# Patient Record
Sex: FEMALE | Race: WHITE | NOT HISPANIC OR LATINO | Employment: OTHER | URBAN - METROPOLITAN AREA
[De-identification: names, ages, dates, MRNs, and addresses within clinical notes are randomized per-mention and may not be internally consistent; named-entity substitution may affect disease eponyms.]

---

## 2017-01-03 RX ORDER — LEVOTHYROXINE SODIUM 75 UG/1
TABLET ORAL
Qty: 30 TABLET | Refills: 11 | Status: SHIPPED | OUTPATIENT
Start: 2017-01-03 | End: 2017-06-16

## 2017-01-12 ENCOUNTER — OFFICE VISIT (OUTPATIENT)
Dept: SURGERY | Facility: CLINIC | Age: 37
End: 2017-01-12
Payer: MEDICARE

## 2017-01-12 VITALS
SYSTOLIC BLOOD PRESSURE: 120 MMHG | HEIGHT: 62 IN | BODY MASS INDEX: 32.94 KG/M2 | WEIGHT: 179 LBS | HEART RATE: 112 BPM | OXYGEN SATURATION: 97 % | DIASTOLIC BLOOD PRESSURE: 78 MMHG | RESPIRATION RATE: 19 BRPM | TEMPERATURE: 98 F

## 2017-01-12 DIAGNOSIS — N61.0 CELLULITIS OF LEFT BREAST: Primary | ICD-10-CM

## 2017-01-12 RX ORDER — SODIUM CHLORIDE 0.9 % (FLUSH) 0.9 %
3 SYRINGE (ML) INJECTION EVERY 8 HOURS
Status: CANCELLED | OUTPATIENT
Start: 2017-01-12

## 2017-01-12 RX ORDER — SODIUM CHLORIDE AND POTASSIUM CHLORIDE 150; 900 MG/100ML; MG/100ML
INJECTION, SOLUTION INTRAVENOUS CONTINUOUS
Status: CANCELLED | OUTPATIENT
Start: 2017-01-12

## 2017-01-12 RX ORDER — ONDANSETRON 4 MG/1
4 TABLET, FILM COATED ORAL 4 TIMES DAILY PRN
COMMUNITY
End: 2017-01-26

## 2017-01-12 RX ORDER — ONDANSETRON 2 MG/ML
4 INJECTION INTRAMUSCULAR; INTRAVENOUS EVERY 12 HOURS PRN
Status: CANCELLED | OUTPATIENT
Start: 2017-01-12

## 2017-01-12 RX ORDER — HYDROCODONE BITARTRATE AND ACETAMINOPHEN 5; 325 MG/1; MG/1
1 TABLET ORAL EVERY 4 HOURS PRN
Status: CANCELLED | OUTPATIENT
Start: 2017-01-12

## 2017-01-12 RX ORDER — HYDROCODONE BITARTRATE AND ACETAMINOPHEN 10; 325 MG/1; MG/1
1 TABLET ORAL 3 TIMES DAILY PRN
COMMUNITY
End: 2017-01-26 | Stop reason: SDUPTHER

## 2017-01-12 RX ORDER — OXYCODONE HYDROCHLORIDE 5 MG/1
10 TABLET ORAL EVERY 4 HOURS PRN
Status: CANCELLED | OUTPATIENT
Start: 2017-01-12
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
## 2017-01-12 NOTE — MR AVS SNAPSHOT
[facility]  [address]  [address] [address]  Phone: [phone]  Fax: [phone]                  Jaycee Gray   2017 3:30 PM   Office Visit    Description:  Female : 1980   Provider:  TONY Steward MD   Department:  [facility]           Reason for Visit     Post-op Evaluation     Other           Diagnoses this Visit        Comments    Cellulitis of left breast    -  Primary            To Do List           Future Appointments        Provider Department Dept Phone    2017 3:30 PM TONY Steward MD [facility] [phone]      Goals (5 Years of Data)     None      [facility]     [facility] -  Assistance  Registered nurses in the [facility] provide clinical advisement, health education, appointment booking, and other advisory services.  Call for this free service at [phone].             Medications           Message regarding Medications     Verify the changes and/or additions to your medication regime listed below are the same as discussed with your clinician today.  If any of these changes or additions are incorrect, please notify your healthcare provider.        STOP taking these medications     oxycodone-acetaminophen (PERCOCET)  mg per tablet Take 1 tablet by mouth 2 (two) times daily as needed for Pain.           Verify that the below list of medications is an accurate representation of the medications you are currently taking.  If none reported, the list may be blank. If incorrect, please contact your healthcare provider. Carry this list with you in case of emergency.           Current Medications     hydrocodone-acetaminophen 10-325mg (NORCO)  mg Tab Take 1 tablet by mouth 3 (three) times daily as needed for Pain.    levothyroxine (SYNTHROID) 75 MCG tablet TAKE ONE TABLET BY MOUTH EVERY DAY BEFORE BREAKFAST    ondansetron (ZOFRAN) 4 MG  "tablet Take 4 mg by mouth 4 (four) times daily as needed for Nausea.    tizanidine (ZANAFLEX) 4 MG tablet Take 1 tablet (4 mg total) by mouth every 8 (eight) hours.    zolpidem (AMBIEN) 5 MG Tab Take 1 tablet (5 mg total) by mouth nightly as needed.           Clinical Reference Information           Vital Signs - Last Recorded  Most recent update: 1/12/2017 12:50 PM by Mary Ann Rhoades MA    BP Pulse Temp Resp Ht Wt    120/78 (BP Location: Left arm, Patient Position: Sitting, BP Method: Manual) (!) 112 98.4 °F (36.9 °C) 19 5' 2" (1.575 m) 81.2 kg (179 lb)    LMP SpO2 BMI          04/26/2009 97% 32.74 kg/m2        Blood Pressure          Most Recent Value    BP  120/78      Allergies as of 1/12/2017     Sumatriptan Succinate    Tramadol      Immunizations Administered on Date of Encounter - 1/12/2017     None      "

## 2017-01-26 ENCOUNTER — OFFICE VISIT (OUTPATIENT)
Dept: SURGERY | Facility: CLINIC | Age: 37
End: 2017-01-26
Payer: MEDICARE

## 2017-01-26 VITALS
SYSTOLIC BLOOD PRESSURE: 102 MMHG | DIASTOLIC BLOOD PRESSURE: 60 MMHG | HEART RATE: 94 BPM | BODY MASS INDEX: 32.57 KG/M2 | OXYGEN SATURATION: 99 % | HEIGHT: 62 IN | WEIGHT: 177 LBS | RESPIRATION RATE: 19 BRPM

## 2017-01-26 DIAGNOSIS — N64.89 HEMATOMA OF BREAST: ICD-10-CM

## 2017-01-26 DIAGNOSIS — N61.0 CELLULITIS OF BREAST: Primary | ICD-10-CM

## 2017-01-26 PROCEDURE — 99024 POSTOP FOLLOW-UP VISIT: CPT | Mod: S$GLB,,, | Performed by: PHYSICIAN ASSISTANT

## 2017-01-26 NOTE — MR AVS SNAPSHOT
[facility]  [address] [place] [address]  [address] [place] [address]  Phone: [phone]  Fax: [phone]                  Jaycee Gray   2017 1:15 PM   Office Visit    Description:  Female : 1980   Provider:  Denise Mims PA-C   Department:  [facility]           Reason for Visit     Post-op Evaluation                To Do List           Goals (5 Years of Data)     None      Follow-Up and Disposition     Return if symptoms worsen or fail to improve.      [facility]     [facility] -  Assistance  Registered nurses in the [facility] provide clinical advisement, health education, appointment booking, and other advisory services.  Call for this free service at [phone].             Medications           Message regarding Medications     Verify the changes and/or additions to your medication regime listed below are the same as discussed with your clinician today.  If any of these changes or additions are incorrect, please notify your healthcare provider.        STOP taking these medications     ondansetron (ZOFRAN) 4 MG tablet Take 4 mg by mouth 4 (four) times daily as needed for Nausea.           Verify that the below list of medications is an accurate representation of the medications you are currently taking.  If none reported, the list may be blank. If incorrect, please contact your healthcare provider. Carry this list with you in case of emergency.           Current Medications     levothyroxine (SYNTHROID) 75 MCG tablet TAKE ONE TABLET BY MOUTH EVERY DAY BEFORE BREAKFAST    mupirocin (BACTROBAN) 2 % ointment Apply topically 3 (three) times daily. Apply to nipple/areolar area    tizanidine (ZANAFLEX) 4 MG tablet Take 1 tablet (4 mg total) by mouth every 8 (eight) hours.    zolpidem (AMBIEN) 5 MG Tab Take 1 tablet (5 mg total) by mouth nightly as needed.    hydrocodone-acetaminophen 10-325mg (NORCO)  " mg Tab Take 1 tablet by mouth 4 (four) times daily as needed.    levoFLOXacin (LEVAQUIN) 750 MG tablet Take 1 tablet (750 mg total) by mouth once daily.           Clinical Reference Information           Vital Signs - Last Recorded  Most recent update: 1/26/2017  1:04 PM by Mary Ann Rhoades MA    BP Pulse Resp Ht Wt LMP    102/60 (BP Location: Right arm, Patient Position: Sitting, BP Method: Manual) 94 19 5' 2" (1.575 m) 80.3 kg (177 lb) 04/26/2009    SpO2 BMI             99% 32.37 kg/m2         Blood Pressure          Most Recent Value    BP  102/60      Allergies as of 1/26/2017     Carrot    Sumatriptan Succinate    Tramadol      Immunizations Administered on Date of Encounter - 1/26/2017     None      Instructions    1.  Apply skin lotion to the areola/nipple complex 3 x daily x 3 weeks.  2.  Call our office with any questions/concerns  3.  Return to clinic as needed       "

## 2017-01-26 NOTE — PROGRESS NOTES
History & Physical    SUBJECTIVE:     History of Present Illness:  Patient is a 36 y.o. female presents for hospital follow-up.  Patient was admitted to the hospital for cellulitis of the left lateral breast on 1/12/2017.  The patient was placed on antibiotics and asked to apply warm moist compresses to the breast for several days.  The area became dark showing a resolving hematoma and then the patient   Spiked a temp and the lateral aspect of the breast became erythematous and painful.  An ultrasound was ordered and no fluid/abscess was noted.  The patient was continued on antibiotics.  The following day, the patient spiked a temp and the area in question became redder.  The patient was taken to the OR on 1/18/2017 and under went multiple aspirations of the left breast.  No purulent exudate was obtained.  The antibiotics were discontinued and the patient was discharged the next day.  She returns today and advises she is much better.  She has no complaints today.    Chief Complaint   Patient presents with    Post-op Evaluation       Review of patient's allergies indicates:   Allergen Reactions    Carrot Hives and Shortness Of Breath    Sumatriptan succinate Other (See Comments)     paralysis    Tramadol Other (See Comments)     Faces swells       Current Outpatient Prescriptions   Medication Sig Dispense Refill    levothyroxine (SYNTHROID) 75 MCG tablet TAKE ONE TABLET BY MOUTH EVERY DAY BEFORE BREAKFAST 30 tablet 11    mupirocin (BACTROBAN) 2 % ointment Apply topically 3 (three) times daily. Apply to nipple/areolar area 1 Tube 1    tizanidine (ZANAFLEX) 4 MG tablet Take 1 tablet (4 mg total) by mouth every 8 (eight) hours. 30 tablet 11    zolpidem (AMBIEN) 5 MG Tab Take 1 tablet (5 mg total) by mouth nightly as needed. 30 tablet 2    hydrocodone-acetaminophen 10-325mg (NORCO)  mg Tab Take 1 tablet by mouth 4 (four) times daily as needed. 32 tablet 0    levoFLOXacin (LEVAQUIN) 750 MG tablet Take 1  "tablet (750 mg total) by mouth once daily. 7 tablet 0    [DISCONTINUED] escitalopram (LEXAPRO) 10 MG tablet Take 1 tablet (10 mg total) by mouth once daily. 30 tablet 11    [DISCONTINUED] ranitidine (ZANTAC) 150 MG tablet Take 1 tablet (150 mg total) by mouth 2 (two) times daily. 60 tablet 11    [DISCONTINUED] topiramate (TOPAMAX) 50 MG tablet Take 1 tablet (50 mg total) by mouth 2 (two) times daily. 60 tablet 0     No current facility-administered medications for this visit.        Past Medical History   Diagnosis Date    Anxiety     Breast abscess     Depression     Endometriosis of uterus     Headache(784.0)     Herpes     History of psychiatric care     History of psychiatric hospitalization     Muscular dystrophy     Psychiatric problem     PTSD (post-traumatic stress disorder)     Seizures     Self-injurious behavior      Past Surgical History   Procedure Laterality Date    Knee surgery Bilateral     Hysterectomy       TLH vs LAVH with BSO    Appendectomy      Breast surgery       reduction     Family History   Problem Relation Age of Onset    Cancer Maternal Grandfather      colon    Heart disease Neg Hx      Social History   Substance Use Topics    Smoking status: Former Smoker     Packs/day: 0.50     Years: 3.00     Types: Cigarettes    Smokeless tobacco: Never Used      Comment: quit 4/2015    Alcohol use Yes      Comment: at parties or holidays        Review of Systems:  Review of Systems   Skin: Negative for color change, pallor, rash and wound.        S/P Cellulitis and resolving hematoma to left breast       OBJECTIVE:     Vital Signs (Most Recent)  Pulse: 94 (01/26/17 1301)  Resp: 19 (01/26/17 1301)  BP: 102/60 (01/26/17 1301)  SpO2: 99 % (01/26/17 1301)  5' 2" (1.575 m)  80.3 kg (177 lb)     Physical Exam:  Physical Exam   Constitutional: She is oriented to person, place, and time. She appears well-developed and well-nourished. No distress.   HENT:   Head: Normocephalic and " atraumatic.   Right Ear: External ear normal.   Left Ear: External ear normal.   Nose: Nose normal.   Eyes: Conjunctivae and EOM are normal. Pupils are equal, round, and reactive to light. Right eye exhibits no discharge. Left eye exhibits no discharge. No scleral icterus.   Neck: Normal range of motion. Neck supple. No tracheal deviation present.   Cardiovascular: Normal rate, regular rhythm and normal heart sounds.  Exam reveals no gallop and no friction rub.    No murmur heard.  Pulmonary/Chest: Effort normal and breath sounds normal. No stridor. No respiratory distress. She has no wheezes. She has no rales.   Abdominal: Soft. Bowel sounds are normal. She exhibits no distension. There is no tenderness.   Musculoskeletal:   Decreased ROM secondary to MD.   Neurological: She is alert and oriented to person, place, and time. She displays abnormal reflex. Coordination (Patient has MD.) abnormal.   Skin: Skin is warm and dry. No rash noted. She is not diaphoretic. No erythema. No pallor.   Left Breast: no erythema, no bruising noted.  Hematoma completely resolved.  Areola/Nipple complex is completely healed.  NTTP.   Psychiatric: She has a normal mood and affect. Her behavior is normal. Judgment and thought content normal.   Nursing note and vitals reviewed.      Laboratory  Microbiology: Reviewed    Diagnostic Results:      ASSESSMENT/PLAN:     1.  Cellulitis of the Left Lateral Breast -- resolved  2.  Hematoma of the Left Inferior Breast  -- resolved  3.  H/o MD  4.  H/o Anxiety  5.  H/o H/A  6.  H/o Psyc issues including PTSD    PLAN:Plan     1.  Patient reassured of resolution of cellulitis and hematoma  2.  Patient advised to apply skin lotion to the areola/nipple complex 3 x daily x 3 weeks.  3.  Call our office with any questions/concerns  4.  RTC prn  5.  Dr Steward advised of above.

## 2017-01-26 NOTE — PATIENT INSTRUCTIONS
1.  Apply skin lotion to the areola/nipple complex 3 x daily x 3 weeks.  2.  Call our office with any questions/concerns  3.  Return to clinic as needed

## 2017-02-02 DIAGNOSIS — J02.9 PHARYNGITIS, UNSPECIFIED ETIOLOGY: ICD-10-CM

## 2017-02-02 RX ORDER — ZOLPIDEM TARTRATE 5 MG/1
TABLET ORAL
Qty: 30 TABLET | Refills: 2 | Status: SHIPPED | OUTPATIENT
Start: 2017-02-02 | End: 2017-04-24 | Stop reason: SDUPTHER

## 2017-02-06 ENCOUNTER — OFFICE VISIT (OUTPATIENT)
Dept: SURGERY | Facility: CLINIC | Age: 37
End: 2017-02-06
Payer: MEDICARE

## 2017-02-06 VITALS
SYSTOLIC BLOOD PRESSURE: 102 MMHG | BODY MASS INDEX: 33.31 KG/M2 | WEIGHT: 181 LBS | HEIGHT: 62 IN | OXYGEN SATURATION: 98 % | RESPIRATION RATE: 18 BRPM | HEART RATE: 84 BPM | DIASTOLIC BLOOD PRESSURE: 60 MMHG

## 2017-02-06 DIAGNOSIS — L65.9 HAIR LOSS: ICD-10-CM

## 2017-02-06 DIAGNOSIS — N64.4 MASTODYNIA OF LEFT BREAST: Primary | ICD-10-CM

## 2017-02-06 DIAGNOSIS — R11.2 NAUSEA AND VOMITING, INTRACTABILITY OF VOMITING NOT SPECIFIED, UNSPECIFIED VOMITING TYPE: ICD-10-CM

## 2017-02-06 PROCEDURE — 99214 OFFICE O/P EST MOD 30 MIN: CPT | Mod: 24,S$GLB,, | Performed by: PHYSICIAN ASSISTANT

## 2017-02-06 RX ORDER — HYDROCODONE BITARTRATE AND ACETAMINOPHEN 7.5; 325 MG/1; MG/1
1 TABLET ORAL EVERY 6 HOURS PRN
Qty: 32 TABLET | Refills: 0 | Status: SHIPPED | OUTPATIENT
Start: 2017-02-06 | End: 2017-02-06 | Stop reason: ALTCHOICE

## 2017-02-06 RX ORDER — OXYCODONE AND ACETAMINOPHEN 10; 325 MG/1; MG/1
1 TABLET ORAL EVERY 6 HOURS PRN
Qty: 30 TABLET | Refills: 0 | Status: SHIPPED | OUTPATIENT
Start: 2017-02-06 | End: 2017-02-13

## 2017-02-06 RX ORDER — MUPIROCIN 20 MG/G
OINTMENT TOPICAL 2 TIMES DAILY
Qty: 1 TUBE | Refills: 1 | Status: SHIPPED | OUTPATIENT
Start: 2017-02-06 | End: 2017-02-13

## 2017-02-06 RX ORDER — PROMETHAZINE HYDROCHLORIDE 25 MG/1
25 TABLET ORAL EVERY 4 HOURS
Qty: 20 TABLET | Refills: 0 | Status: SHIPPED | OUTPATIENT
Start: 2017-02-06 | End: 2017-02-13

## 2017-02-06 NOTE — PATIENT INSTRUCTIONS
1.  Rx Percocet prn pain  2.  Rx Phenergan prn Nausea/Vomiting  3.  Rx Bactroban to apply to left areola twice a day after cleaning site.  4.  F/u one week  5.  Call our office with any questions/concerns  6.  Labs to check Thyroid Functions

## 2017-02-06 NOTE — MR AVS SNAPSHOT
[facility]  [address]  [address] [address]  Phone: [phone]  Fax: [phone]                  Jaycee Gray   2017 1:45 PM   Office Visit    Description:  Female : 1980   Provider:  Denise Mims PA-C   Department:  [facility]           Reason for Visit     Breast Problem           Diagnoses this Visit        Comments    Mastodynia of left breast    -  Primary     Nausea and vomiting, intractability of vomiting not specified, unspecified vomiting type         Hair loss                To Do List           Future Appointments        Provider Department Dept Phone    2017 1:45 PM Denise Mims PA-C [facility] [phone]    2017 1:45 PM TONY Steward MD [facility] [phone]      Goals (5 Years of Data)     None      Follow-Up and Disposition     Return in about 7 days (around 2017) for recheck.       These Medications        Disp Refills Start End    mupirocin (BACTROBAN) 2 % ointment 1 Tube 1 2017    Apply topically 2 (two) times daily. - Topical (Top)    Pharmacy: [facility] [address] [place], [place] [id number] [facility] [address] Ph #: [phone]       oxycodone-acetaminophen (PERCOCET)  mg per tablet 30 tablet 0 2017    Take 1 tablet by mouth every 6 (six) hours as needed for Pain. - Oral    [facility]: [facility] [address] [place], [place] [id number] [facility] [address] Ph #: [phone]       promethazine (PHENERGAN) 25 MG tablet 20 tablet 0 2017     Take 1 tablet (25 mg total) by mouth every 4 (four) hours. - Oral    [facility] [address] [place], [place] [id number] [address] AT [facility] [address] Ph #: [phone]         [facility]     [facility] Care [facility] -  Assistance  Registered nurses in the [facility]  [facility] provide clinical advisement, health education, appointment booking, and other advisory services.  Call for this free service at [phone].             Medications           Message regarding Medications     Verify the changes and/or additions to your medication regime listed below are the same as discussed with your clinician today.  If any of these changes or additions are incorrect, please notify your healthcare provider.        START taking these NEW medications        Refills    mupirocin (BACTROBAN) 2 % ointment 1    Sig: Apply topically 2 (two) times daily.    Class: Print    Route: Topical (Top)    oxycodone-acetaminophen (PERCOCET)  mg per tablet 0    Sig: Take 1 tablet by mouth every 6 (six) hours as needed for Pain.    Class: Print    Route: Oral    promethazine (PHENERGAN) 25 MG tablet 0    Sig: Take 1 tablet (25 mg total) by mouth every 4 (four) hours.    Class: Print    Route: Oral           Verify that the below list of medications is an accurate representation of the medications you are currently taking.  If none reported, the list may be blank. If incorrect, please contact your healthcare provider. Carry this list with you in case of emergency.           Current Medications     levothyroxine (SYNTHROID) 75 MCG tablet TAKE ONE TABLET BY MOUTH EVERY DAY BEFORE BREAKFAST    tizanidine (ZANAFLEX) 4 MG tablet Take 1 tablet (4 mg total) by mouth every 8 (eight) hours.    zolpidem (AMBIEN) 5 MG Tab TAKE ONE TABLET BY MOUTH NIGHTLY AS NEEDED    mupirocin (BACTROBAN) 2 % ointment Apply topically 2 (two) times daily.    oxycodone-acetaminophen (PERCOCET)  mg per tablet Take 1 tablet by mouth every 6 (six) hours as needed for Pain.    promethazine (PHENERGAN) 25 MG tablet Take 1 tablet (25 mg total) by mouth every 4 (four) hours.           Clinical Reference Information           Your Vitals Were     BP Pulse Resp Height Weight Last Period    102/60 (BP Location: Right arm, Patient  "Position: Sitting, BP Method: Manual) 84 18 5' 2" (1.575 m) 82.1 kg (181 lb) 04/26/2009    SpO2 BMI             98% 33.11 kg/m2         Blood Pressure          Most Recent Value    BP  102/60      Allergies as of 2/6/2017     Carrot    Sumatriptan Succinate    Tramadol      Immunizations Administered on Date of Encounter - 2/6/2017     None      Orders Placed During Today's Visit     Future Labs/Procedures Expected by Expires    T3  2/6/2017 4/7/2018    T4  2/6/2017 4/7/2018    TSH  2/6/2017 4/7/2018      Instructions    1.  Rx Percocet prn pain  2.  Rx Phenergan prn Nausea/Vomiting  3.  Rx Bactroban to apply to left areola twice a day after cleaning site.  4.  F/u one week  5.  Call our office with any questions/concerns       Language Assistance Services     ATTENTION: Language assistance services are available, free of charge. Please call [phone].      ATENCIÓN: Si habla marnie, tiene a razo disposición servicios gratuitos de asistencia lingüística. Llame al [phone].     PARIS Ý: N?u b?n nói Ti?ng Vi?t, có các d?ch v? h? tr? ngôn ng? mi?n phí dành cho b?n. G?i s? [phone].         [facility] complies with applicable Federal civil rights laws and does not discriminate on the basis of race, color, national origin, age, disability, or sex.        "

## 2017-02-06 NOTE — PROGRESS NOTES
History & Physical    SUBJECTIVE:     History of Present Illness:  Patient is a 36 y.o. female presents with complaints of a new problem with the left breast.  Patient advises of a mass that came up to the mid & lateral inferior aspect of the breast on Friday and has progressively increased in size.  Patient complains of a lot of pain with palpation of the site.  Patient also complains of nausea and vomiting due to the pain.  Patient denies nipple discharge.  Patient denies erythema or calor to the breast.  Patient also complains of hair loss that started with her breast reduction surgery last year.  I have reviewed patient's medical history, past surgeries, social history, medications and allergies.    Chief Complaint   Patient presents with    Breast Problem     Left Breast pain       Review of patient's allergies indicates:   Allergen Reactions    Carrot Hives and Shortness Of Breath    Sumatriptan succinate Other (See Comments)     paralysis    Tramadol Other (See Comments)     Faces swells       Current Outpatient Prescriptions   Medication Sig Dispense Refill    levothyroxine (SYNTHROID) 75 MCG tablet TAKE ONE TABLET BY MOUTH EVERY DAY BEFORE BREAKFAST 30 tablet 11    tizanidine (ZANAFLEX) 4 MG tablet Take 1 tablet (4 mg total) by mouth every 8 (eight) hours. 30 tablet 11    zolpidem (AMBIEN) 5 MG Tab TAKE ONE TABLET BY MOUTH NIGHTLY AS NEEDED 30 tablet 2    hydrocodone-acetaminophen 7.5-325mg (NORCO) 7.5-325 mg per tablet Take 1 tablet by mouth every 6 (six) hours as needed for Pain. 32 tablet 0    mupirocin (BACTROBAN) 2 % ointment Apply topically 2 (two) times daily. 1 Tube 1    [DISCONTINUED] escitalopram (LEXAPRO) 10 MG tablet Take 1 tablet (10 mg total) by mouth once daily. 30 tablet 11    [DISCONTINUED] ranitidine (ZANTAC) 150 MG tablet Take 1 tablet (150 mg total) by mouth 2 (two) times daily. 60 tablet 11    [DISCONTINUED] topiramate (TOPAMAX) 50 MG tablet Take 1 tablet (50 mg total) by  mouth 2 (two) times daily. 60 tablet 0     No current facility-administered medications for this visit.        Past Medical History   Diagnosis Date    Anxiety     Breast abscess     Depression     Endometriosis of uterus     Headache(784.0)     Herpes     History of psychiatric care     History of psychiatric hospitalization     Muscular dystrophy     Psychiatric problem     PTSD (post-traumatic stress disorder)     Seizures     Self-injurious behavior      Past Surgical History   Procedure Laterality Date    Knee surgery Bilateral     Hysterectomy       TLH vs LAVH with BSO    Appendectomy      Breast surgery       reduction     Family History   Problem Relation Age of Onset    Cancer Maternal Grandfather      colon    Heart disease Neg Hx      Social History   Substance Use Topics    Smoking status: Former Smoker     Packs/day: 0.50     Years: 3.00     Types: Cigarettes    Smokeless tobacco: Never Used      Comment: quit 4/2015    Alcohol use Yes      Comment: at parties or holidays        Review of Systems:  Review of Systems   Constitutional: Positive for activity change (secondary to pain) and appetite change (secondary to nausea and vomiting). Negative for chills, diaphoresis, fatigue and fever.   HENT: Negative for congestion, postnasal drip, rhinorrhea, sinus pressure, sneezing and sore throat.    Respiratory: Negative for cough, choking, shortness of breath, wheezing and stridor.    Cardiovascular: Negative for chest pain and palpitations.   Gastrointestinal: Positive for nausea (secondary to pain) and vomiting (secondary to pain). Negative for abdominal distention, abdominal pain, constipation and diarrhea.   Musculoskeletal: Positive for arthralgias, back pain and myalgias. Negative for neck pain and neck stiffness.   Skin: Positive for wound (on the areaola). Negative for color change, pallor and rash.   Neurological: Positive for weakness. Negative for dizziness, seizures,  "light-headedness and headaches.   Psychiatric/Behavioral: Negative for agitation and confusion. The patient is not nervous/anxious and is not hyperactive.        OBJECTIVE:     Vital Signs (Most Recent)  Pulse: 84 (02/06/17 1255)  Resp: 18 (02/06/17 1255)  BP: 102/60 (02/06/17 1255)  SpO2: 98 % (02/06/17 1255)  5' 2" (1.575 m)  82.1 kg (181 lb)     Physical Exam:  Physical Exam   Constitutional: She is oriented to person, place, and time. She appears well-developed and well-nourished. No distress.   HENT:   Head: Normocephalic and atraumatic.   Right Ear: External ear normal.   Left Ear: External ear normal.   Nose: Nose normal.   Eyes: Conjunctivae and EOM are normal. Pupils are equal, round, and reactive to light. No scleral icterus.   Neck: Normal range of motion. Neck supple. No tracheal deviation present.   Cardiovascular: Normal rate, regular rhythm and normal heart sounds.  Exam reveals no gallop and no friction rub.    No murmur heard.  Pulmonary/Chest: Effort normal and breath sounds normal. No stridor. No respiratory distress. She has no wheezes. She has no rales.   Abdominal: Soft. Bowel sounds are normal. She exhibits no distension. There is no tenderness.   Musculoskeletal:   Decreased ROM secondary to MDA     Neurological: She is alert and oriented to person, place, and time. Coordination (secondary to MDA) abnormal.   Skin: Skin is warm and dry. No rash noted. She is not diaphoretic. No erythema. No pallor.   "Golf-ball" size mass to the mid-lateral inferior aspect of the left breast, no erythema, no calor, mobile, exquisitely TTP.   Psychiatric: She has a normal mood and affect. Her behavior is normal. Judgment and thought content normal.   Nursing note and vitals reviewed.      Laboratory  No Recent TFT's    Diagnostic Results:      ASSESSMENT/PLAN:     1.  Mastodynia of Left Breast  2.  Nausea with Vomiting  3.  Hair loss  4.  H/o MDA  5.  H/o Anxiety  6.  H/o Depression  7.  H/o PTSD    PLAN:Plan "     1.  Rx Bactroban to apply to left areola BID x 7 days  2.  Rx Phenergan 25 mg one po Q 4 HRS prn N/V  3.  Rx Percocet 10/325 mg one po Q 6 HRS prn pain  4.  Re-evaluate left breast in one week  5.  Patient advised to call our office if sxs become worse.  6.  Dr Steward is aware of above.  7.  TFT's to check thyroid function

## 2017-02-13 ENCOUNTER — OFFICE VISIT (OUTPATIENT)
Dept: SURGERY | Facility: CLINIC | Age: 37
End: 2017-02-13
Payer: MEDICARE

## 2017-02-13 VITALS
OXYGEN SATURATION: 98 % | HEIGHT: 62 IN | TEMPERATURE: 98 F | BODY MASS INDEX: 32.82 KG/M2 | RESPIRATION RATE: 18 BRPM | HEART RATE: 96 BPM | WEIGHT: 178.38 LBS | DIASTOLIC BLOOD PRESSURE: 60 MMHG | SYSTOLIC BLOOD PRESSURE: 102 MMHG

## 2017-02-13 DIAGNOSIS — N64.4 BREAST PAIN: Primary | ICD-10-CM

## 2017-02-13 DIAGNOSIS — R11.2 NAUSEA AND VOMITING, INTRACTABILITY OF VOMITING NOT SPECIFIED, UNSPECIFIED VOMITING TYPE: ICD-10-CM

## 2017-02-13 DIAGNOSIS — F41.9 ANXIETY: ICD-10-CM

## 2017-02-13 PROCEDURE — 99214 OFFICE O/P EST MOD 30 MIN: CPT | Mod: S$GLB,,, | Performed by: PHYSICIAN ASSISTANT

## 2017-02-13 RX ORDER — PROMETHAZINE HYDROCHLORIDE 25 MG/1
25 TABLET ORAL EVERY 4 HOURS PRN
Qty: 12 TABLET | Refills: 0 | Status: SHIPPED | OUTPATIENT
Start: 2017-02-13 | End: 2017-03-08

## 2017-02-13 RX ORDER — OXYCODONE AND ACETAMINOPHEN 5; 325 MG/1; MG/1
1 TABLET ORAL 3 TIMES DAILY PRN
Qty: 24 TABLET | Refills: 0 | Status: SHIPPED | OUTPATIENT
Start: 2017-02-13 | End: 2017-03-08

## 2017-02-13 RX ORDER — LORAZEPAM 0.5 MG/1
0.5 TABLET ORAL 2 TIMES DAILY PRN
Qty: 30 TABLET | Refills: 0 | Status: SHIPPED | OUTPATIENT
Start: 2017-02-13 | End: 2017-03-08

## 2017-02-13 NOTE — MR AVS SNAPSHOT
[facility]  [address]  [address]  [place] [address]  Phone: [phone]  Fax: [phone]                  Jaycee Gray   2017 1:45 PM   Office Visit    Description:  Female : 1980   Provider:  Denise Mims PA-C   Department:  [facility]           Reason for Visit     Follow-up           Diagnoses this Visit        Comments    Breast pain    -  Primary     Nausea and vomiting, intractability of vomiting not specified, unspecified vomiting type         Anxiety                To Do List           Goals (5 Years of Data)     None      Follow-Up and Disposition     Return if symptoms worsen or fail to improve.       These Medications        Disp Refills Start End    oxycodone-acetaminophen (PERCOCET) 5-325 mg per tablet 24 tablet 0 2017     Take 1 tablet by mouth 3 (three) times daily as needed for Pain. - Oral    Pharmacy: [facility] [place] [address] AT [facility] [address] Ph #: [phone]       promethazine (PHENERGAN) 25 MG tablet 12 tablet 0 2017     Take 1 tablet (25 mg total) by mouth every 4 (four) hours as needed for Nausea. - Oral    Pharmacy: [facility] [place] [address] AT [facility] [address] Ph #: [phone]       lorazepam (ATIVAN) 0.5 MG tablet 30 tablet 0 2017 3/15/2017    Take 1 tablet (0.5 mg total) by mouth 2 (two) times daily as needed for Anxiety. - Oral    Ph[facility] [place] [address] [facility] [address] Ph #: [phone]         [facility]     [facility] On Call Nurse Care Line -  Assistance  Registered nurses in the [facility] provide clinical advisement, health education, appointment booking, and other advisory services.  Call for this free service at [phone].             Medications           Message regarding  Medications     Verify the changes and/or additions to your medication regime listed below are the same as discussed with your clinician today.  If any of these changes or additions are incorrect, please notify your healthcare provider.        START taking these NEW medications        Refills    oxycodone-acetaminophen (PERCOCET) 5-325 mg per tablet 0    Sig: Take 1 tablet by mouth 3 (three) times daily as needed for Pain.    Class: Print    Route: Oral    promethazine (PHENERGAN) 25 MG tablet 0    Sig: Take 1 tablet (25 mg total) by mouth every 4 (four) hours as needed for Nausea.    Class: Print    Route: Oral    lorazepam (ATIVAN) 0.5 MG tablet 0    Sig: Take 1 tablet (0.5 mg total) by mouth 2 (two) times daily as needed for Anxiety.    Class: Print    Route: Oral      STOP taking these medications     oxycodone-acetaminophen (PERCOCET)  mg per tablet Take 1 tablet by mouth every 6 (six) hours as needed for Pain.           Verify that the below list of medications is an accurate representation of the medications you are currently taking.  If none reported, the list may be blank. If incorrect, please contact your healthcare provider. Carry this list with you in case of emergency.           Current Medications     levothyroxine (SYNTHROID) 75 MCG tablet TAKE ONE TABLET BY MOUTH EVERY DAY BEFORE BREAKFAST    mupirocin (BACTROBAN) 2 % ointment Apply topically 2 (two) times daily.    tizanidine (ZANAFLEX) 4 MG tablet Take 1 tablet (4 mg total) by mouth every 8 (eight) hours.    zolpidem (AMBIEN) 5 MG Tab TAKE ONE TABLET BY MOUTH NIGHTLY AS NEEDED    lorazepam (ATIVAN) 0.5 MG tablet Take 1 tablet (0.5 mg total) by mouth 2 (two) times daily as needed for Anxiety.    oxycodone-acetaminophen (PERCOCET) 5-325 mg per tablet Take 1 tablet by mouth 3 (three) times daily as needed for Pain.    promethazine (PHENERGAN) 25 MG tablet Take 1 tablet (25 mg total) by mouth every 4 (four) hours as needed for Nausea.          "  Clinical Reference Information           Your Vitals Were     BP Pulse Temp Resp Height Weight    102/60 (BP Location: Right arm, Patient Position: Sitting, BP Method: Manual) 96 98.3 °F (36.8 °C) 18 5' 2" (1.575 m) 80.9 kg (178 lb 6.4 oz)    Last Period SpO2 BMI          04/26/2009 (Exact Date) 98% 32.63 kg/m2        Blood Pressure          Most Recent Value    BP  102/60      Allergies as of 2/13/2017     Carrot    Sumatriptan Succinate    Tramadol      Immunizations Administered on Date of Encounter - 2/13/2017     None      Instructions    1.  Rx Phenergan 25 mg one po every 4 hours as needed for Nausea/vomiting  2.  Rx Percocet 5/325 mg #24 one po TID prn pain  3.  Rx Ativan 0.5 mg one po BID prn anxiety  4.  The left breast mass should continue to decrease in size  5.  Call our office with any questions/concerns       Language Assistance Services     ATTENTION: Language assistance services are available, free of charge. Please call [phone].      ATENCIÓN: Si habla español, tiene a razo disposición servicios gratuitos de asistencia lingüística. Llame al [phone].     PARIS Ý: N?u b?n nói Ti?ng Vi?t, có các d?ch v? h? tr? ngôn ng? mi?n phí dành cho b?n. G?i s? [phone].         [facility] complies with applicable Federal civil rights laws and does not discriminate on the basis of race, color, national origin, age, disability, or sex.        "

## 2017-02-13 NOTE — PATIENT INSTRUCTIONS
1.  Rx Phenergan 25 mg one po every 4 hours as needed for Nausea/vomiting  2.  Rx Percocet 5/325 mg #24 one po TID prn pain  3.  Rx Ativan 0.5 mg one po BID prn anxiety  4.  The left breast mass should continue to decrease in size  5.  Call our office with any questions/concerns

## 2017-03-08 ENCOUNTER — OFFICE VISIT (OUTPATIENT)
Dept: FAMILY MEDICINE | Facility: CLINIC | Age: 37
End: 2017-03-08
Payer: MEDICARE

## 2017-03-08 ENCOUNTER — TELEPHONE (OUTPATIENT)
Dept: FAMILY MEDICINE | Facility: CLINIC | Age: 37
End: 2017-03-08

## 2017-03-08 VITALS
RESPIRATION RATE: 16 BRPM | WEIGHT: 180.75 LBS | DIASTOLIC BLOOD PRESSURE: 80 MMHG | SYSTOLIC BLOOD PRESSURE: 126 MMHG | OXYGEN SATURATION: 99 % | BODY MASS INDEX: 33.06 KG/M2 | HEART RATE: 91 BPM

## 2017-03-08 DIAGNOSIS — R10.32 LEFT LOWER QUADRANT PAIN: Primary | ICD-10-CM

## 2017-03-08 PROCEDURE — 99214 OFFICE O/P EST MOD 30 MIN: CPT | Mod: S$PBB,,,

## 2017-03-08 PROCEDURE — 99213 OFFICE O/P EST LOW 20 MIN: CPT | Mod: PBBFAC,PO

## 2017-03-08 PROCEDURE — 99999 PR PBB SHADOW E&M-EST. PATIENT-LVL III: CPT | Mod: PBBFAC,,,

## 2017-03-08 RX ORDER — METRONIDAZOLE 500 MG/1
500 TABLET ORAL 3 TIMES DAILY
Qty: 30 TABLET | Refills: 0 | Status: SHIPPED | OUTPATIENT
Start: 2017-03-08 | End: 2017-03-13

## 2017-03-08 RX ORDER — CIPROFLOXACIN 500 MG/1
500 TABLET ORAL 2 TIMES DAILY
Qty: 20 TABLET | Refills: 0 | Status: SHIPPED | OUTPATIENT
Start: 2017-03-08 | End: 2017-03-13

## 2017-03-08 RX ORDER — METRONIDAZOLE 500 MG/1
500 TABLET ORAL 3 TIMES DAILY
Qty: 30 TABLET | Refills: 0 | Status: SHIPPED | OUTPATIENT
Start: 2017-03-08 | End: 2017-03-08 | Stop reason: SDUPTHER

## 2017-03-08 RX ORDER — CIPROFLOXACIN 500 MG/1
500 TABLET ORAL 2 TIMES DAILY
Qty: 20 TABLET | Refills: 0 | Status: SHIPPED | OUTPATIENT
Start: 2017-03-08 | End: 2017-03-08 | Stop reason: SDUPTHER

## 2017-03-08 NOTE — PROGRESS NOTES
Subjective:       Patient ID: Jaycee Gray is a 36 y.o. female.    Chief Complaint: LLQ pain (10/10)    HPI Complaining of pain in her LLQ of severe intensity. She thinks that taking pain medications has been masking her pain. She was prescribed over 250 opioid pain medications since November. This was for post-op pain from a breast reduction and subsequent complications including an abscess and infection that required 3 surgical procedures. She is concerned it is related to endometriosis and is scheduled to consult with gyn next week. She had serious pain last week while in [facility]. She vomits after meals. No weight loss. She is discouraged. LLQ stabbing. Daily constant but gets worse at times. Standing and walking make the pain worse. She consulted with Dr. Cavazos for back pain. He advised her not to do epidural injections. BM or passing gas doesn't help. She had blood in her stool. She is not currently sexually active.     Review of Systems   Constitutional: Positive for appetite change and diaphoresis. Negative for activity change, chills, fatigue and fever.   HENT: Negative.    Gastrointestinal: Positive for abdominal distention, abdominal pain, blood in stool, nausea and vomiting. Negative for anal bleeding, diarrhea and rectal pain.   Endocrine: Negative.    Genitourinary: Positive for hematuria and pelvic pain. Negative for difficulty urinating, dysuria, flank pain, urgency, vaginal bleeding and vaginal discharge.   Musculoskeletal: Positive for back pain.   Skin: Negative for rash.   Neurological: Positive for light-headedness.   Hematological: Negative.    Psychiatric/Behavioral: Negative for dysphoric mood.       Objective:      Vitals:    03/08/17 1300   BP: 126/80   Pulse: 91   Resp: 16     Physical Exam   Constitutional: She is oriented to person, place, and time. She appears well-developed and well-nourished. She is active.  Non-toxic appearance. She does not have a sickly appearance. She does  not appear ill. No distress.   HENT:   Head: Normocephalic and atraumatic.   Right Ear: External ear normal.   Left Ear: External ear normal.   Nose: Nose normal.   Hearing normal.    Eyes: Conjunctivae are normal. Pupils are equal, round, and reactive to light.   Neck: Normal range of motion. Neck supple. No thyroid mass and no thyromegaly present.   Cardiovascular: Normal rate, regular rhythm, normal heart sounds and intact distal pulses.  Exam reveals no gallop and no friction rub.    No murmur heard.  Pulses:       Dorsalis pedis pulses are 2+ on the right side, and 2+ on the left side.        Posterior tibial pulses are 2+ on the right side, and 2+ on the left side.   Pulmonary/Chest: Effort normal and breath sounds normal. No respiratory distress. She exhibits no tenderness.   Abdominal: Soft. Bowel sounds are normal. There is tenderness.   Musculoskeletal: Normal range of motion. She exhibits no edema.   Lymphadenopathy:     She has no cervical adenopathy.   Neurological: She is alert and oriented to person, place, and time. She has normal strength. Coordination and gait normal.   Skin: Skin is warm and dry. She is not diaphoretic. No pallor.   Psychiatric: She has a normal mood and affect. Her speech is normal and behavior is normal. Judgment and thought content normal. Cognition and memory are normal.   Vitals reviewed.      Assessment:       1. Left lower quadrant pain        Plan:       Left lower quadrant pain  -     CT Abdomen Without Contrast; Future; Expected date: 3/8/17  -     Discontinue: ciprofloxacin HCl (CIPRO) 500 MG tablet; Take 1 tablet (500 mg total) by mouth 2 (two) times daily.  Dispense: 20 tablet; Refill: 0  -     Discontinue: metronidazole (FLAGYL) 500 MG tablet; Take 1 tablet (500 mg total) by mouth 3 (three) times daily.  Dispense: 30 tablet; Refill: 0  -     Ambulatory referral to General Surgery  -     ciprofloxacin HCl (CIPRO) 500 MG tablet; Take 1 tablet (500 mg total) by mouth 2  (two) times daily.  Dispense: 20 tablet; Refill: 0  -     metronidazole (FLAGYL) 500 MG tablet; Take 1 tablet (500 mg total) by mouth 3 (three) times daily.  Dispense: 30 tablet; Refill: 0      Return in about 4 weeks (around 4/5/2017).

## 2017-03-08 NOTE — TELEPHONE ENCOUNTER
----- Message from Ashwini Amanda sent at 3/8/2017  2:46 PM CST -----  taye langley in [place]   [phone]  Called to say script given to patient today has a major drug interaction with something else she is taking       cipro and zanaflex              Call pharmacy

## 2017-03-08 NOTE — MR AVS SNAPSHOT
[facility]  [address] [place] [address]  Phone: [phone]  Fax: [phone]                  Jaycee Gray   3/8/2017 1:00 PM   Office Visit    Description:  Female : 1980   Provider:  Matthew Hartman Jr., MD   Department:  [facility]           Reason for Visit     LLQ pain           Diagnoses this Visit        Comments    Left lower quadrant pain    -  Primary            To Do List           Future Appointments        Provider Department Dept Phone    3/13/2017 2:30 PM Jena Jackson MD [place] - [phone]      Goals (5 Years of Data)     None      Follow-Up and Disposition     Return in about 4 weeks (around 2017).       These Medications        Disp Refills Start End    ciprofloxacin HCl (CIPRO) 500 MG tablet 20 tablet 0 3/8/2017 3/18/2017    Take 1 tablet (500 mg total) by mouth 2 (two) times daily. - Oral    Pharmacy: [id number] - [place] - [address] Ph #: [phone]       metronidazole (FLAGYL) 500 MG tablet 30 tablet 0 3/8/2017 3/18/2017    Take 1 tablet (500 mg total) by mouth 3 (three) times daily. - Oral    Pharmacy: [id number] - [place] - [address]  #: [phone]         [facility]     [facility] On Call Nurse Care Line -  Assistance  Registered nurses in the [facility] provide clinical advisement, health education, appointment booking, and other advisory services.  Call for this free service at [phone].             Medications           Message regarding Medications     Verify the changes and/or additions to your medication regime listed below are the same as discussed with your clinician today.  If any of these changes or additions are incorrect, please notify your healthcare provider.        START taking these NEW medications        Refills    ciprofloxacin HCl (CIPRO) 500 MG tablet 0    Sig: Take 1 tablet (500 mg total) by mouth 2 (two) times daily.    Class:  Normal    Route: Oral    metronidazole (FLAGYL) 500 MG tablet 0    Sig: Take 1 tablet (500 mg total) by mouth 3 (three) times daily.    Class: Normal    Route: Oral      STOP taking these medications     lorazepam (ATIVAN) 0.5 MG tablet Take 1 tablet (0.5 mg total) by mouth 2 (two) times daily as needed for Anxiety.    oxycodone-acetaminophen (PERCOCET) 5-325 mg per tablet Take 1 tablet by mouth 3 (three) times daily as needed for Pain.    promethazine (PHENERGAN) 25 MG tablet Take 1 tablet (25 mg total) by mouth every 4 (four) hours as needed for Nausea.    tizanidine (ZANAFLEX) 4 MG tablet Take 1 tablet (4 mg total) by mouth every 8 (eight) hours.           Verify that the below list of medications is an accurate representation of the medications you are currently taking.  If none reported, the list may be blank. If incorrect, please contact your healthcare provider. Carry this list with you in case of emergency.           Current Medications     levothyroxine (SYNTHROID) 75 MCG tablet TAKE ONE TABLET BY MOUTH EVERY DAY BEFORE BREAKFAST    zolpidem (AMBIEN) 5 MG Tab TAKE ONE TABLET BY MOUTH NIGHTLY AS NEEDED    ciprofloxacin HCl (CIPRO) 500 MG tablet Take 1 tablet (500 mg total) by mouth 2 (two) times daily.    metronidazole (FLAGYL) 500 MG tablet Take 1 tablet (500 mg total) by mouth 3 (three) times daily.           Clinical Reference Information           Your Vitals Were     BP Pulse Resp Weight Last Period SpO2    126/80 (BP Location: Right arm, Patient Position: Sitting, BP Method: Manual) 91 16 82 kg (180 lb 12.4 oz) 04/26/2009 (Exact Date) 99%    BMI                33.06 kg/m2          Blood Pressure          Most Recent Value    BP  126/80      Allergies as of 3/8/2017     Carrot    Sumatriptan Succinate    Tramadol      Immunizations Administered on Date of Encounter - 3/8/2017     None      Orders Placed During Today's Visit      Normal Orders This Visit    Ambulatory referral to General Surgery      Future Labs/Procedures Expected by Expires    CT Abdomen Without Contrast  3/8/2017 3/8/2018      [facility] Sign-Up     Activating your [facility] account is as easy as 1-2-3!     1) Visit [email or web address], select Sign Up Now, enter this activation code and your date of birth, then select Next.  Activation code not generated  Current Patient Portal Status: Account disabled      2) Create a username and password to use when you visit [facility] in the future and select a security question in case you lose your password and select Next.    3) Enter your e-mail address and click Sign Up!    Additional Information  If you have questions, please e-mail [email or web address] or call [phone] to talk to our [facility] staff. Remember, [facility] is NOT to be used for urgent needs. For medical emergencies, dial 911.         Language Assistance Services     ATTENTION: Language assistance services are available, free of charge. Please call [phone].      ATENCIÓN: Si habla marnie, tiene a razo disposición servicios gratuitos de asistencia lingüística. Llame al [phone].     PARIS Ý: N?u b?n nói Ti?ng Vi?t, có các d?ch v? h? tr? ngôn ng? mi?n phí dành cho b?n. G?i s? [phone].         [facility] complies with applicable Federal civil rights laws and does not discriminate on the basis of race, color, national origin, age, disability, or sex.

## 2017-03-13 ENCOUNTER — OFFICE VISIT (OUTPATIENT)
Dept: OBSTETRICS AND GYNECOLOGY | Facility: CLINIC | Age: 37
End: 2017-03-13
Payer: MEDICARE

## 2017-03-13 VITALS
SYSTOLIC BLOOD PRESSURE: 100 MMHG | BODY MASS INDEX: 32.7 KG/M2 | HEIGHT: 62 IN | DIASTOLIC BLOOD PRESSURE: 70 MMHG | WEIGHT: 177.69 LBS

## 2017-03-13 DIAGNOSIS — G89.29 CHRONIC PELVIC PAIN IN FEMALE: Primary | ICD-10-CM

## 2017-03-13 DIAGNOSIS — R10.2 CHRONIC PELVIC PAIN IN FEMALE: Primary | ICD-10-CM

## 2017-03-13 PROCEDURE — 99999 PR PBB SHADOW E&M-EST. PATIENT-LVL II: CPT | Mod: PBBFAC,,, | Performed by: OBSTETRICS & GYNECOLOGY

## 2017-03-13 PROCEDURE — 99212 OFFICE O/P EST SF 10 MIN: CPT | Mod: PBBFAC,PO | Performed by: OBSTETRICS & GYNECOLOGY

## 2017-03-13 PROCEDURE — 99212 OFFICE O/P EST SF 10 MIN: CPT | Mod: S$PBB,,, | Performed by: OBSTETRICS & GYNECOLOGY
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
## 2017-03-14 ENCOUNTER — INITIAL CONSULT (OUTPATIENT)
Dept: SURGERY | Facility: CLINIC | Age: 37
End: 2017-03-14
Payer: MEDICARE

## 2017-03-14 VITALS
SYSTOLIC BLOOD PRESSURE: 122 MMHG | WEIGHT: 178.13 LBS | DIASTOLIC BLOOD PRESSURE: 80 MMHG | HEIGHT: 62 IN | OXYGEN SATURATION: 96 % | RESPIRATION RATE: 18 BRPM | HEART RATE: 93 BPM | BODY MASS INDEX: 32.78 KG/M2

## 2017-03-14 DIAGNOSIS — R10.11 RIGHT UPPER QUADRANT ABDOMINAL PAIN: Primary | ICD-10-CM

## 2017-03-14 DIAGNOSIS — R11.2 INTRACTABLE VOMITING WITH NAUSEA, UNSPECIFIED VOMITING TYPE: ICD-10-CM

## 2017-03-14 DIAGNOSIS — R11.2 NAUSEA WITH VOMITING, UNSPECIFIED: ICD-10-CM

## 2017-03-14 DIAGNOSIS — R19.7 DIARRHEA, UNSPECIFIED TYPE: ICD-10-CM

## 2017-03-14 DIAGNOSIS — M25.511 ACUTE PAIN OF RIGHT SHOULDER: ICD-10-CM

## 2017-03-14 PROBLEM — K81.0 ACUTE CHOLECYSTITIS: Status: ACTIVE | Noted: 2017-03-14

## 2017-03-14 PROCEDURE — 99215 OFFICE O/P EST HI 40 MIN: CPT | Mod: S$GLB,,, | Performed by: PHYSICIAN ASSISTANT

## 2017-03-14 NOTE — PROGRESS NOTES
History & Physical    SUBJECTIVE:     History of Present Illness:  Patient is a 36 y.o. female presents with c/o severe RUQ abdominal pain since Mardi [place]as.  Patient advises  The pain is so severe she is unable to lay flat or sleep at night.  Patient also advises of nausea with vomiting and states she is unable to keep solids and liquids down.  Patient appears to be in mild distress. Patient also c/o right shoulder pain and pain with deep breathing.  I have reviewed patient's past medical issues, past surgeries, social h/o, medications and allergies with her.    Chief Complaint   Patient presents with    Abdominal Pain       Review of patient's allergies indicates:   Allergen Reactions    Carrot Hives and Shortness Of Breath    Sumatriptan succinate Other (See Comments)     paralysis    Tramadol Other (See Comments)     Faces swells       Current Outpatient Prescriptions   Medication Sig Dispense Refill    levothyroxine (SYNTHROID) 75 MCG tablet TAKE ONE TABLET BY MOUTH EVERY DAY BEFORE BREAKFAST 30 tablet 11    zolpidem (AMBIEN) 5 MG Tab TAKE ONE TABLET BY MOUTH NIGHTLY AS NEEDED 30 tablet 2    [DISCONTINUED] escitalopram (LEXAPRO) 10 MG tablet Take 1 tablet (10 mg total) by mouth once daily. 30 tablet 11    [DISCONTINUED] ranitidine (ZANTAC) 150 MG tablet Take 1 tablet (150 mg total) by mouth 2 (two) times daily. 60 tablet 11    [DISCONTINUED] topiramate (TOPAMAX) 50 MG tablet Take 1 tablet (50 mg total) by mouth 2 (two) times daily. 60 tablet 0     No current facility-administered medications for this visit.        Past Medical History:   Diagnosis Date    Anxiety     Breast abscess     Depression     Endometriosis of uterus     Headache(784.0)     Herpes     History of psychiatric care     History of psychiatric hospitalization     Muscular dystrophy     Psychiatric problem     PTSD (post-traumatic stress disorder)     Seizures     Self-injurious behavior     Thyroid disease      Past  "Surgical History:   Procedure Laterality Date    APPENDECTOMY      BREAST SURGERY      reduction    HYSTERECTOMY      TLH vs LAVH with BSO    KNEE SURGERY Bilateral     OOPHORECTOMY       Family History   Problem Relation Age of Onset    Cancer Maternal Grandfather      colon    Heart disease Neg Hx      Social History   Substance Use Topics    Smoking status: Former Smoker     Packs/day: 0.50     Years: 3.00     Types: Cigarettes    Smokeless tobacco: Never Used      Comment: quit 4/2015    Alcohol use Yes      Comment: at parties or holidays        Review of Systems:  Review of Systems   Constitutional: Positive for activity change, appetite change and fatigue. Negative for chills, diaphoresis, fever and unexpected weight change.   HENT: Negative for congestion, postnasal drip, rhinorrhea, sinus pressure, sneezing and sore throat.    Respiratory: Negative for cough, choking, shortness of breath, wheezing and stridor.    Cardiovascular: Negative for chest pain and palpitations.   Gastrointestinal: Positive for abdominal distention (right side), abdominal pain (severe, RUQ), diarrhea, nausea and vomiting. Negative for constipation.   Musculoskeletal: Positive for arthralgias, gait problem (secondary to MD) and myalgias. Negative for neck pain and neck stiffness.   Skin: Negative for color change, pallor, rash and wound.   Neurological: Positive for weakness. Negative for dizziness, syncope, facial asymmetry and headaches.   Psychiatric/Behavioral: Negative for agitation and confusion. The patient is not nervous/anxious and is not hyperactive.        OBJECTIVE:     Vital Signs (Most Recent)  Pulse: 93 (03/14/17 1409)  Resp: 18 (03/14/17 1409)  BP: 122/80 (03/14/17 1409)  SpO2: 96 % (03/14/17 1409)  5' 2" (1.575 m)  80.8 kg (178 lb 1.6 oz)     Physical Exam:  Physical Exam   Constitutional: She is oriented to person, place, and time. She appears well-developed and well-nourished. She appears distressed " (mild).   HENT:   Head: Normocephalic and atraumatic.   Right Ear: External ear normal.   Left Ear: External ear normal.   Nose: Nose normal.   Eyes: Conjunctivae and EOM are normal. Pupils are equal, round, and reactive to light. No scleral icterus.   Neck: Normal range of motion. Neck supple. No tracheal deviation present.   Cardiovascular: Normal rate, regular rhythm and normal heart sounds.  Exam reveals no gallop and no friction rub.    No murmur heard.  Pulmonary/Chest: No stridor. No respiratory distress. She has no wheezes. She has no rales.   Poor effort secondary to RUQ abdominal pain     Abdominal: Soft. She exhibits distension (RUQ). There is tenderness (severe RUQ). There is no rebound and no guarding.   + Davis's Sign     Musculoskeletal: She exhibits tenderness and deformity. She exhibits no edema.   Decreased ROM secondary to H/o MD.   Neurological: She is alert and oriented to person, place, and time. Coordination abnormal.   Skin: Skin is warm and dry. No rash noted. She is not diaphoretic. No erythema. No pallor.   Psychiatric: She has a normal mood and affect. Her behavior is normal. Judgment and thought content normal.   Nursing note and vitals reviewed.      Laboratory  No recent CBC, CMP    Diagnostic Results:  No recent EKG   CT ABD/Pelvis without any contrast today -- images reviewed by me    ASSESSMENT/PLAN:     1.  Acute Cholecystitis without cholelithiasis  2.  Nausea and vomiting  3.  Diarrhea  4.  H/o MD  5.  H/o Anxiety  6.  H/o Hypothyroidism  7.  H/o Seizures  8.  H/o PTSD  9.  H/o Psyc D/o    PLAN:Plan     1.  IP admit to Med/Surg  2.  Zofran prn N/V  3.  NPO  4.  SCD's to B LE for DVT prophylaxis  5.  IVF @ 125 ml/HR  6.  Pain control  7.  Lap Cheri in AM

## 2017-03-14 NOTE — LETTER
March 14, 2017      Matthew Hartman Jr., MD  [address]  [facility] [address]           [facility]  [address]  [address]  [facility] [address]  Phone: [phone]  Fax: [phone]          Patient: Jaycee Gray   MR Number: [id number]   YOB: 1980   Date of Visit: 3/14/2017       Dear Dr. Matthew Hartman Jr.:    Thank you for referring Ms. Jaycee Gray to me for evaluation. Attached you will find relevant portions of my assessment and plan of care.    Impression:  Patient evaluated for severe RUQ abdominal pain nausea and vomiting and pain of right shoulder for several weeks.  Patient presents today and advises she has been unable to keep solids or liquids down.  Patient is in mild distress.  She was sent for a STAT CT ABD/Pelvis without any contrast, the study was a normal read, however on physical examination the patient is exquisitely tender in the RUQ.      Plan:  The patient was admitted to the hospital for hydration, control of nausea and vomiting and will be taken to the OR in the am for a Laparoscopic Cholecystectomy.    If you have questions, please do not hesitate to call me. I look forward to following Ms. Jaycee Gray along with you.    Thank you for the opportunity to assist int the care of your patient.    Sincerely,      Denise Mims PA-C    Enclosure  CC:  No Recipients    If you would like to receive this communication electronically, please contact [email or web address] or [phone] to request more information on Campus Direct Link access.    For providers and/or their staff who would like to refer a patient to [facility], please contact us through our one-stop-shop provider referral line, [facility], at [phone].    If you feel you have received this communication in error or would no longer like to receive these types of communications, please e-mail [email or web address]

## 2017-03-14 NOTE — MR AVS SNAPSHOT
"    [facility]  [address] [place] [address]  [address] [address]  Phone: [phone]  Fax: [phone]                  Jaycee Gray   3/14/2017 1:45 PM   Initial consult    Description:  Female : 1980   Provider:  RYAN JeanC   Department:  [facility]           Reason for Visit     Abdominal Pain           Diagnoses this Visit        Comments    Right upper quadrant abdominal pain    -  Primary     Intractable vomiting with nausea, unspecified vomiting type         Diarrhea, unspecified type         Acute pain of right shoulder                To Do List           Goals (5 Years of Data)     None      [facility]     [facility] Nurse Care Line -  Assistance  Registered nurses in the [facility] provide clinical advisement, health education, appointment booking, and other advisory services.  Call for this free service at [phone].             Medications           Message regarding Medications     Verify the changes and/or additions to your medication regime listed below are the same as discussed with your clinician today.  If any of these changes or additions are incorrect, please notify your healthcare provider.             Verify that the below list of medications is an accurate representation of the medications you are currently taking.  If none reported, the list may be blank. If incorrect, please contact your healthcare provider. Carry this list with you in case of emergency.           Current Medications     levothyroxine (SYNTHROID) 75 MCG tablet TAKE ONE TABLET BY MOUTH EVERY DAY BEFORE BREAKFAST    zolpidem (AMBIEN) 5 MG Tab TAKE ONE TABLET BY MOUTH NIGHTLY AS NEEDED           Clinical Reference Information           Your Vitals Were     BP Pulse Resp Height Weight Last Period    122/80 (BP Location: Right arm, Patient Position: Sitting, BP Method: Manual) 93 18 5' 2" (1.575 m) 80.8 kg (178 lb 1.6 oz) " 04/26/2009 (Exact Date)    SpO2 BMI             96% 32.57 kg/m2         Blood Pressure          Most Recent Value    BP  122/80      Allergies as of 3/14/2017     Carrot    Sumatriptan Succinate    Tramadol      Immunizations Administered on Date of Encounter - 3/14/2017     None      Instructions    1.  Admit to hospital  2.  Pain control  3.  Control of Nausea and vomiting  4.  Lap Cheri in the morning       Language Assistance Services     ATTENTION: Language assistance services are available, free of charge. Please call [phone].      ATENCIÓN: Si habla español, tiene a razo disposición servicios gratuitos de asistencia lingüística. Llame al [phone].     CHÚ Ý: N?u b?n nói Ti?ng Vi?t, có các d?ch v? h? tr? ngôn ng? mi?n phí dành cho b?n. G?i s? [phone].         [facility] complies with applicable Federal civil rights laws and does not discriminate on the basis of race, color, national origin, age, disability, or sex.

## 2017-03-14 NOTE — PATIENT INSTRUCTIONS
1.  Admit to hospital  2.  Pain control  3.  Control of Nausea and vomiting  4.  Lap Cheri in the morning

## 2017-03-16 PROBLEM — K81.0 ACUTE CHOLECYSTITIS: Status: RESOLVED | Noted: 2017-03-14 | Resolved: 2017-03-16

## 2017-03-20 ENCOUNTER — TELEPHONE (OUTPATIENT)
Dept: FAMILY MEDICINE | Facility: CLINIC | Age: 37
End: 2017-03-20

## 2017-03-24 ENCOUNTER — HOSPITAL ENCOUNTER (INPATIENT)
Facility: HOSPITAL | Age: 37
LOS: 3 days | Discharge: HOME | DRG: 920 | End: 2017-03-27
Attending: FAMILY MEDICINE | Admitting: FAMILY MEDICINE
Payer: MEDICARE

## 2017-03-24 ENCOUNTER — TELEPHONE (OUTPATIENT)
Dept: SURGERY | Facility: CLINIC | Age: 37
End: 2017-03-24

## 2017-03-24 DIAGNOSIS — R93.5 ABNORMAL CT OF THE ABDOMEN: ICD-10-CM

## 2017-03-24 DIAGNOSIS — G60.0 CHARCOT MARIE TOOTH MUSCULAR ATROPHY: ICD-10-CM

## 2017-03-24 DIAGNOSIS — A63.0 HPV (HUMAN PAPILLOMA VIRUS) ANOGENITAL INFECTION: ICD-10-CM

## 2017-03-24 DIAGNOSIS — G89.29 CHRONIC PELVIC PAIN IN FEMALE: ICD-10-CM

## 2017-03-24 DIAGNOSIS — E89.40 PREMATURE SURGICAL MENOPAUSE: ICD-10-CM

## 2017-03-24 DIAGNOSIS — R10.11 RIGHT UPPER QUADRANT PAIN: ICD-10-CM

## 2017-03-24 DIAGNOSIS — F43.10 PTSD (POST-TRAUMATIC STRESS DISORDER): ICD-10-CM

## 2017-03-24 DIAGNOSIS — Z90.49 S/P CHOLECYSTECTOMY: ICD-10-CM

## 2017-03-24 DIAGNOSIS — R79.89 ABNORMAL LFTS: ICD-10-CM

## 2017-03-24 DIAGNOSIS — E03.9 ACQUIRED HYPOTHYROIDISM: ICD-10-CM

## 2017-03-24 DIAGNOSIS — R10.9 ABDOMINAL PAIN: Primary | ICD-10-CM

## 2017-03-24 DIAGNOSIS — R10.2 CHRONIC PELVIC PAIN IN FEMALE: ICD-10-CM

## 2017-03-24 DIAGNOSIS — G89.4 CHRONIC PAIN SYNDROME: ICD-10-CM

## 2017-03-24 DIAGNOSIS — F41.0 PANIC DISORDER: Chronic | ICD-10-CM

## 2017-03-24 PROCEDURE — 11000001 HC ACUTE MED/SURG PRIVATE ROOM

## 2017-03-24 NOTE — IP AVS SNAPSHOT
[facility]  [address]  [place] [address]  Phone: [phone]           Patient Discharge Instructions     Our goal is to set you up for success. This packet includes information on your condition, medications, and your home care. It will help you to care for yourself so you don't get sicker and need to go back to the hospital.     Please ask your nurse if you have any questions.        There are many details to remember when preparing to leave the hospital. Here is what you will need to do:    1. Take your medicine. If you are prescribed medications, review your Medication List in the following pages. You may have new medications to  at the pharmacy and others that you'll need to stop taking. Review the instructions for how and when to take your medications. Talk with your doctor or nurses if you are unsure of what to do.     2. Go to your follow-up appointments. Specific follow-up information is listed in the following pages. Your may be contacted by a transition nurse or clinical provider about future appointments. Be sure we have all of the phone numbers to reach you, if needed. Please contact your provider's office if you are unable to make an appointment.     3. Watch for warning signs. Your doctor or nurse will give you detailed warning signs to watch for and when to call for assistance. These instructions may also include educational information about your condition. If you experience any of warning signs to your health, call your doctor.               ** Verify the list of medication(s) below is accurate and up to date. Carry this with you in case of emergency. If your medications have changed, please notify your healthcare provider.             Medication List      START taking these medications        Additional Info                      ciprofloxacin HCl 500 MG tablet   Commonly known as:  CIPRO   Quantity:  14 tablet   Refills:  0   Dose:  500 mg    Instructions:  Take 1  tablet (500 mg total) by mouth every 12 (twelve) hours.     Begin Date    AM    Noon    PM    Bedtime       oxycodone-acetaminophen 5-325 mg per tablet   Commonly known as:  PERCOCET   Quantity:  15 tablet   Refills:  0   Dose:  1 tablet   Replaces:  oxycodone-acetaminophen  mg per tablet    Instructions:  Take 1 tablet by mouth every 6 (six) hours as needed for Pain.     Begin Date    AM    Noon    PM    Bedtime         CONTINUE taking these medications        Additional Info                      docusate sodium 100 MG capsule   Commonly known as:  COLACE   Quantity:  60 capsule   Refills:  1   Dose:  100 mg    Last time this was given:  100 mg on 3/26/2017  9:57 AM   Instructions:  Take 1 capsule (100 mg total) by mouth 2 (two) times daily.     Begin Date    AM    Noon    PM    Bedtime       levothyroxine 75 MCG tablet   Commonly known as:  SYNTHROID   Quantity:  30 tablet   Refills:  11    Instructions:  TAKE ONE TABLET BY MOUTH EVERY DAY BEFORE BREAKFAST     Begin Date    AM    Noon    PM    Bedtime       tizanidine 4 MG tablet   Commonly known as:  ZANAFLEX   Refills:  0   Dose:  4 mg    Instructions:  Take 4 mg by mouth every 6 (six) hours as needed.     Begin Date    AM    Noon    PM    Bedtime       zolpidem 5 MG Tab   Commonly known as:  AMBIEN   Quantity:  30 tablet   Refills:  2    Last time this was given:  5 mg on 3/26/2017  8:37 PM   Instructions:  TAKE ONE TABLET BY MOUTH NIGHTLY AS NEEDED     Begin Date    AM    Noon    PM    Bedtime         STOP taking these medications     oxycodone-acetaminophen  mg per tablet   Commonly known as:  PERCOCET   Replaced by:  oxycodone-acetaminophen 5-325 mg per tablet         ASK your doctor about these medications        Additional Info                      ranitidine 150 MG tablet   Commonly known as:  ZANTAC   Quantity:  60 tablet   Refills:  11   Dose:  150 mg    Instructions:  Take 1 tablet (150 mg total) by mouth 2 (two) times daily.     Begin Date     AM    Noon    PM    Bedtime            Where to Get Your Medications      You can get these medications from any pharmacy     Bring a paper prescription for each of these medications     ciprofloxacin HCl 500 MG tablet    oxycodone-acetaminophen 5-325 mg per tablet                  Please bring to all follow up appointments:    1. A copy of your discharge instructions.  2. All medicines you are currently taking in their original bottles.  3. Identification and insurance card.    Please arrive 15 minutes ahead of scheduled appointment time.    Please call 24 hours in advance if you must reschedule your appointment and/or time.        Follow-up Information     Follow up with Matthew Hartman MD. Schedule an appointment as soon as possible for a visit in 2 weeks.    Specialty:  Family Medicine    Contact information:    [address]  [place] [address]  [phone]          Discharge Instructions     Future Orders    Activity as tolerated     Call MD for:  difficulty breathing or increased cough     Call MD for:  increased confusion or weakness     Call MD for:  persistent dizziness, light-headedness, or visual disturbances     Call MD for:  persistent nausea and vomiting or diarrhea     Call MD for:  severe persistent headache     Call MD for:  severe uncontrolled pain     Call MD for:  temperature >100.4     Call MD for:  worsening rash     Diet general     Questions:    Total calories:      Fat restriction, if any:      Protein restriction, if any:      Na restriction, if any:      Fluid restriction:      Additional restrictions:          Primary Diagnosis     Your primary diagnosis was:  Abdominal Pain      Admission Information     Date & Time Provider Department [facility]    3/24/2017 11:56 PM Danny Ham MD [facility] [id number]      Care Providers     Provider Role Specialty Primary office phone    Danny Ham MD Attending Provider Family Medicine [phone]    Joe CRUZ  "Sully Miller MD Consulting Physician  Gastroenterology [phone]    Ritchie Carlton MD Consulting Physician  Radiology [phone]      Your Vitals Were     BP Pulse Temp Resp Height Weight    117/71 (BP Location: Left arm, Patient Position: Lying, BP Method: Automatic) 72 98 °F (36.7 °C) (Oral) 16 5' 2" (1.575 m) 81.4 kg (179 lb 6.4 oz)    Last Period SpO2 BMI          04/26/2009 (LMP Unknown) 99% 32.81 kg/m2        Recent Lab Values     No lab values to display.      Pending Labs     Order Current Status    AFB Culture & Smear In process    AFB Culture & Smear In process    Aerobic culture In process    Aerobic culture In process    Culture, Anaerobe In process    Culture, Anaerobe In process    Fungus culture In process    Fungus culture In process    Gram stain In process    Gram stain In process    Blood culture Preliminary result    Blood culture Preliminary result      Allergies as of 3/27/2017        Reactions    Carrot Hives, Shortness Of Breath    Sumatriptan Succinate Other (See Comments)    paralysis    Tramadol Other (See Comments)    Faces aurora      [facility]     [facility] Nurse Care Line - 24/7 Assistance  Unless otherwise directed by your provider, please contact [facility], our nurse care line that is available for 24/7 assistance.     Registered nurses in the [facility] provide clinical advisement, health education, appointment booking, and other advisory services.  Call for this free service at [phone].        Advance Directives     An advance directive is a document which, in the event you are no longer able to make decisions for yourself, tells your healthcare team what kind of treatment you do or do not want to receive, or who you would like to make those decisions for you.  If you do not currently have an advance directive, [facility] encourages you to create one.  For more information call:  [phone]), [phone] ([phone]),  " or log on to [email or web address].        Smoking Cessation     If you would like to quit smoking:   You may be eligible for free services if you are a [place] resident and started smoking cigarettes before September 1, 1988.  Call the Smoking Cessation [employer] (S[employer]) toll free at (465) 943-1137 or (147) 260-2381.   Call 1-800-QUIT-NOW if you do not meet the above criteria.            Language Assistance Services     ATTENTION: Language assistance services are available, free of charge. Please call [phone].      ATENCIÓN: Si habla español, tiene a razo disposición servicios gratuitos de asistencia lingüística. Llame al [phone].     CHÚ Ý: N?u b?n nói Ti?ng Vi?t, có các d?ch v? h? tr? ngôn ng? mi?n phí dành cho b?n. G?i s? [phone].         [facility] complies with applicable Federal civil rights laws and does not discriminate on the basis of race, color, national origin, age, disability, or sex.

## 2017-03-24 NOTE — TELEPHONE ENCOUNTER
Patient called and was crying saying her arm was shacking and her chest was hurting. I spoke with Joan she said it was not related to her surgery that patient needs to go to the emergency room to get checked out. The patient is on her way to the emergency room.

## 2017-03-25 PROBLEM — R10.11 RIGHT UPPER QUADRANT PAIN: Status: ACTIVE | Noted: 2017-03-25

## 2017-03-25 PROBLEM — R79.89 ABNORMAL LFTS: Status: ACTIVE | Noted: 2017-03-25

## 2017-03-25 PROBLEM — R93.5 ABNORMAL CT OF THE ABDOMEN: Status: ACTIVE | Noted: 2017-03-25

## 2017-03-25 LAB — LACTATE SERPL-SCNC: 0.8 MMOL/L

## 2017-03-25 PROCEDURE — 63600175 PHARM REV CODE 636 W HCPCS: Performed by: FAMILY MEDICINE

## 2017-03-25 PROCEDURE — 87040 BLOOD CULTURE FOR BACTERIA: CPT | Mod: 59

## 2017-03-25 PROCEDURE — 99223 1ST HOSP IP/OBS HIGH 75: CPT | Mod: ,,, | Performed by: INTERNAL MEDICINE

## 2017-03-25 PROCEDURE — 25000003 PHARM REV CODE 250: Performed by: FAMILY MEDICINE

## 2017-03-25 PROCEDURE — 94761 N-INVAS EAR/PLS OXIMETRY MLT: CPT

## 2017-03-25 PROCEDURE — 97802 MEDICAL NUTRITION INDIV IN: CPT

## 2017-03-25 PROCEDURE — 36415 COLL VENOUS BLD VENIPUNCTURE: CPT

## 2017-03-25 PROCEDURE — 11000001 HC ACUTE MED/SURG PRIVATE ROOM

## 2017-03-25 PROCEDURE — 83605 ASSAY OF LACTIC ACID: CPT

## 2017-03-25 PROCEDURE — 25000003 PHARM REV CODE 250: Performed by: STUDENT IN AN ORGANIZED HEALTH CARE EDUCATION/TRAINING PROGRAM

## 2017-03-25 PROCEDURE — 63600175 PHARM REV CODE 636 W HCPCS: Performed by: STUDENT IN AN ORGANIZED HEALTH CARE EDUCATION/TRAINING PROGRAM

## 2017-03-25 RX ORDER — HYDROMORPHONE HYDROCHLORIDE 2 MG/ML
2 INJECTION, SOLUTION INTRAMUSCULAR; INTRAVENOUS; SUBCUTANEOUS EVERY 6 HOURS PRN
Status: DISCONTINUED | OUTPATIENT
Start: 2017-03-25 | End: 2017-03-25

## 2017-03-25 RX ORDER — ONDANSETRON 2 MG/ML
4 INJECTION INTRAMUSCULAR; INTRAVENOUS EVERY 6 HOURS PRN
Status: DISCONTINUED | OUTPATIENT
Start: 2017-03-25 | End: 2017-03-27 | Stop reason: HOSPADM

## 2017-03-25 RX ORDER — IBUPROFEN 200 MG
24 TABLET ORAL
Status: DISCONTINUED | OUTPATIENT
Start: 2017-03-25 | End: 2017-03-27 | Stop reason: HOSPADM

## 2017-03-25 RX ORDER — CIPROFLOXACIN 2 MG/ML
400 INJECTION, SOLUTION INTRAVENOUS
Status: DISCONTINUED | OUTPATIENT
Start: 2017-03-25 | End: 2017-03-27 | Stop reason: HOSPADM

## 2017-03-25 RX ORDER — ENOXAPARIN SODIUM 100 MG/ML
40 INJECTION SUBCUTANEOUS EVERY 24 HOURS
Status: CANCELLED | OUTPATIENT
Start: 2017-03-25

## 2017-03-25 RX ORDER — MEPERIDINE HYDROCHLORIDE 50 MG/ML
12.5 INJECTION INTRAMUSCULAR; INTRAVENOUS; SUBCUTANEOUS EVERY 6 HOURS PRN
Status: DISPENSED | OUTPATIENT
Start: 2017-03-25 | End: 2017-03-26

## 2017-03-25 RX ORDER — ZOLPIDEM TARTRATE 5 MG/1
5 TABLET ORAL NIGHTLY PRN
Status: DISCONTINUED | OUTPATIENT
Start: 2017-03-25 | End: 2017-03-27 | Stop reason: HOSPADM

## 2017-03-25 RX ORDER — DOCUSATE SODIUM 100 MG/1
100 CAPSULE, LIQUID FILLED ORAL 2 TIMES DAILY
Status: DISCONTINUED | OUTPATIENT
Start: 2017-03-25 | End: 2017-03-27 | Stop reason: HOSPADM

## 2017-03-25 RX ORDER — OXYCODONE HYDROCHLORIDE 5 MG/1
10 TABLET ORAL EVERY 8 HOURS PRN
Status: DISCONTINUED | OUTPATIENT
Start: 2017-03-25 | End: 2017-03-27 | Stop reason: HOSPADM

## 2017-03-25 RX ORDER — GLUCAGON 1 MG
1 KIT INJECTION
Status: DISCONTINUED | OUTPATIENT
Start: 2017-03-25 | End: 2017-03-27 | Stop reason: HOSPADM

## 2017-03-25 RX ORDER — METRONIDAZOLE 500 MG/100ML
500 INJECTION, SOLUTION INTRAVENOUS
Status: DISCONTINUED | OUTPATIENT
Start: 2017-03-25 | End: 2017-03-27 | Stop reason: HOSPADM

## 2017-03-25 RX ORDER — IBUPROFEN 200 MG
16 TABLET ORAL
Status: DISCONTINUED | OUTPATIENT
Start: 2017-03-25 | End: 2017-03-27 | Stop reason: HOSPADM

## 2017-03-25 RX ORDER — LEVOTHYROXINE SODIUM 75 UG/1
75 TABLET ORAL
Status: DISCONTINUED | OUTPATIENT
Start: 2017-03-25 | End: 2017-03-27 | Stop reason: HOSPADM

## 2017-03-25 RX ORDER — HYDROMORPHONE HYDROCHLORIDE 1 MG/ML
1 INJECTION, SOLUTION INTRAMUSCULAR; INTRAVENOUS; SUBCUTANEOUS EVERY 6 HOURS PRN
Status: DISCONTINUED | OUTPATIENT
Start: 2017-03-25 | End: 2017-03-25

## 2017-03-25 RX ORDER — OXYCODONE AND ACETAMINOPHEN 10; 325 MG/1; MG/1
1 TABLET ORAL EVERY 8 HOURS PRN
Status: DISCONTINUED | OUTPATIENT
Start: 2017-03-25 | End: 2017-03-25

## 2017-03-25 RX ADMIN — METRONIDAZOLE 500 MG: 500 INJECTION, SOLUTION INTRAVENOUS at 02:03

## 2017-03-25 RX ADMIN — MEPERIDINE HYDROCHLORIDE 12.5 MG: 50 INJECTION, SOLUTION INTRAMUSCULAR; INTRAVENOUS; SUBCUTANEOUS at 09:03

## 2017-03-25 RX ADMIN — MEPERIDINE HYDROCHLORIDE 12.5 MG: 50 INJECTION, SOLUTION INTRAMUSCULAR; INTRAVENOUS; SUBCUTANEOUS at 03:03

## 2017-03-25 RX ADMIN — ONDANSETRON 4 MG: 2 INJECTION INTRAMUSCULAR; INTRAVENOUS at 03:03

## 2017-03-25 RX ADMIN — ONDANSETRON 4 MG: 2 INJECTION INTRAMUSCULAR; INTRAVENOUS at 06:03

## 2017-03-25 RX ADMIN — HYDROMORPHONE HYDROCHLORIDE 1 MG: 1 INJECTION, SOLUTION INTRAMUSCULAR; INTRAVENOUS; SUBCUTANEOUS at 02:03

## 2017-03-25 RX ADMIN — PROMETHAZINE HYDROCHLORIDE 12.5 MG: 25 INJECTION INTRAMUSCULAR; INTRAVENOUS at 08:03

## 2017-03-25 RX ADMIN — OXYCODONE HYDROCHLORIDE 10 MG: 5 TABLET ORAL at 03:03

## 2017-03-25 RX ADMIN — METRONIDAZOLE 500 MG: 500 INJECTION, SOLUTION INTRAVENOUS at 07:03

## 2017-03-25 RX ADMIN — MEPERIDINE HYDROCHLORIDE 12.5 MG: 50 INJECTION, SOLUTION INTRAMUSCULAR; INTRAVENOUS; SUBCUTANEOUS at 10:03

## 2017-03-25 RX ADMIN — OXYCODONE HYDROCHLORIDE 10 MG: 5 TABLET ORAL at 07:03

## 2017-03-25 RX ADMIN — METRONIDAZOLE 500 MG: 500 INJECTION, SOLUTION INTRAVENOUS at 10:03

## 2017-03-25 RX ADMIN — ZOLPIDEM TARTRATE 5 MG: 5 TABLET, FILM COATED ORAL at 07:03

## 2017-03-25 RX ADMIN — CIPROFLOXACIN 400 MG: 2 INJECTION, SOLUTION INTRAVENOUS at 03:03

## 2017-03-25 RX ADMIN — HYDROMORPHONE HYDROCHLORIDE 2 MG: 2 INJECTION, SOLUTION INTRAMUSCULAR; INTRAVENOUS; SUBCUTANEOUS at 06:03

## 2017-03-25 NOTE — PLAN OF CARE
Problem: Patient Care Overview  Goal: Plan of Care Review  Outcome: Ongoing (interventions implemented as appropriate)  Recommendation/Intervention: 1. Advance diet when medically able to Light/GI Soft. 2. If unable to advance to oral diet within 5 days consider alternate nutrition support Clinimix E 4.25/10 at 80ml/hr with 250ml 20% lipids daily (1481 calories, 82g protein, 1.6mg/kg/min GIR)  Goals: Oral diet or nutrition support within 5 days  Nutrition Goal Status: new

## 2017-03-25 NOTE — ASSESSMENT & PLAN NOTE
Nutrition Problem:   Other: Suboptimal oral food and beverage intake    Etiology/Related to:   Altered GI tract function    As evidenced by:  NPO with N/V/D and abdominal pain prior to admission    Treatment Strategy:   1. Consider alternate nutrition support if unable to advance to oral diet within 5 days    Nutrition Diagnosis Status:   New

## 2017-03-25 NOTE — SUBJECTIVE & OBJECTIVE
Past Medical History:   Diagnosis Date    Anxiety     Breast abscess     Depression     Endometriosis of uterus     Headache(784.0)     Herpes     History of psychiatric care     History of psychiatric hospitalization     Muscular dystrophy     Psychiatric problem     PTSD (post-traumatic stress disorder)     Seizures     Self-injurious behavior     Thyroid disease        Past Surgical History:   Procedure Laterality Date    APPENDECTOMY      BREAST SURGERY      reduction    CHOLECYSTECTOMY  03/2017    HYSTERECTOMY      TLH vs LAVH with BSO    KNEE SURGERY Bilateral     OOPHORECTOMY         Review of patient's allergies indicates:   Allergen Reactions    Carrot Hives and Shortness Of Breath    Sumatriptan succinate Other (See Comments)     paralysis    Tramadol Other (See Comments)     Faces swells     Family History     Problem Relation (Age of Onset)    Cancer Maternal Grandfather        Social History Main Topics    Smoking status: Former Smoker     Packs/day: 0.50     Years: 3.00     Types: Cigarettes    Smokeless tobacco: Never Used      Comment: quit 4/2015    Alcohol use Yes      Comment: at parties or holidays    Drug use: No    Sexual activity: Not Currently     Partners: Male     Birth control/ protection: Surgical, None     Review of Systems   Constitutional: Positive for chills. Negative for activity change, appetite change and unexpected weight change.   HENT: Negative for congestion, ear pain, nosebleeds, sinus pressure, sore throat, trouble swallowing and voice change.    Eyes: Negative for pain.   Respiratory: Negative for cough and wheezing.    Cardiovascular: Negative for chest pain, palpitations and leg swelling.   Genitourinary: Negative for difficulty urinating, dysuria, frequency and hematuria.   Musculoskeletal: Negative for arthralgias, back pain and joint swelling.   Skin: Negative for rash.   Allergic/Immunologic: Negative for immunocompromised state.    Neurological: Negative for dizziness, seizures, weakness, numbness and headaches.   Hematological: Negative for adenopathy. Does not bruise/bleed easily.   Psychiatric/Behavioral: Negative for confusion. The patient is nervous/anxious.      Objective:     Vital Signs (Most Recent):  Temp: 97.7 °F (36.5 °C) (03/25/17 0800)  Pulse: 67 (03/25/17 0800)  Resp: 16 (03/25/17 0800)  BP: 116/66 (03/25/17 0800)  SpO2: 97 % (03/25/17 1150) Vital Signs (24h Range):  Temp:  [97.7 °F (36.5 °C)-99 °F (37.2 °C)] 97.7 °F (36.5 °C)  Pulse:  [58-93] 67  Resp:  [11-20] 16  SpO2:  [95 %-100 %] 97 %  BP: ()/(49-97) 116/66     Weight: 81.4 kg (179 lb 6.4 oz) (03/24/17 2357)  Body mass index is 32.81 kg/(m^2).      Intake/Output Summary (Last 24 hours) at 03/25/17 1305  Last data filed at 03/25/17 0800   Gross per 24 hour   Intake              300 ml   Output              425 ml   Net             -125 ml       Lines/Drains/Airways     Peripheral Intravenous Line                 Peripheral IV - Single Lumen 03/25/17 0005 Right Antecubital less than 1 day                Physical Exam   Constitutional: She is oriented to person, place, and time. She appears well-developed and well-nourished. No distress.   HENT:   Head: Normocephalic and atraumatic.   Nose: Nose normal.   Eyes: Conjunctivae and EOM are normal. Pupils are equal, round, and reactive to light. No scleral icterus.   Neck: Neck supple.   Cardiovascular: Normal rate and regular rhythm.  Exam reveals no gallop.    No murmur heard.  Pulmonary/Chest: Effort normal and breath sounds normal. No stridor. No respiratory distress. She has no wheezes. She has no rales.   Abdominal: Soft. Bowel sounds are normal. She exhibits no distension and no mass. There is tenderness. There is no rebound.   Moderate RUQ tenderness. Healing incisions   Musculoskeletal: She exhibits no edema or tenderness.   Lymphadenopathy:     She has no cervical adenopathy.   Neurological: She is alert and  oriented to person, place, and time. No cranial nerve deficit. Coordination normal.   Skin: Skin is warm and dry. No rash noted. She is not diaphoretic. No erythema.       Significant Labs:  Acute Hepatitis Panel: No results for input(s): HEPBSAG, HEPAIGM, HEPCAB in the last 48 hours.    Invalid input(s): HAPBIGM  Amylase: No results for input(s): AMYLASE in the last 48 hours.  CBC:   Recent Labs  Lab 03/24/17  1511   WBC 7.28   HGB 12.0   HCT 36.1*        CMP:   Recent Labs  Lab 03/24/17  1511   GLU 93   CALCIUM 9.0   ALBUMIN 3.8   PROT 7.3      K 3.9   CO2 32*      BUN 9   CREATININE 0.57   ALKPHOS 232*   *   *   BILITOT 0.5       Significant Imaging:  CT: I have reviewed all results within the past 24 hours and my personal findings are:  possible biloma

## 2017-03-25 NOTE — H&P
[facility]  History & Physical    Subjective:      Chief Complaint/Reason for Admission: Abdominal Pain    Jayece Gray is a 36 y.o. female with a history of anxiety, thyroid disease and a recent cholecystectomy by  Dr Steward who presented to ED at outside hospital with a complaint of abdominal pain. The patient states that she had her gallbladder removed on 3/15/2017 and since that time has had some abdominal pain but that it began progressively getting worse 2-3 days ago. She describes the pain as stabbing pain that is 10/10 and does not remit with PO percocet 10's. She states that she also has nausea, vomiting, and diarrhea for the past 2 days as well. She states that she tried eating a turkey sandwich prior to arrival at ED and was unable to keep it down. She describes her diarrhea as initially loose stool but has now progressed to profuse watery stool.    In the ED at outside hospital the patient obtained a CT which was reported by the ED physician to have fluid collection noted in her abdomen. The case was discussed with GI Dr Pack and was [place] [facility] by [place].    Past Medical History:   Diagnosis Date    Anxiety     Breast abscess     Depression     Endometriosis of uterus     Headache(784.0)     Herpes     History of psychiatric care     History of psychiatric hospitalization     Muscular dystrophy     Psychiatric problem     PTSD (post-traumatic stress disorder)     Seizures     Self-injurious behavior     Thyroid disease      Past Surgical History:   Procedure Laterality Date    APPENDECTOMY      BREAST SURGERY      reduction    CHOLECYSTECTOMY  03/2017    HYSTERECTOMY      TLH vs LAVH with BSO    KNEE SURGERY Bilateral     OOPHORECTOMY       Family History   Problem Relation Age of Onset    Cancer Maternal Grandfather      colon    Heart disease Neg Hx      Social History   Substance Use Topics    Smoking status: Former Smoker      Packs/day: 0.50     Years: 3.00     Types: Cigarettes    Smokeless tobacco: Never Used      Comment: quit 4/2015    Alcohol use Yes      Comment: at parties or holidays       PTA Medications   Medication Sig    levothyroxine (SYNTHROID) 75 MCG tablet TAKE ONE TABLET BY MOUTH EVERY DAY BEFORE BREAKFAST    oxycodone-acetaminophen (PERCOCET)  mg per tablet Take 1 tablet by mouth 3 (three) times daily as needed for Pain.    docusate sodium (COLACE) 100 MG capsule Take 1 capsule (100 mg total) by mouth 2 (two) times daily.    tizanidine (ZANAFLEX) 4 MG tablet Take 4 mg by mouth every 6 (six) hours as needed.    zolpidem (AMBIEN) 5 MG Tab TAKE ONE TABLET BY MOUTH NIGHTLY AS NEEDED     Review of patient's allergies indicates:   Allergen Reactions    Carrot Hives and Shortness Of Breath    Sumatriptan succinate Other (See Comments)     paralysis    Tramadol Other (See Comments)     Faces swells        Review of Systems   Constitutional: Negative for chills and fever.   HENT: Negative for sore throat.    Eyes: Negative for blurred vision.   Respiratory: Negative for shortness of breath.    Cardiovascular: Negative for chest pain.   Gastrointestinal: Positive for abdominal pain, diarrhea, nausea and vomiting.   Genitourinary: Negative for dysuria.   Musculoskeletal: Negative for back pain.   Skin: Negative for rash.   Neurological: Negative for focal weakness.   Psychiatric/Behavioral: Negative for depression.       Objective:      Vital Signs (Most Recent)  Temp: 99 °F (37.2 °C) (03/24/17 2357)  Pulse: 73 (03/24/17 2357)  Resp: 16 (03/24/17 2357)  BP: 122/71 (03/24/17 2357)    Vital Signs Range (Last 24H):  Temp:  [98.5 °F (36.9 °C)-99 °F (37.2 °C)]   Pulse:  [58-93]   Resp:  [11-20]   BP: ()/(49-97)   SpO2:  [95 %-100 %]     Physical Exam   Constitutional: She is oriented to person, place, and time. She appears well-developed and well-nourished.   HENT:   Head: Normocephalic and atraumatic.   Eyes: EOM  are normal. Pupils are equal, round, and reactive to light.   Neck: Normal range of motion. Neck supple.   Cardiovascular: Normal rate, regular rhythm, normal heart sounds and intact distal pulses.    Pulmonary/Chest: Effort normal and breath sounds normal.   Abdominal: Soft. Bowel sounds are normal. She exhibits no distension and no mass. There is tenderness (right middle quadrant). There is guarding. There is no rebound. No hernia.   Musculoskeletal: Normal range of motion.   Neurological: She is alert and oriented to person, place, and time.   Skin: Skin is warm and dry.   Psychiatric: She has a normal mood and affect. Her behavior is normal. Judgment and thought content normal.   Nursing note and vitals reviewed.      Assessment:      Pt is a 36 year old female with history cholecystectomy and sudden worsening abdominal pain with fluid collection on CT who transferred to [facility] for evaluation by GI.    Plan:      1. Abdominal fluid collection  - noted on CT post operatively 10 days after cholecystectomy  - concern for infectious process  - will start cipro and flagyl and f/u Dr Virk in the AM    2. Transaminitis  - AST/ALT of 267 and 169  - will get hep panel  - avoid hepatotoxic agents    3. Hypothyroidism  - on synthroid 75mcg daily    ppx - SCDs for VTE    dispo - SCD for VTE    Asher Olivarez MD  PGY-2 FM  2:06 AM

## 2017-03-25 NOTE — PLAN OF CARE
The Sw spoke to the pt via phone to complete the assessment.       03/25/17 2507   Discharge Assessment   Assessment Type Discharge Planning Assessment   Assessment information obtained from? Patient   Prior to hospitilization cognitive status: Alert/Oriented   Prior to hospitalization functional status: Independent   Current cognitive status: Alert/Oriented   Current Functional Status: Independent   Lives With sibling(s)   Able to Return to Prior Arrangements yes   Is patient able to care for self after discharge? Yes   How many people do you have in your home that can help with your care after discharge? 3   Who are your caregiver(s) and their phone number(s)? Micah Pleitez(sister)[phone]   Patient's perception of discharge disposition home or selfcare   Readmission Within The Last 30 Days (Pt had gallbladder surgery last week.)   Patient currently being followed by outpatient case management? No   Patient currently receives home health services? No   Does the patient currently use HME? No   Patient currently receives private duty nursing? No   Patient currently receives any other outside agency services? No   Equipment Currently Used at Home none   Do you have any problems affording any of your prescribed medications? No   Is the patient taking medications as prescribed? yes   Do you have any financial concerns preventing you from receiving the healthcare you need? No   Does the patient have transportation to healthcare appointments? Yes   Transportation Available family or friend will provide   On Dialysis? No   Does the patient receive services at the Coumadin Clinic? No   Are there any open cases? No   Discharge Plan A Home with family   Discharge Plan B Home Health   Patient/Family In Agreement With Plan yes

## 2017-03-25 NOTE — PROGRESS NOTES
Dr. Olivarez notified of patient's continued pain after receiving dilaudid 1mg iv and oxy ir po and nausea.

## 2017-03-25 NOTE — CONSULTS
[facility]  Adult Nutrition  Consult Note    SUMMARY     Recommendations  Recommendation/Intervention: 1. Advance diet when medically able to Light/GI Soft.      2. If unable to advance to oral diet within 5 days consider alternate nutrition support Clinimix E 4.25/10 at 80ml/hr with 250ml 20% lipids daily (1481 calories, 82g protein, 1.6mg/kg/min GIR)  Goals: Oral diet or nutrition support within 5 days  Nutrition Goal Status: new    Nutrition Discharge Plan  Home on Regular diet    Reason for Assessment  Reason for Assessment: per organizational policy, nurse/nurse practitioner consult (protocol)  Diagnosis:  (Abdominal fluid collection on CT)  Relevent Medical History: Psych Hospitalization, Endometriosis, Muscular Dystrophy, Thyropid Disease, PTSD, see H&P   General Information Comments: Patient with hx of cholecystectomy 3/15 with sudden worsening of abdominal pain and N/V/D. Patient with small amount of weight loss over the last 1 year per review of EPIC records    Nutrition Prescription Ordered  Current Diet Order: NPO     Nutrition Risk Screen  Nutrition Risk Screen: reduced oral intake over the last month    Nutrition/Diet History  Typical Food/Fluid Intake: unable to obtain    Labs/Tests/Procedures/Meds  Pertinent Labs Reviewed: reviewed  Pertinent Labs Comments: ,   Pertinent Medications Reviewed: reviewed    Physical Findings  Oral/Mouth Cavity:  (WDL per nursing flowsheets)  Skin:  (incision at lap site with Berny 20 per nursing flowsheets)    Anthropometrics  Height (inches): 62.01 in  Weight Method: Bed Scale  Weight (kg): 81.4 kg  Ideal Body Weight (IBW), Female: 110.05 lb  % Ideal Body Weight, Female (lb): 163.07 lb  BMI (kg/m2): 32.81  BMI Grade: 30 - 34.9- obesity - grade I  Usual Body Weight (UBW), k.3 kg (1 year ago)  % Usual Body Weight: 94.32    Estimated/Assessed Needs  Weight Used For Calorie Calculations: 81.4 kg (179 lb 7.3 oz)   Height (cm): 157.5  cm  [facility] Need [facility] (x1-1.2 = 1221-0223)  Weight Used For Protein Calculations: 81.4 kg (179 lb 7.3 oz)  0.8 gm Protein (gm): 65.26 and 1.0 gm Protein (gm): 81.57  Fluid Need Method: RDA Method (1ml/trinidad or as needed)    Monitor and Evaluation  Food and Nutrient Intake: energy intake  Food and Nutrient Adminstration: diet order, enteral and parenteral nutrition administration  Anthropometric Measurements: weight  Biochemical Data, Medical Tests and Procedures: electrolyte and renal panel, glucose/endocrine profile    Nutrition Follow-Up  RD Follow-up?: Yes (2x weekly)    Assessment and Plan  * Abdominal pain  Nutrition Problem:   Other: Suboptimal oral food and beverage intake    Etiology/Related to:   Altered GI tract function    As evidenced by:  NPO with N/V/D and abdominal pain prior to admission    Treatment Strategy:   1. Consider alternate nutrition support if unable to advance to oral diet within 5 days    Nutrition Diagnosis Status:   New

## 2017-03-25 NOTE — CONSULTS
[facility]  Gastroenterology  Consult Note    Patient Name: Jaycee Gray  MRN: [id number]  Admission Date: 3/24/2017  Hospital Length of Stay: 1 days  Code Status: Full Code   Attending Provider: Danny Ham MD   Consulting Provider: Joe Morales Jr, MD  Primary Care Physician: Matthew Hartman MD  Principal Problem:Abdominal pain    Last Recorded LACE+ Scores     LACE+ Score      55 at 03/24 2328                Consults  Subjective:     HPI:  Patient is a 36 y.o. female admitted with a history of right upper quadrant pain progressively getting worse over the past several days.  S/P lap cholecystectomy  3/15/2017. Indicates that her RUQ pain improved immediately post op, but now more severe and worsened with a deep breath  CT ->  3.5 x 2.1 x 2.8 cm fluid collection identified within the gallbladder fossa  Transient emesis after eating yesterday. Some loose BMs following CT  Denies any prior GI disorders or symptoms    PMH of anxiety, depression,thyroid disease, substance abuse, breast abscess, as well as recent cholecystectomy    In the ED at outside hospital the patient obtained a CT which was reported by the ED physician to have fluid collection noted in her abdomen.      Past Medical History:   Diagnosis Date    Anxiety     Breast abscess     Depression     Endometriosis of uterus     Headache(784.0)     Herpes     History of psychiatric care     History of psychiatric hospitalization     Muscular dystrophy     Psychiatric problem     PTSD (post-traumatic stress disorder)     Seizures     Self-injurious behavior     Thyroid disease        Past Surgical History:   Procedure Laterality Date    APPENDECTOMY      BREAST SURGERY      reduction    CHOLECYSTECTOMY  03/2017    HYSTERECTOMY      TLH vs LAVH with BSO    KNEE SURGERY Bilateral     OOPHORECTOMY         Review of patient's allergies indicates:   Allergen Reactions    Carrot Hives and Shortness Of Breath     Sumatriptan succinate Other (See Comments)     paralysis    Tramadol Other (See Comments)     Faces swells     Family History     Problem Relation (Age of Onset)    Cancer Maternal Grandfather        Social History Main Topics    Smoking status: Former Smoker     Packs/day: 0.50     Years: 3.00     Types: Cigarettes    Smokeless tobacco: Never Used      Comment: quit 4/2015    Alcohol use Yes      Comment: at parties or holidays    Drug use: No    Sexual activity: Not Currently     Partners: Male     Birth control/ protection: Surgical, None     Review of Systems   Constitutional: Positive for chills. Negative for activity change, appetite change and unexpected weight change.   HENT: Negative for congestion, ear pain, nosebleeds, sinus pressure, sore throat, trouble swallowing and voice change.    Eyes: Negative for pain.   Respiratory: Negative for cough and wheezing.    Cardiovascular: Negative for chest pain, palpitations and leg swelling.   Genitourinary: Negative for difficulty urinating, dysuria, frequency and hematuria.   Musculoskeletal: Negative for arthralgias, back pain and joint swelling.   Skin: Negative for rash.   Allergic/Immunologic: Negative for immunocompromised state.   Neurological: Negative for dizziness, seizures, weakness, numbness and headaches.   Hematological: Negative for adenopathy. Does not bruise/bleed easily.   Psychiatric/Behavioral: Negative for confusion. The patient is nervous/anxious.      Objective:     Vital Signs (Most Recent):  Temp: 97.7 °F (36.5 °C) (03/25/17 0800)  Pulse: 67 (03/25/17 0800)  Resp: 16 (03/25/17 0800)  BP: 116/66 (03/25/17 0800)  SpO2: 97 % (03/25/17 1150) Vital Signs (24h Range):  Temp:  [97.7 °F (36.5 °C)-99 °F (37.2 °C)] 97.7 °F (36.5 °C)  Pulse:  [58-93] 67  Resp:  [11-20] 16  SpO2:  [95 %-100 %] 97 %  BP: ()/(49-97) 116/66     Weight: 81.4 kg (179 lb 6.4 oz) (03/24/17 2357)  Body mass index is 32.81 kg/(m^2).      Intake/Output Summary  (Last 24 hours) at 03/25/17 1305  Last data filed at 03/25/17 0800   Gross per 24 hour   Intake              300 ml   Output              425 ml   Net             -125 ml       Lines/Drains/Airways     Peripheral Intravenous Line                 Peripheral IV - Single Lumen 03/25/17 0005 Right Antecubital less than 1 day                Physical Exam   Constitutional: She is oriented to person, place, and time. She appears well-developed and well-nourished. No distress.   HENT:   Head: Normocephalic and atraumatic.   Nose: Nose normal.   Eyes: Conjunctivae and EOM are normal. Pupils are equal, round, and reactive to light. No scleral icterus.   Neck: Neck supple.   Cardiovascular: Normal rate and regular rhythm.  Exam reveals no gallop.    No murmur heard.  Pulmonary/Chest: Effort normal and breath sounds normal. No stridor. No respiratory distress. She has no wheezes. She has no rales.   Abdominal: Soft. Bowel sounds are normal. She exhibits no distension and no mass. There is tenderness. There is no rebound.   Moderate RUQ tenderness. Healing incisions   Musculoskeletal: She exhibits no edema or tenderness.   Lymphadenopathy:     She has no cervical adenopathy.   Neurological: She is alert and oriented to person, place, and time. No cranial nerve deficit. Coordination normal.   Skin: Skin is warm and dry. No rash noted. She is not diaphoretic. No erythema.       Significant Labs:  Acute Hepatitis Panel: No results for input(s): HEPBSAG, HEPAIGM, HEPCAB in the last 48 hours.    Invalid input(s): HAPBIGM  Amylase: No results for input(s): AMYLASE in the last 48 hours.  CBC:   Recent Labs  Lab 03/24/17  1511   WBC 7.28   HGB 12.0   HCT 36.1*        CMP:   Recent Labs  Lab 03/24/17  1511   GLU 93   CALCIUM 9.0   ALBUMIN 3.8   PROT 7.3      K 3.9   CO2 32*      BUN 9   CREATININE 0.57   ALKPHOS 232*   *   *   BILITOT 0.5       Significant Imaging:  CT: I have reviewed all results within  the past 24 hours and my personal findings are:  possible biloma     Assessment/Plan:     Abnormal CT of the abdomen  Impression  RUQ fluid collection  RUQ pain  Recent lap cholecystectomy    Plan   HIDA to r/o bile leak  Consider percutaneous drain of fluid collection  follow LFTs  Antibiotics for the time being      VTE Risk Mitigation         Ordered     Medium Risk of VTE  Once      03/25/17 0503     Place sequential compression device  Until discontinued      03/25/17 0206          Thank you for your consult. I will follow-up with patient. Please contact us if you have any additional questions.    Joe Morales Jr, MD  Gastroenterology  [facility]

## 2017-03-25 NOTE — ASSESSMENT & PLAN NOTE
Impression  RUQ fluid collection  RUQ pain  Recent lap cholecystectomy    Plan   HIDA to r/o bile leak  Consider percutaneous drain of fluid collection  follow LFTs  Antibiotics for the time being

## 2017-03-26 LAB
ALBUMIN SERPL BCP-MCNC: 3.1 G/DL
ALP SERPL-CCNC: 180 U/L
ALT SERPL W/O P-5'-P-CCNC: 109 U/L
ANION GAP SERPL CALC-SCNC: 12 MMOL/L
AST SERPL-CCNC: 76 U/L
BASOPHILS # BLD AUTO: 0.02 K/UL
BASOPHILS NFR BLD: 0.3 %
BILIRUB SERPL-MCNC: 0.4 MG/DL
BUN SERPL-MCNC: 6 MG/DL
CALCIUM SERPL-MCNC: 9.2 MG/DL
CHLORIDE SERPL-SCNC: 107 MMOL/L
CO2 SERPL-SCNC: 20 MMOL/L
CREAT SERPL-MCNC: 0.6 MG/DL
DIFFERENTIAL METHOD: ABNORMAL
EOSINOPHIL # BLD AUTO: 0.2 K/UL
EOSINOPHIL NFR BLD: 3.2 %
ERYTHROCYTE [DISTWIDTH] IN BLOOD BY AUTOMATED COUNT: 13.6 %
EST. GFR  (AFRICAN AMERICAN): >60 ML/MIN/1.73 M^2
EST. GFR  (NON AFRICAN AMERICAN): >60 ML/MIN/1.73 M^2
GLUCOSE SERPL-MCNC: 89 MG/DL
HCT VFR BLD AUTO: 35.4 %
HGB BLD-MCNC: 11.8 G/DL
LYMPHOCYTES # BLD AUTO: 2.5 K/UL
LYMPHOCYTES NFR BLD: 39.5 %
MAGNESIUM SERPL-MCNC: 1.9 MG/DL
MCH RBC QN AUTO: 28 PG
MCHC RBC AUTO-ENTMCNC: 33.3 %
MCV RBC AUTO: 84 FL
MONOCYTES # BLD AUTO: 0.3 K/UL
MONOCYTES NFR BLD: 4.4 %
NEUTROPHILS # BLD AUTO: 3.3 K/UL
NEUTROPHILS NFR BLD: 52.4 %
PHOSPHATE SERPL-MCNC: 4.1 MG/DL
PLATELET # BLD AUTO: 240 K/UL
PMV BLD AUTO: 9.4 FL
POTASSIUM SERPL-SCNC: 3.6 MMOL/L
PROT SERPL-MCNC: 6.8 G/DL
RBC # BLD AUTO: 4.22 M/UL
SODIUM SERPL-SCNC: 139 MMOL/L
WBC # BLD AUTO: 6.33 K/UL

## 2017-03-26 PROCEDURE — 80074 ACUTE HEPATITIS PANEL: CPT

## 2017-03-26 PROCEDURE — 99233 SBSQ HOSP IP/OBS HIGH 50: CPT | Mod: ,,, | Performed by: INTERNAL MEDICINE

## 2017-03-26 PROCEDURE — 83735 ASSAY OF MAGNESIUM: CPT

## 2017-03-26 PROCEDURE — 11000001 HC ACUTE MED/SURG PRIVATE ROOM

## 2017-03-26 PROCEDURE — 63600175 PHARM REV CODE 636 W HCPCS: Performed by: FAMILY MEDICINE

## 2017-03-26 PROCEDURE — 94761 N-INVAS EAR/PLS OXIMETRY MLT: CPT

## 2017-03-26 PROCEDURE — 84100 ASSAY OF PHOSPHORUS: CPT

## 2017-03-26 PROCEDURE — 85025 COMPLETE CBC W/AUTO DIFF WBC: CPT

## 2017-03-26 PROCEDURE — 25000003 PHARM REV CODE 250: Performed by: FAMILY MEDICINE

## 2017-03-26 PROCEDURE — 36415 COLL VENOUS BLD VENIPUNCTURE: CPT

## 2017-03-26 PROCEDURE — 80053 COMPREHEN METABOLIC PANEL: CPT

## 2017-03-26 RX ORDER — MORPHINE SULFATE 2 MG/ML
2 INJECTION, SOLUTION INTRAMUSCULAR; INTRAVENOUS EVERY 6 HOURS PRN
Status: DISCONTINUED | OUTPATIENT
Start: 2017-03-26 | End: 2017-03-27

## 2017-03-26 RX ORDER — MEPERIDINE HYDROCHLORIDE 50 MG/ML
12.5 INJECTION INTRAMUSCULAR; INTRAVENOUS; SUBCUTANEOUS EVERY 8 HOURS PRN
Status: DISCONTINUED | OUTPATIENT
Start: 2017-03-26 | End: 2017-03-26

## 2017-03-26 RX ADMIN — CIPROFLOXACIN 400 MG: 2 INJECTION, SOLUTION INTRAVENOUS at 04:03

## 2017-03-26 RX ADMIN — METRONIDAZOLE 500 MG: 500 INJECTION, SOLUTION INTRAVENOUS at 08:03

## 2017-03-26 RX ADMIN — MORPHINE SULFATE 2 MG: 2 INJECTION, SOLUTION INTRAMUSCULAR; INTRAVENOUS at 06:03

## 2017-03-26 RX ADMIN — METRONIDAZOLE 500 MG: 500 INJECTION, SOLUTION INTRAVENOUS at 05:03

## 2017-03-26 RX ADMIN — ZOLPIDEM TARTRATE 5 MG: 5 TABLET, FILM COATED ORAL at 08:03

## 2017-03-26 RX ADMIN — METRONIDAZOLE 500 MG: 500 INJECTION, SOLUTION INTRAVENOUS at 11:03

## 2017-03-26 RX ADMIN — DOCUSATE SODIUM 100 MG: 100 CAPSULE, LIQUID FILLED ORAL at 09:03

## 2017-03-26 RX ADMIN — MORPHINE SULFATE 2 MG: 2 INJECTION, SOLUTION INTRAMUSCULAR; INTRAVENOUS at 11:03

## 2017-03-26 RX ADMIN — MEPERIDINE HYDROCHLORIDE 12.5 MG: 50 INJECTION, SOLUTION INTRAMUSCULAR; INTRAVENOUS; SUBCUTANEOUS at 04:03

## 2017-03-26 RX ADMIN — MEPERIDINE HYDROCHLORIDE 12.5 MG: 50 INJECTION, SOLUTION INTRAMUSCULAR; INTRAVENOUS; SUBCUTANEOUS at 11:03

## 2017-03-26 NOTE — PROGRESS NOTES
Progress Note  Gastroenterology      SUBJECTIVE:       Pt alert NAD  Tolerating clear liquids po  Transient emesis yesterday which pt attributes to a migraine  Right upper quadrant tendeness persists  HIDA scan did not identify a bile leak      Review of patient's allergies indicates:   Allergen Reactions    Carrot Hives and Shortness Of Breath    Sumatriptan succinate Other (See Comments)     paralysis    Tramadol Other (See Comments)     Faces swells       Past Medical History:   Diagnosis Date    Anxiety     Breast abscess     Depression     Endometriosis of uterus     Headache(784.0)     Herpes     History of psychiatric care     History of psychiatric hospitalization     Muscular dystrophy     Psychiatric problem     PTSD (post-traumatic stress disorder)     Seizures     Self-injurious behavior     Thyroid disease      Past Surgical History:   Procedure Laterality Date    APPENDECTOMY      BREAST SURGERY      reduction    CHOLECYSTECTOMY  03/2017    HYSTERECTOMY      TLH vs LAVH with BSO    KNEE SURGERY Bilateral     OOPHORECTOMY       Family History   Problem Relation Age of Onset    Cancer Maternal Grandfather      colon    Heart disease Neg Hx      Social History   Substance Use Topics    Smoking status: Former Smoker     Packs/day: 0.50     Years: 3.00     Types: Cigarettes    Smokeless tobacco: Never Used      Comment: quit 4/2015    Alcohol use Yes      Comment: at parties or holidays         OBJECTIVE:     Vital Signs (Most Recent)  Temp: 97.5 °F (36.4 °C) (03/26/17 0800)  Pulse: 66 (03/26/17 0800)  Resp: 16 (03/26/17 0800)  BP: 110/65 (03/26/17 0800)  SpO2: 96 % (03/26/17 0744)    Temperature Range Min/Max (Last 24H):  Temp:  [97.4 °F (36.3 °C)-98.4 °F (36.9 °C)]     Physical Exam:  Eyes: Pupils are equal, round, and reactive to light.   Neck: Supple. No mass  Cardiovascular: Regular rhythm . No murmur   Pulmonary/Chest: Lungs clear   Abdominal: Soft. No mass palpated.  Nontender, no guarding. Positive bowel sounds   Musculoskeletal: No deformity. No edema.   Psychiatric: Alert and oriented    Laboratory:  Lab Results   Component Value Date     03/26/2017    K 3.6 03/26/2017     03/26/2017    CO2 20 (L) 03/26/2017    BUN 6 03/26/2017    CREATININE 0.6 03/26/2017    GLU 89 03/26/2017    MG 1.9 03/26/2017    AST 76 (H) 03/26/2017     (H) 03/26/2017    ALBUMIN 3.1 (L) 03/26/2017    PROT 6.8 03/26/2017    BILITOT 0.4 03/26/2017    WBC 6.33 03/26/2017    HGB 11.8 (L) 03/26/2017    HCT 35.4 (L) 03/26/2017    MCV 84 03/26/2017     03/26/2017    INR 1.0 09/23/2016     HIDA findings:              Impression:               This patient status post cholecystectomy, there are no abnormal collections of radiotracer that would suggest biliary leak or biloma.            ASSESSMENT/PLAN:     Impression  RUQ fluid collection  RUQ pain  Recent lap cholecystectomy   HIDA negative for a bile leak    Plan   IR consult for percutaneous drain of fluid collection  follow LFTs  Antibiotics for the time being  Continue analgesics, clear liquid diet    Juli KATZ

## 2017-03-26 NOTE — NURSING
Pt tolerated regular diet. No c/o N or V. Only complains of pain in left hand s/p IV infiltrate at another hospital prior to this admission.L hand edematous. Ice pack applied.

## 2017-03-26 NOTE — NURSING
Demerol was obtained from [facility] to administer to pt as requested, however the pain medication was changed to Morphine. The Demerol was not drawn even though the vial was opened. It was discarded by this RN with a witness and was never given to the pt.

## 2017-03-26 NOTE — PROGRESS NOTES
Progress Note    Admit Date: 3/24/2017   LOS: 2 days     SUBJECTIVE:     Patient reports episode of NBNB emesis of mostly food contents overnight.  Tolerating juice and jello. Last BM on 3/24/2017.    Scheduled Meds:   ciprofloxacin  400 mg Intravenous Q12H    docusate sodium  100 mg Oral BID    levothyroxine  75 mcg Oral Before breakfast    metronidazole  500 mg Intravenous Q8H     Continuous Infusions:   PRN Meds:dextrose 50%, dextrose 50%, glucagon (human recombinant), glucose, glucose, meperidine, ondansetron, oxycodone, zolpidem    Review of patient's allergies indicates:   Allergen Reactions    Carrot Hives and Shortness Of Breath    Sumatriptan succinate Other (See Comments)     paralysis    Tramadol Other (See Comments)     Faces swells       Review of Systems  As per subjective  Constitutional: Negative for chills, diaphoresis, fatigue, fever and unexpected weight change.   HENT: Negative for congestion, ear pain, postnasal drip, rhinorrhea, sinus pressure, sneezing, sore throat, trouble swallowing and voice change.    Eyes: Negative for pain, discharge, itching and visual disturbance.   Respiratory: Negative for cough, chest tightness, shortness of breath, wheezing and stridor.    Cardiovascular: Negative for chest pain, palpitations and leg swelling.   Gastrointestinal: Negative for abdominal pain, blood in stool, constipation, diarrhea, nausea and vomiting.   Endocrine: Negative for cold intolerance, heat intolerance, polydipsia, polyphagia and polyuria.   Genitourinary: Negative for difficulty urinating, dysuria, flank pain, frequency, hematuria and urgency.   Musculoskeletal: Negative for arthralgias, back pain, joint swelling and myalgias.   Skin: Negative for pallor, rash and wound.   Neurological: Negative for dizziness, seizures, syncope, weakness, light-headedness and headaches.      OBJECTIVE:     Vital Signs (Most Recent)  Temp: 97.4 °F (36.3 °C) (03/26/17 0400)  Pulse: 61 (03/26/17  0400)  Resp: 18 (03/25/17 2000)  BP: 106/61 (03/26/17 0400)  SpO2: 96 % (03/26/17 0744)    Vital Signs Range (Last 24H):  Temp:  [97.4 °F (36.3 °C)-98.4 °F (36.9 °C)]   Pulse:  [61-72]   Resp:  [18]   BP: (106-123)/(61-82)   SpO2:  [96 %-100 %]     I & O (Last 24H):  Intake/Output Summary (Last 24 hours) at 03/26/17 0845  Last data filed at 03/26/17 0537   Gross per 24 hour   Intake              300 ml   Output              500 ml   Net             -200 ml     Physical Exam:  Gen: no acute distress  HEENT: NC/AT, clear sclera, hearing grossly intact, no nasal drainage  CV: S1S2, RRR, no m/g/r  Resp: CTA BL, no acute respiratory distress  Ab: soft, ND, no organomegaly, +BS, RUQ mild tenderness on palpation  Ext: no edema     Laboratory:  CBC:   Recent Labs  Lab 03/26/17 0407   WBC 6.33   RBC 4.22   HGB 11.8*   HCT 35.4*      MCV 84   MCH 28.0   MCHC 33.3     CMP:   Recent Labs  Lab 03/26/17 0407   GLU 89   CALCIUM 9.2   ALBUMIN 3.1*   PROT 6.8      K 3.6   CO2 20*      BUN 6   CREATININE 0.6   ALKPHOS 180*   *   AST 76*   BILITOT 0.4     LFTs:   Recent Labs  Lab 03/26/17 0407   *   AST 76*   ALKPHOS 180*   BILITOT 0.4   PROT 6.8   ALBUMIN 3.1*     Microbiology Results (last 7 days)     Procedure Component Value Units Date/Time    Blood culture [[id number]] Collected:  03/25/17 0249    Order Status:  Completed Specimen:  Blood Updated:  03/26/17 0812     Blood Culture, Routine No Growth to date     Blood Culture, Routine No Growth to date    Blood culture [[id number]] Collected:  03/25/17 0249    Order Status:  Completed Specimen:  Blood Updated:  03/26/17 0812     Blood Culture, Routine No Growth to date     Blood Culture, Routine No Growth to date          Diagnostic Results:  Imaging Results         NM Hepatobiliary Imaging with GB (HIDA) (Final result) Result time:  03/25/17 15:49:57    Final result by Aniyah Patterson MD (03/25/17 15:49:57)    Impression:          This patient  status post cholecystectomy, there are no abnormal collections of radiotracer that would suggest biliary leak or biloma.          Electronically signed by: DANIEL PEREA MD  Date:     03/25/17  Time:    15:49     Narrative:    HEPATOBILIARY SCINTIGRAPHY    Indication:  Suspect post laparoscopic cholecystectomy bile leak    Comparison:  CT abdomen pelvis dated 3/24/17    Technique:    Following the IV administration of 5 mCi of Tc-99m-mebrofenin, sequential dynamic anterior images of the abdomen were obtained for 60 minutes ..        Findings:      The early images demonstrate homogeneous uptake of the radiopharmaceutical by the liver with no focal hepatic abnormalities.  The common bile duct is first visualized at 10 minutes  and the small bowel at 20 minutes.  The clearance from the liver is normal.               ASSESSMENT/PLAN:     Pt is a 36 year old female with history cholecystectomy and sudden worsening abdominal pain with fluid collection on CT who transferred to [facility] for evaluation by GI.    1. Abdominal fluid collection  - noted on CT post operatively 10 days after cholecystectomy  - concern for infectious process  - will start cipro and flagyl  - Dr Virk on board. F/u rec's  - Continues to have mild ab pain with episode of NBNB emesis overnight.     2. Transaminitis  - AST/ALT of 267 and 169, improving today  - hep panel pending  - avoid hepatotoxic agents     3. Hypothyroidism  - on synthroid 75mcg daily     ppx - SCDs for VTE     dispo - SCD for VTE    Case will be discussed with the attending  Tyshawn Pedersen MD  [employer] [facility] PGY2  03/26/2017 9:59 AM

## 2017-03-26 NOTE — PLAN OF CARE
"Problem: Patient Care Overview  Goal: Plan of Care Review  Outcome: Ongoing (interventions implemented as appropriate)  Consistent complaints of pain and nausea, unrelieved by medication. Patient reported feeling "hot and drowsy" post Phenergan administration. Symptoms still persisting. Seen by MD on call. VS stable. Bed alarm refused. Patient instructed to call for help if she needs to get out of bed. No other signs or symptoms of distress noted. Will continue symptom management. Patient safety maintained. Will continue to monitor.       "

## 2017-03-26 NOTE — PLAN OF CARE
Problem: Patient Care Overview  Goal: Plan of Care Review  Pt on room air with SpO2  96%. No respiratory distress noted. Will continue to monitor..

## 2017-03-27 VITALS
DIASTOLIC BLOOD PRESSURE: 71 MMHG | HEART RATE: 72 BPM | OXYGEN SATURATION: 99 % | WEIGHT: 179.38 LBS | TEMPERATURE: 98 F | HEIGHT: 62 IN | RESPIRATION RATE: 16 BRPM | SYSTOLIC BLOOD PRESSURE: 117 MMHG | BODY MASS INDEX: 33.01 KG/M2

## 2017-03-27 LAB
ALBUMIN SERPL BCP-MCNC: 3.2 G/DL
ALP SERPL-CCNC: 168 U/L
ALT SERPL W/O P-5'-P-CCNC: 77 U/L
ANION GAP SERPL CALC-SCNC: 11 MMOL/L
AST SERPL-CCNC: 39 U/L
BASOPHILS # BLD AUTO: 0.03 K/UL
BASOPHILS NFR BLD: 0.4 %
BILIRUB SERPL-MCNC: 0.3 MG/DL
BUN SERPL-MCNC: 7 MG/DL
CALCIUM SERPL-MCNC: 9.1 MG/DL
CHLORIDE SERPL-SCNC: 108 MMOL/L
CO2 SERPL-SCNC: 18 MMOL/L
CREAT SERPL-MCNC: 0.7 MG/DL
DIFFERENTIAL METHOD: ABNORMAL
EOSINOPHIL # BLD AUTO: 0.3 K/UL
EOSINOPHIL NFR BLD: 3.6 %
ERYTHROCYTE [DISTWIDTH] IN BLOOD BY AUTOMATED COUNT: 13.8 %
EST. GFR  (AFRICAN AMERICAN): >60 ML/MIN/1.73 M^2
EST. GFR  (NON AFRICAN AMERICAN): >60 ML/MIN/1.73 M^2
GLUCOSE SERPL-MCNC: 86 MG/DL
HAV IGM SERPL QL IA: NEGATIVE
HBV CORE IGM SERPL QL IA: NEGATIVE
HBV SURFACE AG SERPL QL IA: NEGATIVE
HCT VFR BLD AUTO: 35.2 %
HCV AB SERPL QL IA: NEGATIVE
HGB BLD-MCNC: 11.4 G/DL
INR PPP: 1.1
LYMPHOCYTES # BLD AUTO: 3.8 K/UL
LYMPHOCYTES NFR BLD: 49.1 %
MAGNESIUM SERPL-MCNC: 1.7 MG/DL
MCH RBC QN AUTO: 27.5 PG
MCHC RBC AUTO-ENTMCNC: 32.4 %
MCV RBC AUTO: 85 FL
MONOCYTES # BLD AUTO: 0.6 K/UL
MONOCYTES NFR BLD: 7.4 %
NEUTROPHILS # BLD AUTO: 3 K/UL
NEUTROPHILS NFR BLD: 39.5 %
PHOSPHATE SERPL-MCNC: 3.5 MG/DL
PLATELET # BLD AUTO: 265 K/UL
PMV BLD AUTO: 10.5 FL
POTASSIUM SERPL-SCNC: 3.5 MMOL/L
PROT SERPL-MCNC: 7 G/DL
PROTHROMBIN TIME: 11.4 SEC
RBC # BLD AUTO: 4.14 M/UL
SODIUM SERPL-SCNC: 137 MMOL/L
WBC # BLD AUTO: 7.7 K/UL

## 2017-03-27 PROCEDURE — 63600175 PHARM REV CODE 636 W HCPCS: Performed by: FAMILY MEDICINE

## 2017-03-27 PROCEDURE — 63600175 PHARM REV CODE 636 W HCPCS: Performed by: RADIOLOGY

## 2017-03-27 PROCEDURE — 80053 COMPREHEN METABOLIC PANEL: CPT

## 2017-03-27 PROCEDURE — 84100 ASSAY OF PHOSPHORUS: CPT

## 2017-03-27 PROCEDURE — 83735 ASSAY OF MAGNESIUM: CPT

## 2017-03-27 PROCEDURE — 85007 BL SMEAR W/DIFF WBC COUNT: CPT

## 2017-03-27 PROCEDURE — 36415 COLL VENOUS BLD VENIPUNCTURE: CPT

## 2017-03-27 PROCEDURE — 63600175 PHARM REV CODE 636 W HCPCS

## 2017-03-27 PROCEDURE — 85025 COMPLETE CBC W/AUTO DIFF WBC: CPT

## 2017-03-27 PROCEDURE — 85027 COMPLETE CBC AUTOMATED: CPT

## 2017-03-27 PROCEDURE — 85610 PROTHROMBIN TIME: CPT

## 2017-03-27 PROCEDURE — 99233 SBSQ HOSP IP/OBS HIGH 50: CPT | Mod: ,,, | Performed by: INTERNAL MEDICINE

## 2017-03-27 PROCEDURE — 94761 N-INVAS EAR/PLS OXIMETRY MLT: CPT

## 2017-03-27 PROCEDURE — 25000003 PHARM REV CODE 250: Performed by: FAMILY MEDICINE

## 2017-03-27 RX ORDER — CIPROFLOXACIN 500 MG/1
500 TABLET ORAL EVERY 12 HOURS
Qty: 14 TABLET | Refills: 0 | Status: SHIPPED | OUTPATIENT
Start: 2017-03-27 | End: 2017-04-03

## 2017-03-27 RX ORDER — MIDAZOLAM HYDROCHLORIDE 1 MG/ML
INJECTION, SOLUTION INTRAMUSCULAR; INTRAVENOUS CODE/TRAUMA/SEDATION MEDICATION
Status: COMPLETED | OUTPATIENT
Start: 2017-03-27 | End: 2017-03-27

## 2017-03-27 RX ORDER — OXYCODONE AND ACETAMINOPHEN 5; 325 MG/1; MG/1
1 TABLET ORAL EVERY 6 HOURS PRN
Qty: 15 TABLET | Refills: 0 | Status: SHIPPED | OUTPATIENT
Start: 2017-03-27 | End: 2017-03-28

## 2017-03-27 RX ORDER — OXYCODONE AND ACETAMINOPHEN 5; 325 MG/1; MG/1
1 TABLET ORAL EVERY 6 HOURS PRN
Qty: 15 TABLET | Refills: 0 | Status: SHIPPED | OUTPATIENT
Start: 2017-03-27 | End: 2017-03-27

## 2017-03-27 RX ORDER — CIPROFLOXACIN 500 MG/1
500 TABLET ORAL EVERY 12 HOURS
Qty: 14 TABLET | Refills: 0 | OUTPATIENT
Start: 2017-03-27 | End: 2017-03-27

## 2017-03-27 RX ORDER — MORPHINE SULFATE 2 MG/ML
1 INJECTION, SOLUTION INTRAMUSCULAR; INTRAVENOUS ONCE
Status: COMPLETED | OUTPATIENT
Start: 2017-03-27 | End: 2017-03-27

## 2017-03-27 RX ORDER — FENTANYL CITRATE 50 UG/ML
INJECTION, SOLUTION INTRAMUSCULAR; INTRAVENOUS CODE/TRAUMA/SEDATION MEDICATION
Status: COMPLETED | OUTPATIENT
Start: 2017-03-27 | End: 2017-03-27

## 2017-03-27 RX ADMIN — FENTANYL CITRATE 50 MCG: 50 INJECTION, SOLUTION INTRAMUSCULAR; INTRAVENOUS at 10:03

## 2017-03-27 RX ADMIN — OXYCODONE HYDROCHLORIDE 10 MG: 5 TABLET ORAL at 03:03

## 2017-03-27 RX ADMIN — MORPHINE SULFATE 1 MG: 2 INJECTION, SOLUTION INTRAMUSCULAR; INTRAVENOUS at 01:03

## 2017-03-27 RX ADMIN — ONDANSETRON 4 MG: 2 INJECTION INTRAMUSCULAR; INTRAVENOUS at 08:03

## 2017-03-27 RX ADMIN — ONDANSETRON 4 MG: 2 INJECTION INTRAMUSCULAR; INTRAVENOUS at 01:03

## 2017-03-27 RX ADMIN — METRONIDAZOLE 500 MG: 500 INJECTION, SOLUTION INTRAVENOUS at 12:03

## 2017-03-27 RX ADMIN — METRONIDAZOLE 500 MG: 500 INJECTION, SOLUTION INTRAVENOUS at 04:03

## 2017-03-27 RX ADMIN — MORPHINE SULFATE 2 MG: 2 INJECTION, SOLUTION INTRAMUSCULAR; INTRAVENOUS at 12:03

## 2017-03-27 RX ADMIN — CIPROFLOXACIN 400 MG: 2 INJECTION, SOLUTION INTRAVENOUS at 03:03

## 2017-03-27 RX ADMIN — MORPHINE SULFATE 2 MG: 2 INJECTION, SOLUTION INTRAMUSCULAR; INTRAVENOUS at 06:03

## 2017-03-27 RX ADMIN — MIDAZOLAM 1 MG: 1 INJECTION INTRAMUSCULAR; INTRAVENOUS at 10:03

## 2017-03-27 NOTE — PROGRESS NOTES
.Pharmacy New Medication Education    Patient accepted medication education.    Pharmacy educated patient on the following medications, using the teach-back method.   Cipro  Flagyl  Docusate  Synthroid  Zofran  Morphine  Oxyir  Zolpidem    Learners of pharmacy medication education included:  patient    Patient +/- learner response:  verbalize understanding

## 2017-03-27 NOTE — PHYSICIAN QUERY
PT Name: Jaycee Gray  MR #: [id number]    Physician Query Form - Relationship to Procedure Clarification     CDS/: Allie Villareal               Contact information: [phone]  This form is a permanent document in the medical record.     Query Date: March 27, 2017      By submitting this query, we are merely seeking further clarification of documentation. Please utilize your independent clinical judgment when addressing the question(s) below.    The Medical record contains the following:  Supporting Clinical Findings Location in Medical Record   Abdominal fluid collection  - noted on CT post operatively 10 days after cholecystectomy  - concern for infectious process  - will start cipro and flagyl and f/u Dr Virk in the AM H&P       Please clarify if Abdominal fluid collection is    [  ] Inherent/Integral to procedure  [  ] Routine outcome  [  ] Incidental finding  [x  ] Complication of procedure  [  ] Clinically insignificant  [  ] Clinically undetermined

## 2017-03-27 NOTE — NURSING
Pt back from IR no acute distress noted complains of pain to abdomen refuses oral pain med which is due at this time pt states she will wait for morphine

## 2017-03-27 NOTE — PLAN OF CARE
Problem: Patient Care Overview  Goal: Plan of Care Review  Outcome: Ongoing (interventions implemented as appropriate)  Patient in NAD. Patient complains of moderate to severe pain somewhat relieved with PRN pain medications. C/o intermittent nausea when pain gets worse. IV abx administered per MD order. NPO since midnight for drain placement today. Fall precautions maintained. Bed in locked low position side rails up x2. Call light within reach. Instructed patient to call for assistance. Patient condition stable. Will continue to monitor patient.

## 2017-03-27 NOTE — CONSULTS
Consult/H&P Note  Interventional Radiology    Consult Requested By: GI    Reason for Consult: Fluid collection in GB fossa    SUBJECTIVE:     Chief Complaint: fluid collection in GB fossa    History of Present Illness: 35 yo female s/p cholecystectomy with small collection in the GB fossa, recent HIDA showed no bile leak.    Past Medical History:   Diagnosis Date    Anxiety     Breast abscess     Depression     Endometriosis of uterus     Headache(784.0)     Herpes     History of psychiatric care     History of psychiatric hospitalization     Muscular dystrophy     Psychiatric problem     PTSD (post-traumatic stress disorder)     Seizures     Self-injurious behavior     Thyroid disease      Past Surgical History:   Procedure Laterality Date    APPENDECTOMY      BREAST SURGERY      reduction    CHOLECYSTECTOMY  03/2017    HYSTERECTOMY      TLH vs LAVH with BSO    KNEE SURGERY Bilateral     OOPHORECTOMY       Family History   Problem Relation Age of Onset    Cancer Maternal Grandfather      colon    Heart disease Neg Hx      Social History   Substance Use Topics    Smoking status: Former Smoker     Packs/day: 0.50     Years: 3.00     Types: Cigarettes    Smokeless tobacco: Never Used      Comment: quit 4/2015    Alcohol use Yes      Comment: at parties or holidays       Review of Systems:  Constitutional/General:No fever, chills, change in appetite or weight loss.  Hematological/Immuno: no known coagulopathies  Respiratory: no shortness of breath  Cardiovascular: no chest pain  Gastrointestinal: positive for - abdominal pain  Genito-Urinary: no dysuria  Musculoskeletal: negative  Skin: Negative for rash, itching, pigmentation changes, nail or hair changes.  Neurological: no TIA or stroke symptoms  Psychiatric: normal mood/affect, good insight/judgement      OBJECTIVE:     Vital Signs Range (Last 24H):  Temp:  [98 °F (36.7 °C)-98.5 °F (36.9 °C)]   Pulse:  [62-78]   Resp:  [16-18]   BP:  ()/(51-77)   SpO2:  [96 %-99 %]     Physical Exam:  General- Patient alert and oriented x3 in NAD  ENT- PERRLA,  Neck- No masses  CV- Regular rate and rhythm  Resp-  No increased WOB  GI- Non tender/non-distended  Extrem- No cyanosis, clubbing, edema.   Derm- No rashes, masses, or lesions noted  Neuro-  No focal deficits noted.     Physical Exam  Body mass index is 32.81 kg/(m^2).    Scheduled Meds:    ciprofloxacin  400 mg Intravenous Q12H    docusate sodium  100 mg Oral BID    levothyroxine  75 mcg Oral Before breakfast    metronidazole  500 mg Intravenous Q8H     Continuous Infusions:    PRN Meds:dextrose 50%, dextrose 50%, glucagon (human recombinant), glucose, glucose, morphine, ondansetron, oxycodone, zolpidem    Allergies:   Review of patient's allergies indicates:   Allergen Reactions    Carrot Hives and Shortness Of Breath    Sumatriptan succinate Other (See Comments)     paralysis    Tramadol Other (See Comments)     Faces swells       Labs:    Recent Labs  Lab 03/27/17 0831   INR 1.1       Recent Labs  Lab 03/27/17 0459   WBC 7.70   HGB 11.4*   HCT 35.2*   MCV 85         Recent Labs  Lab 03/27/17 0459   GLU 86      K 3.5      CO2 18*   BUN 7   CREATININE 0.7   CALCIUM 9.1   MG 1.7   ALT 77*   AST 39   ALBUMIN 3.2*   BILITOT 0.3       Vitals (Most Recent):  Temp: 98 °F (36.7 °C) (03/27/17 0709)  Pulse: 62 (03/27/17 0709)  Resp: 18 (03/27/17 0709)  BP: (!) 94/53 (03/27/17 0709)  SpO2: 99 % (03/27/17 0834)    ASA: 2  Mallampati: 2    Consent obtained    ASSESSMENT/PLAN:     CT guided aspiration of RUQ fluid collection.    Active Hospital Problems    Diagnosis  POA    *Abdominal pain [R10.9]  Yes    Right upper quadrant pain [R10.11]  Unknown    Abnormal LFTs [R79.89]  Unknown    Abnormal CT of the abdomen [R93.5]  Unknown      Resolved Hospital Problems    Diagnosis Date Resolved POA   No resolved problems to display.           Stephen Pruett MD

## 2017-03-27 NOTE — PROGRESS NOTES
Radiology Post-Procedure Note    Pre Op Diagnosis: galbladder ann marie fluid collection  Post Op Diagnosis: Same    Procedure:  CT of abdomen obtained    Findings:   Please see CT dictation for full details.   abdominal CT showed decreased size of the GB fossa collection compared with 3/24 scan.  Given difficult location of the collection and decreased size, we recommend no percutaneous drainage attempt at this time.    Serial US vs CT may be beneficial for continued evaluation of the collection.  If the collection does not continue to resolve, drain placement/aspiration may be reconsidered.      @SIG@

## 2017-03-27 NOTE — PROGRESS NOTES
Progress Note  Gastroenterology      SUBJECTIVE:     Pt alert NAD  Per radiology, RUQ fluid collection has diminished  Pt anxious for food  Afebrile, WBC normal  LFTs normalizing    Review of patient's allergies indicates:   Allergen Reactions    Carrot Hives and Shortness Of Breath    Sumatriptan succinate Other (See Comments)     paralysis    Tramadol Other (See Comments)     Faces swells       Past Medical History:   Diagnosis Date    Anxiety     Breast abscess     Depression     Endometriosis of uterus     Headache(784.0)     Herpes     History of psychiatric care     History of psychiatric hospitalization     Muscular dystrophy     Psychiatric problem     PTSD (post-traumatic stress disorder)     Seizures     Self-injurious behavior     Thyroid disease      Past Surgical History:   Procedure Laterality Date    APPENDECTOMY      BREAST SURGERY      reduction    CHOLECYSTECTOMY  03/2017    HYSTERECTOMY      TLH vs LAVH with BSO    KNEE SURGERY Bilateral     OOPHORECTOMY       Family History   Problem Relation Age of Onset    Cancer Maternal Grandfather      colon    Heart disease Neg Hx      Social History   Substance Use Topics    Smoking status: Former Smoker     Packs/day: 0.50     Years: 3.00     Types: Cigarettes    Smokeless tobacco: Never Used      Comment: quit 4/2015    Alcohol use Yes      Comment: at parties or holidays         OBJECTIVE:     Vital Signs (Most Recent)  Temp: 98 °F (36.7 °C) (03/27/17 0709)  Pulse: 72 (03/27/17 1045)  Resp: 16 (03/27/17 1045)  BP: 117/71 (03/27/17 1040)  SpO2: 99 % (03/27/17 1150)    Temperature Range Min/Max (Last 24H):  Temp:  [98 °F (36.7 °C)-98.5 °F (36.9 °C)]     Physical Exam:  Eyes: Pupils are equal, round, and reactive to light.   Neck: Supple. No mass  Cardiovascular: Regular rhythm . No murmur   Pulmonary/Chest: Lungs clear   Abdominal: Soft. No mass palpated.                    Minimal RUQ tenderness. Positive bowel sounds. No  rebound   Musculoskeletal: No deformity. No edema.   Psychiatric: Alert and oriented    Laboratory:  Lab Results   Component Value Date     03/27/2017    K 3.5 03/27/2017     03/27/2017    CO2 18 (L) 03/27/2017    BUN 7 03/27/2017    CREATININE 0.7 03/27/2017    GLU 86 03/27/2017    MG 1.7 03/27/2017    AST 39 03/27/2017    ALT 77 (H) 03/27/2017    ALBUMIN 3.2 (L) 03/27/2017    PROT 7.0 03/27/2017    BILITOT 0.3 03/27/2017    WBC 7.70 03/27/2017    HGB 11.4 (L) 03/27/2017    HCT 35.2 (L) 03/27/2017    MCV 85 03/27/2017     03/27/2017    INR 1.1 03/27/2017         ASSESSMENT/PLAN:     Impression,  Clinically improved  Fluid collection diminished    Rec  Outpt followup  Complete the course of antibiotics, oral    [facility]

## 2017-03-27 NOTE — PLAN OF CARE
Pt will d/c home, friend will come  patient.  Pt wanted to use [facility]/p pharmacy,   [place] faxed scripts to pharmacy and  Called and spoke with Jakob. Jakob will  hardscripts from patient.    no HH or DME needs.     Pt will make own follow up appt with Dr Tovar.          03/27/17 9659   Final Note   Assessment Type Final Discharge Note   Discharge Disposition Home   Discharge planning education complete? Yes   What phone number can be called within the next 1-3 days to see how you are doing after discharge? [phone]   Hospital Follow Up  Appt(s) scheduled? Yes   Discharge plans and expectations educations in teach back method with documentation complete? Yes   Offered [facility] -- Bedside Delivery? Yes   Discharge/Hospital Encounter Summary to (non-[facility]) PCP Yes   Referral to Outpatient Case Management complete? n/a   Referral to / orders for Home Health Complete? n/a   30 day supply of medicines given at discharge, if documented non-compliance / non-adherence? n/a   Any social issues identified prior to discharge? Yes   Did you assess the readiness or willingness of the family or caregiver to support self management of care? Yes   Right Care Referral Info   Post Acute Recommendation No Care

## 2017-03-27 NOTE — NURSING
Discharge instructions given pt voiced understanding vitals stable iv removed rosie well prescriptions given to pt

## 2017-03-27 NOTE — PLAN OF CARE
No future appointments.     03/27/17 1727   Readmission Questionnaire   At the time of your discharge, did someone talk to you about what your health problems were? Yes   At the time of discharge, did someone talk to you about what to watch out for regarding worsening of your health problem? Yes   At the time of discharge, did someone talk to you about what to do if you experienced worsening of your health problem? Yes   At the time of discharge, did someone talk to you about which medication to take when you left the hospital and which ones to stop taking? Yes   At the time of discharge, did someone talk to you about when and where to follow up with a doctor after you left the hospital? Yes   What do you believe caused you to be sick enough to be re-admitted? pain   How often do you need to have someone help you when you read instructions, pamphlets, or other written material from your doctor or pharmacy? Never   Do you have problems taking your medications as prescribed? No   Do you have any problems affording any of  your prescribed medications? No   Do you have problems obtaining/receiving your medications? No   Does the patient have transportation to healthcare appointments? Yes   Lives With alone   Living Arrangements house   Does the patient have family/friends to help with healtcare needs after discharge? yes   Who are your caregiver(s) and their phone number(s)? self   Does your caregiver provide all the help you need? Yes   Are you currently feeling confused? No   Are you currently having problems thinking? No   Are you currently having memory problems? No   Have you felt down, depressed, or hopeless? 0   Have you felt little interest or pleasure in doing things? 0   In the last 7 days, my sleep quality was: fair

## 2017-03-27 NOTE — PROGRESS NOTES
Progress Note    Admit Date: 3/24/2017   LOS: 3 days     SUBJECTIVE:     Pt was seen and examined this AM. NAEON. Pt remained NPO since midnight for IR tap today.     Scheduled Meds:   ciprofloxacin  400 mg Intravenous Q12H    docusate sodium  100 mg Oral BID    levothyroxine  75 mcg Oral Before breakfast    metronidazole  500 mg Intravenous Q8H     Continuous Infusions:   PRN Meds:dextrose 50%, dextrose 50%, glucagon (human recombinant), glucose, glucose, morphine, ondansetron, oxycodone, zolpidem    Review of patient's allergies indicates:   Allergen Reactions    Carrot Hives and Shortness Of Breath    Sumatriptan succinate Other (See Comments)     paralysis    Tramadol Other (See Comments)     Faces swells       Review of Systems  As per subjective  Constitutional: Negative for chills, diaphoresis, fatigue, fever and unexpected weight change.   HENT: Negative for congestion, ear pain, postnasal drip, rhinorrhea, sinus pressure, sneezing, sore throat, trouble swallowing and voice change.    Eyes: Negative for pain, discharge, itching and visual disturbance.   Respiratory: Negative for cough, chest tightness, shortness of breath, wheezing and stridor.    Cardiovascular: Negative for chest pain, palpitations and leg swelling.   Gastrointestinal: Negative for abdominal pain, blood in stool, constipation, diarrhea, nausea and vomiting.   Endocrine: Negative for cold intolerance, heat intolerance, polydipsia, polyphagia and polyuria.   Genitourinary: Negative for difficulty urinating, dysuria, flank pain, frequency, hematuria and urgency.   Musculoskeletal: Negative for arthralgias, back pain, joint swelling and myalgias.   Skin: Negative for pallor, rash and wound.   Neurological: Negative for dizziness, seizures, syncope, weakness, light-headedness and headaches.      OBJECTIVE:     Vital Signs (Most Recent)  Temp: 98 °F (36.7 °C) (03/27/17 0709)  Pulse: 62 (03/27/17 0709)  Resp: 18 (03/27/17 0709)  BP: (!)  94/53 (03/27/17 0709)  SpO2: 99 % (03/27/17 0834)    Vital Signs Range (Last 24H):  Temp:  [98 °F (36.7 °C)-98.5 °F (36.9 °C)]   Pulse:  [62-78]   Resp:  [16-18]   BP: ()/(51-77)   SpO2:  [96 %-99 %]     I & O (Last 24H):    Intake/Output Summary (Last 24 hours) at 03/27/17 1002  Last data filed at 03/27/17 0428   Gross per 24 hour   Intake             1300 ml   Output             2250 ml   Net             -950 ml     Physical Exam:  Gen: no acute distress  HEENT: NC/AT, clear sclera, hearing grossly intact, no nasal drainage  CV: S1S2, RRR, no m/g/r  Resp: CTA BL, no acute respiratory distress  Ab: soft, ND, no organomegaly, +BS, RUQ mild tenderness on palpation, no rebound or guarding  Ext: no edema     Laboratory:  CBC:     Recent Labs  Lab 03/27/17 0459   WBC 7.70   RBC 4.14   HGB 11.4*   HCT 35.2*      MCV 85   MCH 27.5   MCHC 32.4     CMP:     Recent Labs  Lab 03/27/17 0459   GLU 86   CALCIUM 9.1   ALBUMIN 3.2*   PROT 7.0      K 3.5   CO2 18*      BUN 7   CREATININE 0.7   ALKPHOS 168*   ALT 77*   AST 39   BILITOT 0.3     LFTs:     Recent Labs  Lab 03/27/17 0459   ALT 77*   AST 39   ALKPHOS 168*   BILITOT 0.3   PROT 7.0   ALBUMIN 3.2*     Microbiology Results (last 7 days)     Procedure Component Value Units Date/Time    Blood culture [[id number]] Collected:  03/25/17 0249    Order Status:  Completed Specimen:  Blood Updated:  03/27/17 0812     Blood Culture, Routine No Growth to date     Blood Culture, Routine No Growth to date     Blood Culture, Routine No Growth to date    Blood culture [[id number]] Collected:  03/25/17 0249    Order Status:  Completed Specimen:  Blood Updated:  03/27/17 0812     Blood Culture, Routine No Growth to date     Blood Culture, Routine No Growth to date     Blood Culture, Routine No Growth to date          Diagnostic Results:  Imaging Results         NM Hepatobiliary Imaging with GB (HIDA) (Final result) Result time:  03/25/17 15:49:57    Final result by  Daniel Patterson MD (03/25/17 15:49:57)    Impression:          This patient status post cholecystectomy, there are no abnormal collections of radiotracer that would suggest biliary leak or biloma.          Electronically signed by: DANIEL PATTERSON MD  Date:     03/25/17  Time:    15:49     Narrative:    HEPATOBILIARY SCINTIGRAPHY    Indication:  Suspect post laparoscopic cholecystectomy bile leak    Comparison:  CT abdomen pelvis dated 3/24/17    Technique:    Following the IV administration of 5 mCi of Tc-99m-mebrofenin, sequential dynamic anterior images of the abdomen were obtained for 60 minutes ..        Findings:      The early images demonstrate homogeneous uptake of the radiopharmaceutical by the liver with no focal hepatic abnormalities.  The common bile duct is first visualized at 10 minutes  and the small bowel at 20 minutes.  The clearance from the liver is normal.               ASSESSMENT/PLAN:     Pt is a 36 year old female with history cholecystectomy and sudden worsening abdominal pain with fluid collection on CT who transferred to [facility] for evaluation by GI.    1. Abdominal fluid collection  - noted on CT post operatively 10 days after cholecystectomy  - concern for infectious process  - will start cipro and flagyl  - Dr Virk on board, recs: IR tap to drain  - Continues to have mild ab pain  - Pain control     2. Transaminitis  - AST/ALT of 39/77, improving today  - hep panel pending  - avoid hepatotoxic agents     3. Hypothyroidism  - on synthroid 75mcg daily     ppx - SCDs for VTE     dispo - NPO today for IR tap to drain fluid    3/27/2017 Martir Almazan M.D.  U  Resident PGY-1

## 2017-03-28 ENCOUNTER — OFFICE VISIT (OUTPATIENT)
Dept: SURGERY | Facility: CLINIC | Age: 37
End: 2017-03-28
Payer: MEDICARE

## 2017-03-28 ENCOUNTER — PATIENT OUTREACH (OUTPATIENT)
Dept: ADMINISTRATIVE | Facility: CLINIC | Age: 37
End: 2017-03-28
Payer: MEDICARE

## 2017-03-28 VITALS
SYSTOLIC BLOOD PRESSURE: 121 MMHG | RESPIRATION RATE: 18 BRPM | DIASTOLIC BLOOD PRESSURE: 60 MMHG | HEART RATE: 71 BPM | BODY MASS INDEX: 32.94 KG/M2 | WEIGHT: 179 LBS | OXYGEN SATURATION: 98 % | HEIGHT: 62 IN

## 2017-03-28 DIAGNOSIS — Z98.890 POST-OPERATIVE STATE: Primary | ICD-10-CM

## 2017-03-28 PROCEDURE — 99024 POSTOP FOLLOW-UP VISIT: CPT | Mod: S$GLB,,, | Performed by: PHYSICIAN ASSISTANT

## 2017-03-28 NOTE — PATIENT INSTRUCTIONS
1.  Rx Percocet 10/325 mg # 24 one by mouth 3 x day as needed for pain, no refills  2.  Rx Lorazepam 0.5 mg #30 one by mouth twice daily as needed for anxiety, no refills  3.  Apply skin lotion to incision sites 3 x day  x 3 weeks  4.  No more prescriptions for Percocet or Lorazepam per Dr Steward.  5.  Call our office with any questions/concerns  6.  Continue taking Miralax OTC while on pain medication and anxiety medication.

## 2017-03-28 NOTE — MR AVS SNAPSHOT
[facility]  [address], [address]  [facility] [address]  Phone: [phone]  Fax: [phone]                  Jaycee Gray   3/28/2017 1:00 PM   Office Visit    Description:  Female : 1980   Provider:  Denise Mims PA-C   Department:  [facility]           Reason for Visit     Post-op Evaluation           Diagnoses this Visit        Comments    Post-operative state    -  Primary            To Do List           Future Appointments        Provider Department Dept Phone    3/28/2017 1:00 PM Denise Mims PA-C [facility] [phone]    2017 1:00 PM Jesse Stone MD [facility] [phone]      Goals (5 Years of Data)     None      [facility]     [facility] On Call Nurse Care Line -  Assistance  Registered nurses in the [facility] On Call Center provide clinical advisement, health education, appointment booking, and other advisory services.  Call for this free service at [phone].             Medications           Message regarding Medications     Verify the changes and/or additions to your medication regime listed below are the same as discussed with your clinician today.  If any of these changes or additions are incorrect, please notify your healthcare provider.        STOP taking these medications     docusate sodium (COLACE) 100 MG capsule Take 1 capsule (100 mg total) by mouth 2 (two) times daily.    oxycodone-acetaminophen (PERCOCET) 5-325 mg per tablet Take 1 tablet by mouth every 6 (six) hours as needed for Pain.           Verify that the below list of medications is an accurate representation of the medications you are currently taking.  If none reported, the list may be blank. If incorrect, please contact your healthcare provider. Carry this list with you in case of emergency.           Current Medications     ciprofloxacin HCl (CIPRO) 500 MG tablet Take 1 tablet (500 mg  "total) by mouth every 12 (twelve) hours.    levothyroxine (SYNTHROID) 75 MCG tablet TAKE ONE TABLET BY MOUTH EVERY DAY BEFORE BREAKFAST    tizanidine (ZANAFLEX) 4 MG tablet Take 4 mg by mouth every 6 (six) hours as needed.    zolpidem (AMBIEN) 5 MG Tab TAKE ONE TABLET BY MOUTH NIGHTLY AS NEEDED           Clinical Reference Information           Your Vitals Were     BP Pulse Resp Height Weight Last Period    121/60 (BP Location: Right arm, Patient Position: Sitting, BP Method: Manual) 71 18 5' 2" (1.575 m) 81.2 kg (179 lb) 04/26/2009 (LMP Unknown)    SpO2 BMI             98% 32.74 kg/m2         Blood Pressure          Most Recent Value    BP  121/60      Allergies as of 3/28/2017     Carrot    Sumatriptan Succinate    Tramadol      Immunizations Administered on Date of Encounter - 3/28/2017     None      Instructions    1.  Rx Percocet 10/325 mg # 24 one by mouth 3 x day as needed for pain, no refills  2.  Rx Lorazepam 0.5 mg #30 one by mouth twice daily as needed for anxiety, no refills  3.  Apply skin lotion to incision sites 3 x day  x 3 weeks  4.  No more prescriptions for Percocet or Lorazepam per Dr Steward.  5.  Call our office with any questions/concerns  6.  Continue taking Miralax OTC while on pain medication and anxiety medication.       Language Assistance Services     ATTENTION: Language assistance services are available, free of charge. Please call [phone].      ATENCIÓN: Si david dye, tiene a razo disposición servicios gratuitos de asistencia lingüística. Llning al [phone].     PARIS Ý: N?u b?n nói Ti?ng Vi?t, có các d?ch v? h? tr? ngôn ng? mi?n phí dành cho b?n. G?i s? [phone].         [facility] complies with applicable Federal civil rights laws and does not discriminate on the basis of race, color, national origin, age, disability, or sex.        "

## 2017-03-28 NOTE — PROGRESS NOTES
C3 nurse attempted to contact patient. No answer. The following message was left for the patient to return the call:  Good morning I am a nurse calling on behalf of [facility] from the Care Coordination Center.  This is a Transitional Care Call for Jaycee Gray. When you have a moment please contact us at [phone] or [phone] Monday through Friday, between the hours of 8 am to 4 pm. We look forward to speaking with you. On behalf of [facility] have a nice day.    The patient does not have a scheduled HOSFU appointment within 7-14 days post hospital discharge date 3/27/17. Message sent to Physician staff to assist with HOSFU appointment scheduling.

## 2017-03-28 NOTE — PATIENT INSTRUCTIONS
Abdominal Pain    Abdominal pain is pain in the stomach or belly area. Everyone has this pain from time to time. In many cases it goes away on its own. But abdominal pain can sometimes be due to a serious problem, such as appendicitis. So its important to know when to seek help.  Causes of abdominal pain  There are many possible causes of abdominal pain. Common causes in adults include:  · Constipation, diarrhea, or gas  · Stomach acid flowing back up into the esophagus (acid reflux or heartburn)  · Severe acid reflux, called GERD (gastroesophageal reflux disease)  · A sore in the lining of the stomach or small intestine (peptic ulcer)  · Inflammation of the gallbladder, liver, or pancreas  · Gallstones or kidney stones  · Appendicitis   · Intestinal blockage   · An internal organ pushing through a muscle or other tissue (hernia)  · Urinary tract infections  · In women, menstrual cramps, fibroids, or endometriosis  · Inflammation or infection of the intestines  Diagnosing the cause of abdominal pain  Your healthcare provider will do a physical exam help find the cause of your pain. If needed, tests will be ordered. Belly pain has many possible causes. So it can be hard to find the reason for your pain. Giving details about your pain can help. Tell your provider where and when you feel the pain, and what makes it better or worse. Also let your provider know if you have other symptoms such as:  · Fever  · Tiredness  · Upset stomach (nausea)  · Vomiting  · Changes in bathroom habits  Treating abdominal pain  Some causes of pain need emergency medical treatment right away. These include appendicitis or a bowel blockage. Other problems can be treated with rest, fluids, or medicines. Your healthcare provider can give you specific instructions for treatment or self-care based on what is causing your pain.  If you have vomiting or diarrhea, sip water or other clear fluids. When you are ready to eat solid foods again,  start with small amounts of easy-to-digest, low-fat foods. These include apple sauce, toast, or crackers.   When to seek medical care  Call 911 or go to the hospital right away if you:  · Cant pass stool and are vomiting  · Are vomiting blood or have bloody diarrhea or black, tarry diarrhea  · Have chest, neck, or shoulder pain  · Feel like you might pass out  · Have pain in your shoulder blades with nausea  · Have sudden, severe belly pain  · Have new, severe pain unlike any you have felt before  · Have a belly that is rigid, hard, and tender to touch  Call your healthcare provider if you have:  · Pain for more than 5 days  · Bloating for more than 2 days  · Diarrhea for more than 5 days  · A fever of 100.4°F (38.0°C) or higher, or as directed by your provider  · Pain that gets worse  · Weight loss for no reason  · Continued lack of appetite  · Blood in your stool  How to prevent abdominal pain  Here are some tips to help prevent abdominal pain:  · Eat smaller amounts of food at one time.  · Avoid greasy, fried, or other high-fat foods.  · Avoid foods that give you gas.  · Exercise regularly.  · Drink plenty of fluids.  To help prevent GERD symptoms:  · Quit smoking.  · Reduce alcohol and certain foods that increase stomach acid.  · Avoid aspirin and over-the-counter pain and fever medicines (NSAIDS or nonsteroidal anti-inflammatory drugs), if possible  · Lose extra weight.  · Finish eating at least 2 hours before you go to bed or lie down.  · Raise the head of your bed.  Date Last Reviewed: 7/1/2016  © 5274-5223 [employer]. [address] [place] [address], [place] [address]. All rights reserved. This information is not intended as a substitute for professional medical care. Always follow your healthcare professional's instructions.
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
## 2017-03-29 ENCOUNTER — OUTPATIENT CASE MANAGEMENT (OUTPATIENT)
Dept: ADMINISTRATIVE | Facility: OTHER | Age: 37
End: 2017-03-29

## 2017-03-29 NOTE — DISCHARGE SUMMARY
Discharge Summary      Admit Date: 3/24/2017    Discharge Date and Time: 03/29/2017    Attending Physician: Husam Delgado III, MD     Discharge Physician: Martir Almazan    Principal Diagnoses: Abdominal pain  The primary encounter diagnosis was Abdominal pain. Diagnoses of Abnormal CT of the abdomen, Abnormal LFTs, Right upper quadrant pain, Panic disorder, Chronic pelvic pain in female, Premature surgical menopause, HPV (human papilloma virus) anogenital infection, PTSD (post-traumatic stress disorder), Charcot Amanda Tooth muscular atrophy, Acquired hypothyroidism, and Chronic pain syndrome were also pertinent to this visit.    Discharged Condition: stable    Hospital Course: Jaycee Gray is a 36 y.o. female with pmh  has a past medical history of Anxiety; Breast abscess; Depression; Endometriosis of uterus; Headache(784.0); Herpes; History of psychiatric care; History of psychiatric hospitalization; Muscular dystrophy; Psychiatric problem; PTSD (post-traumatic stress disorder); Seizures; Self-injurious behavior; and Thyroid disease. who presented with Abdominal pain. The following is the hospital course     Patient presented to the ED s/p Giovanni Alonzo approximately 1.5 weeks (3/15/17)  prior to arrival.  Reported abdominal pain and reported it was worsening over the past 2-3 days.  Pain was reported to be stabbing 10/10.  She also reported feeling nauseated, vomiting and diarrhea for those 2 days as well.  CT of the abdomen was obtained which was concerning for a fluid collection in surgical site.  Patient was admitted and IR was consulted for possible tap of the fluid pocket.  It was deemed the collection of fluid was too small to intervene with and located in an area that would be difficult to access without going through vital organs such as the Liver.  Patient reported improvement in her pain with pain regimen, was tolerating a diet and was stable for discharge with a follow up with her PCP and  surgeon.    Consults: IP CONSULT TO DIETARY  IP CONSULT TO INTERVENTIONAL RADIOLOGY    Significant Diagnostic Studies: CT abdomen    Treatments: Pain control    Disposition: Home or Self Care    Patient Instructions:   Discharge Medication List as of 3/27/2017  4:00 PM      CONTINUE these medications which have CHANGED    Details   ciprofloxacin HCl (CIPRO) 500 MG tablet Take 1 tablet (500 mg total) by mouth every 12 (twelve) hours., Starting 3/27/2017, Until Mon 4/3/17, Print      oxycodone-acetaminophen (PERCOCET) 5-325 mg per tablet Take 1 tablet by mouth every 6 (six) hours as needed for Pain., Starting 3/27/2017, Until Discontinued, Print         CONTINUE these medications which have NOT CHANGED    Details   levothyroxine (SYNTHROID) 75 MCG tablet TAKE ONE TABLET BY MOUTH EVERY DAY BEFORE BREAKFAST, Normal      tizanidine (ZANAFLEX) 4 MG tablet Take 4 mg by mouth every 6 (six) hours as needed., Until Discontinued, Historical Med      zolpidem (AMBIEN) 5 MG Tab TAKE ONE TABLET BY MOUTH NIGHTLY AS NEEDED, Normal      docusate sodium (COLACE) 100 MG capsule Take 1 capsule (100 mg total) by mouth 2 (two) times daily., Starting 3/16/2017, Until Discontinued, Print         STOP taking these medications       oxycodone-acetaminophen (PERCOCET)  mg per tablet Comments:   Reason for Stopping:         ranitidine (ZANTAC) 150 MG tablet Comments:   Reason for Stopping:                 Discharge Procedure Orders  Ambulatory referral to Outpatient Case Management   Referral Priority: Routine Referral Type: Consultation   Referral Reason: Specialty Services Required    Number of Visits Requested: 1      Diet general     Activity as tolerated     Call MD for:  temperature >100.4     Call MD for:  persistent nausea and vomiting or diarrhea     Call MD for:  severe uncontrolled pain     Call MD for:  difficulty breathing or increased cough     Call MD for:  severe persistent headache     Call MD for:  worsening rash      Call MD for:  persistent dizziness, light-headedness, or visual disturbances     Call MD for:  increased confusion or weakness         Martir Almazan  03/29/2017  3:13 PM

## 2017-03-29 NOTE — PROGRESS NOTES
FYI:  Please note the following patient has been assigned to JOSH Sharif with Outpatient Complex Care Mgmt for screening. (Low/Moderate risk patient)    Please contact [facility] at ext [phone] with questions.    Thank you    Mamta Candelaria, SSC

## 2017-03-30 LAB
BACTERIA BLD CULT: NORMAL
BACTERIA BLD CULT: NORMAL

## 2017-03-30 NOTE — PHYSICIAN QUERY
PT Name: Jaycee Gray  MR #: [id number]     Physician Query Form - Documentation Clarification      CDS/: Allie Villareal               Contact information: [phone]    This form is a permanent document in the medical record.     Query Date: March 30, 2017    By submitting this query, we are merely seeking further clarification of documentation. Please utilize your independent clinical judgment when addressing the question(s) below.    The Medical record reflects the following:    Supporting Clinical Findings Location in Medical Record     Abdominal fluid collection  - noted on CT post operatively 10 days after cholecystectomy  - concern for infectious process  - will start cipro and flagyl and f/u Dr Virk in the AM     Ciprofloxacin IV   Flagyl IV     H&P              MAR                                                                                      Doctor, Please specify diagnosis or diagnoses associated with above clinical findings.    Provider Use Only      [  ] Abdominal mass  [  ] Seroma of abdomen  [ x ] Other explanation with details_______post-operative fluid collection____________________________  [  ] Clinically unable to determine

## 2017-03-31 ENCOUNTER — OUTPATIENT CASE MANAGEMENT (OUTPATIENT)
Dept: ADMINISTRATIVE | Facility: OTHER | Age: 37
End: 2017-03-31

## 2017-03-31 NOTE — PHYSICIAN QUERY
PT Name: Jaycee Gray  MR #: [id number]     Physician Query Form - Documentation Clarification      CDS/: Allie Villareal               Contact information: [phone]                                                                                        Withdrawn. DD    This form is a permanent document in the medical record.     Query Date: March 31, 2017    By submitting this query, we are merely seeking further clarification of documentation. Please utilize your independent clinical judgment when addressing the question(s) below.    The Medical record reflects the following:    Supporting Clinical Findings Location in Medical Record     Abdominal fluid collection  - noted on CT post operatively 10 days after cholecystectomy  - concern for infectious process  - will start cipro and flagyl and f/u Dr Virk in the AM     Ciprofloxacin IV   Flagyl IV     H&P              MAR                                                                                      Doctor, Please specify diagnosis or diagnoses associated with above clinical findings.    Provider Use Only      [  ] Abdominal abscess  [  ] Seroma of abdomen  [  ] Other explanation with details___________________________________  [  ] Clinically unable to determine

## 2017-04-04 ENCOUNTER — OUTPATIENT CASE MANAGEMENT (OUTPATIENT)
Dept: ADMINISTRATIVE | Facility: OTHER | Age: 37
End: 2017-04-04

## 2017-04-04 NOTE — PROGRESS NOTES
Attempt to complete initial assessment for Outpatient Care Management; left message requesting return call. Farhan RN-OPCM

## 2017-04-05 ENCOUNTER — OFFICE VISIT (OUTPATIENT)
Dept: INTERNAL MEDICINE | Facility: CLINIC | Age: 37
End: 2017-04-05
Payer: MEDICARE

## 2017-04-05 VITALS
BODY MASS INDEX: 32.99 KG/M2 | HEART RATE: 76 BPM | WEIGHT: 179.25 LBS | DIASTOLIC BLOOD PRESSURE: 70 MMHG | HEIGHT: 62 IN | OXYGEN SATURATION: 98 % | TEMPERATURE: 99 F | RESPIRATION RATE: 16 BRPM | SYSTOLIC BLOOD PRESSURE: 120 MMHG

## 2017-04-05 DIAGNOSIS — G47.00 INSOMNIA, UNSPECIFIED TYPE: ICD-10-CM

## 2017-04-05 DIAGNOSIS — G60.0 CHARCOT MARIE TOOTH MUSCULAR ATROPHY: Primary | ICD-10-CM

## 2017-04-05 DIAGNOSIS — F43.10 PTSD (POST-TRAUMATIC STRESS DISORDER): ICD-10-CM

## 2017-04-05 DIAGNOSIS — E03.9 ACQUIRED HYPOTHYROIDISM: ICD-10-CM

## 2017-04-05 DIAGNOSIS — F41.0 PANIC DISORDER: Chronic | ICD-10-CM

## 2017-04-05 PROBLEM — R10.11 RIGHT UPPER QUADRANT PAIN: Status: RESOLVED | Noted: 2017-03-25 | Resolved: 2017-04-05

## 2017-04-05 PROCEDURE — 99213 OFFICE O/P EST LOW 20 MIN: CPT | Mod: S$GLB,,, | Performed by: INTERNAL MEDICINE

## 2017-04-05 NOTE — MR AVS SNAPSHOT
[facility]  [address],  [address]  [place] [address]  Phone: [phone]  Fax: [phone]                  Jaycee Gray   2017 1:00 PM   Office Visit    Description:  Female : 1980   Provider:  Jesse Stone MD   Department:  [facility]           Reason for Visit     Hospital follow up TCC     Fatigue           Diagnoses this Visit        Comments    PTSD (post-traumatic stress disorder)    -  Primary     Panic disorder         Insomnia, unspecified type         Charcot Amanda Tooth muscular atrophy         Acquired hypothyroidism                To Do List           Goals (5 Years of Data)     None      [facility]     [facility] Care Line -  Assistance  Unless otherwise directed by your provider, please contact [facility], our nurse care line that is available for  assistance.     Registered nurses in the [facility] provide: appointment scheduling, clinical advisement, health education, and other advisory services.  Call: [phone] (toll free)               Medications           Message regarding Medications     Verify the changes and/or additions to your medication regime listed below are the same as discussed with your clinician today.  If any of these changes or additions are incorrect, please notify your healthcare provider.        STOP taking these medications     ondansetron (ZOFRAN) 4 MG tablet Take 1 tablet (4 mg total) by mouth every 8 (eight) hours as needed for Nausea.    promethazine (PHENERGAN) 25 MG tablet Take 1 tablet (25 mg total) by mouth every 6 (six) hours as needed for Nausea.           Verify that the below list of medications is an accurate representation of the medications you are currently taking.  If none reported, the list may be blank. If incorrect, please contact your healthcare provider. Carry this list with you in case of emergency.           Current  "Medications     levothyroxine (SYNTHROID) 75 MCG tablet TAKE ONE TABLET BY MOUTH EVERY DAY BEFORE BREAKFAST    tizanidine (ZANAFLEX) 4 MG tablet Take 4 mg by mouth every 6 (six) hours as needed.    zolpidem (AMBIEN) 5 MG Tab TAKE ONE TABLET BY MOUTH NIGHTLY AS NEEDED           Clinical Reference Information           Your Vitals Were     BP Pulse Temp Resp Height Weight    120/70 (BP Location: Left arm, Patient Position: Sitting, BP Method: Manual) 76 98.9 °F (37.2 °C) (Oral) 16 5' 2" (1.575 m) 81.3 kg (179 lb 3.7 oz)    Last Period SpO2 BMI          04/26/2009 (LMP Unknown) 98% 32.78 kg/m2        Blood Pressure          Most Recent Value    BP  120/70      Allergies as of 4/5/2017     Carrot    Sumatriptan Succinate    Tramadol      Immunizations Administered on Date of Encounter - 4/5/2017     None      Orders Placed During Today's Visit      Normal Orders This Visit    Ambulatory Referral to Psychiatry       Language Assistance Services     ATTENTION: Language assistance services are available, free of charge. Please call [phone].      ATENCIÓN: Si habla marnie, tiene a razo disposición servicios gratuitos de asistencia lingüística. Llame al [phone].     PARIS Ý: N?u b?n nói Ti?ng Vi?t, có các d?ch v? h? tr? ngôn ng? mi?n phí dành cho b?n. G?i s? [phone].         [facility] complies with applicable Federal civil rights laws and does not discriminate on the basis of race, color, national origin, age, disability, or sex.        "

## 2017-04-05 NOTE — PROGRESS NOTES
"Subjective:      Patient ID: Jaycee Gray is a 36 y.o. female.    Chief Complaint: Hospital follow up TCC (TCC VISIT Pt blacked out 4/2/2017) and Fatigue    HPI: 36y/oWF with a complex history:  -Migraine HA/ " brain lesion", seizure disorder  -Amanda-Charcott-Tooth Disease  -PTSD, with panic attacks and insomnia  -Hypothyroidism  -Ch. Pain syndrome( pelvic,back,ankles,left wrist)  -Recent cholecystectomy, gallbladder fossa fluid collection, abdominal pain.  No fever,chills.  Review of Systems   Constitutional: Positive for activity change and fatigue. Negative for appetite change, chills, fever and unexpected weight change.   HENT: Negative.    Eyes: Negative.    Respiratory: Negative for cough, choking, chest tightness, shortness of breath and wheezing.    Cardiovascular: Negative for chest pain, palpitations and leg swelling.   Gastrointestinal: Negative for abdominal distention, constipation, diarrhea, nausea and vomiting.   Endocrine: Negative.    Genitourinary: Negative.    Musculoskeletal: Positive for arthralgias, back pain and gait problem. Negative for myalgias and neck pain.   Skin: Negative.    Allergic/Immunologic: Negative.    Neurological: Positive for seizures and weakness.   Hematological: Negative.    Psychiatric/Behavioral: Positive for dysphoric mood and sleep disturbance. Negative for agitation, self-injury and suicidal ideas. The patient is nervous/anxious.        Objective:   /70 (BP Location: Left arm, Patient Position: Sitting, BP Method: Manual)  Pulse 76  Temp 98.9 °F (37.2 °C) (Oral)   Resp 16  Ht 5' 2" (1.575 m)  Wt 81.3 kg (179 lb 3.7 oz)  LMP 04/26/2009 (LMP Unknown)  SpO2 98%  BMI 32.78 kg/m2    Physical Exam   Constitutional: She is oriented to person, place, and time. She appears well-developed and well-nourished. No distress.   HENT:   Head: Normocephalic and atraumatic.   Right Ear: External ear normal.   Left Ear: External ear normal.   Mouth/Throat: No " oropharyngeal exudate.   Tongue,nose,ear piercing/bars   Eyes: Conjunctivae and EOM are normal. Pupils are equal, round, and reactive to light.   Neck: Normal range of motion. Neck supple.   Cardiovascular: Normal rate, regular rhythm and normal heart sounds.    Pulmonary/Chest: Effort normal and breath sounds normal.   Abdominal: Soft. Bowel sounds are normal.   Musculoskeletal: She exhibits deformity. She exhibits no edema or tenderness.   Foot slapping, atrophic lower legs, waddling gait.   Neurological: She is alert and oriented to person, place, and time.   Skin: Skin is warm and dry. She is not diaphoretic.   Healed,clean surgical scars   Psychiatric: She has a normal mood and affect. Her behavior is normal. Judgment and thought content normal.   Nursing note and vitals reviewed.      Assessment:     1. Charcot Amanda Tooth muscular atrophy    2. PTSD (post-traumatic stress disorder)    3. Panic disorder    4. Insomnia, unspecified type    5. Acquired hypothyroidism    No medications that have the potential to cause muscle weakness ( Quinilones, benzo) should be used in her case, unless  Short term and previously used.  Plan:     Charcot Amanda Tooth muscular atrophy    PTSD (post-traumatic stress disorder)  -     Ambulatory Referral to Psychiatry    Panic disorder  -     Ambulatory Referral to Psychiatry    Insomnia, unspecified type  -     Ambulatory Referral to Psychiatry    Acquired hypothyroidism

## 2017-04-18 ENCOUNTER — OUTPATIENT CASE MANAGEMENT (OUTPATIENT)
Dept: ADMINISTRATIVE | Facility: OTHER | Age: 37
End: 2017-04-18
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
## 2017-04-21 ENCOUNTER — OUTPATIENT CASE MANAGEMENT (OUTPATIENT)
Dept: ADMINISTRATIVE | Facility: OTHER | Age: 37
End: 2017-04-21

## 2017-04-21 NOTE — PROGRESS NOTES
Please note that a welcome packet was printed and mailed to the patient on 4/21/2017.    Please contact [facility] at [phone]. [phone] with questions.    Thank you,   Milagro Peguero, SSC

## 2017-04-21 NOTE — LETTER
April 21, 2017    Jaycee Gray  [address] [place] [address]             Outpatient Case Management  [address]  [facility] [address] Dear Jaycee Gray:    We understand that receiving many services from different doctors and healthcare providers is overwhelming. There are appointments to make, transportation to arrange, dietary instructions to understand, and new medications to obtain.    This is where [facility] can help.     You are eligible to receive Outpatient Case Management services when you have healthcare needs that require the coordination of many providers, treatments, and services. You also qualify if you need assistance with a new treatment plan.     There is no charge for this support. You may have been referred to this program from your doctor(s), hospital staff member(s), or insurance company but you always have a choice to participate or not participate. To participate, you must give us your permission to be enrolled.     When you are enrolled in the [facility] program, the  who is assigned to you is    Dominga Zepeda, RN  [phone]    Depending on your needs and wishes, your  may speak with you by phone, visit you at your place of living (for example your home, skilled nursing facility, or rehabilitation facility), or meet you at your doctors office.     Your  will tell you why you have been selected to participate in the program and will complete an assessment of your needs. Then a personalized plan of care will be developed with you and or your caregiver.             Here are examples of the services your [facility]  provides.     Coordinate communication among multiple providers.   Arrange for transportation, doctors visits, durable medical equipment, home care services, and special clinics.    Provide coaching on how to manage your health condition.    Answer  questions about your health condition.   Help you understand your doctors treatment plan.    Provide additional instruction about your health condition, treatments, and medications.    Help you obtain information about your insurance coverage.    Advocate for your individual needs.    Request a Licensed Clinical  (LCSW) to visit you if you need their services. LCSWs help with long term planning (discussing placement options, advanced planning directives), financial planning, and assistance (for example rent, utilities, medication funding).     Your  will coordinate their activities with other outpatient services you are receiving. All services provided by [facility]  are coordinated with and communicated to your primary care physician.    Our goal is to help you manage your health condition(s) safely within your living environment, whether that is your home or a medical facility. We want to help you function at the healthiest and highest level possible.     Sincerely,      Prashanth Bermudez MD  Medical Director    Enclosures:    Frequently Asked Questions  Patient Rights and Responsibilities   Reporting a Grievance or Complaint  Consent/Release of Information  Stamped Addressed Envelope                  Frequently Asked Questions about [facility]    What is [facility]?  Outpatient Case management is not Home healthcare services. [facility]  do not provide hands-on care. [facility]  will work with your doctor to arrange for home health services, if needed. Home health services have a limited duration and there are some restrictions as to who can get these services. There is no prescribed limit to the amount of time you receive [facility] services. [facility]  are not agents of your insurance company. However, [facility]  Managers can help you obtain information from your insurance company.     Who are the [facility] ?  [facility]  are Registered Nurses and Social Workers. It is important to remember that you and your  are a team that works together with your primary care physician to create your individualized plan of care. The ultimate goal of your care plan is to help you implement your doctors treatment plan and to help you function at the highest level of health possible.     What are my rights as a patient?  It is important for you to know and understand your rights and responsibilities while receiving services from the [facility] program. We have enclosed a complete description of your rights and responsibilities. You can help to make your care more effective when you understand your right and responsibilities.     What is needed to be enrolled in the program?  You are only enrolled in the [facility] Program when you give us your consent to participate. You will find enclosed a consent form. You are receiving this letter because you or your caregivers have given us a verbal consent to enroll you in [facility] Program. We ask that you sign and return the enclosed written consent in the stamped self-addressed envelope.                           Patient Rights and Responsibilities    We consider you a partner in your care. When you are well informed, participate in treatment decisions and communicate openly with your doctor and other healthcare professionals, you help make your care more effective.     While you are in the Outpatient Case Management Program, your rights include the following:     You have a right to be provided services in a non-discriminatory manner in accordance with the provisions of Title VI of the Civil Rights Act of 1964, Section 504 of the Rehabilitation Act of 1973, the Age  Discrimination Act of 1975, the Americans with Disabilities Act as well as any other applicable Federal and State laws and regulations.     You have the right to a reasonable, timely response to your request or need for care, as well as the right to considerate and respectful care including an environment that preserves dignity and contributes to a positive self-image. You are responsible for being considerate and respectful of our staff.     You have a right to information regarding patient rights, advocacy services and complaint mechanisms, and the right to prompt resolution of any complaint. You or a designee has the right to participate in the resolution of ethical issues surrounding your care. You have a right to file a complaint if you feel that your rights have been infringed, without fear or penalty from [facility] or the federal government. You may file a complaint with the Director of Outpatient Case Management by calling [phone]. At any time, you may lodge a grievance with the [facility] and [facility] by calling [phone], or the Joint Commission on Accreditation of Healthcare Organizations at [phone].     You, or someone acting on your behalf, have the right to understandable information on your health status, treatment and progress in order to make decisions. You have the right to know the nature, risks and alternatives to treatment. You have the right to be informed, when appropriate, regarding the outcome of the care that has been provided. You have the right to refuse treatment to the extent permitted by law, and the right to be informed of the alternatives and consequences of refusing treatment.     You, in collaboration with your physician, have the right to make decisions regarding care and the right to participate in the development and implementation of the plan of care and effective pain management. You have the right to know the name and professional status of  those responsible for the delivery of your care and treatment.       You have a right, within legal guidelines, to have a guardian, next-of-kin or legal designee exercises your patient rights when you are unable to do so. You have the right for your wishes regarding end-of-life decisions to be addressed by the healthcare team through advance directives. You have the right to personal privacy and confidentiality and to expect confidentiality of all records and communications pertaining to your care.      You have the right to receive communications about your health information confidentially. You have the right to request restrictions on the uses and disclosures of your health information. You have the right to inspect, copy, request amendments and receive an accounting of to whom we have disclosed your health information.     You have the right to be provided with interpretation services if you do not speak English; to alternative communication techniques if you are hearing or vision impaired; and to have any other resources needed on your behalf to ensure effective communication. These services are provided free of charge.     You have a right to personal safety (free from mental, physical, sexual and verbal abuse, neglect and exploitation). You have the right to access protective and advocacy services.     Advance Directives  A Patient Advocate is available to meet with patients to answer questions regarding advance directives.    Living Will  A document that outlines what medical treatment the patient does or does not want in the event the patient becomes unable to make those decisions at the appropriate time.    Durable Medical Power of   A document by which the patient designates an individual to be responsible for making medical decisions in the event the patient becomes unable to do so.    HIPAA Notice of Privacy Practices  Your medical information is governed by federal privacy laws. HIPAA  protects private medical information and how that information is disclosed. If you have a question regarding the HIPAA Notice of Privacy Practices, or if you believe your privacy rights have been violated, you may call our designated hotline at ([phone].            Quality Improvement  Because we consistently strive to improve the care and service provided to our patients, we welcome your feedback. Your comments are an important part of our quality improvement process, as we like to know what we are doing right and which areas are in need of improvement. Our policy is to listen, be responsive and provide you with an appropriate and timely follow-up to your questions or concerns. Our goal is active patient and family involvement in all aspects of the care process.                                                                                  Reporting a Grievance or Complaint    During your time with the [facility] team you may have a grievance or complaint with our services. Your Patients Bill of Rights gives patients, families, and caregivers the right to express concerns and grievances and the right to expect a reasonable and timely response.     Your presentation of your concerns is not viewed negatively. It is an opportunity for us to improve the quality of our care and services we provide to you.     You may report your concerns directly to your , or you can phone in a complaint to:     Director of Outpatient Case Management  [phone]    You may also send a complaint letter to:    Director of Outpatient Case Management Services  [address] [place] [id number]    Tell us the details of your complaint and provide us with a contact phone number so we can contact you to obtain additional information. We will return a call to you within two business days of our receipt of your complaint, and to request additional information as needed. If you choose to  mail a letter, your complaint may take a few days longer to reach us.     All grievances will be addressed as quickly as possible. A grievance or complaint that involves situations or practices which place patients in immediate danger will be addressed as an urgent matter. We will work to resolve all other complaints within seven days of receipt. By that time, you will receive a phone call with either the resolution of your complaint, or a plan for corrective action. A formal written response will be sent to you within 30 days of receipt of your grievance.     If a resolution cannot be completed within 30 days, a letter will be sent to you or your family member with an estimated time for the final response.    Additionally, all patients have the right to file complaints with external agencies, without exception. Complaints/grievances can be addressed to the following agencies:            Patient Safety or Quality of Care Concerns  Office of Quality Monitoring   [facility] [place] [address]  [phone] Toll Free    HIPPA Privacy/Security Concerns  Office for Civil Rights Region IV  [place] Department of Health & Human Services  [address] [place] [address], [address]  [place] [address] [phone] Phone  [phone] TDD  [phone] Toll Free    Medicare/Medicaid Billing Concerns  [place] for Medicare & Medicaid Services  Region [address]  [address] [place] [address], [address]  [place] [address] [phone] Phone  [phone] Toll Free    General Concerns  [place] [facility] ([facility])  [phone] Toll Free Complaint Hotline                                                              Consent Form/Release of Information    By signing--     (1) I agree I have read the Outpatient Case Management information provided to me;     (2) I agree to voluntarily participate in the Outpatient Case Management program;     (3) I understand I must consent to  participation in the Outpatient Case Management program during my first interview with my ;    (4) I consent to the discussion and release of my personal health information to my healthcare team (including my personal physician, my medical home care team, any specialty physician(s), and my [facility] team);     (5) I agree my consent is valid for the length of time I am receiving Outpatient Case Management;    (6) I agree to referrals to community resources which my Case Management team recommends for me. I agree to the release of my personal information and personal health information as necessary to referral sources.    ___________________________________________________________________  Patients Printed Name     ___________________________________________________________________  Patients Signature       Date    If patient is in being cared for, please complete this section:     ___________________________________________________________________  Printed Name of Person Caring For Patient   Relationship To Patient    ___________________________________________________________________   Signature of Person Caring For Patient     Date    PLEASE SIGN AND RETURN IN THE ENCLOSED PRE-ADDRESSED ENVELOPE.

## 2017-04-24 ENCOUNTER — OFFICE VISIT (OUTPATIENT)
Dept: INTERNAL MEDICINE | Facility: CLINIC | Age: 37
End: 2017-04-24
Payer: MEDICARE

## 2017-04-24 VITALS
RESPIRATION RATE: 20 BRPM | HEART RATE: 80 BPM | BODY MASS INDEX: 33.02 KG/M2 | OXYGEN SATURATION: 98 % | DIASTOLIC BLOOD PRESSURE: 88 MMHG | HEIGHT: 62 IN | TEMPERATURE: 98 F | WEIGHT: 179.44 LBS | SYSTOLIC BLOOD PRESSURE: 130 MMHG

## 2017-04-24 DIAGNOSIS — G47.01 INSOMNIA DUE TO MEDICAL CONDITION: ICD-10-CM

## 2017-04-24 DIAGNOSIS — G60.0 CHARCOT MARIE TOOTH MUSCULAR ATROPHY: ICD-10-CM

## 2017-04-24 DIAGNOSIS — T78.40XA HYPERSENSITIVITY DISORDER, INITIAL ENCOUNTER: Primary | ICD-10-CM

## 2017-04-24 PROCEDURE — 99214 OFFICE O/P EST MOD 30 MIN: CPT | Mod: S$GLB,,, | Performed by: INTERNAL MEDICINE

## 2017-04-24 RX ORDER — EPINEPHRINE 0.3 MG/.3ML
1 INJECTION SUBCUTANEOUS ONCE
Qty: 0.3 ML | Refills: 0 | Status: SHIPPED | OUTPATIENT
Start: 2017-04-24 | End: 2019-01-22 | Stop reason: SDUPTHER

## 2017-04-24 RX ORDER — ZOLPIDEM TARTRATE 5 MG/1
5 TABLET ORAL NIGHTLY PRN
Qty: 30 TABLET | Refills: 2 | Status: SHIPPED | OUTPATIENT
Start: 2017-04-24 | End: 2017-07-09 | Stop reason: SDUPTHER

## 2017-04-24 NOTE — PROGRESS NOTES
"Subjective:      Patient ID: Jaycee Gray is a 36 y.o. female.    Chief Complaint: Back Pain (2 weeks (history of kidney stones))    HPI: 36y/oWF, with a long history of pain.  She has had a JENNA,BSO,cholecystectomy,appendectomy.  History of kidney stones, seen today in ER and had a negative work up.  States she has "tried to deal with this for 2 weeks, taking ibuprofen and tylenol. Thought it might be  Muscular, but it hurts to urinate". It hurts her to get on toilet, not the physical act of urinating.  Given Robaxin from the ER.  Review of Systems   Constitutional: Positive for fatigue. Negative for chills and diaphoresis.   HENT: Negative.    Eyes: Negative.    Respiratory: Negative.    Cardiovascular: Negative.    Gastrointestinal: Negative.    Endocrine: Negative.    Genitourinary: Negative.    Musculoskeletal: Positive for arthralgias, back pain and gait problem.   Allergic/Immunologic: Negative.    Hematological: Negative.    Psychiatric/Behavioral: Negative.        Objective:   /88 (BP Location: Left arm, Patient Position: Sitting, BP Method: Manual)  Pulse 80  Temp 98.2 °F (36.8 °C) (Oral)   Resp 20  Ht 5' 2" (1.575 m)  Wt 81.4 kg (179 lb 7.3 oz)  LMP 04/26/2009  SpO2 98%  BMI 32.82 kg/m2    Physical Exam   Constitutional: She appears well-developed and well-nourished.   HENT:   Head: Normocephalic and atraumatic.   Nose: Nose normal.   Mouth/Throat: Oropharynx is clear and moist.   Eyes: Conjunctivae are normal. Pupils are equal, round, and reactive to light.   Neck: Normal range of motion.   Cardiovascular: Normal rate, regular rhythm and normal heart sounds.    Pulmonary/Chest: Effort normal and breath sounds normal.   Abdominal: Soft. Bowel sounds are normal. There is no hepatosplenomegaly. There is no rigidity, no rebound, no guarding and no CVA tenderness.   TTT all over her back   Musculoskeletal: She exhibits tenderness.   Lifts feet off the ground while walking.   Neurological: " She is alert.   Skin: Skin is warm and dry. No rash noted.   Nursing note and vitals reviewed.      Assessment:   1. Hypersensitivity disorder  2.Amanda-Charcott Tooth  3.Insomnia      Plan:   See azeb.

## 2017-04-25 ENCOUNTER — OUTPATIENT CASE MANAGEMENT (OUTPATIENT)
Dept: ADMINISTRATIVE | Facility: OTHER | Age: 37
End: 2017-04-25

## 2017-04-25 ENCOUNTER — TELEPHONE (OUTPATIENT)
Dept: INTERNAL MEDICINE | Facility: CLINIC | Age: 37
End: 2017-04-25

## 2017-04-25 NOTE — TELEPHONE ENCOUNTER
----- Message from Dominga Zepeda RN sent at 4/25/2017  1:17 PM CDT -----  Contact: Jaycee Gray [phone]    Hi. This is Dominga Zepeda RN. I am with the Outpatient case management team with [facility]. I received a referral on the above patient. I spoke with patient today on the telephone and she reports she is still having right lower back pain. Pt reports she went to the ER yesterday but was discharged with an RX for Robaxin which she cannot afford. Pt reports she continues to alternate between Tylenol and Ibuprofen. The pt reports she is going to call your office but I wanted to make you aware of the situation. The pt is aware I am sending you an in basket message.       Please notify patient of your recommendations.     Thank you,  Dominga Zepeda RN

## 2017-04-25 NOTE — PROGRESS NOTES
Pt reports she is having right lower back pain. Pt reports she went to her PCP yesterday who sent her to the ER fearing she had kidney stones. Pt reports the ER sent her home with an RX for Robaxin that she cannot afford to have filled. I will send a message in [place] to [facility] to refer patient for assistance. This nurse will in basket PCP- Dr Stone in regards to pts continuing pain and her recommendations. Pt denies any falls or injuries to her back. Pt denies any migraines. Pt reports she cannot have any further epidural injections into her back for another 2-3 months since she recently had gallbladder surgery. Pt reports she will call her PCP- Dr Stone and discuss the continuing pain with her right now. Will follow up. J[facility]eda [facility]        Follow Up Plan:  Continue to educate about Migraines. Review signs and symptoms of oncoming migraine. Encourage patient to follow medication and treatment regimen. Encourage patient to maintain follow up with doctors.  Continue to educate about fall prevention/home safety. Encourage patient to follow medication and treatment regimen. Encourage patient to maintain follow up with doctors.  Continue to educate about Chronic Pain/Charot Amanda Tooth Muscular Atrophy. Review signs and symptoms of Charot Amanda Tooth Muscular Atrophy . Encourage patient to follow medication and treatment regimen. Encourage patient to maintain follow up with doctors.

## 2017-04-25 NOTE — TELEPHONE ENCOUNTER
----- Message from Joselin Flores sent at 4/25/2017  1:41 PM CDT -----  Contact: patient  Patient would like a call back about her pain,it is not getting any better an she cannot get the Robaxin filled because it is too expensive. [phone]

## 2017-04-26 NOTE — TELEPHONE ENCOUNTER
Informed patient to alternate Tylenol and ibuprofen for pain. Patient states that she is already doing that and she is still in pain. Informed patient that Dr. Stone would like to [facility]Psyh provider. Patient verbalized that she will go but she will need to find a ride. Informed patient that they will contact her to set up an appointment.

## 2017-04-27 ENCOUNTER — TELEPHONE (OUTPATIENT)
Dept: INTERNAL MEDICINE | Facility: CLINIC | Age: 37
End: 2017-04-27

## 2017-04-27 NOTE — TELEPHONE ENCOUNTER
Spoke with Xuan @ [facility] and patient explained to her that she told us that she wanted something in [facility] where she lives. Patient declined appointment.     I spoke with patient and she was ok with this appointment being in [place] and told me that she would find a ride.

## 2017-04-27 NOTE — TELEPHONE ENCOUNTER
----- Message from Joselin Flores sent at 4/27/2017  9:36 AM CDT -----  Contact: Xuan with [facility]( [facility]) [phone]  Please call back in reference to patient .

## 2017-05-02 ENCOUNTER — OUTPATIENT CASE MANAGEMENT (OUTPATIENT)
Dept: ADMINISTRATIVE | Facility: OTHER | Age: 37
End: 2017-05-02

## 2017-05-02 NOTE — PROGRESS NOTES
"Pt reports she is still having right lower back pain. Pt reports she called Dr Joe Boyd's office but is not going to make an appointment with him. When this nurse asked why she was not making an appointment the pt replied that the first appt is not until September 2017 and that "he is a GI DR, I have back pain". Pt was obviously annoyed throughout the conversation but when asked about her mood or if she felt depressed she replied " no, I am fine". . Pt reports she feels like she is getting the run around. Pt reports she is currently using Tylenol and ibuprofen for pain and ranked her current pain at 7/10. Pt reports she is making an appointment to see an orthopedist when she gets back from vacation after next week. Pt denies any falls since we last spoke and reports she has not had any migraines but did feel a little light headed yesterday. Pt reports she laid down for a while then felt fine. Discussed with pt me contacting Dr Stone for additional needs or advise. Pt refused this nurses offer to in basket Dr. Stone.   Pt reports she has not heard from the PAP dept yet. Will follow up. JACEY Zepeda RN        Follow Up Plan:  Continue to educate about Migraines. Review signs and symptoms of oncoming migraine. Encourage patient to follow medication and treatment regimen. Encourage patient to maintain follow up with doctors.  Continue to educate about fall prevention/home safety. Encourage patient to follow medication and treatment regimen. Encourage patient to maintain follow up with doctors.  Continue to educate about Chronic Pain/Charot Amanda Tooth Muscular Atrophy. Review signs and symptoms of Charot Amanda Tooth Muscular Atrophy . Encourage patient to follow medication and treatment regimen. Encourage patient to maintain follow up with doctors.    "

## 2017-05-16 ENCOUNTER — OUTPATIENT CASE MANAGEMENT (OUTPATIENT)
Dept: ADMINISTRATIVE | Facility: OTHER | Age: 37
End: 2017-05-16

## 2017-05-16 NOTE — PROGRESS NOTES
Attempt to complete follow up for Outpatient Care Management; left message requesting return call. Farhan LONDON-OPCM      Follow Up Plan:  Continue to educate about Migraines. Review signs and symptoms of oncoming migraine. Encourage patient to follow medication and treatment regimen. Encourage patient to maintain follow up with doctors.  Continue to educate about fall prevention/home safety. Encourage patient to follow medication and treatment regimen. Encourage patient to maintain follow up with doctors.  Continue to educate about Chronic Pain/Charot Amanda Tooth Muscular Atrophy. Review signs and symptoms of Charot Amanda Tooth Muscular Atrophy . Encourage patient to follow medication and treatment regimen. Encourage patient to maintain follow up with doctors.

## 2017-05-24 ENCOUNTER — OUTPATIENT CASE MANAGEMENT (OUTPATIENT)
Dept: ADMINISTRATIVE | Facility: OTHER | Age: 37
End: 2017-05-24

## 2017-05-24 NOTE — LETTER
May 24, 2017    Jaycee Gray  [address] [place] [address]             [facility] [address] [facility] [place] [address] Dear Ms. Gray,    I work with [facility] Out[facility] Case Management Department. I have been unsuccessful at reaching you to follow-up to see how you have been doing. Please call me back at your earliest convenience to discuss your health care needs.      I can be reached at [phone] from 8:00AM to 4:30 PM on Monday thru Friday. [facility] also has a program where a nurse is available 24/7 to answer questions or provide medical advice, their number is [phone].    Thanks,      Dominga Zepeda RN  Outpatient Case Management

## 2017-05-24 NOTE — PROGRESS NOTES
-- 2nd attempt to follow up for OPCM, left message requesting a return call. As this is my second unsuccessful attempt to complete follow up for OPCM, I will mail a letter with my contact information. Farhan LONDON-OPCM      Follow Up Plan:  Continue to educate about Migraines. Review signs and symptoms of oncoming migraine. Encourage patient to follow medication and treatment regimen. Encourage patient to maintain follow up with doctors.  Continue to educate about fall prevention/home safety. Encourage patient to follow medication and treatment regimen. Encourage patient to maintain follow up with doctors.  Continue to educate about Chronic Pain/Charot Amanda Tooth Muscular Atrophy. Review signs and symptoms of Charot Amanda Tooth Muscular Atrophy . Encourage patient to follow medication and treatment regimen. Encourage patient to maintain follow up with doctors.

## 2017-06-01 ENCOUNTER — OUTPATIENT CASE MANAGEMENT (OUTPATIENT)
Dept: ADMINISTRATIVE | Facility: OTHER | Age: 37
End: 2017-06-01

## 2017-06-01 NOTE — PROGRESS NOTES
3rd Attempt to complete initial assessment for Outpatient Care Management;left message requesting a return call. A letter was mailed at the time of my 2nd attempt to complete initial assessment, requesting a return call from patientKrunal Real RN-OPCM

## 2017-06-12 ENCOUNTER — OUTPATIENT CASE MANAGEMENT (OUTPATIENT)
Dept: ADMINISTRATIVE | Facility: OTHER | Age: 37
End: 2017-06-12

## 2017-06-12 NOTE — PROGRESS NOTES
4th Attempt to complete initial assessment for Outpatient Care Management;left message requesting a return call. A letter was mailed at the time of my 2nd and 3rd attempts to complete initial assessment, requesting a return call from patient. Will close case at this time. Farhan LONDON-OPCM

## 2017-06-13 ENCOUNTER — OUTPATIENT CASE MANAGEMENT (OUTPATIENT)
Dept: ADMINISTRATIVE | Facility: OTHER | Age: 37
End: 2017-06-13

## 2017-06-13 NOTE — PROGRESS NOTES
Please note this patient was mailed an Outpatient Case Management Patient Satisfaction Discharge Survey on 6-13-17    Please contact [facility] at ext. [phone] with any questions.    Thank you,      Milagro Peguero, SSC

## 2017-07-09 DIAGNOSIS — G47.01 INSOMNIA DUE TO MEDICAL CONDITION: ICD-10-CM

## 2017-07-10 RX ORDER — ZOLPIDEM TARTRATE 5 MG/1
TABLET ORAL
Qty: 30 TABLET | Refills: 1 | Status: SHIPPED | OUTPATIENT
Start: 2017-07-10 | End: 2017-09-14 | Stop reason: SDUPTHER

## 2017-07-19 DIAGNOSIS — G47.01 INSOMNIA DUE TO MEDICAL CONDITION: ICD-10-CM

## 2017-07-20 RX ORDER — ZOLPIDEM TARTRATE 5 MG/1
TABLET ORAL
Qty: 30 TABLET | Refills: 1 | OUTPATIENT
Start: 2017-07-20

## 2017-09-14 ENCOUNTER — TELEPHONE (OUTPATIENT)
Dept: SURGERY | Facility: CLINIC | Age: 37
End: 2017-09-14

## 2017-09-14 ENCOUNTER — TELEPHONE (OUTPATIENT)
Dept: INTERNAL MEDICINE | Facility: CLINIC | Age: 37
End: 2017-09-14

## 2017-09-14 ENCOUNTER — OFFICE VISIT (OUTPATIENT)
Dept: INTERNAL MEDICINE | Facility: CLINIC | Age: 37
End: 2017-09-14
Payer: MEDICARE

## 2017-09-14 ENCOUNTER — OFFICE VISIT (OUTPATIENT)
Dept: SURGERY | Facility: CLINIC | Age: 37
End: 2017-09-14
Payer: MEDICARE

## 2017-09-14 VITALS
HEART RATE: 92 BPM | TEMPERATURE: 98 F | SYSTOLIC BLOOD PRESSURE: 110 MMHG | BODY MASS INDEX: 31.77 KG/M2 | DIASTOLIC BLOOD PRESSURE: 80 MMHG | HEIGHT: 62 IN | OXYGEN SATURATION: 99 % | RESPIRATION RATE: 16 BRPM | WEIGHT: 172.63 LBS

## 2017-09-14 VITALS
BODY MASS INDEX: 31.86 KG/M2 | HEIGHT: 62 IN | TEMPERATURE: 99 F | WEIGHT: 173.13 LBS | SYSTOLIC BLOOD PRESSURE: 104 MMHG | HEART RATE: 74 BPM | DIASTOLIC BLOOD PRESSURE: 78 MMHG

## 2017-09-14 DIAGNOSIS — G47.01 INSOMNIA DUE TO MEDICAL CONDITION: ICD-10-CM

## 2017-09-14 DIAGNOSIS — G60.0 CHARCOT MARIE TOOTH MUSCULAR ATROPHY: ICD-10-CM

## 2017-09-14 DIAGNOSIS — N64.4 BREAST PAIN, LEFT: Primary | ICD-10-CM

## 2017-09-14 DIAGNOSIS — R11.2 NAUSEA WITH VOMITING, UNSPECIFIED: ICD-10-CM

## 2017-09-14 DIAGNOSIS — M54.9 CHRONIC BACK PAIN, UNSPECIFIED BACK LOCATION, UNSPECIFIED BACK PAIN LATERALITY: ICD-10-CM

## 2017-09-14 DIAGNOSIS — G89.29 CHRONIC BACK PAIN, UNSPECIFIED BACK LOCATION, UNSPECIFIED BACK PAIN LATERALITY: ICD-10-CM

## 2017-09-14 DIAGNOSIS — B02.9 HERPES ZOSTER WITHOUT COMPLICATION: Primary | ICD-10-CM

## 2017-09-14 DIAGNOSIS — T14.8XXA SCRATCHES: ICD-10-CM

## 2017-09-14 DIAGNOSIS — B02.9 HERPES ZOSTER WITHOUT COMPLICATION: ICD-10-CM

## 2017-09-14 PROCEDURE — 99214 OFFICE O/P EST MOD 30 MIN: CPT | Mod: S$GLB,,, | Performed by: INTERNAL MEDICINE

## 2017-09-14 PROCEDURE — 99215 OFFICE O/P EST HI 40 MIN: CPT | Mod: S$GLB,,, | Performed by: SURGERY

## 2017-09-14 RX ORDER — GABAPENTIN 100 MG/1
100 CAPSULE ORAL 3 TIMES DAILY
Qty: 45 CAPSULE | Refills: 0 | Status: SHIPPED | OUTPATIENT
Start: 2017-09-14 | End: 2017-09-22

## 2017-09-14 RX ORDER — ACYCLOVIR 800 MG/1
800 TABLET ORAL
Qty: 50 TABLET | Refills: 0 | Status: SHIPPED | OUTPATIENT
Start: 2017-09-14 | End: 2017-09-22

## 2017-09-14 RX ORDER — TIZANIDINE HYDROCHLORIDE 4 MG/1
4 CAPSULE, GELATIN COATED ORAL NIGHTLY
Qty: 30 CAPSULE | Refills: 1 | Status: SHIPPED | OUTPATIENT
Start: 2017-09-14 | End: 2017-10-21

## 2017-09-14 RX ORDER — ZOLPIDEM TARTRATE 5 MG/1
5 TABLET ORAL NIGHTLY PRN
Qty: 30 TABLET | Refills: 1 | Status: SHIPPED | OUTPATIENT
Start: 2017-09-14 | End: 2017-10-21
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
## 2017-09-14 NOTE — TELEPHONE ENCOUNTER
"9-14-17 Patient was seen in office by Dr. Henson today. Dr. Henson ordered US of Breast. This nurse went in to schedule with patient and she stated " you know, I dont' want to schedule that test. I am going to see my PCP and see if they feel I should have it done. I will call when I decided if I want to have anything further done. " Patient then left clinic.  Dr. Henson notified.  "

## 2017-09-14 NOTE — PROGRESS NOTES
"Subjective:      Patient ID: Jaycee Gray is a 37 y.o. female.    Chief Complaint: Blister (possible shingles both side of chest with pain and burning for a over 2 weeks)    HPI: 37 y.o. White female, with Charcot Amanda Tooth Syndrome, developed pain and a burning sensation under her  Left breast 1 week ago. She went to the ER thinking it might be shingles. There she was thought to have a cellulitis  Of left breast, and referred to Surgery, because she has a history of " staph abscesses ".  She was sent home on Clindamycin.  Surgery saw her today, and referred her back here now due to what appears to be scratches on top of shingles.    Review of Systems   Constitutional: Negative for chills, fatigue and fever.   HENT: Negative.    Eyes: Negative.    Respiratory: Negative for chest tightness, shortness of breath and wheezing.    Cardiovascular: Negative for chest pain and palpitations.   Gastrointestinal: Negative.    Genitourinary: Negative.    Musculoskeletal: Positive for back pain, gait problem and myalgias.   Skin: Positive for rash and wound.   Neurological: Positive for weakness.        Stinging,burning sensation to area under breasts, around to back.   Hematological: Negative.    Psychiatric/Behavioral: Negative.        Objective:   /80 (BP Location: Left arm, Patient Position: Sitting, BP Method: Large (Manual))   Pulse 92   Temp 98.3 °F (36.8 °C) (Oral)   Resp 16   Ht 5' 2" (1.575 m)   Wt 78.3 kg (172 lb 9.9 oz)   LMP 04/26/2009   SpO2 99%   BMI 31.57 kg/m²     Physical Exam   Constitutional: She is oriented to person, place, and time. She appears well-developed and well-nourished.   HENT:   Head: Normocephalic and atraumatic.   Right Ear: External ear normal.   Left Ear: External ear normal.   Nose: Nose normal.   Mouth/Throat: Oropharynx is clear and moist.   Eyes: Conjunctivae are normal. Pupils are equal, round, and reactive to light.   Neck: Normal range of motion.   Cardiovascular: " Normal rate, regular rhythm and normal heart sounds.    Pulmonary/Chest: Effort normal and breath sounds normal.   Abdominal: Soft. Bowel sounds are normal.   Musculoskeletal: Normal range of motion.   Neurological: She is alert and oriented to person, place, and time.   Skin: Rash noted. There is erythema.   See photos of Dr. Jung.   Nursing note and vitals reviewed.      Assessment:     1. Herpes zoster without complication    2. Scratches    3. Insomnia due to medical condition    4. Charcot Amanda Tooth muscular atrophy      Plan:     Herpes zoster without complication  -     acyclovir (ZOVIRAX) 800 MG Tab; Take 1 tablet (800 mg total) by mouth 5 (five) times daily.  Dispense: 50 tablet; Refill: 0  -     gabapentin (NEURONTIN) 100 MG capsule; Take 1 capsule (100 mg total) by mouth 3 (three) times daily.  Dispense: 45 capsule; Refill: 0    Scratches    Insomnia due to medical condition  -     zolpidem (AMBIEN) 5 MG Tab; Take 1 tablet (5 mg total) by mouth nightly as needed.  Dispense: 30 tablet; Refill: 1    Charcot Amanda Tooth muscular atrophy  -     tizanidine (ZANAFLEX) 4 mg Cap; Take 4 mg by mouth every evening.  Dispense: 30 capsule; Refill: 1

## 2017-09-14 NOTE — TELEPHONE ENCOUNTER
----- Message from Joselin Flores sent at 9/14/2017 10:21 AM CDT -----  Contact: patient   Patient would like to  a prescription for zolpidem (AMBIEN) 5 MG Tab and tizanidine (ZANAFLEX) 4 mg Cap. She is seeing  now and will stop on her way out.

## 2017-10-21 PROBLEM — M54.9 CHRONIC BACK PAIN: Status: ACTIVE | Noted: 2017-10-21

## 2017-10-21 PROBLEM — G89.29 CHRONIC BACK PAIN: Status: ACTIVE | Noted: 2017-10-21

## 2017-10-21 PROBLEM — M62.838 MUSCLE SPASM: Status: ACTIVE | Noted: 2017-10-21

## 2017-10-31 RX ORDER — ZOLPIDEM TARTRATE 5 MG/1
TABLET ORAL
Qty: 30 TABLET | Refills: 2 | Status: SHIPPED | OUTPATIENT
Start: 2017-10-31 | End: 2017-11-16

## 2017-11-19 ENCOUNTER — HOSPITAL ENCOUNTER (EMERGENCY)
Facility: HOSPITAL | Age: 37
Discharge: HOME | End: 2017-11-19
Attending: EMERGENCY MEDICINE
Payer: MEDICARE

## 2017-11-19 VITALS
SYSTOLIC BLOOD PRESSURE: 142 MMHG | WEIGHT: 175 LBS | HEIGHT: 62 IN | RESPIRATION RATE: 16 BRPM | TEMPERATURE: 99 F | BODY MASS INDEX: 32.2 KG/M2 | OXYGEN SATURATION: 100 % | HEART RATE: 83 BPM | DIASTOLIC BLOOD PRESSURE: 82 MMHG

## 2017-11-19 DIAGNOSIS — L03.317 CELLULITIS OF BUTTOCK: Primary | ICD-10-CM

## 2017-11-19 LAB
B-HCG UR QL: NEGATIVE
BILIRUB UR QL STRIP: NEGATIVE
CLARITY UR: CLEAR
COLOR UR: YELLOW
CTP QC/QA: YES
GLUCOSE UR QL STRIP: NEGATIVE
HGB UR QL STRIP: NEGATIVE
KETONES UR QL STRIP: NEGATIVE
LEUKOCYTE ESTERASE UR QL STRIP: NEGATIVE
NITRITE UR QL STRIP: NEGATIVE
PH UR STRIP: 6 [PH] (ref 5–8)
PROT UR QL STRIP: NEGATIVE
SP GR UR STRIP: 1.01 (ref 1–1.03)
URN SPEC COLLECT METH UR: NORMAL
UROBILINOGEN UR STRIP-ACNC: NEGATIVE EU/DL

## 2017-11-19 PROCEDURE — 81003 URINALYSIS AUTO W/O SCOPE: CPT

## 2017-11-19 PROCEDURE — 10060 I&D ABSCESS SIMPLE/SINGLE: CPT

## 2017-11-19 PROCEDURE — 81025 URINE PREGNANCY TEST: CPT | Performed by: EMERGENCY MEDICINE

## 2017-11-19 PROCEDURE — 99283 EMERGENCY DEPT VISIT LOW MDM: CPT | Mod: 25

## 2017-11-19 PROCEDURE — 25000003 PHARM REV CODE 250: Performed by: EMERGENCY MEDICINE

## 2017-11-19 RX ORDER — LIDOCAINE HYDROCHLORIDE 10 MG/ML
10 INJECTION INFILTRATION; PERINEURAL
Status: COMPLETED | OUTPATIENT
Start: 2017-11-19 | End: 2017-11-19

## 2017-11-19 RX ORDER — SULFAMETHOXAZOLE AND TRIMETHOPRIM 800; 160 MG/1; MG/1
1 TABLET ORAL 2 TIMES DAILY
Qty: 14 TABLET | Refills: 0 | Status: SHIPPED | OUTPATIENT
Start: 2017-11-19 | End: 2017-11-26

## 2017-11-19 RX ADMIN — LIDOCAINE HYDROCHLORIDE 10 ML: 10 INJECTION, SOLUTION INFILTRATION; PERINEURAL at 09:11

## 2017-11-20 NOTE — ED NOTES
Small abscess to inner aspect of right buttock since Thursday.  States that she had greenish drainage from site earlier today.  Denies fever

## 2017-11-20 NOTE — ED NOTES
RN at bedside to escort Dr Layton for I & D procedure, patient tolerated well.  Telfa dressing applied

## 2017-11-20 NOTE — ED PROVIDER NOTES
"Encounter Date: 11/19/2017       History     Chief Complaint   Patient presents with    Rectal Pain     Pt. reports having a boil to her rectum that had some green drainage today. Pt. c/o generalixed weakness with dysuria.     36 Y/O F WITH BUTTOCK PAIN RELATED TO AN ABSCESS THAT SHE DRAINED AT HOME YESTERDAY.  SHE REPORTS NOTING GREEN DRAINAGE.  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  NO RELIEVING MEASURES.  NO FEVER/CHILLS.  NO RECENT ABX TX.  PT STATES SHE ALSO HAS DYSURIA.  SHE IS REQUESTING AN I&D.  PT USES HIBICLENS AT HOME          Review of patient's allergies indicates:   Allergen Reactions    Carrot Hives and Shortness Of Breath    Sumatriptan succinate Other (See Comments)     paralysis    Tramadol Other (See Comments)     Faces swells. Tolerates percocet     Past Medical History:   Diagnosis Date    Anxiety     Breast abscess     Charcot-Amanda-Tooth disease     mild LE weakness    Depression     Endometriosis of uterus     Headache(784.0)     Herpes     History of psychiatric care     History of psychiatric hospitalization     PTSD (post-traumatic stress disorder)     Seizures     Self-injurious behavior     Thyroid disease      Past Surgical History:   Procedure Laterality Date    APPENDECTOMY      BACK SURGERY  07/01/2017    BREAST SURGERY      reduction    CHOLECYSTECTOMY  03/2017    HYSTERECTOMY      TLH vs LAVH with BSO    KNEE SURGERY Bilateral     OOPHORECTOMY       Family History   Problem Relation Age of Onset    Cancer Maternal Grandfather      colon    Heart disease Neg Hx      Social History   Substance Use Topics    Smoking status: Former Smoker     Packs/day: 0.50     Years: 3.00     Types: Cigarettes    Smokeless tobacco: Never Used      Comment: quit 4/2015    Alcohol use Yes      Comment: socially     Review of Systems   Constitutional: Negative for " chills and fever.   Gastrointestinal: Negative for abdominal pain, nausea and vomiting.   Genitourinary: Positive for dysuria. Negative for difficulty urinating, flank pain and frequency.   Musculoskeletal: Negative for arthralgias.   Skin: Negative for rash.        RIGHT BUTTOCK ABSCESS   Neurological: Negative for dizziness and headaches.   Hematological: Negative for adenopathy.   Psychiatric/Behavioral: The patient is nervous/anxious.    All other systems reviewed and are negative.      Physical Exam     Initial Vitals [11/19/17 2038]   BP Pulse Resp Temp SpO2   (!) 142/82 83 16 98.7 °F (37.1 °C) 100 %      MAP       102         Physical Exam    Nursing note and vitals reviewed.  Constitutional: She appears well-developed and well-nourished. She is not diaphoretic. No distress.   HENT:   Head: Normocephalic and atraumatic.   Neck: Normal range of motion.   Cardiovascular: Normal rate, regular rhythm and normal heart sounds. Exam reveals no gallop and no friction rub.    No murmur heard.  Pulmonary/Chest: Breath sounds normal. No respiratory distress. She has no wheezes. She has no rhonchi. She has no rales.   Musculoskeletal: Normal range of motion.   Neurological: She is alert and oriented to person, place, and time.   Skin: Skin is warm and dry. There is erythema.        Psychiatric: Judgment and thought content normal.   ANXIOUS         ED Course   I & D - Incision and Drainage  Date/Time: 11/19/2017 9:09 PM  Location procedure was performed: [facility] EMERGENCY DEPARTMENT  Performed by: PETER TINSLEY  Authorized by: PETER TINSLEY   Assisting provider: PETER TINSLEY  Pre-operative diagnosis: ABSCESS  Post-operative diagnosis: ABSCESS  Consent Done: Yes  Consent: Verbal consent obtained.  Risks and benefits: risks, benefits and alternatives were discussed  Consent given by: patient  Patient understanding: patient states understanding of the procedure being performed  Type: abscess  Location:  "RIGHT BUTTOCK.  Anesthesia: local infiltration    Anesthesia:  Local Anesthetic: lidocaine 1% with epinephrine  Anesthetic total: 8 mL  Patient sedated: no  Risk factor: underlying major vessel  Scalpel size: 11  Incision type: single straight  Complexity: simple  Drainage characteristics: NO DRAINAGE.  Wound treatment: incision and  wound packed  Packing material: 1/4 in gauze  Complications: No  Specimens: No  Implants: No  Patient tolerance: Patient tolerated the procedure well with no immediate complications        Labs Reviewed   URINALYSIS   POCT URINE PREGNANCY             Medical Decision Making:   Initial Assessment:   38 Y/O F WITH BUTTOCK PAIN RELATED TO AN ABSCESS THAT SHE DRAINED AT HOME YESTERDAY.  SHE REPORTS NOTING GREEN DRAINAGE.  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  NO RELIEVING MEASURES.  NO FEVER/CHILLS.  NO RECENT ABX TX.  PT STATES SHE ALSO HAS DYSURIA.  SHE IS REQUESTING AN I&D.  PT USES HIBICLENS AT HOME          Differential Diagnosis:   DDX: ABSCESS, UTI, CELLULITIS, PILONIDAL CYST  Clinical Tests:   Lab Tests: Ordered and Reviewed  The following lab test(s) were unremarkable: Urinalysis  ED Management:  I AND D DONE PER PT REQUEST.  NO PURULENT DRAINAGE.  U/A NEG.  PT D/C HOME WITH BACTRIM FOR CELLULITIS    Pt counseled on their diagnosis and the importance of following up with PCP.  Pt also cautioned on when to return to ED.  Pt verbalizes understanding of discharge plan and will return to ED immediately if symptoms worsen                     ED Course      Clinical Impression:   The encounter diagnosis was Cellulitis of buttock.    Disposition:   Disposition: Discharged  Condition: Stable                        Teresa Layton MD  11/19/17 2110    "

## 2017-11-30 ENCOUNTER — OFFICE VISIT (OUTPATIENT)
Dept: INTERNAL MEDICINE | Facility: CLINIC | Age: 37
End: 2017-11-30
Payer: MEDICARE

## 2017-11-30 VITALS
WEIGHT: 180.31 LBS | SYSTOLIC BLOOD PRESSURE: 108 MMHG | BODY MASS INDEX: 33.18 KG/M2 | HEART RATE: 85 BPM | TEMPERATURE: 99 F | OXYGEN SATURATION: 96 % | HEIGHT: 62 IN | DIASTOLIC BLOOD PRESSURE: 80 MMHG | RESPIRATION RATE: 18 BRPM

## 2017-11-30 DIAGNOSIS — K11.20 PAROTITIS NOT DUE TO MUMPS: Primary | ICD-10-CM

## 2017-11-30 DIAGNOSIS — H92.02 REFERRED OTALGIA OF LEFT EAR: ICD-10-CM

## 2017-11-30 PROCEDURE — 99213 OFFICE O/P EST LOW 20 MIN: CPT | Mod: S$GLB,,, | Performed by: INTERNAL MEDICINE

## 2017-11-30 RX ORDER — CLINDAMYCIN HYDROCHLORIDE 300 MG/1
300 CAPSULE ORAL EVERY 8 HOURS
Qty: 21 CAPSULE | Refills: 0 | Status: SHIPPED | OUTPATIENT
Start: 2017-11-30 | End: 2018-02-14

## 2017-11-30 RX ORDER — IBUPROFEN 800 MG/1
800 TABLET ORAL
Qty: 30 TABLET | Refills: 0 | Status: SHIPPED | OUTPATIENT
Start: 2017-11-30 | End: 2017-12-10

## 2017-11-30 NOTE — PROGRESS NOTES
"Subjective:      Patient ID: Jaycee Gary is a 37 y.o. female.    Chief Complaint: Jaw Pain (left; started about 2 weeks ago) and Night Sweats    HPI: 37 y.o. White female , 2 weeks ago developed swelling of her left cheek, at the level of her jaw.  She did not have any recent dental work. Has pain in left ear.  She does not remember having mumps, but has had no recent travel outside of the immediate area.  Drinks at least 4 bottles of water a day.  No fever, but has night sweats. This may not be new.       Review of Systems   Constitutional: Negative.    HENT: Positive for ear pain and facial swelling. Negative for congestion, dental problem, drooling, ear discharge, hearing loss, nosebleeds, postnasal drip, rhinorrhea, sinus pain, sinus pressure, sneezing, sore throat, tinnitus and trouble swallowing.    Eyes: Negative.    Respiratory: Negative.    Cardiovascular: Negative for chest pain.   Gastrointestinal: Negative.    Endocrine: Positive for polydipsia. Negative for polyphagia and polyuria.   Musculoskeletal: Negative.    Neurological: Negative for weakness, light-headedness and headaches.   Hematological: Negative.    Psychiatric/Behavioral: Negative.        Objective:   /80 (BP Location: Right arm, Patient Position: Sitting, BP Method: Large (Manual))   Pulse 85   Temp 98.9 °F (37.2 °C) (Oral)   Resp 18   Ht 5' 2" (1.575 m)   Wt 81.8 kg (180 lb 5.4 oz)   LMP 04/26/2009   SpO2 96%   BMI 32.98 kg/m²     Physical Exam   Constitutional: She is oriented to person, place, and time. She appears well-developed and well-nourished.   HENT:   Head: Normocephalic and atraumatic.       Right Ear: Tympanic membrane, external ear and ear canal normal.   Left Ear: Tympanic membrane, external ear and ear canal normal.   Nose: Nose normal.   Mouth/Throat: Oropharynx is clear and moist and mucous membranes are normal. She does not have dentures. No oral lesions. No trismus in the jaw. Normal dentition. No " dental abscesses, uvula swelling or dental caries.   Tender to touch  Minor swelling.  No swelling of mucous membranes, or ductal abnormality to vision.   Eyes: Conjunctivae are normal. Pupils are equal, round, and reactive to light.   Neck: Normal range of motion. Neck supple.   Cardiovascular: Normal rate, regular rhythm and normal heart sounds.    Pulmonary/Chest: Effort normal and breath sounds normal.   Abdominal: Soft. Bowel sounds are normal.   Musculoskeletal: Normal range of motion.   Neurological: She is alert and oriented to person, place, and time.   Skin: Skin is warm and dry.   Psychiatric: She has a normal mood and affect. Her behavior is normal.   Nursing note and vitals reviewed.      Assessment:     1. Parotitis not due to mumps    2. Referred otalgia of left ear      Plan:     Parotitis not due to mumps  -     clindamycin (CLEOCIN) 300 MG capsule; Take 1 capsule (300 mg total) by mouth every 8 (eight) hours.  Dispense: 21 capsule; Refill: 0  -     ibuprofen (ADVIL,MOTRIN) 800 MG tablet; Take 1 tablet (800 mg total) by mouth 3 (three) times daily before meals.  Dispense: 30 tablet; Refill: 0    Referred otalgia of left ear    Fluids with lemon.  Warm compresses.  May have to refer to Oral surgeon/ENT if does not find resolution in7-10 days.

## 2017-11-30 NOTE — PATIENT INSTRUCTIONS
Handwashing: Tips for Patients, Family, and Friends    Germs are everywhere around us. Normally, we live with germs without getting sick. In certain cases, harmful germs cause us to get sick with an infection. Or we can spread harmful germs to others and cause them to get sick. Keeping your hands clean is the best way to prevent getting or spreading germs that cause infection. Wash your hands with soap and water or use an alcohol-based hand .  When to clean your hands: For patients  In the hospital or in your home, you can come in contact with many harmful germs. To help prevent infection, wash your hands often, especially:  · After using the bathroom  · Before and after eating  · After coughing or sneezing  · After using a tissue  · After touching or changing a dressing or bandage  · After touching any object or surface that may be contaminated  · After touching an animal during a pet therapy session (hospital)  · After touching an animal, cleaning up after a pet, or preparing food for pets (home)  If you dont have access to soap and water, use an alcohol-based hand gel containing at least 60% alcohol. These products kill most germs and are easy to use. But use soap and water (not alcohol-based hand gel) if your hands are visibly dirty.  When to clean your hands: For family and friends  When visiting or caring for a loved one, washing your hands or using an alcohol-based hand  can help stop germs from spreading. Wash your hands:  · Before entering and after leaving the patients room  · As soon as you remove gloves or other protective clothing  · After changing a dressing or bandage  · After any contact with blood or other body fluids  · After touching or changing the patients bed linen or towels  · After touching an animal during a pet therapy session (hospital)  · After touching an animal, cleaning up after a pet, or preparing food for pets (home)  Many hospitals have sinks or gel dispensers  right outside patient rooms. If not, carry a bottle of alcohol-based hand gel with you, and use it every time you visit. Use soap and water (not alcohol-based hand gel) if your hands are visibly dirty.  Tips for good handwashing  Here are some suggestions to follow:  · Use warm water and plenty of soap. Work up a good lather.  · Clean the whole hand, including under your nails, between your fingers, and up the wrists.  · Wash for at least 15 to 20 seconds. Dont just wipe. Scrub well.  · Rinse, letting the water run down your fingers, not up your wrists.  · Dry your hands well. Use a paper towel to turn off the faucet and open the door.  Time matters  The longer you wash your hands, the more germs youll remove. Most people wash their hands for 6 to 7 seconds. But at least 15 seconds are needed to remove germs. Singing Happy Birthday or the Alphabet Song are examples of how long 15 seconds would be. To protect yourself and others from infection, washing for 30 seconds is best.  How to use an alcohol-based hand   Alcohol-based hand  may kill more germs than soap and water. Use them when your hands arent visibly dirty. For best results, follow these steps:  · Choose a gel or spray that contains at least 60 percent alcohol. Products with less alcohol may not kill germs.  · Spread about a tablespoon of  in the palm of one hand.  · Rub your hands together briskly, cleaning the backs of your hands, the palms, between your fingers, and up the wrists.  · Rub until the  is gone, and your hands are completely dry.  How do antibacterial soaps and alcohol-based hand  differ?  Antibacterial soaps:  · Come in liquid or bar form and are used with water  · Are no better at removing germs than plain soap  Alcohol-based hand :  · Come in gels or sprays that dont need water  · Are as or more effective than washing with soap and water   Date Last Reviewed: 12/1/2016  © 8569-5051 [employer]  [employer]. [address] [place] [address], [place] [address]. All rights reserved. This information is not intended as a substitute for professional medical care. Always follow your healthcare professional's instructions.

## 2017-12-04 ENCOUNTER — OFFICE VISIT (OUTPATIENT)
Dept: INTERNAL MEDICINE | Facility: CLINIC | Age: 37
End: 2017-12-04
Payer: MEDICARE

## 2017-12-04 ENCOUNTER — TELEPHONE (OUTPATIENT)
Dept: INTERNAL MEDICINE | Facility: CLINIC | Age: 37
End: 2017-12-04

## 2017-12-04 VITALS
DIASTOLIC BLOOD PRESSURE: 70 MMHG | WEIGHT: 181 LBS | HEART RATE: 75 BPM | TEMPERATURE: 98 F | RESPIRATION RATE: 16 BRPM | SYSTOLIC BLOOD PRESSURE: 110 MMHG | BODY MASS INDEX: 33.31 KG/M2 | HEIGHT: 62 IN | OXYGEN SATURATION: 98 %

## 2017-12-04 DIAGNOSIS — R52 PAIN AGGRAVATED BY MASTICATION: ICD-10-CM

## 2017-12-04 DIAGNOSIS — K11.20 PAROTITIS NOT DUE TO MUMPS: Primary | ICD-10-CM

## 2017-12-04 PROCEDURE — 99213 OFFICE O/P EST LOW 20 MIN: CPT | Mod: S$PBB,,, | Performed by: INTERNAL MEDICINE

## 2017-12-04 PROCEDURE — 99999 PR PBB SHADOW E&M-EST. PATIENT-LVL III: CPT | Mod: PBBFAC,,, | Performed by: INTERNAL MEDICINE

## 2017-12-04 PROCEDURE — 99213 OFFICE O/P EST LOW 20 MIN: CPT | Mod: PBBFAC,PN | Performed by: INTERNAL MEDICINE

## 2017-12-04 RX ORDER — HYDROCODONE BITARTRATE AND ACETAMINOPHEN 5; 325 MG/1; MG/1
1 TABLET ORAL EVERY 8 HOURS PRN
Qty: 20 TABLET | Refills: 0 | Status: SHIPPED | OUTPATIENT
Start: 2017-12-04 | End: 2018-02-14

## 2017-12-04 NOTE — TELEPHONE ENCOUNTER
----- Message from Joselin Flores sent at 12/4/2017  9:45 AM CST -----  Contact: PATIENT   Patient came in on Thursday for her face hurting an swelling , she said she is doing the motrin, antibiotics and lemon water but she is still in lots of pain and cannot function. Please call her [phone]

## 2017-12-04 NOTE — PROGRESS NOTES
"Subjective:      Patient ID: Jaycee Gray is a 37 y.o. female.    Chief Complaint: Oral Pain (left side of mouth inside)    HPI: 37 y.o. White female , despite taking the antibiotics,motrin and water, still has a parotitis on the left.  Miserable. Cannot swallow easily. Crying in pain.  Is going to see an Oral Surgeon in the next several days.      Review of Systems   Constitutional: Positive for activity change and appetite change.   HENT: Positive for dental problem and ear pain. Negative for drooling and sneezing.    Respiratory: Negative for chest tightness and wheezing.    Cardiovascular: Negative for chest pain and palpitations.   Musculoskeletal:        Cannot easily open mouth, due to stretching her cheek.   Psychiatric/Behavioral: Positive for dysphoric mood.       Objective:   /70 (BP Location: Left arm, Patient Position: Sitting, BP Method: Large (Manual))   Pulse 75   Temp 98.1 °F (36.7 °C) (Oral)   Resp 16   Ht 5' 2" (1.575 m)   Wt 93.2 kg (205 lb 5.7 oz)   LMP 04/26/2009   SpO2 98%   BMI 37.56 kg/m²     Physical Exam   Constitutional: She appears well-developed and well-nourished. She appears distressed.   HENT:   Head: Normocephalic and atraumatic.       Right Ear: External ear and ear canal normal.   Left Ear: External ear and ear canal normal.   Eyes: Conjunctivae and EOM are normal.   Neck: Normal range of motion.   Cardiovascular: Normal rate, regular rhythm and normal heart sounds.    Pulmonary/Chest: Effort normal and breath sounds normal.   Lymphadenopathy:     She has no cervical adenopathy.   Neurological: She is alert.   Skin: Skin is warm and dry.   Psychiatric: Her mood appears anxious.   Crying   Nursing note and vitals reviewed.      Assessment:     1. Parotitis not due to mumps    2. Pain aggravated by mastication      Plan:     Parotitis not due to mumps  -     hydrocodone-acetaminophen 5-325mg (NORCO) 5-325 mg per tablet; Take 1 tablet by mouth every 8 (eight) hours " as needed for Pain.  Dispense: 20 tablet; Refill: 0    Pain aggravated by mastication  -     hydrocodone-acetaminophen 5-325mg (NORCO) 5-325 mg per tablet; Take 1 tablet by mouth every 8 (eight) hours as needed for Pain.  Dispense: 20 tablet; Refill: 0      Continue antibiotics, fluids and motrin.  See Oral surgeon ASAP.

## 2018-03-21 ENCOUNTER — TELEPHONE (OUTPATIENT)
Dept: INTERNAL MEDICINE | Facility: CLINIC | Age: 38
End: 2018-03-21

## 2018-03-21 DIAGNOSIS — S83.231A COMPLEX TEAR OF MEDIAL MENISCUS, CURRENT INJURY, RIGHT KNEE, INITIAL ENCOUNTER: Primary | ICD-10-CM

## 2018-03-21 DIAGNOSIS — S83.521A: ICD-10-CM

## 2018-03-21 NOTE — TELEPHONE ENCOUNTER
Patient has been informed to get medication tizanidine for pain doctor. Patient informed and understand. Vf/ma

## 2018-03-21 NOTE — TELEPHONE ENCOUNTER
----- Message from Joselin Flores sent at 3/21/2018 10:57 AM CDT -----  Contact: patient   Patient would like a new prescription for tizanidine 4mg sent to [facility] . She said that her muscles are hurting. Any question call [phone]

## 2018-05-10 ENCOUNTER — TELEPHONE (OUTPATIENT)
Dept: INTERNAL MEDICINE | Facility: CLINIC | Age: 38
End: 2018-05-10

## 2018-05-10 DIAGNOSIS — G60.0 CHARCOT MARIE TOOTH MUSCULAR ATROPHY: ICD-10-CM

## 2018-05-10 DIAGNOSIS — R79.89 ABNORMAL LFTS: ICD-10-CM

## 2018-05-10 DIAGNOSIS — E03.9 ACQUIRED HYPOTHYROIDISM: Primary | ICD-10-CM

## 2018-05-10 DIAGNOSIS — Z13.6 SCREENING FOR CARDIOVASCULAR CONDITION: ICD-10-CM

## 2018-05-10 NOTE — TELEPHONE ENCOUNTER
----- Message from Nakia Gonzales sent at 5/10/2018  1:38 PM CDT -----  No. [phone]    Patient has an appointment with Dr. Stone on 5/17/18.   She would like to have her thyroid checked prior to the appointment.

## 2018-05-17 ENCOUNTER — TELEPHONE (OUTPATIENT)
Dept: INTERNAL MEDICINE | Facility: CLINIC | Age: 38
End: 2018-05-17

## 2018-05-17 NOTE — TELEPHONE ENCOUNTER
Patient came into the office feeling very bad. Patient was sent to the ER with symptoms of a heart a tack. Vf/ma

## 2018-05-17 NOTE — TELEPHONE ENCOUNTER
----- Message from Sergio Bazzi sent at 5/17/2018  9:07 AM CDT -----  Contact: [phone]  Patient advised she is not feeling good and would like to be seen soon. Patient advised she would rather be in the doctor's office and is currently on her way. Please call and advise.

## 2018-05-18 NOTE — TELEPHONE ENCOUNTER
----- Message from Renee Alvarez sent at 5/18/2018  9:24 AM CDT -----  Should she still have tests done that was ordered by .went to ER yesterday.

## 2018-06-06 ENCOUNTER — OFFICE VISIT (OUTPATIENT)
Dept: INTERNAL MEDICINE | Facility: CLINIC | Age: 38
End: 2018-06-06
Payer: MEDICARE

## 2018-06-06 ENCOUNTER — INITIAL CONSULT (OUTPATIENT)
Dept: CARDIOLOGY | Facility: CLINIC | Age: 38
End: 2018-06-06
Payer: MEDICARE

## 2018-06-06 VITALS
OXYGEN SATURATION: 97 % | WEIGHT: 178.56 LBS | HEIGHT: 62 IN | DIASTOLIC BLOOD PRESSURE: 74 MMHG | SYSTOLIC BLOOD PRESSURE: 106 MMHG | HEART RATE: 72 BPM | BODY MASS INDEX: 32.86 KG/M2

## 2018-06-06 VITALS
OXYGEN SATURATION: 98 % | WEIGHT: 178.63 LBS | DIASTOLIC BLOOD PRESSURE: 70 MMHG | SYSTOLIC BLOOD PRESSURE: 110 MMHG | RESPIRATION RATE: 16 BRPM | BODY MASS INDEX: 32.87 KG/M2 | HEART RATE: 73 BPM | HEIGHT: 62 IN

## 2018-06-06 DIAGNOSIS — E03.9 ACQUIRED HYPOTHYROIDISM: Primary | ICD-10-CM

## 2018-06-06 DIAGNOSIS — G60.0 CHARCOT MARIE TOOTH MUSCULAR ATROPHY: ICD-10-CM

## 2018-06-06 DIAGNOSIS — R07.9 CHEST PAIN, UNSPECIFIED TYPE: ICD-10-CM

## 2018-06-06 DIAGNOSIS — R10.11 RIGHT UPPER QUADRANT ABDOMINAL PAIN: ICD-10-CM

## 2018-06-06 DIAGNOSIS — R68.81 EARLY SATIETY: ICD-10-CM

## 2018-06-06 DIAGNOSIS — F41.0 PANIC DISORDER: Chronic | ICD-10-CM

## 2018-06-06 DIAGNOSIS — G60.0 CHARCOT MARIE TOOTH MUSCULAR ATROPHY: Primary | ICD-10-CM

## 2018-06-06 DIAGNOSIS — F43.10 PTSD (POST-TRAUMATIC STRESS DISORDER): ICD-10-CM

## 2018-06-06 DIAGNOSIS — N30.10 INTERSTITIAL CYSTITIS: ICD-10-CM

## 2018-06-06 PROBLEM — R93.5 ABNORMAL CT OF THE ABDOMEN: Status: RESOLVED | Noted: 2017-03-25 | Resolved: 2018-06-06

## 2018-06-06 PROBLEM — R79.89 ABNORMAL LFTS: Status: RESOLVED | Noted: 2017-03-25 | Resolved: 2018-06-06

## 2018-06-06 PROCEDURE — 99214 OFFICE O/P EST MOD 30 MIN: CPT | Mod: PBBFAC,27,PN | Performed by: INTERNAL MEDICINE

## 2018-06-06 PROCEDURE — 99214 OFFICE O/P EST MOD 30 MIN: CPT | Mod: PBBFAC,PN | Performed by: INTERNAL MEDICINE

## 2018-06-06 PROCEDURE — 99205 OFFICE O/P NEW HI 60 MIN: CPT | Mod: S$PBB,,, | Performed by: INTERNAL MEDICINE

## 2018-06-06 PROCEDURE — 99999 PR PBB SHADOW E&M-EST. PATIENT-LVL IV: CPT | Mod: PBBFAC,,, | Performed by: INTERNAL MEDICINE

## 2018-06-06 PROCEDURE — 99214 OFFICE O/P EST MOD 30 MIN: CPT | Mod: S$PBB,,, | Performed by: INTERNAL MEDICINE

## 2018-06-06 RX ORDER — LEVOTHYROXINE SODIUM 50 UG/1
50 TABLET ORAL DAILY
Qty: 30 TABLET | Refills: 11 | Status: SHIPPED | OUTPATIENT
Start: 2018-06-06 | End: 2019-06-12 | Stop reason: SDUPTHER

## 2018-06-06 RX ORDER — HYDROCODONE BITARTRATE AND ACETAMINOPHEN 5; 325 MG/1; MG/1
1 TABLET ORAL DAILY PRN
COMMUNITY
End: 2019-01-30 | Stop reason: SDUPTHER

## 2018-06-06 NOTE — PATIENT INSTRUCTIONS
Assessment/Plan:  Jaycee Gray is a 37 y.o. female with a past medical history of Charcot-Amanda Tooth Disease, anxiety, who presents for follow up after recent ED discharge.     1. Chest Pain- Pt presents with chest pain as described.  ASCVD risk score is 0.3%.  Because of Charcot Amanda Tooth, pt is unable to walk on a treadmill.  Check echo and nuclear stress test to evaluate further.      Follow up in 2 weeks

## 2018-06-06 NOTE — PROGRESS NOTES
"Subjective:      Patient ID: Jaycee Gray is a 37 y.o. female.    Chief Complaint: Follow-up (6 month follow up); Results (lab results); and Abdominal Pain (abd pain and swelling x )    HPI: 37 y.o. White female, has multiple providers in other systems, not [facility], so no reports of  Any procedures,surgeries,notes. ( [employer],[facility],  pain--- Dirk HERNANDES,Neuro [employer]).    Uses the ER for " acute issues", and has never been able to be enrolled in Case management from last year.  She has lost 27# since last visit.  Todays issues:  -RUQ fullness  -fatigue      Recent Labs  Lab Result Units 06/01/18  0911   TSH uIU/mL 5.220*   HDL mg/dL 54   Cholesterol mg/dL 167   Triglycerides mg/dL 59   HDL/Chol Ratio % 32.3   Non-HDL Cholesterol mg/dL 113       Recent Labs  Lab Result Units 04/22/18  1235 06/01/18  0911 06/01/18  1031   Color, UA  Yellow  --  Yellow   Specific Gravity, UA  1.015  --  1.025   pH, UA  7.0  --  6.0   Total Cholesterol/HDL Ratio   --  3.1  --        Recent Labs  Lab Result Units 04/22/18  1235 06/01/18  1031   Ketones, UA  Negative Negative   Occult Blood UA  Negative Negative   Nitrite, UA  Negative Negative   Urobilinogen, UA EU/dL Negative Negative       Recent Labs  Lab Result Units 04/22/18  1235 05/17/18  1047 06/01/18  0911 06/01/18  1031   Leukocytes, UA  Negative  --   --  Negative   WBC K/uL  --  6.29 7.63  --    RBC M/uL  --  4.95 4.67  --    Hemoglobin g/dL  --  14.9 13.9  --    Hematocrit %  --  43.7 41.6  --    MCH pg  --  30.1 29.8  --    RDW %  --  13.2 12.9  --        Recent Labs  Lab Result Units 05/17/18  1047 06/01/18  0911   Platelets K/uL 249 211   MPV fL 10.3 10.3   Glucose mg/dL 91 99   BUN, Bld mg/dL 6* 12   Creatinine mg/dL 0.48* 0.50   Calcium mg/dL 9.8 9.6   Sodium mmol/L 143 144       Recent Labs  Lab Result Units 05/17/18  1047 05/17/18  1312 06/01/18  0911   Potassium mmol/L 5.6* 4.0 4.3   Chloride mmol/L 108  --  106   Total Protein g/dL 8.5*  --  7.6   Albumin g/dL 4.6  --  " "4.2   Total Bilirubin mg/dL 0.7  --  0.3   AST U/L 43  --  23   Alkaline Phosphatase U/L 93  --  89       Recent Labs  Lab Result Units 05/17/18  1047 06/01/18  0911   CO2 mmol/L 22* 24   ALT U/L 23 22   Anion Gap mmol/L 13 14   eGFR if non African American mL/min/1.73 m^2 >60.0 >60.0   MCV fL 88 89         Review of Systems   HENT: Positive for dental problem.    Respiratory: Positive for chest tightness. Negative for cough, choking and shortness of breath.    Cardiovascular: Positive for chest pain.   Gastrointestinal: Positive for abdominal distention and vomiting.        Early satiety.  Vomiting hours later, undigested food   Endocrine: Positive for cold intolerance and heat intolerance.   Genitourinary: Positive for difficulty urinating and urgency.   Musculoskeletal: Positive for arthralgias, back pain and gait problem.   Neurological: Positive for weakness.   Psychiatric/Behavioral: Negative.        Objective:   /70 (BP Location: Left arm, Patient Position: Sitting, BP Method: Large (Manual))   Pulse 73   Resp 16   Ht 5' 2" (1.575 m)   Wt 81 kg (178 lb 9.6 oz)   LMP 04/26/2009 (LMP Unknown)   SpO2 98%   BMI 32.67 kg/m²     Physical Exam   Constitutional: She is oriented to person, place, and time. She appears well-developed and well-nourished.   HENT:   Head: Normocephalic and atraumatic.   Nose: Nose normal.   Eyes: Conjunctivae are normal. Pupils are equal, round, and reactive to light.   Cardiovascular: Normal rate, regular rhythm and normal heart sounds.    Pulmonary/Chest: Effort normal and breath sounds normal. No stridor.   Abdominal: Soft. Bowel sounds are normal. She exhibits no distension. There is no tenderness.   Musculoskeletal: She exhibits deformity. She exhibits no edema or tenderness.   Neurological: She is alert and oriented to person, place, and time. She displays atrophy. She exhibits abnormal muscle tone. Coordination abnormal.   Bilateral foot drop   Skin: Skin is warm and " dry.   Psychiatric: She has a normal mood and affect. Her behavior is normal.   Nursing note and vitals reviewed.      Assessment:     1. Acquired hypothyroidism    2. Charcot Amanda Tooth muscular atrophy    3. Right upper quadrant abdominal pain    4. Early satiety    5. Interstitial cystitis    Question whether this is an unknown neurological degeneration/ syndrome.  Transportation is an issues.  Plan:     Acquired hypothyroidism  -     levothyroxine (SYNTHROID) 50 MCG tablet; Take 1 tablet (50 mcg total) by mouth once daily.  Dispense: 30 tablet; Refill: 11  -     TSH; Future; Expected date: 09/06/2018  -     T4, free; Future; Expected date: 09/04/2018    Charcot Amanda Tooth muscular atrophy  -     Ambulatory referral to Outpatient Case Management  -     Ambulatory referral to Gastroenterology    Right upper quadrant abdominal pain    Early satiety  -     Ambulatory referral to Gastroenterology    Interstitial cystitis  -     Ambulatory referral to Urology
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
## 2018-06-07 ENCOUNTER — OUTPATIENT CASE MANAGEMENT (OUTPATIENT)
Dept: ADMINISTRATIVE | Facility: OTHER | Age: 38
End: 2018-06-07

## 2018-06-07 ENCOUNTER — TELEPHONE (OUTPATIENT)
Dept: INTERNAL MEDICINE | Facility: CLINIC | Age: 38
End: 2018-06-07

## 2018-06-07 NOTE — PROGRESS NOTES
Thank you for the referral.  Patient has been assigned to Yuli Jackson LMSW for low risk screening for Outpatient Case Management.     Reason for referral: Charcot Amanda Tooth muscular atrophy    Please contact [facility] at ext. [phone] with any questions.    Thank you,    Kassy Sterling LMSW

## 2018-06-08 ENCOUNTER — OUTPATIENT CASE MANAGEMENT (OUTPATIENT)
Dept: ADMINISTRATIVE | Facility: OTHER | Age: 38
End: 2018-06-08

## 2018-06-11 ENCOUNTER — OUTPATIENT CASE MANAGEMENT (OUTPATIENT)
Dept: ADMINISTRATIVE | Facility: OTHER | Age: 38
End: 2018-06-11

## 2018-06-11 NOTE — PROGRESS NOTES
2nd attempt  This LMSW attempted to reach patient/caregiver to provide resource and left msg requesting a return call.  Letter with contact information was sent via Patient Portal to patient/caregiver.  Referral source notified.      This LMSW received a referral on the above patient.   Reason for referral:Charcot Amanda Tooth muscular atrophy  Name of the community resource that was provided: [facility] , Advance Directives  Resource given to: Patient  via [place] mail and Telephone    LMSW received a return call from patient. LMSW completed assessment with patient . Patient resides with sister and brother -in-law. Patient reports she is able to complete  ADL's. Patient denied needing assistance with food, shelter, medication or medical. Patient reports her friend usually provides transportation, however car is not currently working. Patient reports she has recently  been connected with Medicaid Transportation to assist with additional transit. Patient reports she does not have an Advance Directive in place. LMSW ask that this SW provide resources on Advance Care Planning. Patient is seeking additional support when she has a health care concern or needs advice on care options. LMSW provided patient with information for [facility]. Patient denied needing additional assistance with coordinating care. Patient reports she has all her doctors here at [facility]. Per chart review patient was connected with [facility] last year. Patient does not meet criteria for High Risk.

## 2018-06-11 NOTE — LETTER
June 11, 2018    Jaycee Gray  [address] [place] [address]             [facility] [address] [place] [address] Dear:Jaycee Gray    I am writing from the Outpatient Complex Care Management Department at [facility].  I received a referral from Dr. Jesse Stone to contact you or your caregiver regarding any needs you may have. I have attempted to contact you or your cargeiver by phone two times unsuccessfully.  Please contact the Outpatient Complex Care Management Department at [phone] if you would like to discuss your needs.      Sincerely,         Yuli Jackson LMSW

## 2018-06-14 ENCOUNTER — INITIAL CONSULT (OUTPATIENT)
Dept: GASTROENTEROLOGY | Facility: CLINIC | Age: 38
End: 2018-06-14
Payer: MEDICARE

## 2018-06-14 VITALS — BODY MASS INDEX: 32.92 KG/M2 | WEIGHT: 180 LBS

## 2018-06-14 DIAGNOSIS — R14.0 ABDOMINAL DISTENTION: Primary | ICD-10-CM

## 2018-06-14 PROCEDURE — 99999 PR PBB SHADOW E&M-EST. PATIENT-LVL II: CPT | Mod: PBBFAC,,, | Performed by: INTERNAL MEDICINE

## 2018-06-14 PROCEDURE — 99212 OFFICE O/P EST SF 10 MIN: CPT | Mod: PBBFAC,PO | Performed by: INTERNAL MEDICINE

## 2018-06-14 PROCEDURE — 99213 OFFICE O/P EST LOW 20 MIN: CPT | Mod: S$PBB,,, | Performed by: INTERNAL MEDICINE

## 2018-06-14 NOTE — LETTER
June 14, 2018      Jesse tSone MD  [address]  [address]  [facility] [address]           [facility]  [address] [place] [id number]  Phone: [phone]          Patient: Jaycee Gray   MR Number: [id number]   YOB: 1980   Date of Visit: 6/14/2018       Dear Dr. Jesse Stone:    Thank you for referring Jaycee Gray to me for evaluation. Attached you will find relevant portions of my assessment and plan of care.    If you have questions, please do not hesitate to call me. I look forward to following Jaycee Gray along with you.    Sincerely,    Arsh Avalos MD    Enclosure  CC:  No Recipients    If you would like to receive this communication electronically, please contact [email or web address] or [phone] to request more information on [employer] Link access.    For providers and/or their staff who would like to refer a patient to [facility], please contact us through our one-stop-shop provider referral line, [facility], at [phone].    If you feel you have received this communication in error or would no longer like to receive these types of communications, please e-mail [email or web address]

## 2018-06-14 NOTE — PROGRESS NOTES
Subjective:       Patient ID: Jaycee Gray is a 37 y.o. female.    Chief Complaint: Abdominal Pain; Emesis; and Bloated    This is a 37-year-old female here for a sensation of abdominal distention and tenderness.  She has noted asymmetric abdominal distention noted predominant in the right upper quadrant for the past 2 years.  It is without particular dietary relation she notes it more when she is standing.  No particular exacerbating or relieving factors or other associated symptoms.  Some nausea.  She has had an upper endoscopy within the past 5 years noting only mild gastritis.  No NSAID usage.  No unintentional weight loss or change in bowel habits.  She is status post remote cholecystectomy.    The following portions of the patient's history were reviewed and updated as appropriate: allergies, current medications, past family history, past medical history, past social history, past surgical history and problem list.    (Portions of this note were dictated using voice recognition software and may contain dictation related errors in spelling/grammar/syntax not found on text review)    HPI  Review of Systems   Constitutional: Positive for appetite change. Negative for fever.   HENT: Negative for postnasal drip and trouble swallowing.    Eyes: Negative for pain and redness.   Respiratory: Negative for chest tightness and shortness of breath.    Cardiovascular: Positive for leg swelling. Negative for chest pain.   Gastrointestinal: Positive for abdominal distention, abdominal pain and nausea. Negative for diarrhea.   Endocrine: Negative for cold intolerance and heat intolerance.   Genitourinary: Negative for difficulty urinating and hematuria.   Musculoskeletal: Positive for arthralgias and back pain.   Skin: Negative for color change and pallor.   Allergic/Immunologic: Negative for environmental allergies and food allergies.   Neurological: Negative for dizziness and numbness.   Hematological: Negative for  adenopathy. Does not bruise/bleed easily.   Psychiatric/Behavioral: Negative for agitation and behavioral problems.       Objective:      Physical Exam   Constitutional: She is oriented to person, place, and time. She appears well-developed and well-nourished. No distress.   HENT:   Head: Normocephalic and atraumatic.   Nose: Nose normal.   Eyes: Conjunctivae and EOM are normal. No scleral icterus.   Neck: Normal range of motion. Neck supple. No tracheal deviation present. No thyromegaly present.   Cardiovascular: Normal rate, regular rhythm and normal heart sounds.  Exam reveals no gallop and no friction rub.    No murmur heard.  Pulmonary/Chest: Effort normal and breath sounds normal. No respiratory distress. She has no wheezes.   Abdominal: Soft. Bowel sounds are normal. She exhibits no distension. There is no tenderness.   Musculoskeletal:        Right wrist: She exhibits normal range of motion and no tenderness.        Left wrist: She exhibits normal range of motion and no tenderness.   Lymphadenopathy:        Head (right side): No submental and no submandibular adenopathy present.        Head (left side): No submental and no submandibular adenopathy present.   Neurological: She is alert and oriented to person, place, and time.   Skin: Skin is warm and dry. No rash noted. She is not diaphoretic. No erythema.   Psychiatric: She has a normal mood and affect. Her behavior is normal.   Nursing note and vitals reviewed.      Prior CT reviewed  Assessment:       1. Abdominal distention        Plan:   1. Abdominal u/s

## 2018-06-22 ENCOUNTER — TELEPHONE (OUTPATIENT)
Dept: GASTROENTEROLOGY | Facility: CLINIC | Age: 38
End: 2018-06-22

## 2018-06-22 NOTE — TELEPHONE ENCOUNTER
----- Message from Dinora Garcia sent at 6/22/2018 10:35 AM CDT -----  Contact: self / [phone]  Patient is requesting a call back regarding, the results of her test. Please advise

## 2018-06-29 ENCOUNTER — PATIENT MESSAGE (OUTPATIENT)
Dept: UROLOGY | Facility: CLINIC | Age: 38
End: 2018-06-29

## 2018-07-03 ENCOUNTER — TELEPHONE (OUTPATIENT)
Dept: GASTROENTEROLOGY | Facility: CLINIC | Age: 38
End: 2018-07-03

## 2018-07-03 NOTE — TELEPHONE ENCOUNTER
----- Message from Willem Roe sent at 7/3/2018  8:10 AM CDT -----  Contact: self/[phone]  Patient is calling to get results from recent Ultrasound.      Please call and advise as she stated she has left numerous message and no one has called her back.

## 2018-07-06 ENCOUNTER — TELEPHONE (OUTPATIENT)
Dept: GASTROENTEROLOGY | Facility: CLINIC | Age: 38
End: 2018-07-06

## 2018-07-06 DIAGNOSIS — R10.84 GENERALIZED ABDOMINAL PAIN: Primary | ICD-10-CM

## 2018-07-06 NOTE — TELEPHONE ENCOUNTER
----- Message from Arsh Avalos MD sent at 7/5/2018  8:04 AM CDT -----  Prominent bile duct, no retained stones noted. If still with epigastric pain, can schedule EGD/EUS

## 2018-07-06 NOTE — TELEPHONE ENCOUNTER
Results given to patient. Patient verbalized understanding. Will call back to schedule EGD/EUS.    EGD/EUS scheduled with Dr. Pereyra 08/14/2018

## 2018-08-14 ENCOUNTER — ANESTHESIA EVENT (OUTPATIENT)
Dept: ENDOSCOPY | Facility: HOSPITAL | Age: 38
End: 2018-08-14
Payer: MEDICARE

## 2018-08-14 ENCOUNTER — ANESTHESIA (OUTPATIENT)
Dept: ENDOSCOPY | Facility: HOSPITAL | Age: 38
End: 2018-08-14
Payer: MEDICARE

## 2018-08-14 ENCOUNTER — HOSPITAL ENCOUNTER (OUTPATIENT)
Facility: HOSPITAL | Age: 38
Discharge: HOME | End: 2018-08-14
Attending: INTERNAL MEDICINE | Admitting: INTERNAL MEDICINE
Payer: MEDICARE

## 2018-08-14 VITALS
RESPIRATION RATE: 13 BRPM | WEIGHT: 175 LBS | TEMPERATURE: 98 F | HEART RATE: 60 BPM | BODY MASS INDEX: 32.2 KG/M2 | DIASTOLIC BLOOD PRESSURE: 74 MMHG | SYSTOLIC BLOOD PRESSURE: 110 MMHG | OXYGEN SATURATION: 99 % | HEIGHT: 62 IN

## 2018-08-14 DIAGNOSIS — R10.9 ABDOMINAL PAIN: ICD-10-CM

## 2018-08-14 DIAGNOSIS — R10.11 RIGHT UPPER QUADRANT ABDOMINAL PAIN: Primary | ICD-10-CM

## 2018-08-14 PROCEDURE — 25000003 PHARM REV CODE 250: Performed by: INTERNAL MEDICINE

## 2018-08-14 PROCEDURE — 37000009 HC ANESTHESIA EA ADD 15 MINS: Performed by: INTERNAL MEDICINE

## 2018-08-14 PROCEDURE — 37000008 HC ANESTHESIA 1ST 15 MINUTES: Performed by: INTERNAL MEDICINE

## 2018-08-14 PROCEDURE — 43259 EGD US EXAM DUODENUM/JEJUNUM: CPT | Performed by: INTERNAL MEDICINE

## 2018-08-14 PROCEDURE — 63600175 PHARM REV CODE 636 W HCPCS: Performed by: NURSE ANESTHETIST, CERTIFIED REGISTERED

## 2018-08-14 PROCEDURE — 43259 EGD US EXAM DUODENUM/JEJUNUM: CPT | Mod: ,,, | Performed by: INTERNAL MEDICINE

## 2018-08-14 RX ORDER — PROPOFOL 10 MG/ML
VIAL (ML) INTRAVENOUS CONTINUOUS PRN
Status: DISCONTINUED | OUTPATIENT
Start: 2018-08-14 | End: 2018-08-14

## 2018-08-14 RX ORDER — PROPOFOL 10 MG/ML
VIAL (ML) INTRAVENOUS
Status: DISCONTINUED | OUTPATIENT
Start: 2018-08-14 | End: 2018-08-14

## 2018-08-14 RX ORDER — MIDAZOLAM HYDROCHLORIDE 1 MG/ML
INJECTION, SOLUTION INTRAMUSCULAR; INTRAVENOUS
Status: DISCONTINUED | OUTPATIENT
Start: 2018-08-14 | End: 2018-08-14

## 2018-08-14 RX ORDER — SODIUM CHLORIDE 0.9 % (FLUSH) 0.9 %
3 SYRINGE (ML) INJECTION
Status: DISCONTINUED | OUTPATIENT
Start: 2018-08-14 | End: 2018-08-14 | Stop reason: HOSPADM

## 2018-08-14 RX ORDER — LIDOCAINE HCL/PF 100 MG/5ML
SYRINGE (ML) INTRAVENOUS
Status: DISCONTINUED | OUTPATIENT
Start: 2018-08-14 | End: 2018-08-14

## 2018-08-14 RX ORDER — SODIUM CHLORIDE 9 MG/ML
INJECTION, SOLUTION INTRAVENOUS CONTINUOUS
Status: DISCONTINUED | OUTPATIENT
Start: 2018-08-14 | End: 2018-08-14 | Stop reason: HOSPADM

## 2018-08-14 RX ORDER — FENTANYL CITRATE 50 UG/ML
INJECTION, SOLUTION INTRAMUSCULAR; INTRAVENOUS
Status: DISCONTINUED | OUTPATIENT
Start: 2018-08-14 | End: 2018-08-14

## 2018-08-14 RX ADMIN — MIDAZOLAM 2 MG: 1 INJECTION INTRAMUSCULAR; INTRAVENOUS at 08:08

## 2018-08-14 RX ADMIN — PROPOFOL 40 MG: 10 INJECTION, EMULSION INTRAVENOUS at 08:08

## 2018-08-14 RX ADMIN — FENTANYL CITRATE 100 MCG: 50 INJECTION, SOLUTION INTRAMUSCULAR; INTRAVENOUS at 08:08

## 2018-08-14 RX ADMIN — PROPOFOL 100 MCG/KG/MIN: 10 INJECTION, EMULSION INTRAVENOUS at 08:08

## 2018-08-14 RX ADMIN — SODIUM CHLORIDE: 0.9 INJECTION, SOLUTION INTRAVENOUS at 07:08

## 2018-08-14 RX ADMIN — LIDOCAINE HYDROCHLORIDE 50 MG: 20 INJECTION, SOLUTION INTRAVENOUS at 08:08

## 2018-08-14 NOTE — DISCHARGE SUMMARY
Discharge Summary/Instructions after an Endoscopic Procedure    Patient Name: Jaycee Gray  Patient MRN: [id number]  Patient YOB: 1980 Tuesday, August 14, 2018  Marko Pereyra MD    RESTRICTIONS:  During your procedure today, you received medications for sedation.  These medications may affect your judgment, balance and coordination.  Therefore, for 24 hours, you have the following restrictions:     - DO NOT drive a car, operate machinery, make legal/financial decisions, sign important papers or drink alcohol.      ACTIVITY:  Today: no heavy lifting, straining or running due to procedural sedation/anesthesia.  The following day: return to full activity including work.    DIET:  Eat and drink normally unless instructed otherwise.     TREATMENT FOR COMMON SIDE EFFECTS:  - Mild abdominal pain, nausea, belching, bloating or excessive gas:  rest, eat lightly and use a heating pad.  - Sore Throat: treat with throat lozenges and/or gargle with warm salt water.  - Because air was used during the procedure, expelling large amounts of air from your rectum or belching is normal.  - If a bowel prep was taken, you may not have a bowel movement for 1-3 days.  This is normal.      SYMPTOMS TO WATCH FOR AND REPORT TO YOUR PHYSICIAN:  1. Abdominal pain or bloating, other than gas cramps.  2. Chest pain.  3. Back pain.  4. Signs of infection such as: chills or fever occurring within 24 hours after the procedure.  5. Rectal bleeding, which would show as bright red, maroon, or black stools. (A tablespoon of blood from the rectum is not serious, especially if hemorrhoids are present.)  6. Vomiting.  7. Weakness or dizziness.      GO DIRECTLY TO THE NEAREST EMERGENCY ROOM IF YOU HAVE ANY OF THE FOLLOWING:     Difficulty breathing              Chills and/or fever over 101 F   Persistent vomiting and/or vomiting blood   Severe abdominal pain   Severe chest pain   Black, tarry stools   Bleeding- more than one tablespoon   Any  other symptom or condition that you feel may need urgent attention    Your doctor recommends these additional instructions:  If any biopsies were taken, your doctors clinic will contact you in 1 to 2 weeks with any results.    - Discharge patient to home (ambulatory).   - Return to referring physician.    For questions, problems or results please call your physician - Marko Pereyra MD at Work:  [phone].    EMERGENCY PHONE NUMBER: [phone],  LAB RESULTS: [phone]    IF A COMPLICATION OR EMERGENCY SITUATION ARISES AND YOU ARE UNABLE TO REACH YOUR PHYSICIAN - GO DIRECTLY TO THE EMERGENCY ROOM.

## 2018-08-14 NOTE — PLAN OF CARE
Patient done with recovery, vitals stable, patient denies any pain. Report and discharge instructions given. Patient ready to be discharged.

## 2018-08-14 NOTE — PLAN OF CARE
Past Medical History:   Diagnosis Date    Anxiety     Breast abscess     Charcot-Amanda-Tooth disease     mild LE weakness    Chronic interstitial cystitis without hematuria     CMT (Charcot-Amanda-Tooth disease)     Depression     reports she is no longer depressed    Endometriosis of uterus     Headache(784.0)     Herpes     History of psychiatric care     History of psychiatric hospitalization     PTSD (post-traumatic stress disorder)     Seizures     Self-injurious behavior     Thyroid disease      Patient here today for EGD/Upper EUS with Dr. Pereyra. NPO status verified and appropriate for procedure. Mother present, patient in agreement with plan of care and consents to proceed as scheduled.

## 2018-08-14 NOTE — ANESTHESIA PREPROCEDURE EVALUATION
08/14/2018  Jaycee Gray is a 37 y.o., female having upper endo for eval GI symptoms.  PMH - thyroid supplement,  CMT dx (weakness LE's, decreased sensation and foot drop)    Anesthesia Evaluation    I have reviewed the Patient Summary Reports.    I have reviewed the Nursing Notes.   I have reviewed the Medications.     Review of Systems  Anesthesia Hx:  No problems with previous Anesthesia  History of prior surgery of interest to airway management or planning: Denies Family Hx of Anesthesia complications.    Social:  Non-Smoker    Neurological:   Neuromuscular Disease, Headaches Seizures    Endocrine:   Hypothyroidism    Psych:   Psychiatric History          Physical Exam  General:  Well nourished    Airway/Jaw/Neck:  Airway Findings: Mouth Opening: Normal Tongue: Normal  General Airway Assessment: Adult  Mallampati: III  Improves to II with phonation.  TM Distance: Normal, at least 6 cm  Jaw/Neck Findings:  Neck ROM: Normal ROM       Chest/Lungs:  Chest/Lungs Findings: Clear to auscultation, Normal Respiratory Rate     Heart/Vascular:  Heart Findings: Rate: Normal  Rhythm: Regular Rhythm  Sounds: Normal        Mental Status:  Mental Status Findings:  Alert and Oriented        Past Medical History:   Diagnosis Date    Anxiety      Breast abscess      Charcot-Amanda-Tooth disease       mild LE weakness    Chronic interstitial cystitis without hematuria      CMT (Charcot-Amanda-Tooth disease)      Depression       reports she is no longer depressed    Endometriosis of uterus      Headache(784.0)      Herpes      History of psychiatric care      History of psychiatric hospitalization      PTSD (post-traumatic stress disorder)      Seizures      Self-injurious behavior      Thyroid disease              Past Surgical History:   Procedure Laterality Date    APPENDECTOMY        BACK  SURGERY   07/01/2017    BACK SURGERY   02/21/2018     screw removal    BREAST SURGERY         reduction    CHOLECYSTECTOMY   03/2017    HYSTERECTOMY         TLH vs LAVH with BSO    KNEE SURGERY Bilateral      OOPHORECTOMY       EF  55 - 60%    ECHO   6/2018    Anesthesia Plan  Type of Anesthesia, risks & benefits discussed:  Anesthesia Type:  MAC  Patient's Preference:   Intra-op Monitoring Plan:   Intra-op Monitoring Plan Comments:   Post Op Pain Control Plan:   Post Op Pain Control Plan Comments:   Induction:   IV  Beta Blocker:  Patient is not currently on a Beta-Blocker (No further documentation required).       Informed Consent: Patient understands risks and agrees with Anesthesia plan.  Questions answered. Anesthesia consent signed with patient.  ASA Score: 3     Day of Surgery Review of History & Physical: I have interviewed and examined the patient. I have reviewed the patient's H&P dated:            Ready For Surgery From Anesthesia Perspective.

## 2018-08-14 NOTE — H&P
History & Physical - Short Stay  Gastroenterology      SUBJECTIVE:     Procedure: EGD and EUS    Chief Complaint/Indication for Procedure: Abdominal Pain    History of Present Illness:  Patient is a 37 y.o. female presents with abdominal pain and dilated CBD of US. The patient is S/P cholecystectomy.   Facility-Administered Medications Prior to Admission   Medication    regadenoson injection 0.4 mg     PTA Medications   Medication Sig    HYDROcodone-acetaminophen (NORCO) 5-325 mg per tablet Take 1 tablet by mouth daily as needed for Pain.    levothyroxine (SYNTHROID) 50 MCG tablet Take 1 tablet (50 mcg total) by mouth once daily.    epinephrine (EPIPEN 2-JAYSON) 0.3 mg/0.3 mL AtIn Inject 0.3 mLs (0.3 mg total) into the muscle once. Inject 1 pen as directed as needed.    zinc oxide (BOUDREAUXS BUTT PASTE) 16 % Oint ointment Apply 1 application topically once daily.       Review of patient's allergies indicates:   Allergen Reactions    Carrot Hives and Shortness Of Breath    Sumatriptan succinate Other (See Comments)     paralysis    Tramadol Other (See Comments)     Faces swells. Tolerates percocet        Past Medical History:   Diagnosis Date    Anxiety     Breast abscess     Charcot-Amanda-Tooth disease     mild LE weakness    Chronic interstitial cystitis without hematuria     CMT (Charcot-Amanda-Tooth disease)     Depression     reports she is no longer depressed    Endometriosis of uterus     Headache(784.0)     Herpes     History of psychiatric care     History of psychiatric hospitalization     PTSD (post-traumatic stress disorder)     Seizures     Self-injurious behavior     Thyroid disease      Past Surgical History:   Procedure Laterality Date    APPENDECTOMY      BACK SURGERY  07/01/2017    BACK SURGERY  02/21/2018    screw removal    BREAST SURGERY      reduction    CHOLECYSTECTOMY  03/2017    ESOPHAGOGASTRODUODENOSCOPY  08/14/2018    HYSTERECTOMY      TLH vs LAVH with BSO     KNEE SURGERY Bilateral     OOPHORECTOMY      Upper EUS  08/14/2018     Family History   Problem Relation Age of Onset    Cancer Maternal Grandfather         colon    Colon cancer Maternal Grandfather     Heart disease Neg Hx      Social History     Tobacco Use    Smoking status: Former Smoker     Packs/day: 0.50     Years: 3.00     Pack years: 1.50     Types: Cigarettes    Smokeless tobacco: Never Used    Tobacco comment: quit 4/2015   Substance Use Topics    Alcohol use: No    Drug use: No       Review of Systems:  Constitutional: no fever or chills  Respiratory: no cough or shortness of breath  Cardiovascular: no chest pain or palpitations  Gastrointestinal: positive for abdominal pain and diarrhea    OBJECTIVE:     Vital Signs (Most Recent)  Temp: 97.9 °F (36.6 °C) (08/14/18 0700)  Pulse: 71 (08/14/18 0700)  Resp: 18 (08/14/18 0700)  BP: 108/79 (08/14/18 0702)  SpO2: 98 % (08/14/18 0700)    Physical Exam:  General: well developed, well nourished  Lungs:  normal respiratory effort  Heart: regular rate, S1, S2 normal  Abdomen: soft, non-tender non-distented; bowel sounds normal; no masses,  no organomegaly    Laboratory  CBC: No results for input(s): WBC, RBC, HGB, HCT, PLT, MCV, MCH, MCHC in the last 168 hours.  CMP: No results for input(s): GLU, CALCIUM, ALBUMIN, PROT, NA, K, CO2, CL, BUN, CREATININE, ALKPHOS, ALT, AST, BILITOT in the last 168 hours.  Coagulation: No results for input(s): LABPROT, INR, APTT in the last 168 hours.      Diagnostic Results:      ASSESSMENT/PLAN:     Abdominal pain  Dilated CBD    Plan: EGD and EUS    Anesthesia Plan: MAC    ASA Grade: ASA 3 - Patient with moderate systemic disease with functional limitations     The impression and plan was discussed in detail with the patient and family. All questions have been answered and the patient voices understanding of our plan at this point. The risk of the procedure was discussed in detail which includes but not limited to  bleeding, infection, perforation in some cases requiring surgery with its spectrum of complications.

## 2018-08-14 NOTE — [employer] PATIENT INSTRUCTIONS
Discharge Summary/Instructions after an Endoscopic Procedure  Patient Name: Jaycee Gray  Patient MRN: [id number]  Patient YOB: 1980 Tuesday, August 14, 2018  Marko Pereyra MD  RESTRICTIONS:  During your procedure today, you received medications for sedation.  These   medications may affect your judgment, balance and coordination.  Therefore,   for 24 hours, you have the following restrictions:   - DO NOT drive a car, operate machinery, make legal/financial decisions,   sign important papers or drink alcohol.    ACTIVITY:  Today: no heavy lifting, straining or running due to procedural   sedation/anesthesia.  The following day: return to full activity including work.  DIET:  Eat and drink normally unless instructed otherwise.     TREATMENT FOR COMMON SIDE EFFECTS:  - Mild abdominal pain, nausea, belching, bloating or excessive gas:  rest,   eat lightly and use a heating pad.  - Sore Throat: treat with throat lozenges and/or gargle with warm salt   water.  - Because air was used during the procedure, expelling large amounts of air   from your rectum or belching is normal.  - If a bowel prep was taken, you may not have a bowel movement for 1-3 days.    This is normal.  SYMPTOMS TO WATCH FOR AND REPORT TO YOUR PHYSICIAN:  1. Abdominal pain or bloating, other than gas cramps.  2. Chest pain.  3. Back pain.  4. Signs of infection such as: chills or fever occurring within 24 hours   after the procedure.  5. Rectal bleeding, which would show as bright red, maroon, or black stools.   (A tablespoon of blood from the rectum is not serious, especially if   hemorrhoids are present.)  6. Vomiting.  7. Weakness or dizziness.  GO DIRECTLY TO THE NEAREST EMERGENCY ROOM IF YOU HAVE ANY OF THE FOLLOWING:      Difficulty breathing              Chills and/or fever over 101 F   Persistent vomiting and/or vomiting blood   Severe abdominal pain   Severe chest pain   Black, tarry stools   Bleeding- more than one  tablespoon   Any other symptom or condition that you feel may need urgent attention  Your doctor recommends these additional instructions:  If any biopsies were taken, your doctors clinic will contact you in 1 to 2   weeks with any results.  - Discharge patient to home (ambulatory).   - Return to referring physician.  For questions, problems or results please call your physician - Marko Pereyra MD at Work:  [phone].  EMERGENCY PHONE NUMBER: [phone],  LAB RESULTS: [phone]  IF A COMPLICATION OR EMERGENCY SITUATION ARISES AND YOU ARE UNABLE TO REACH   YOUR PHYSICIAN - GO DIRECTLY TO THE EMERGENCY ROOM.  Marko Pereyra MD  8/14/2018 8:30:36 AM  This report has been verified and signed electronically.  [employer]

## 2018-08-14 NOTE — ANESTHESIA POSTPROCEDURE EVALUATION
"Anesthesia Post Evaluation    Patient: Jaycee Gray    Procedure(s) Performed: Procedure(s) (LRB):  ESOPHAGOGASTRODUODENOSCOPY (EGD) (N/A)  ULTRASOUND, ENDOSCOPIC, UPPER GI TRACT (N/A)    Final Anesthesia Type: MAC  Patient location during evaluation: GI PACU  Level of consciousness: awake  Post-procedure vital signs: reviewed and stable  Pain management: adequate  Airway patency: patent    Anesthetic complications: no      Cardiovascular status: stable  Respiratory status: spontaneous ventilation  Hydration status: euvolemic  Follow-up not needed.        Visit Vitals  /74   Pulse 60   Temp 36.6 °C (97.9 °F)   Resp 13   Ht 5' 2" (1.575 m)   Wt 79.4 kg (175 lb)   LMP 04/26/2009 (LMP Unknown)   SpO2 99%   Breastfeeding? No   BMI 32.01 kg/m²       Pain/Shena Score: Pain Assessment Performed: Yes (8/14/2018  8:52 AM)  Presence of Pain: complains of pain/discomfort (8/14/2018  8:52 AM)  Pain Rating Prior to Med Admin: 8 (8/14/2018  8:33 AM)  Shena Score: 10 (8/14/2018  8:48 AM)        "

## 2018-08-14 NOTE — TRANSFER OF CARE
"Anesthesia Transfer of Care Note    Patient: Jaycee Gray    Procedure(s) Performed: Procedure(s) (LRB):  ESOPHAGOGASTRODUODENOSCOPY (EGD) (N/A)  ULTRASOUND, ENDOSCOPIC, UPPER GI TRACT (N/A)    Patient location: GI    Anesthesia Type: MAC    Transport from OR: Transported from OR on room air with adequate spontaneous ventilation    Post pain: adequate analgesia    Post assessment: no apparent anesthetic complications    Post vital signs: stable    Level of consciousness: awake, alert and oriented    Nausea/Vomiting: no nausea/vomiting    Complications: none    Transfer of care protocol was followed      Last vitals:   Visit Vitals  BP (!) 104/54   Pulse 68   Temp 36.6 °C (97.9 °F)   Resp 13   Ht 5' 2" (1.575 m)   Wt 79.4 kg (175 lb)   LMP 04/26/2009 (LMP Unknown)   SpO2 98%   Breastfeeding? No   BMI 32.01 kg/m²     "

## 2018-08-27 ENCOUNTER — OFFICE VISIT (OUTPATIENT)
Dept: URGENT CARE | Facility: CLINIC | Age: 38
End: 2018-08-27
Payer: MEDICARE

## 2018-08-27 VITALS
TEMPERATURE: 97 F | WEIGHT: 176 LBS | HEIGHT: 62 IN | SYSTOLIC BLOOD PRESSURE: 106 MMHG | OXYGEN SATURATION: 99 % | HEART RATE: 81 BPM | DIASTOLIC BLOOD PRESSURE: 73 MMHG | RESPIRATION RATE: 18 BRPM | BODY MASS INDEX: 32.39 KG/M2

## 2018-08-27 DIAGNOSIS — L02.413 ABSCESS OF RIGHT SHOULDER: Primary | ICD-10-CM

## 2018-08-27 PROCEDURE — 10060 I&D ABSCESS SIMPLE/SINGLE: CPT | Mod: S$GLB,,, | Performed by: NURSE PRACTITIONER

## 2018-08-27 PROCEDURE — 99214 OFFICE O/P EST MOD 30 MIN: CPT | Mod: 25,S$GLB,, | Performed by: NURSE PRACTITIONER

## 2018-08-27 RX ORDER — CLINDAMYCIN HYDROCHLORIDE 300 MG/1
300 CAPSULE ORAL 3 TIMES DAILY
Qty: 21 CAPSULE | Refills: 0 | Status: SHIPPED | OUTPATIENT
Start: 2018-08-27 | End: 2018-09-03

## 2018-08-27 RX ORDER — MUPIROCIN 20 MG/G
OINTMENT TOPICAL
Qty: 22 G | Refills: 1 | Status: SHIPPED | OUTPATIENT
Start: 2018-08-27 | End: 2018-09-18

## 2018-08-27 RX ORDER — CLINDAMYCIN PHOSPHATE 150 MG/ML
300 INJECTION, SOLUTION INTRAVENOUS
Status: COMPLETED | OUTPATIENT
Start: 2018-08-27 | End: 2018-08-27

## 2018-08-27 RX ADMIN — CLINDAMYCIN PHOSPHATE 300 MG: 150 INJECTION, SOLUTION INTRAVENOUS at 10:08

## 2018-08-27 NOTE — PROCEDURES
"Incision & Drainage  Date/Time: 8/27/2018 10:15 AM  Performed by: Sulema Norwood NP  Authorized by: Sulema Norwood NP     Time out: Immediately prior to procedure a "time out" was called to verify the correct patient, procedure, equipment, support staff and site/side marked as required.    Consent Done?:  Yes (Verbal)    Type:  Abscess  Body area:  Trunk  Location details:  Chest  Anesthesia:  Local infiltration  Local anesthetic: lidocaine 1% with epinephrine  Anesthetic total (ml):  4  Scalpel size:  11  Incision type:  Single straight  Complexity:  Simple  Drainage:  Bloody and serous  Drainage amount:  Scant  Wound treatment:  Wound left open, drainage and expression of material  Patient tolerance:  Patient tolerated the procedure well with no immediate complications      "

## 2018-08-27 NOTE — PATIENT INSTRUCTIONS
Please follow up with your Primary care provider within 2-5 days if your signs and symptoms have not resolved or worsen.     If your condition worsens or fails to improve we recommend that you receive another evaluation at the emergency room immediately or contact your primary medical clinic to discuss your concerns.    You must understand that you have received an Urgent Care treatment only and that you may be released before all of your medical problems are known or treated.   You, the patient, will arrange for follow up care as instructed.     You have been given an antibiotic to treat your condition today.  Please complete the antibiotic as directed on the bottle.     As with any antibiotics, use probiotics and/or high culture yogurt about 2 hours apart from the antibiotic and about 1 week after the antibiotic to replace the gut michele lost with antibiotic use.      If you are female and on BCP use additional methods to prevent pregnancy while on antibiotics and for one cycle after.       Abscess (Incision & Drainage)  An abscess is sometimes called a boil. It happens when bacteria get trapped under the skin and start to grow. Pus forms inside the abscess as the body responds to the bacteria. An abscess can happen with an insect bite, ingrown hair, blocked oil gland, pimple, cyst, or puncture wound.  Your healthcare provider has drained the pus from your abscess. If the abscess pocket was large, your healthcare provider may have put in gauze packing. Your provider will need to remove it on your next visit. He or she may also replace it at that time. You may not need antibiotics to treat a simple abscess, unless the infection is spreading into the skin around the wound (cellulitis).  The wound will take about 1 to 2 weeks to heal, depending on the size of the abscess. Healthy tissue will grow from the bottom and sides of the opening until it seals over.  Home care  These tips can help your wound heal:  · The wound  may drain for the first 2 days. Cover the wound with a clean dry dressing. Change the dressing if it becomes soaked with blood or pus.  · If a gauze packing was placed inside the abscess pocket, you may be told to remove it yourself. You may do this in the shower. Once the packing is removed, you should wash the area in the shower, or clean the area as directed by your provider. Continue to do this until the skin opening has closed. Make sure you wash your hands after changing the packing or cleaning the wound.  · If you were prescribed antibiotics, take them as directed until they are all gone.  · You may use acetaminophen or ibuprofen to control pain, unless another pain medicine was prescribed. If you have liver disease or ever had a stomach ulcer, talk with your doctor before using these medicines.  Follow-up care  Follow up with your healthcare provider, or as advised. If a gauze packing was put in your wound, it should be removed in 1 to 2 days. Check your wound every day for any signs that the infection is getting worse. The signs are listed below.  When to seek medical advice  Call your healthcare provider right away if any of these occur:  · Increasing redness or swelling  · Red streaks in the skin leading away from the wound  · Increasing local pain or swelling  · Continued pus draining from the wound 2 days after treatment  · Fever of 100.4ºF (38ºC) or higher, or as directed by your healthcare provider  · Boil returns when you are at home  Date Last Reviewed: 9/1/2016  © 2540-1970 [employer]. [address] [place] [address], [place] [address]. All rights reserved. This information is not intended as a substitute for professional medical care. Always follow your healthcare professional's instructions.

## 2018-08-27 NOTE — PROGRESS NOTES
"Subjective:       Patient ID: Jaycee Gray is a 37 y.o. female.    Vitals:  height is 5' 2" (1.575 m) and weight is 79.8 kg (176 lb). Her temperature is 97.4 °F (36.3 °C). Her blood pressure is 106/73 and her pulse is 81. Her respiration is 18 and oxygen saturation is 99%.     Chief Complaint: Abscess (right upper chest)    Patient stated she had a mole in that area, then suddenly 2-3 weeks noted red, puss inside, and swelling.  Patient stated it has gotten worse and pressure in area.  Patient under pain management.       Abscess   Chronicity:  NewProgression Since Onset: gradually worsening  Abscess location: chest.  Associated Symptoms: no fever, no chills  Characteristics: draining, painful and redness    Pain Scale:  10/10  Ineffective treatments: pain medication.  Relieved by:  Nothing  Worsened by:  Nothing    Review of Systems   Constitution: Negative for chills and fever.   HENT: Negative for sore throat.    Eyes: Negative for blurred vision.   Cardiovascular: Negative for chest pain.   Respiratory: Negative for shortness of breath.    Skin: Negative for rash.   Musculoskeletal: Negative for back pain and joint pain.   Gastrointestinal: Negative for abdominal pain, diarrhea, nausea and vomiting.   Neurological: Negative for headaches.   Psychiatric/Behavioral: The patient is not nervous/anxious.        Objective:      Physical Exam   Constitutional: She is oriented to person, place, and time. She appears well-developed and well-nourished.   HENT:   Head: Normocephalic and atraumatic. Head is without abrasion, without contusion and without laceration.   Right Ear: External ear normal.   Left Ear: External ear normal.   Nose: Nose normal.   Mouth/Throat: Oropharynx is clear and moist.   Eyes: Conjunctivae, EOM and lids are normal. Pupils are equal, round, and reactive to light.   Neck: Trachea normal, full passive range of motion without pain and phonation normal. Neck supple.   Cardiovascular: Normal " rate, regular rhythm and normal heart sounds.   Pulmonary/Chest: Effort normal and breath sounds normal. No stridor. No respiratory distress.   Musculoskeletal: Normal range of motion.   Neurological: She is alert and oriented to person, place, and time.   Skin: Skin is warm, dry and intact. Capillary refill takes less than 2 seconds. Lesion (see diagram) noted. No abrasion, no bruising, no burn, no ecchymosis, no laceration and no rash noted. No cyanosis or erythema. Nails show no clubbing.        Psychiatric: She has a normal mood and affect. Her speech is normal and behavior is normal. Judgment and thought content normal. Cognition and memory are normal.   Nursing note and vitals reviewed.      Assessment:       1. Abscess of right shoulder        Plan:         Abscess of right shoulder  -     Incision & Drainage  -     clindamycin (CLEOCIN) 300 MG capsule; Take 1 capsule (300 mg total) by mouth 3 (three) times daily. for 7 days  Dispense: 21 capsule; Refill: 0  -     clindamycin injection 300 mg; Inject 2 mLs (300 mg total) into the muscle one time.  -     mupirocin (BACTROBAN) 2 % ointment; Apply to affected area 3 times daily x 10 days  Dispense: 22 g; Refill: 1      Patient Instructions   Please follow up with your Primary care provider within 2-5 days if your signs and symptoms have not resolved or worsen.     If your condition worsens or fails to improve we recommend that you receive another evaluation at the emergency room immediately or contact your primary medical clinic to discuss your concerns.    You must understand that you have received an Urgent Care treatment only and that you may be released before all of your medical problems are known or treated.   You, the patient, will arrange for follow up care as instructed.     You have been given an antibiotic to treat your condition today.  Please complete the antibiotic as directed on the bottle.     As with any antibiotics, use probiotics and/or high  culture yogurt about 2 hours apart from the antibiotic and about 1 week after the antibiotic to replace the gut michele lost with antibiotic use.      If you are female and on BCP use additional methods to prevent pregnancy while on antibiotics and for one cycle after.       Abscess (Incision & Drainage)  An abscess is sometimes called a boil. It happens when bacteria get trapped under the skin and start to grow. Pus forms inside the abscess as the body responds to the bacteria. An abscess can happen with an insect bite, ingrown hair, blocked oil gland, pimple, cyst, or puncture wound.  Your healthcare provider has drained the pus from your abscess. If the abscess pocket was large, your healthcare provider may have put in gauze packing. Your provider will need to remove it on your next visit. He or she may also replace it at that time. You may not need antibiotics to treat a simple abscess, unless the infection is spreading into the skin around the wound (cellulitis).  The wound will take about 1 to 2 weeks to heal, depending on the size of the abscess. Healthy tissue will grow from the bottom and sides of the opening until it seals over.  Home care  These tips can help your wound heal:  · The wound may drain for the first 2 days. Cover the wound with a clean dry dressing. Change the dressing if it becomes soaked with blood or pus.  · If a gauze packing was placed inside the abscess pocket, you may be told to remove it yourself. You may do this in the shower. Once the packing is removed, you should wash the area in the shower, or clean the area as directed by your provider. Continue to do this until the skin opening has closed. Make sure you wash your hands after changing the packing or cleaning the wound.  · If you were prescribed antibiotics, take them as directed until they are all gone.  · You may use acetaminophen or ibuprofen to control pain, unless another pain medicine was prescribed. If you have liver disease  or ever had a stomach ulcer, talk with your doctor before using these medicines.  Follow-up care  Follow up with your healthcare provider, or as advised. If a gauze packing was put in your wound, it should be removed in 1 to 2 days. Check your wound every day for any signs that the infection is getting worse. The signs are listed below.  When to seek medical advice  Call your healthcare provider right away if any of these occur:  · Increasing redness or swelling  · Red streaks in the skin leading away from the wound  · Increasing local pain or swelling  · Continued pus draining from the wound 2 days after treatment  · Fever of 100.4ºF (38ºC) or higher, or as directed by your healthcare provider  · Boil returns when you are at home  Date Last Reviewed: 9/1/2016  © 7552-9208 [employer]. [address] [place] [address], [place] [address]. All rights reserved. This information is not intended as a substitute for professional medical care. Always follow your healthcare professional's instructions.

## 2018-08-30 ENCOUNTER — TELEPHONE (OUTPATIENT)
Dept: URGENT CARE | Facility: CLINIC | Age: 38
End: 2018-08-30

## 2018-09-21 PROBLEM — M54.16 RIGHT LUMBAR RADICULITIS: Status: ACTIVE | Noted: 2018-09-21

## 2018-10-04 ENCOUNTER — OFFICE VISIT (OUTPATIENT)
Dept: INTERNAL MEDICINE | Facility: CLINIC | Age: 38
End: 2018-10-04
Payer: MEDICARE

## 2018-10-04 VITALS
WEIGHT: 184.31 LBS | HEIGHT: 62 IN | OXYGEN SATURATION: 97 % | DIASTOLIC BLOOD PRESSURE: 70 MMHG | TEMPERATURE: 98 F | SYSTOLIC BLOOD PRESSURE: 110 MMHG | HEART RATE: 74 BPM | BODY MASS INDEX: 33.92 KG/M2 | RESPIRATION RATE: 16 BRPM

## 2018-10-04 DIAGNOSIS — E03.9 ACQUIRED HYPOTHYROIDISM: Primary | ICD-10-CM

## 2018-10-04 DIAGNOSIS — G60.0 CHARCOT MARIE TOOTH MUSCULAR ATROPHY: ICD-10-CM

## 2018-10-04 PROCEDURE — 99213 OFFICE O/P EST LOW 20 MIN: CPT | Mod: PBBFAC,PN | Performed by: INTERNAL MEDICINE

## 2018-10-04 PROCEDURE — 99213 OFFICE O/P EST LOW 20 MIN: CPT | Mod: S$PBB,,, | Performed by: INTERNAL MEDICINE

## 2018-10-04 PROCEDURE — 99999 PR PBB SHADOW E&M-EST. PATIENT-LVL III: CPT | Mod: PBBFAC,,, | Performed by: INTERNAL MEDICINE

## 2018-10-05 NOTE — PROGRESS NOTES
"Subjective:      Patient ID: Jaycee Gray is a 38 y.o. female.    Chief Complaint: Weight Gain (discuss weight gain problem)    HPI: 38 y.o. White female, wanted to know if her thyroid medicine was working, since she just recently began to " eat right" and began exercising,  But has not been able to loose any weight.  She lives with her sister, who is a vegan, " but there is no way I can do that".    I have reviewed all her TSH levels, normal.  All her other labs are normal too.    Reviewed her diet, was heavy on carbs, and portions...    Review of Systems   Constitutional: Positive for activity change and appetite change. Negative for fatigue.   Respiratory: Negative.    Cardiovascular: Negative.    Gastrointestinal: Negative.    Genitourinary: Negative for dysuria.   Neurological: Negative.    Hematological: Negative.    Psychiatric/Behavioral: Negative.        Objective:   /70 (BP Location: Left arm, Patient Position: Sitting, BP Method: Medium (Manual))   Pulse 74   Temp 98.2 °F (36.8 °C) (Oral)   Resp 16   Ht 5' 2" (1.575 m)   Wt 83.6 kg (184 lb 4.8 oz)   LMP 04/26/2009 (LMP Unknown)   SpO2 97%   BMI 33.71 kg/m²     Physical Exam   Constitutional: She is oriented to person, place, and time. She appears well-developed and well-nourished.   HENT:   Head: Normocephalic and atraumatic.   Mouth/Throat: Oropharynx is clear and moist.   Eyes: Conjunctivae are normal. Pupils are equal, round, and reactive to light.   Neck: Normal range of motion.   Cardiovascular: Normal rate, regular rhythm and normal heart sounds.   Pulmonary/Chest: Effort normal and breath sounds normal.   Abdominal: Soft. Bowel sounds are normal.   Musculoskeletal: She exhibits no edema.   Neurological: She is alert and oriented to person, place, and time.   Skin: Skin is warm and dry.   Psychiatric: She has a normal mood and affect. Her behavior is normal.   Nursing note and vitals reviewed.      Assessment:     1. Acquired " hypothyroidism    2. Charcot Amanda Tooth muscular atrophy    3. BMI 33.0-33.9,adult    Stable issues, but encouraged to be more active and use smaller portions.  Reassured.  Plan:     Acquired hypothyroidism    Charcot Amanda Tooth muscular atrophy    BMI 33.0-33.9,adult    same

## 2019-01-16 ENCOUNTER — TELEPHONE (OUTPATIENT)
Dept: FAMILY MEDICINE | Facility: CLINIC | Age: 39
End: 2019-01-16

## 2019-01-16 DIAGNOSIS — M25.511 CHRONIC RIGHT SHOULDER PAIN: Primary | ICD-10-CM

## 2019-01-16 DIAGNOSIS — G89.29 CHRONIC RIGHT SHOULDER PAIN: Primary | ICD-10-CM

## 2019-01-16 NOTE — TELEPHONE ENCOUNTER
This patient would like to see Dr. Blanca for a second opinion for her right shoulder pain. Advised pt to bring disc with recent MRI and office notes from previous provider. Referral is place. Please schedule.

## 2019-01-22 DIAGNOSIS — T63.481A ALLERGIC REACTION TO INSECT STING, ACCIDENTAL OR UNINTENTIONAL, INITIAL ENCOUNTER: Primary | ICD-10-CM

## 2019-01-22 DIAGNOSIS — T63.441A ANAPHYLACTIC REACTION TO BEE STING, ACCIDENTAL OR UNINTENTIONAL, INITIAL ENCOUNTER: ICD-10-CM

## 2019-01-22 RX ORDER — EPINEPHRINE 0.3 MG/.3ML
1 INJECTION SUBCUTANEOUS ONCE
Qty: 0.3 ML | Refills: 0 | Status: SHIPPED | OUTPATIENT
Start: 2019-01-22 | End: 2020-01-09 | Stop reason: SDUPTHER

## 2019-01-30 ENCOUNTER — HOSPITAL ENCOUNTER (OUTPATIENT)
Dept: RADIOLOGY | Facility: HOSPITAL | Age: 39
Discharge: HOME | End: 2019-01-30
Attending: PHYSICIAN ASSISTANT
Payer: MEDICARE

## 2019-01-30 ENCOUNTER — TELEPHONE (OUTPATIENT)
Dept: ORTHOPEDICS | Facility: CLINIC | Age: 39
End: 2019-01-30

## 2019-01-30 ENCOUNTER — OFFICE VISIT (OUTPATIENT)
Dept: ORTHOPEDICS | Facility: CLINIC | Age: 39
End: 2019-01-30
Payer: MEDICARE

## 2019-01-30 VITALS
SYSTOLIC BLOOD PRESSURE: 123 MMHG | HEIGHT: 62 IN | BODY MASS INDEX: 33.1 KG/M2 | RESPIRATION RATE: 18 BRPM | WEIGHT: 179.88 LBS | DIASTOLIC BLOOD PRESSURE: 83 MMHG | TEMPERATURE: 98 F | HEART RATE: 75 BPM

## 2019-01-30 DIAGNOSIS — T14.90XA TRAUMA: ICD-10-CM

## 2019-01-30 DIAGNOSIS — M79.671 PAIN OF RIGHT HEEL: ICD-10-CM

## 2019-01-30 DIAGNOSIS — T14.90XA TRAUMA: Primary | ICD-10-CM

## 2019-01-30 PROCEDURE — 99213 OFFICE O/P EST LOW 20 MIN: CPT | Mod: S$PBB,,, | Performed by: PHYSICIAN ASSISTANT

## 2019-01-30 PROCEDURE — 73630 X-RAY EXAM OF FOOT: CPT | Mod: 26,RT,, | Performed by: RADIOLOGY

## 2019-01-30 PROCEDURE — 73650 XR CALCANEUS 2 VIEW RIGHT: ICD-10-PCS | Mod: 26,RT,, | Performed by: RADIOLOGY

## 2019-01-30 PROCEDURE — 99999 PR PBB SHADOW E&M-EST. PATIENT-LVL IV: CPT | Mod: PBBFAC,,, | Performed by: PHYSICIAN ASSISTANT

## 2019-01-30 PROCEDURE — 73650 X-RAY EXAM OF HEEL: CPT | Mod: TC,RT

## 2019-01-30 PROCEDURE — 99213 PR OFFICE/OUTPT VISIT, EST, LEVL III, 20-29 MIN: ICD-10-PCS | Mod: S$PBB,,, | Performed by: PHYSICIAN ASSISTANT

## 2019-01-30 PROCEDURE — 99999 PR PBB SHADOW E&M-EST. PATIENT-LVL IV: ICD-10-PCS | Mod: PBBFAC,,, | Performed by: PHYSICIAN ASSISTANT

## 2019-01-30 PROCEDURE — 73630 X-RAY EXAM OF FOOT: CPT | Mod: TC,RT

## 2019-01-30 PROCEDURE — 73650 X-RAY EXAM OF HEEL: CPT | Mod: 26,RT,, | Performed by: RADIOLOGY

## 2019-01-30 PROCEDURE — 99214 OFFICE O/P EST MOD 30 MIN: CPT | Mod: PBBFAC,25 | Performed by: PHYSICIAN ASSISTANT

## 2019-01-30 PROCEDURE — 73630 XR FOOT COMPLETE 3 VIEW RIGHT: ICD-10-PCS | Mod: 26,RT,, | Performed by: RADIOLOGY

## 2019-01-30 RX ORDER — HYDROCODONE BITARTRATE AND ACETAMINOPHEN 5; 325 MG/1; MG/1
1 TABLET ORAL EVERY 6 HOURS PRN
Qty: 42 TABLET | Refills: 0 | Status: SHIPPED | OUTPATIENT
Start: 2019-01-30 | End: 2019-02-08

## 2019-01-30 NOTE — TELEPHONE ENCOUNTER
Notified pt. Per BUCKY Atkinson PA-C pt need to come in at 1:00 PM 1/30/19. Patient states verbal understanding and has no further questions.

## 2019-01-30 NOTE — TELEPHONE ENCOUNTER
Notified pt. Pt stated she jump out of a park truck last week, and landed on on her feet. Right foot pain stated 1/28/2019.  Pt states that her right foot hurts, swelling, turns color when the right foot is down, ( purple), when right foot is elevated is back to some normal color, and unable to walk on her right foot. I ex[plain to pt; per NIKHIL Bowie pt need to see her PCP, pt stated that her PCP is not in the office today. And the pt may need to see someone in neurology, because of the color purple. Pt states she just want to be seen. Patient states verbal understanding and has no further questions.

## 2019-02-01 NOTE — PROGRESS NOTES
Subjective:      Patient ID: Jaycee Gray is a 38 y.o. female.    Chief Complaint: Injury, Pain, and Swelling of the Right Foot (right heel)    HPI    Patient is a 38 year old female who presents to clinic with chief complaint of right posterior heel pain since slipping out of a truck and placing hard weight on her right heel. Pain is increased with weightbearing. Patient has tried rest ice and elevation without relief. Patient has history of charcot eros tooth and has drop foot on that side.     Review of Systems   Constitution: Negative for chills and fever.   Cardiovascular: Negative for chest pain.   Respiratory: Negative for cough and shortness of breath.    Skin: Negative for color change, dry skin, itching, nail changes, poor wound healing and rash.   Musculoskeletal:        Right heel pain   Neurological: Negative for dizziness.   Psychiatric/Behavioral: Negative for altered mental status. The patient is not nervous/anxious.    All other systems reviewed and are negative.        Objective:      General    Constitutional: She is oriented to person, place, and time. She appears well-developed and well-nourished. No distress.   HENT:   Head: Atraumatic.   Eyes: Conjunctivae are normal.   Cardiovascular: Normal rate.    Pulmonary/Chest: Effort normal.   Neurological: She is alert and oriented to person, place, and time.   Psychiatric: She has a normal mood and affect. Her behavior is normal.         Right Ankle/Foot Exam     Tenderness   The patient is tender to palpation of the Achilles tendon.    Range of Motion   Ankle Joint   Dorsiflexion: abnormal   Plantar flexion: abnormal   Subtalar Joint   Inversion: abnormal   Eversion: abnormal                 RADS: No fracture or dislocation.  No bone destruction identified  Assessment:       Encounter Diagnoses   Name Primary?    Trauma Yes    Pain of right heel           Plan:       Discussed plan with patient. She was placed into CAM boot which she can  remove for skin checks and range of motion. She will avoid placing any weight which causes pain. She is to return to clinic in 2 weeks at time x-ray of her calcaneous is needed.

## 2019-02-08 ENCOUNTER — TELEPHONE (OUTPATIENT)
Dept: ORTHOPEDICS | Facility: CLINIC | Age: 39
End: 2019-02-08

## 2019-02-08 DIAGNOSIS — R52 PAIN: Primary | ICD-10-CM

## 2019-02-08 RX ORDER — HYDROCODONE BITARTRATE AND ACETAMINOPHEN 7.5; 325 MG/1; MG/1
1 TABLET ORAL
Qty: 42 TABLET | Refills: 0 | Status: SHIPPED | OUTPATIENT
Start: 2019-02-08 | End: 2019-02-22 | Stop reason: SDUPTHER

## 2019-02-08 RX ORDER — ONDANSETRON 4 MG/1
4 TABLET, FILM COATED ORAL EVERY 8 HOURS PRN
Qty: 12 TABLET | Refills: 0 | Status: SHIPPED | OUTPATIENT
Start: 2019-02-08 | End: 2019-02-22 | Stop reason: SDUPTHER

## 2019-02-08 NOTE — TELEPHONE ENCOUNTER
Notified pt that Prescription for pain as well as nausea sent to pharmacy. Patient states verbal understanding and has no further questions.

## 2019-02-08 NOTE — TELEPHONE ENCOUNTER
----- Message from Karen Atkinson PA-C sent at 2/8/2019  1:38 PM CST -----  Contact: self  Prescription for pain as well as nausea sent to [facility]  ----- Message -----  From: Francisca Ruth MA  Sent: 2/8/2019  10:22 AM  To: Karen Atkinson PA-C    Please send Rx to [facility] in [facility]. Thank  you  ----- Message -----  From: Ariel Workman  Sent: 2/8/2019   9:45 AM  To: Demetrio Jones Staff    Needs Advice    Reason for call: Pain and Nausea         Communication Preference: Phone     Additional Information: Pt states that she is in a lot of pain w/ nausea. She ask if there is something to take for the nausea

## 2019-02-18 ENCOUNTER — OFFICE VISIT (OUTPATIENT)
Dept: ORTHOPEDICS | Facility: CLINIC | Age: 39
End: 2019-02-18
Payer: MEDICARE

## 2019-02-18 ENCOUNTER — HOSPITAL ENCOUNTER (OUTPATIENT)
Dept: RADIOLOGY | Facility: HOSPITAL | Age: 39
Discharge: HOME | End: 2019-02-18
Attending: ORTHOPAEDIC SURGERY
Payer: MEDICARE

## 2019-02-18 ENCOUNTER — TELEPHONE (OUTPATIENT)
Dept: ORTHOPEDICS | Facility: CLINIC | Age: 39
End: 2019-02-18

## 2019-02-18 VITALS — WEIGHT: 170 LBS | BODY MASS INDEX: 31.28 KG/M2 | HEIGHT: 62 IN

## 2019-02-18 DIAGNOSIS — G89.4 CHRONIC PAIN SYNDROME: ICD-10-CM

## 2019-02-18 DIAGNOSIS — M62.838 MUSCLE SPASM: ICD-10-CM

## 2019-02-18 DIAGNOSIS — M25.511 RIGHT SHOULDER PAIN, UNSPECIFIED CHRONICITY: Primary | ICD-10-CM

## 2019-02-18 DIAGNOSIS — M25.511 RIGHT SHOULDER PAIN, UNSPECIFIED CHRONICITY: ICD-10-CM

## 2019-02-18 PROCEDURE — 20552 NJX 1/MLT TRIGGER POINT 1/2: CPT | Mod: S$PBB,RT,, | Performed by: ORTHOPAEDIC SURGERY

## 2019-02-18 PROCEDURE — 73030 X-RAY EXAM OF SHOULDER: CPT | Mod: TC,PN,RT

## 2019-02-18 PROCEDURE — 20552 PR INJECT TRIGGER POINT, 1 OR 2: ICD-10-PCS | Mod: S$PBB,RT,, | Performed by: ORTHOPAEDIC SURGERY

## 2019-02-18 PROCEDURE — 73030 XR SHOULDER COMPLETE 2 OR MORE VIEWS RIGHT: ICD-10-PCS | Mod: 26,RT,, | Performed by: RADIOLOGY

## 2019-02-18 PROCEDURE — 99203 PR OFFICE/OUTPT VISIT, NEW, LEVL III, 30-44 MIN: ICD-10-PCS | Mod: S$PBB,25,, | Performed by: ORTHOPAEDIC SURGERY

## 2019-02-18 PROCEDURE — 99203 OFFICE O/P NEW LOW 30 MIN: CPT | Mod: S$PBB,25,, | Performed by: ORTHOPAEDIC SURGERY

## 2019-02-18 PROCEDURE — 20552 NJX 1/MLT TRIGGER POINT 1/2: CPT | Mod: PBBFAC,PN | Performed by: ORTHOPAEDIC SURGERY

## 2019-02-18 PROCEDURE — 99213 OFFICE O/P EST LOW 20 MIN: CPT | Mod: PBBFAC,25,PN | Performed by: ORTHOPAEDIC SURGERY

## 2019-02-18 PROCEDURE — 73030 X-RAY EXAM OF SHOULDER: CPT | Mod: 26,RT,, | Performed by: RADIOLOGY

## 2019-02-18 PROCEDURE — 99999 PR PBB SHADOW E&M-EST. PATIENT-LVL III: ICD-10-PCS | Mod: PBBFAC,,, | Performed by: ORTHOPAEDIC SURGERY

## 2019-02-18 PROCEDURE — 99999 PR PBB SHADOW E&M-EST. PATIENT-LVL III: CPT | Mod: PBBFAC,,, | Performed by: ORTHOPAEDIC SURGERY

## 2019-02-18 RX ORDER — TRIAMCINOLONE ACETONIDE 40 MG/ML
40 INJECTION, SUSPENSION INTRA-ARTICULAR; INTRAMUSCULAR
Status: COMPLETED | OUTPATIENT
Start: 2019-02-18 | End: 2019-02-18

## 2019-02-18 RX ORDER — ETODOLAC 400 MG/1
400 TABLET, EXTENDED RELEASE ORAL DAILY
Qty: 30 TABLET | Refills: 1 | Status: SHIPPED | OUTPATIENT
Start: 2019-02-18 | End: 2019-03-20

## 2019-02-18 RX ADMIN — TRIAMCINOLONE ACETONIDE 40 MG: 40 INJECTION, SUSPENSION INTRA-ARTICULAR; INTRAMUSCULAR at 11:02

## 2019-02-18 NOTE — TELEPHONE ENCOUNTER
----- Message from Jacqueline Hernandez sent at 2/18/2019 12:35 PM CST -----  Contact: [facility] [phone]  Called stating that medication etodolac (LODINE XL) 400 MG 24 hr tablet is not covered by insurance. Please call

## 2019-02-18 NOTE — PROGRESS NOTES
INITIAL VISIT HISTORY:  A 38-year-old female referred for a second opinion for   ongoing symptoms right shoulder.  She underwent surgery with Dr. Abdul in   October about three or four months ago for a SLAP repair of the right shoulder.    She was doing well after surgery, then had a very bad nightmare, woke up   thrashing around and injured her right shoulder at that time.  She has been in   therapy, but ever since then she has feel like the shoulder is getting worse.    No numbness or tingling is reported.  I think she would like to switch over the   treatment here if possible.    PAST MEDICAL HISTORY:  Significant for anxiety, Charcot-Amanda-Tooth disease,   depression, endometriosis, headaches, psychiatric problems and thyroid disease.    PAST SURGICAL HISTORY:  Includes knee surgery, hysterectomy, appendectomy,   breast surgery and recent shoulder surgery.    FAMILY HISTORY:  Positive for cancer.    SOCIAL HISTORY:  The patient is a former smoker, 1-2 pack year history.  No   alcohol reported.    REVIEW OF SYSTEMS:  Negative fever, chills, rashes.    CURRENT MEDICATIONS:  Reviewed on chart.    ALLERGIES:  Benadryl, sumatriptan, tramadol and wasp venom.    PHYSICAL EXAMINATION:  GENERAL:  Well-developed, well-nourished female in no acute distress, alert and   oriented x3.  MUSCULOSKELETAL:  Examination of upper extremities significant for the right   shoulder, demonstrating well-healed arthroscopic incisions right shoulder.  No   significant swelling or redness.  No evidence of infection.  Range of motion of   the shoulder is actually pretty good, including full internal and external   rotation, abduction and elevation, but interestingly enough, she does have point   tenderness over the levator scapula on the right side posteriorly and the   trapezius, which is very tender to touch and she has a muscle spasm in that   area.  Range of motion of the cervical spine is slightly decreased secondary to    pain.  NEUROLOGIC:  Intact.    IMAGING:  X-rays, AP and lateral right shoulder reviewed, demonstrate no   significant abnormalities.  An MRI brought with the patient, reports shows no   abnormalities other than some bursitis.  Interestingly enough, she has had an   MRI of the cervical spine, which showed some mild spondylosis at C4-C5 and   C5-C6.    IMPRESSION:  Levator scapula syndrome, right shoulder.    PLAN:  I explained the nature of problem to the patient.  I think her symptoms   are coming from the muscle spasm she is having, so for that reason I recommended   a trigger point injection.  After pause for timeout, she identified the right   trigger point near levator scapula, injected with Kenalog 40 mg, 2 mL Xylocaine,   sterile technique, which she tolerated well without complication.    I am concerned that therapy may be making her symptoms worse, so I recommended   that we stop therapy for now, avoid any heavy lifting with the right shoulder or   arm and just let things quiet down a bit.  I am going to start her on Lodine   once a day with food and some Zanaflex for evening time for the evening.  Follow   up in 3-4 weeks.      DARIUSZ  dd: 02/18/2019 11:43:39 (CST)  td: 02/19/2019 04:54:31 (CST)  Doc ID   #[id number]  Job ID #[id number]    CC:

## 2019-02-18 NOTE — LETTER
February 18, 2019        [facility] SONA Stone MD  [address]  [address]   [facility] [address]             [facility]  [address] [place] [place] [id number]  Phone: [phone]   Patient: Jaycee Gary   MR Number: [id number]   YOB: 1980   Date of Visit: 2/18/2019       Dear Dr. Stone:    Thank you for referring Jaycee Gray to me for evaluation. Below are the relevant portions of my assessment and plan of care.            If you have questions, please do not hesitate to call me. I look forward to following Jaycee along with you.    Sincerely,      Mike Blanca Jr., MD           CC  No Recipients

## 2019-02-19 ENCOUNTER — TELEPHONE (OUTPATIENT)
Dept: ORTHOPEDICS | Facility: CLINIC | Age: 39
End: 2019-02-19

## 2019-02-19 RX ORDER — TIZANIDINE 4 MG/1
4 TABLET ORAL NIGHTLY PRN
Qty: 30 TABLET | Refills: 1 | Status: SHIPPED | OUTPATIENT
Start: 2019-02-19 | End: 2019-03-01

## 2019-02-19 NOTE — TELEPHONE ENCOUNTER
----- Message from Willem Roe sent at 2/19/2019 10:02 AM CST -----  Contact: self/[phone]  Patient called to check the status of the medication request from yesterday .    Please call and advise as soon as possible.

## 2019-02-22 ENCOUNTER — HOSPITAL ENCOUNTER (OUTPATIENT)
Dept: RADIOLOGY | Facility: HOSPITAL | Age: 39
Discharge: HOME | End: 2019-02-22
Attending: PHYSICIAN ASSISTANT
Payer: MEDICARE

## 2019-02-22 ENCOUNTER — OFFICE VISIT (OUTPATIENT)
Dept: ORTHOPEDICS | Facility: CLINIC | Age: 39
End: 2019-02-22
Payer: MEDICARE

## 2019-02-22 VITALS — BODY MASS INDEX: 31.28 KG/M2 | HEIGHT: 62 IN | WEIGHT: 170 LBS

## 2019-02-22 DIAGNOSIS — M79.671 RIGHT FOOT PAIN: Primary | ICD-10-CM

## 2019-02-22 DIAGNOSIS — R11.0 NAUSEA: ICD-10-CM

## 2019-02-22 DIAGNOSIS — M79.671 RIGHT FOOT PAIN: ICD-10-CM

## 2019-02-22 DIAGNOSIS — R52 PAIN: ICD-10-CM

## 2019-02-22 PROCEDURE — 73721 MRI JNT OF LWR EXTRE W/O DYE: CPT | Mod: TC,RT

## 2019-02-22 PROCEDURE — 73650 X-RAY EXAM OF HEEL: CPT | Mod: TC,RT

## 2019-02-22 PROCEDURE — 73630 X-RAY EXAM OF FOOT: CPT | Mod: TC,RT

## 2019-02-22 PROCEDURE — 73650 X-RAY EXAM OF HEEL: CPT | Mod: 26,RT,, | Performed by: RADIOLOGY

## 2019-02-22 PROCEDURE — 73721 MRI ANKLE WITHOUT CONTRAST RIGHT: ICD-10-PCS | Mod: 26,RT,, | Performed by: RADIOLOGY

## 2019-02-22 PROCEDURE — 99213 OFFICE O/P EST LOW 20 MIN: CPT | Mod: PBBFAC,25 | Performed by: PHYSICIAN ASSISTANT

## 2019-02-22 PROCEDURE — 99213 PR OFFICE/OUTPT VISIT, EST, LEVL III, 20-29 MIN: ICD-10-PCS | Mod: S$PBB,,, | Performed by: PHYSICIAN ASSISTANT

## 2019-02-22 PROCEDURE — 73630 XR FOOT COMPLETE 3 VIEW RIGHT: ICD-10-PCS | Mod: 26,RT,, | Performed by: RADIOLOGY

## 2019-02-22 PROCEDURE — 99213 OFFICE O/P EST LOW 20 MIN: CPT | Mod: S$PBB,,, | Performed by: PHYSICIAN ASSISTANT

## 2019-02-22 PROCEDURE — 99999 PR PBB SHADOW E&M-EST. PATIENT-LVL III: ICD-10-PCS | Mod: PBBFAC,,, | Performed by: PHYSICIAN ASSISTANT

## 2019-02-22 PROCEDURE — 73630 X-RAY EXAM OF FOOT: CPT | Mod: 26,RT,, | Performed by: RADIOLOGY

## 2019-02-22 PROCEDURE — 73650 XR CALCANEUS 2 VIEW RIGHT: ICD-10-PCS | Mod: 26,RT,, | Performed by: RADIOLOGY

## 2019-02-22 PROCEDURE — 99999 PR PBB SHADOW E&M-EST. PATIENT-LVL III: CPT | Mod: PBBFAC,,, | Performed by: PHYSICIAN ASSISTANT

## 2019-02-22 PROCEDURE — 73721 MRI JNT OF LWR EXTRE W/O DYE: CPT | Mod: 26,RT,, | Performed by: RADIOLOGY

## 2019-02-22 RX ORDER — ONDANSETRON 4 MG/1
4 TABLET, FILM COATED ORAL EVERY 8 HOURS PRN
Qty: 12 TABLET | Refills: 0 | Status: SHIPPED | OUTPATIENT
Start: 2019-02-22 | End: 2019-04-25

## 2019-02-22 RX ORDER — HYDROCODONE BITARTRATE AND ACETAMINOPHEN 7.5; 325 MG/1; MG/1
1 TABLET ORAL
Qty: 42 TABLET | Refills: 0 | Status: SHIPPED | OUTPATIENT
Start: 2019-02-22 | End: 2020-06-08

## 2019-02-22 NOTE — PROGRESS NOTES
Subjective:      Patient ID: Jaycee Gray is a 38 y.o. female.    Chief Complaint: No chief complaint on file.    HPI    Patient is a 38 year old female who presents to clinic with chief complaint of right posterior heel pain since slipping out of a truck and placing hard weight on her right heel. Pain is increased with weightbearing. Patient has tried rest ice and elevation and boot without relief. She repots increased pain to posterior and plantar surface of her heel.  Patient has history of charcot eros tooth and has drop foot on that side.     Review of Systems   Constitution: Negative for chills and fever.   Cardiovascular: Negative for chest pain.   Respiratory: Negative for cough and shortness of breath.    Skin: Negative for color change, dry skin, itching, nail changes, poor wound healing and rash.   Musculoskeletal:        Right heel pain   Neurological: Negative for dizziness.   Psychiatric/Behavioral: Negative for altered mental status. The patient is not nervous/anxious.    All other systems reviewed and are negative.        Objective:      General    Constitutional: She is oriented to person, place, and time. She appears well-developed and well-nourished. No distress.   HENT:   Head: Atraumatic.   Eyes: Conjunctivae are normal.   Cardiovascular: Normal rate.    Pulmonary/Chest: Effort normal.   Neurological: She is alert and oriented to person, place, and time.   Psychiatric: She has a normal mood and affect. Her behavior is normal.         Right Ankle/Foot Exam     Tenderness   The patient is tender to palpation of the Achilles tendon.    Range of Motion   Ankle Joint   Dorsiflexion: abnormal   Plantar flexion: abnormal   Subtalar Joint   Inversion: abnormal   Eversion: abnormal           RADS: No fracture or dislocation.  No bone destruction identified  Assessment:       Encounter Diagnoses   Name Primary?    Right foot pain Yes    Pain     Nausea           Plan:       Discussed plan with  patient. She was placed into splint.  She will avoid placing any weight which causes pain. Mri ordered,. She is to call for results. Follow up is pending results

## 2019-02-25 ENCOUNTER — TELEPHONE (OUTPATIENT)
Dept: ORTHOPEDICS | Facility: CLINIC | Age: 39
End: 2019-02-25

## 2019-02-25 NOTE — TELEPHONE ENCOUNTER
----- Message from Kiley Nunez sent at 2/25/2019  3:02 PM CST -----  Contact: PT  PT is calling regarding MRI results  PT had fallen today because of the splint.  PT is asking about the results to see if she can remove the splint to prevent falling again.     Callback: [phone]

## 2019-02-25 NOTE — TELEPHONE ENCOUNTER
----- Message from Jocelyn Valente sent at 2/25/2019  1:04 PM CST -----  Contact: patient  Attn Shruthi    Please call pt at [phone]    Patient is returning your call from today and still having foot pain    Thank you

## 2019-02-25 NOTE — TELEPHONE ENCOUNTER
----- Message from Jocelyn Valente sent at 2/25/2019 11:15 AM CST -----  Contact: patient  Attn Shruthi    Please call pt at [phone]    Patient is returning your call from this morning    Thank you

## 2019-02-25 NOTE — TELEPHONE ENCOUNTER
----- Message from Lynn Cool sent at 2/25/2019  8:51 AM CST -----  Contact: Self/ [phone]  Needs Advice    Reason for call: Patient would like a call back to speak with someone in office about her pain level. Patient would like to know can she take a half of pill of her HYDROcodone-acetaminophen (NORCO) 7.5-325 mg.         Communication Preference: [phone].

## 2019-02-25 NOTE — TELEPHONE ENCOUNTER
BUCKY Atkinson PA-C will discuss MRI results on schedule appointment on 2/27/19. Per BUCKY Atkinson PA-C okay pt to remove splint on right foot and wear CAMBOOT. Patient states verbal understanding and has no further questions.

## 2019-02-25 NOTE — TELEPHONE ENCOUNTER
Notified pt. Pt stated that she had a fall 2/25/19; Offer pt appointment to come 2/26/19,per RR okay. Pt stated that she don't have a ride. Pt states that she can come 2/27/19 at 10:15 am. Patient states verbal understanding and has no further questions.

## 2019-03-15 ENCOUNTER — OUTPATIENT CASE MANAGEMENT (OUTPATIENT)
Dept: ADMINISTRATIVE | Facility: OTHER | Age: 39
End: 2019-03-15

## 2019-04-26 ENCOUNTER — TELEPHONE (OUTPATIENT)
Dept: FAMILY MEDICINE | Facility: CLINIC | Age: 39
End: 2019-04-26

## 2019-04-26 NOTE — TELEPHONE ENCOUNTER
----- Message from Francy Campbell sent at 4/26/2019  8:33 AM CDT -----  Contact: Self [phone]  Patient has a UC appointment for today and she is requesting to be seen sooner than 4:00 since she is throwing up blood.

## 2019-05-02 ENCOUNTER — OFFICE VISIT (OUTPATIENT)
Dept: ORTHOPEDICS | Facility: CLINIC | Age: 39
End: 2019-05-02
Payer: MEDICARE

## 2019-05-02 VITALS — HEIGHT: 62 IN | BODY MASS INDEX: 31.28 KG/M2 | WEIGHT: 170 LBS

## 2019-05-02 DIAGNOSIS — M54.2 NECK PAIN: Primary | ICD-10-CM

## 2019-05-02 DIAGNOSIS — G89.4 CHRONIC PAIN SYNDROME: ICD-10-CM

## 2019-05-02 PROCEDURE — 20552 PR INJECT TRIGGER POINT, 1 OR 2: ICD-10-PCS | Mod: S$PBB,RT,, | Performed by: ORTHOPAEDIC SURGERY

## 2019-05-02 PROCEDURE — 99213 OFFICE O/P EST LOW 20 MIN: CPT | Mod: S$PBB,25,, | Performed by: ORTHOPAEDIC SURGERY

## 2019-05-02 PROCEDURE — 99213 OFFICE O/P EST LOW 20 MIN: CPT | Mod: PBBFAC,PN | Performed by: ORTHOPAEDIC SURGERY

## 2019-05-02 PROCEDURE — 99213 PR OFFICE/OUTPT VISIT, EST, LEVL III, 20-29 MIN: ICD-10-PCS | Mod: S$PBB,25,, | Performed by: ORTHOPAEDIC SURGERY

## 2019-05-02 PROCEDURE — 20552 NJX 1/MLT TRIGGER POINT 1/2: CPT | Mod: S$PBB,RT,, | Performed by: ORTHOPAEDIC SURGERY

## 2019-05-02 PROCEDURE — 20552 NJX 1/MLT TRIGGER POINT 1/2: CPT | Mod: PBBFAC,PN | Performed by: ORTHOPAEDIC SURGERY

## 2019-05-02 PROCEDURE — 99999 PR PBB SHADOW E&M-EST. PATIENT-LVL III: ICD-10-PCS | Mod: PBBFAC,,, | Performed by: ORTHOPAEDIC SURGERY

## 2019-05-02 PROCEDURE — 99999 PR PBB SHADOW E&M-EST. PATIENT-LVL III: CPT | Mod: PBBFAC,,, | Performed by: ORTHOPAEDIC SURGERY

## 2019-05-02 RX ORDER — TRIAMCINOLONE ACETONIDE 40 MG/ML
40 INJECTION, SUSPENSION INTRA-ARTICULAR; INTRAMUSCULAR
Status: COMPLETED | OUTPATIENT
Start: 2019-05-02 | End: 2019-05-02

## 2019-05-02 RX ADMIN — TRIAMCINOLONE ACETONIDE 40 MG: 40 INJECTION, SUSPENSION INTRA-ARTICULAR; INTRAMUSCULAR at 02:05

## 2019-05-02 NOTE — PROGRESS NOTES
Subjective:      Patient ID: Jaycee Gray is a 38 y.o. female.  Chief Complaint: Pain of the Right Shoulder      HPI  Jaycee Gray is a  38 y.o. female presenting today for follow up of shoulder and neck pain.  She reports that she is a little better since he injection last visit a couple months ago  But still having pain which starts in her neck and radiates down her right arm occasional numbness in the hands reported  No recent trauma.    Review of patient's allergies indicates:   Allergen Reactions    Benadryl decongestant Shortness Of Breath    Carrot Hives and Shortness Of Breath    Sumatriptan succinate Other (See Comments)     paralysis    Tramadol Other (See Comments)     Faces swells. Tolerates percocet    Wasp sting [allergen ext-venom-honey bee] Anaphylaxis         Current Outpatient Medications   Medication Sig Dispense Refill    HYDROcodone-acetaminophen (NORCO) 7.5-325 mg per tablet Take 1 tablet by mouth every 4 to 6 hours as needed for Pain. 42 tablet 0    levothyroxine (SYNTHROID) 50 MCG tablet Take 1 tablet (50 mcg total) by mouth once daily. 30 tablet 11    ondansetron (ZOFRAN) 4 MG tablet Take 8 mg by mouth 2 (two) times daily.      ondansetron (ZOFRAN-ODT) 4 MG TbDL Take 1 tablet (4 mg total) by mouth every 6 (six) hours as needed (Nausea). 10 tablet 0    tiZANidine 4 mg Cap Take by mouth.      EPINEPHrine (EPIPEN 2-JAYSON) 0.3 mg/0.3 mL AtIn Inject 0.3 mLs (0.3 mg total) into the muscle once. Inject 1 pen as directed as needed. for 1 dose 0.3 mL 0     No current facility-administered medications for this visit.        Past Medical History:   Diagnosis Date    Anxiety     Breast abscess     Charcot-Amanda-Tooth disease     mild LE weakness    Chronic interstitial cystitis without hematuria     CMT (Charcot-Amanda-Tooth disease)     Depression     reports she is no longer depressed    Endometriosis of uterus     Headache(784.0)     Herpes     History of psychiatric care   "   History of psychiatric hospitalization     Muscular dystrophy     PTSD (post-traumatic stress disorder)     Seizures     last seizure 14-15 yrs ago    Self-injurious behavior     Thyroid disease        Past Surgical History:   Procedure Laterality Date    APPENDECTOMY      ASPIRATION-BREAST Left 1/18/2017    [facility]    BACK SURGERY  07/01/2017    BACK SURGERY  02/21/2018    screw removal    BREAST SURGERY      reduction    CHOLECYSTECTOMY  03/2017    CHOLECYSTECTOMY-LAPAROSCOPIC N/A 3/15/2017    [facility]    EGD (ESOPHAGOGASTRODUODENOSCOPY) N/A 5/6/2013    [facility] at [facility]    ESOPHAGOGASTRODUODENOSCOPY  08/14/2018    ESOPHAGOGASTRODUODENOSCOPY (EGD) N/A 8/14/2018    Per[facility] by [facility]reyra [facility] at [facility] END[facility]    HYSTERECTOMY      TLH vs LAVH with BSO    INCISION AND DRAINAGE (I&D) Left 9/15/2016    [facility] OR    INCISION AND DRAINAGE (I&D), BREAST Left 9/26/2016    [facility] OR    INJECTION, STEROID, EPIDURAL, TRANSFORAMINAL APPROACH Right 9/21/2018    [facility]os [facility] at [facility] OR    KNEE SURGERY Bilateral     OOPHORECTOMY      SPLIT THICKNESS SKIN GRAFT Left 11/17/2016    [facility] OR    ULTRASOUND, ENDOSCOPIC, UPPER GI TRACT N/A 8/14/2018    Per[facility] at [facility] ENDO    Upper EUS  08/14/2018       OBJECTIVE:   PHYSICAL EXAM:  Height: 5' 2" (157.5 cm) Weight: 77.1 kg (170 lb)  Vitals:    05/02/19 1429   Weight: 77.1 kg (170 lb)   Height: 5' 2" (1.575 m)   PainSc:   6     Ortho/SPM Exam  Examination right shoulder she is able to tolerate pretty good range of motion the shoulder but she has diffuse tenderness over the trapezius and levator scapula  Neck is also tender in the midline  Strength intact right arm sensation intact right " hand    RADIOGRAPHS:  None  Comments: I have personally reviewed the imaging and I agree with the above radiologist's report.    ASSESSMENT/PLAN:     IMPRESSION:  1.  Myofasciitis right upper back and shoulder blade.  2.  Possible underlying cervical disc disease    PLAN:  I recommended that we repeat the injection today.  After pause for time-out patient identified the trigger point the right upper back injected with Kenalog 40 mg 2 cc xylocaine sterile technique  She tolerated the procedure well without complication.  Continue current medications including anti-inflammatory medicine and muscle relaxers new  I also made a referral to the [facility] at [facility] to see she needs any evaluation for her neck    FOLLOW UP:  Follow up with me in 2-3 months    Disclaimer: This note has been generated using voice-recognition software. There may be typographical errors that have been missed during proof-reading.

## 2019-05-28 ENCOUNTER — LAB VISIT (OUTPATIENT)
Dept: LAB | Facility: HOSPITAL | Age: 39
End: 2019-05-28
Attending: OBSTETRICS & GYNECOLOGY
Payer: MEDICARE

## 2019-05-28 ENCOUNTER — OFFICE VISIT (OUTPATIENT)
Dept: OBSTETRICS AND GYNECOLOGY | Facility: CLINIC | Age: 39
End: 2019-05-28
Payer: MEDICARE

## 2019-05-28 VITALS
HEIGHT: 62 IN | BODY MASS INDEX: 32.05 KG/M2 | WEIGHT: 174.19 LBS | SYSTOLIC BLOOD PRESSURE: 110 MMHG | DIASTOLIC BLOOD PRESSURE: 90 MMHG

## 2019-05-28 DIAGNOSIS — R10.2 PELVIC PAIN IN FEMALE: ICD-10-CM

## 2019-05-28 DIAGNOSIS — R79.89 ELEVATED SERUM HCG: ICD-10-CM

## 2019-05-28 DIAGNOSIS — N92.6 IRREGULAR BLEEDING: ICD-10-CM

## 2019-05-28 DIAGNOSIS — R10.2 PELVIC PAIN IN FEMALE: Primary | ICD-10-CM

## 2019-05-28 LAB
HCG INTACT+B SERPL-ACNC: 9.6 MIU/ML
T4 FREE SERPL-MCNC: 1.28 NG/DL (ref 0.71–1.51)
TSH SERPL DL<=0.005 MIU/L-ACNC: 4.88 UIU/ML (ref 0.4–4)

## 2019-05-28 PROCEDURE — 99214 PR OFFICE/OUTPT VISIT, EST, LEVL IV, 30-39 MIN: ICD-10-PCS | Mod: S$PBB,,, | Performed by: OBSTETRICS & GYNECOLOGY

## 2019-05-28 PROCEDURE — 87147 CULTURE TYPE IMMUNOLOGIC: CPT

## 2019-05-28 PROCEDURE — 84439 ASSAY OF FREE THYROXINE: CPT

## 2019-05-28 PROCEDURE — 99999 PR PBB SHADOW E&M-EST. PATIENT-LVL III: CPT | Mod: PBBFAC,,, | Performed by: OBSTETRICS & GYNECOLOGY

## 2019-05-28 PROCEDURE — 99999 PR PBB SHADOW E&M-EST. PATIENT-LVL III: ICD-10-PCS | Mod: PBBFAC,,, | Performed by: OBSTETRICS & GYNECOLOGY

## 2019-05-28 PROCEDURE — 87510 GARDNER VAG DNA DIR PROBE: CPT

## 2019-05-28 PROCEDURE — 84702 CHORIONIC GONADOTROPIN TEST: CPT

## 2019-05-28 PROCEDURE — 36415 COLL VENOUS BLD VENIPUNCTURE: CPT

## 2019-05-28 PROCEDURE — 84443 ASSAY THYROID STIM HORMONE: CPT

## 2019-05-28 PROCEDURE — 99214 OFFICE O/P EST MOD 30 MIN: CPT | Mod: S$PBB,,, | Performed by: OBSTETRICS & GYNECOLOGY

## 2019-05-28 PROCEDURE — 99213 OFFICE O/P EST LOW 20 MIN: CPT | Mod: PBBFAC,PO | Performed by: OBSTETRICS & GYNECOLOGY

## 2019-05-28 PROCEDURE — 87086 URINE CULTURE/COLONY COUNT: CPT

## 2019-05-28 PROCEDURE — 87088 URINE BACTERIA CULTURE: CPT

## 2019-05-28 PROCEDURE — 87480 CANDIDA DNA DIR PROBE: CPT

## 2019-05-28 PROCEDURE — 87491 CHLMYD TRACH DNA AMP PROBE: CPT

## 2019-05-28 NOTE — PROGRESS NOTES
"  Subjective:       Patient ID: Jaycee Gray is a 38 y.o. female.    Chief Complaint:  Gynecologic Exam (frequent urination and she has pain left side.  It is so strong it brings her to her knees)      History of Present Illness  37yo  with h/o muscular dystrophy c/o pelvic pain and urinary frequency, worried about a possible cancer. She has h/o hysterectomy with BSO at age 23 due to endometriosis.  States this pain has been present for the past month, also with vaginal bleeding.  Recent UPT at [place] was positive - bhcg 16.  She reports morning sickness and nausea, worried she is pregnant or has a tumor.  No other complaints today.  Normal BM.  She has an appt with GI scheduled on Thursday for evaluation as well.      GYN & OB History  Patient's last menstrual period was 2009 (lmp unknown).   Date of Last Pap: No result found    OB History    Para Term  AB Living   1       1     SAB TAB Ectopic Multiple Live Births   1              # Outcome Date GA Lbr Darren/2nd Weight Sex Delivery Anes PTL Lv   1 SAB               Birth Comments: at age 18       Review of Systems  Review of Systems:  Neg x 10, except as per HPI        Objective:    Physical Exam     BP (!) 110/90   Ht 5' 2" (1.575 m)   Wt 79 kg (174 lb 2.6 oz)   LMP 2009 (LMP Unknown)   BMI 31.85 kg/m²     UPT negative    Gen: NAD  Resp: Normal respiratory effort  Abd: soft, NT  Pelvic: Cervix, uterus, and ovaries surgically absent.  No blood in vaginal vault.  Scant thin white discharge noted - cx taken.  No masses palpated.  Upon exam, pain appears more mid-abdomen than pelvic.  Ext: normal ROM  Psych: appropriate affect  Neuro: grossly intact    Assessment:        1. Pelvic pain in female    2. Elevated serum hCG    3. Irregular bleeding              Plan:      Discussed exam with pt - pain not in location of gyn organs, recommend F/u with GI as scheduled .    No masses palpated today, but will order pelvic US " order to further evaluated, especially in light of +HCG - repeat pending today.  Will also check TSH.  US scheduled in [place].  Will call with results.  Counseling done, precautions given, all questions answered.  RTC prn, f/u with GI and PCP as scheduled.

## 2019-05-29 LAB
BACTERIAL VAGINOSIS DNA: NEGATIVE
CANDIDA GLABRATA DNA: NEGATIVE
CANDIDA KRUSEI DNA: NEGATIVE
CANDIDA RRNA VAG QL PROBE: NEGATIVE
T VAGINALIS RRNA GENITAL QL PROBE: NEGATIVE

## 2019-05-30 ENCOUNTER — OFFICE VISIT (OUTPATIENT)
Dept: GASTROENTEROLOGY | Facility: CLINIC | Age: 39
End: 2019-05-30
Payer: MEDICARE

## 2019-05-30 VITALS
BODY MASS INDEX: 32.2 KG/M2 | DIASTOLIC BLOOD PRESSURE: 72 MMHG | WEIGHT: 175 LBS | SYSTOLIC BLOOD PRESSURE: 110 MMHG | HEART RATE: 80 BPM | HEIGHT: 62 IN

## 2019-05-30 DIAGNOSIS — R39.89 SENSATION OF PRESSURE IN BLADDER AREA: ICD-10-CM

## 2019-05-30 DIAGNOSIS — R79.89 ELEVATED SERUM HCG: ICD-10-CM

## 2019-05-30 DIAGNOSIS — R10.32 LLQ PAIN: ICD-10-CM

## 2019-05-30 DIAGNOSIS — R11.2 NAUSEA AND VOMITING, INTRACTABILITY OF VOMITING NOT SPECIFIED, UNSPECIFIED VOMITING TYPE: ICD-10-CM

## 2019-05-30 LAB
BACTERIA UR CULT: NORMAL
C TRACH DNA SPEC QL NAA+PROBE: NOT DETECTED
N GONORRHOEA DNA SPEC QL NAA+PROBE: NOT DETECTED

## 2019-05-30 PROCEDURE — 99214 PR OFFICE/OUTPT VISIT, EST, LEVL IV, 30-39 MIN: ICD-10-PCS | Mod: S$PBB,,, | Performed by: INTERNAL MEDICINE

## 2019-05-30 PROCEDURE — 99214 OFFICE O/P EST MOD 30 MIN: CPT | Mod: S$PBB,,, | Performed by: INTERNAL MEDICINE

## 2019-05-30 PROCEDURE — 99999 PR PBB SHADOW E&M-EST. PATIENT-LVL III: ICD-10-PCS | Mod: PBBFAC,,, | Performed by: INTERNAL MEDICINE

## 2019-05-30 PROCEDURE — 99999 PR PBB SHADOW E&M-EST. PATIENT-LVL III: CPT | Mod: PBBFAC,,, | Performed by: INTERNAL MEDICINE

## 2019-05-30 PROCEDURE — 99213 OFFICE O/P EST LOW 20 MIN: CPT | Mod: PBBFAC,PO | Performed by: INTERNAL MEDICINE

## 2019-05-30 RX ORDER — DICYCLOMINE HYDROCHLORIDE 20 MG/1
20 TABLET ORAL 3 TIMES DAILY PRN
Qty: 60 TABLET | Refills: 2 | Status: SHIPPED | OUTPATIENT
Start: 2019-05-30 | End: 2019-06-27

## 2019-05-30 NOTE — PROGRESS NOTES
"Subjective:       Patient ID: Jayece Gray is a 38 y.o. female.    Chief Complaint: Abdominal Pain and Diarrhea    39 yo F with Charcot-Amanda-Tooth muscular dystrophy complains of one month of LLQ pain.  She states that the pain is severe and only relieved by narcotics.  With this pain she is having daily n/v which she describes as "morning sickness."  She vomits every morning upon waking and this improves over the course of the day.  Her bowel habits remained her usual, which is constipated, until 6 days ago when she had sudden onset of diarrhea which lasted for 3 days.  She then took a dose of Imodium and did not have a BM for 2 days, then had a soft BM which was 2 days ago and none since then.  The pain is a point pain in the LLQ.  She states that it does not move or radiate and it is in a very specific spot.  The narcotics make it such that she can walk, etc., but the pain always returns as the narcotic wears off and the pain is very severe.  She feels that she has a lot of pressure in her bladder and she states she has urinated erika blood.  The pain and pressure in the LLQ is worsened by urination.  She states that she does not feel that this pain has anything to do with her GI tract.  She has FH of bladder cancer.    She has long h/o constipation having a BM every 7-14 days.  She strains for BM and when straining will see some red blood.  She states she has had several colonoscopies at [facility] which have been unrevealing (these were done she states due to FH of colon cancer in her maternal grandfather).  Her mother has not had polyps either.    She had a hysterectomy several years ago, but has a positive B-HCG of unknown etiology or significance.    Review of Systems   Constitutional: Positive for appetite change. Negative for unexpected weight change.   Eyes: Negative for photophobia and visual disturbance.   Respiratory: Negative for chest tightness, shortness of breath and wheezing.  " "  Cardiovascular: Negative for chest pain, palpitations and leg swelling.   Genitourinary: Positive for hematuria. Negative for dysuria and flank pain.   Musculoskeletal: Negative for joint swelling and myalgias.   Skin: Negative for color change and rash.   Neurological: Negative for dizziness and speech difficulty.   Psychiatric/Behavioral: Negative for confusion and hallucinations.       Objective:       /72 (BP Location: Right arm, Patient Position: Sitting)   Pulse 80   Ht 5' 2" (1.575 m)   Wt 79.4 kg (175 lb)   LMP 04/26/2009 (LMP Unknown)   BMI 32.01 kg/m²     Physical Exam   Constitutional: She is oriented to person, place, and time. She appears well-developed and well-nourished.   Eyes: Pupils are equal, round, and reactive to light. EOM are normal.   Neck: Normal range of motion. Neck supple.   Cardiovascular: Normal rate and regular rhythm.   Pulmonary/Chest: Effort normal and breath sounds normal.   Abdominal: Soft. Bowel sounds are normal. She exhibits no distension and no mass. There is tenderness. There is no rebound and no guarding.   Neurological: She is alert and oriented to person, place, and time.   Psychiatric: She has a normal mood and affect. Her behavior is normal. Judgment and thought content normal.       Lab Results   Component Value Date    WBC 5.93 04/26/2019    HGB 14.5 04/26/2019    HCT 42.2 04/26/2019    MCV 89 04/26/2019     04/26/2019     CMP  Sodium   Date Value Ref Range Status   04/26/2019 144 136 - 145 mmol/L Final     Potassium   Date Value Ref Range Status   04/26/2019 3.4 (L) 3.5 - 5.1 mmol/L Final     Chloride   Date Value Ref Range Status   04/26/2019 104 95 - 110 mmol/L Final     CO2   Date Value Ref Range Status   04/26/2019 26 23 - 29 mmol/L Final     Glucose   Date Value Ref Range Status   04/26/2019 98 70 - 110 mg/dL Final     BUN, Bld   Date Value Ref Range Status   04/26/2019 9 7 - 17 mg/dL Final     Creatinine   Date Value Ref Range Status "   04/26/2019 0.54 0.50 - 1.40 mg/dL Final     Calcium   Date Value Ref Range Status   04/26/2019 9.8 8.7 - 10.5 mg/dL Final     Total Protein   Date Value Ref Range Status   04/26/2019 8.3 6.0 - 8.4 g/dL Final     Albumin   Date Value Ref Range Status   04/26/2019 4.5 3.5 - 5.2 g/dL Final     Total Bilirubin   Date Value Ref Range Status   04/26/2019 0.7 0.1 - 1.0 mg/dL Final     Comment:     For infants and newborns, interpretation of results should be based  on gestational age, weight and in agreement with clinical  observations.  Premature Infant recommended reference ranges:  Up to 24 hours.............<8.0 mg/dL  Up to 48 hours............<12.0 mg/dL  3-5 days..................<15.0 mg/dL  6-29 days.................<15.0 mg/dL       Alkaline Phosphatase   Date Value Ref Range Status   04/26/2019 93 38 - 126 U/L Final     AST   Date Value Ref Range Status   04/26/2019 19 15 - 46 U/L Final     ALT   Date Value Ref Range Status   04/26/2019 17 10 - 44 U/L Final     Anion Gap   Date Value Ref Range Status   04/26/2019 14 8 - 16 mmol/L Final     eGFR if    Date Value Ref Range Status   04/26/2019 >60.0 >60 mL/min/1.73 m^2 Final     eGFR if non    Date Value Ref Range Status   04/26/2019 >60.0 >60 mL/min/1.73 m^2 Final     Comment:     Calculation used to obtain the estimated glomerular filtration  rate (eGFR) is the CKD-EPI equation.        B-HCG from 16 to 9.6  CT was independently visualized and reviewed by me and showed no diverticulitis.  EUS 8/2018:  Mild gastritis, otherwise normal.  Liver/bile ducts/pancreas all normal.    Assessment:       1. Elevated serum hCG    2. Nausea and vomiting, intractability of vomiting not specified, unspecified vomiting type    3. LLQ pain    4. Sensation of pressure in bladder area        Plan:       Elevated serum hCG; Nausea and vomiting, intractability of vomiting not specified, unspecified vomiting type  -     Ambulatory consult to  Endocrinology    LLQ pain  -     dicyclomine (BENTYL) 20 mg tablet; Take 1 tablet (20 mg total) by mouth 3 (three) times daily as needed.  Dispense: 60 tablet; Refill: 2  -     Her pain is a very discrete point pain which is very unusual for GI pathology, especially with a negative CT scan.  She will try the Bentyl and see if it helps.  If it does, then bowel origin becomes more possible.  -     I am happy to repeat her colonoscopy, but she states she can not tolerate a bowel prep at this time.    Sensation of pressure in bladder area  -     Ambulatory consult to Urology    She needs close f/u with Neurology as CMT could have other associated conditions of which I am not aware.

## 2019-05-31 ENCOUNTER — PATIENT MESSAGE (OUTPATIENT)
Dept: ENDOCRINOLOGY | Facility: CLINIC | Age: 39
End: 2019-05-31

## 2019-06-04 ENCOUNTER — TELEPHONE (OUTPATIENT)
Dept: OBSTETRICS AND GYNECOLOGY | Facility: CLINIC | Age: 39
End: 2019-06-04

## 2019-06-04 DIAGNOSIS — R10.2 PELVIC PAIN IN FEMALE: ICD-10-CM

## 2019-06-04 DIAGNOSIS — N92.6 IRREGULAR BLEEDING: ICD-10-CM

## 2019-06-04 DIAGNOSIS — R79.89 ELEVATED SERUM HCG: Primary | ICD-10-CM

## 2019-06-04 NOTE — TELEPHONE ENCOUNTER
Spoke with patient regarding labs and US.  HCG still mildly elevated but declining.  Possible interaction with TSH.  No pelvic masses seen on US, which is reassuring.  Will monitor HCG level.  F/u with urology and endocrinology as previously referred by GI.  Counseling done, precautions given, all questions answered.

## 2019-06-11 ENCOUNTER — INITIAL CONSULT (OUTPATIENT)
Dept: UROLOGY | Facility: CLINIC | Age: 39
End: 2019-06-11
Payer: MEDICARE

## 2019-06-11 VITALS
WEIGHT: 175.06 LBS | HEIGHT: 62 IN | DIASTOLIC BLOOD PRESSURE: 88 MMHG | HEART RATE: 69 BPM | BODY MASS INDEX: 32.22 KG/M2 | SYSTOLIC BLOOD PRESSURE: 113 MMHG | OXYGEN SATURATION: 99 %

## 2019-06-11 DIAGNOSIS — R39.16 STRAINING TO VOID: ICD-10-CM

## 2019-06-11 DIAGNOSIS — N30.10 IC (INTERSTITIAL CYSTITIS): Primary | ICD-10-CM

## 2019-06-11 DIAGNOSIS — N39.46 MIXED STRESS AND URGE URINARY INCONTINENCE: ICD-10-CM

## 2019-06-11 DIAGNOSIS — R10.2 SUPRAPUBIC PAIN: ICD-10-CM

## 2019-06-11 DIAGNOSIS — R35.0 URINARY FREQUENCY: ICD-10-CM

## 2019-06-11 DIAGNOSIS — R39.11 URINARY HESITANCY: ICD-10-CM

## 2019-06-11 DIAGNOSIS — N30.10 CHRONIC INTERSTITIAL CYSTITIS: Primary | ICD-10-CM

## 2019-06-11 DIAGNOSIS — R39.15 URINARY URGENCY: ICD-10-CM

## 2019-06-11 LAB
BILIRUB SERPL-MCNC: NORMAL MG/DL
BLOOD URINE, POC: NORMAL
COLOR, POC UA: YELLOW
GLUCOSE UR QL STRIP: NORMAL
KETONES UR QL STRIP: NORMAL
LEUKOCYTE ESTERASE URINE, POC: NORMAL
NITRITE, POC UA: NORMAL
PH, POC UA: 6
PROTEIN, POC: NORMAL
SPECIFIC GRAVITY, POC UA: 1.01
UROBILINOGEN, POC UA: 1

## 2019-06-11 PROCEDURE — 87086 URINE CULTURE/COLONY COUNT: CPT

## 2019-06-11 PROCEDURE — 99213 PR OFFICE/OUTPT VISIT, EST, LEVL III, 20-29 MIN: ICD-10-PCS | Mod: S$PBB,,, | Performed by: NURSE PRACTITIONER

## 2019-06-11 PROCEDURE — 99999 PR PBB SHADOW E&M-EST. PATIENT-LVL IV: ICD-10-PCS | Mod: PBBFAC,,, | Performed by: NURSE PRACTITIONER

## 2019-06-11 PROCEDURE — 99213 OFFICE O/P EST LOW 20 MIN: CPT | Mod: S$PBB,,, | Performed by: NURSE PRACTITIONER

## 2019-06-11 PROCEDURE — 81002 URINALYSIS NONAUTO W/O SCOPE: CPT | Mod: PBBFAC,PO | Performed by: NURSE PRACTITIONER

## 2019-06-11 PROCEDURE — 99999 PR PBB SHADOW E&M-EST. PATIENT-LVL IV: CPT | Mod: PBBFAC,,, | Performed by: NURSE PRACTITIONER

## 2019-06-11 PROCEDURE — 99214 OFFICE O/P EST MOD 30 MIN: CPT | Mod: PBBFAC,PO | Performed by: NURSE PRACTITIONER

## 2019-06-11 RX ORDER — SODIUM CHLORIDE 9 MG/ML
INJECTION, SOLUTION INTRAVENOUS CONTINUOUS
Status: CANCELLED | OUTPATIENT
Start: 2019-06-11

## 2019-06-11 NOTE — PATIENT INSTRUCTIONS
1. Bladder scan (PVR)  2. Urine dipstick  3. Urine cx  4. Schedule cystoscopy with hydrodistention by Dr. Shelby on Monday, 7/1/2019.  5. Consent discussed with patient and signed.  6. Follow-up post op.

## 2019-06-11 NOTE — PROGRESS NOTES
Subjective:       Patient ID: Jaycee Gary is a 38 y.o. female.    Chief Complaint: Other (bladder pressure and sharp pain. pt states she has urinary pain as well.)    Patient is new to me. She is a 39 yo WF who is here today with complaints of suprapubic pressure. She reports hx of IC with hydrodistention 4 years ago. Patient also reports urinary leakage when she can't make it to the restroom. Also, urinary leakage with laughing, coughing, and sneezing. Patient denies wearing pads or panty liners for protection. She reports she has an elevated HCG level. Patient had a complete hysterectomy and no pregnant. She is currently being followed by OB/GYN for this issue. Patient was also referred to Endocrinology. She is here today with her mother.    Pelvic Pain   The patient's primary symptoms include pelvic pain. The patient's pertinent negatives include no vaginal discharge. This is a chronic problem. Episode onset: Approximately 2 months ago. The problem occurs constantly. The problem has been gradually worsening. The pain is severe (10/10). The problem affects the left side. She is not pregnant. Associated symptoms include abdominal pain (suprapubic), back pain (lower ), constipation, diarrhea, frequency, nausea, urgency and vomiting. Pertinent negatives include no chills, dysuria, fever, flank pain or hematuria. The symptoms are aggravated by eating. She has tried nothing for the symptoms. She uses hysterectomy for contraception. Her past medical history is significant for an abdominal surgery (gallbladder and appendix removal. ), endometriosis and a gynecological surgery (complete hysterectomy).     Review of Systems   Constitutional: Positive for appetite change (decreased), fatigue and unexpected weight change (weight loss). Negative for chills and fever.   Gastrointestinal: Positive for abdominal pain (suprapubic), constipation, diarrhea, nausea and vomiting.   Genitourinary: Positive for difficulty  urinating (straining and urinary hesitancy), frequency, pelvic pain and urgency. Negative for decreased urine volume, dysuria, flank pain, hematuria, vaginal bleeding, vaginal discharge and vaginal pain.   Musculoskeletal: Positive for back pain (lower ).       Objective:      Physical Exam   Constitutional: She is oriented to person, place, and time. No distress.   Obese   HENT:   Head: Normocephalic and atraumatic.   Eyes: Pupils are equal, round, and reactive to light. EOM are normal.   Neck: Normal range of motion.   Cardiovascular: Normal rate.   Pulmonary/Chest: Effort normal. No respiratory distress.   Abdominal: Soft. There is tenderness (LLQ abdomen).   Genitourinary:   Genitourinary Comments: PVR=18 mL   Musculoskeletal: Normal range of motion. She exhibits no edema.   Neurological: She is alert and oriented to person, place, and time. Coordination normal.   Skin: Skin is warm and dry.   Psychiatric: She has a normal mood and affect. Her behavior is normal. Judgment and thought content normal.   Nursing note and vitals reviewed.      Assessment:       1. IC (interstitial cystitis)    2. Suprapubic pain    3. Urinary hesitancy    4. Straining to void    5. Urinary frequency    6. Urinary urgency        Plan:       Jaycee was seen today for other.    Diagnoses and all orders for this visit:    IC (interstitial cystitis)    Suprapubic pain    Mixed stress and urge urinary incontinence    Urinary frequency  -     POCT URINE DIPSTICK WITHOUT MICROSCOPE  -     Urine culture    Urinary urgency  -     POCT URINE DIPSTICK WITHOUT MICROSCOPE  -     Urine culture    Urinary hesitancy    Straining to void    Other orders  1. Bladder scan (PVR)  2. Schedule cystoscopy with hydrodistention by Dr. Shelby on Wednesday, 6/19/2019.  3. Consent discussed with patient and signed.    Follow-up post op.    Katie Rodriguez NP

## 2019-06-11 NOTE — LETTER
June 11, 2019      Sulema Lopez MD  [address] [facility]  [address]  [facility] [address]           [place] [facility]  [address] [place] [address] [address]  [facility] [address]  Phone: [phone]  Fax: [phone]          Patient: Jaycee Gray   MR Number: [id number]   YOB: 1980   Date of Visit: 6/11/2019       Dear Dr. Sulema Lopez:    Thank you for referring aJycee Gray to me for evaluation. Attached you will find relevant portions of my assessment and plan of care.    If you have questions, please do not hesitate to call me. I look forward to following Jaycee Gray along with you.    Sincerely,    Katie Rodriguez, NP    Enclosure  CC:  No Recipients    If you would like to receive this communication electronically, please contact [email or web address] or [phone] to request more information on [facility] [employer] access.    For providers and/or their staff who would like to refer a patient to [facility], please contact us through our one-stop-shop provider referral line, [facility], at [phone].    If you feel you have received this communication in error or would no longer like to receive these types of communications, please e-mail [email or web address]

## 2019-06-12 DIAGNOSIS — E03.9 ACQUIRED HYPOTHYROIDISM: ICD-10-CM

## 2019-06-12 RX ORDER — LEVOTHYROXINE SODIUM 50 UG/1
50 TABLET ORAL DAILY
Qty: 30 TABLET | Refills: 11 | Status: SHIPPED | OUTPATIENT
Start: 2019-06-12 | End: 2019-06-14 | Stop reason: SDUPTHER

## 2019-06-13 ENCOUNTER — TELEPHONE (OUTPATIENT)
Dept: UROLOGY | Facility: CLINIC | Age: 39
End: 2019-06-13

## 2019-06-13 LAB
BACTERIA UR CULT: NORMAL
BACTERIA UR CULT: NORMAL

## 2019-06-13 NOTE — TELEPHONE ENCOUNTER
----- Message from Katie Rodriguez NP sent at 6/13/2019  8:59 AM CDT -----  Urine cx appears that patient does not have a UTI.

## 2019-06-14 ENCOUNTER — TELEPHONE (OUTPATIENT)
Dept: INTERNAL MEDICINE | Facility: CLINIC | Age: 39
End: 2019-06-14

## 2019-06-14 DIAGNOSIS — E03.9 ACQUIRED HYPOTHYROIDISM: ICD-10-CM

## 2019-06-14 RX ORDER — LEVOTHYROXINE SODIUM 50 UG/1
50 TABLET ORAL DAILY
Qty: 30 TABLET | Refills: 0 | Status: SHIPPED | OUTPATIENT
Start: 2019-06-14 | End: 2020-01-09 | Stop reason: SDUPTHER

## 2019-06-14 NOTE — TELEPHONE ENCOUNTER
The patient is due for follow-up appointment to see dr alcantara.  Please ask them to schedule it.

## 2019-07-01 PROBLEM — N30.10 CHRONIC INTERSTITIAL CYSTITIS: Status: ACTIVE | Noted: 2019-07-01

## 2019-07-03 ENCOUNTER — PATIENT MESSAGE (OUTPATIENT)
Dept: OBSTETRICS AND GYNECOLOGY | Facility: CLINIC | Age: 39
End: 2019-07-03

## 2019-07-03 ENCOUNTER — TELEPHONE (OUTPATIENT)
Dept: FAMILY MEDICINE | Facility: CLINIC | Age: 39
End: 2019-07-03

## 2019-07-03 ENCOUNTER — OFFICE VISIT (OUTPATIENT)
Dept: FAMILY MEDICINE | Facility: CLINIC | Age: 39
End: 2019-07-03
Payer: MEDICARE

## 2019-07-03 VITALS
HEIGHT: 62 IN | BODY MASS INDEX: 32.01 KG/M2 | HEART RATE: 64 BPM | OXYGEN SATURATION: 96 % | TEMPERATURE: 98 F | RESPIRATION RATE: 18 BRPM | WEIGHT: 173.94 LBS | DIASTOLIC BLOOD PRESSURE: 80 MMHG | SYSTOLIC BLOOD PRESSURE: 116 MMHG

## 2019-07-03 DIAGNOSIS — F43.21 GRIEF REACTION: Primary | ICD-10-CM

## 2019-07-03 DIAGNOSIS — R79.89 ELEVATED SERUM HCG: ICD-10-CM

## 2019-07-03 DIAGNOSIS — N92.6 IRREGULAR BLEEDING: Primary | ICD-10-CM

## 2019-07-03 PROCEDURE — 99215 OFFICE O/P EST HI 40 MIN: CPT | Mod: PBBFAC,PN | Performed by: NURSE PRACTITIONER

## 2019-07-03 PROCEDURE — 99999 PR PBB SHADOW E&M-EST. PATIENT-LVL V: ICD-10-PCS | Mod: PBBFAC,,, | Performed by: NURSE PRACTITIONER

## 2019-07-03 PROCEDURE — 99213 PR OFFICE/OUTPT VISIT, EST, LEVL III, 20-29 MIN: ICD-10-PCS | Mod: S$PBB,,, | Performed by: NURSE PRACTITIONER

## 2019-07-03 PROCEDURE — 99999 PR PBB SHADOW E&M-EST. PATIENT-LVL V: CPT | Mod: PBBFAC,,, | Performed by: NURSE PRACTITIONER

## 2019-07-03 PROCEDURE — 99213 OFFICE O/P EST LOW 20 MIN: CPT | Mod: S$PBB,,, | Performed by: NURSE PRACTITIONER

## 2019-07-03 RX ORDER — LORAZEPAM 0.5 MG/1
0.5 TABLET ORAL EVERY 12 HOURS PRN
Qty: 20 TABLET | Refills: 0 | Status: SHIPPED | OUTPATIENT
Start: 2019-07-03 | End: 2019-08-05

## 2019-07-03 NOTE — TELEPHONE ENCOUNTER
Patient states her father passed away and just needs something to help her cope. Explained there were no appointments available today and stated she just needed something. Patient was given an appointment with BESSIE Rosario in [place] today for 4pm

## 2019-07-03 NOTE — TELEPHONE ENCOUNTER
----- Message from Nakia Gonzales sent at 7/3/2019 12:57 PM CDT -----  No. [phone]     Patient recently lost her father.  She would like an anxiety medication called in today.   [facility] in [place]
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
## 2019-07-03 NOTE — TELEPHONE ENCOUNTER
Patient is asking for a referral to Dr Juaquin Singh for endocrinology as she can get a sooner appointment with them  Fax number [phone]  Please advise.

## 2019-07-03 NOTE — PATIENT INSTRUCTIONS
Grief Reaction  Grief is the feeling that we all have when we lose someone or something that has been important in our life. Grief is an unavoidable and normal reaction to this loss, and can last from months to years. The amount of time depends on different factors. These include how close the person was to you, and how much support you have through the grief process. Symptoms can be both physical and emotional.  Physical reactions:  · Loss of appetite or overeating  · Changes in weight  · Trouble getting to sleep or staying asleep  · Hair loss  · Upset stomach, indigestion, heart burn, abdominal pain, cramping, diarrhea  · Sense of trouble breathing  · Trembling, shakiness  Emotional reactions:  · Sadness  · Anxiety  · Feeling depressed or helpless  · Difficulty concentrating  · Detachment or withdrawal from those around you  · Loss of interest in your normal life and work  Home care  · Allow yourself to feel the pain of your loss. For some, this can be a key part of healing grief. Talk about your pain with others who understand. Share good memories that involve the person, pet, or possession  you lost.  · Take time for yourself. Make it a point to do things that you enjoy (gardening, walking in nature, going to a movie, etc.).  · Take care of your physical body. Eat a balanced diet (low in saturated fat and high in fruits and vegetables) and establish an exercise plan at least 3 times a week for 30 minutes. Even mild-moderate exercise (like brisk walking) can make you feel better. Get plenty of sleep.  · Avoid the use of alcohol and drugs to cover your emotional pain. This only slows down the emotional healing process.  · Do not isolate yourself from others. Have daily contact with family or friends. Talk about your loss to those closest to you.  · For additional support, meet with your //rabbi, a counselor or therapist, or your own doctor.  · Consider joining a grief support group. Ask your doctor  or our staff for information on how to find one in your area.  · If you have been prescribed a medicine to help with your symptoms, take it only as directed. Do not use it with alcohol.  Follow-up care  Follow up with your healthcare provider, or as advised.  Call 911  Call 911 if any of these occur:  · Trouble breathing  · Very confused  · Very drowsy or trouble awakening  · Fainting or loss of consciousness  · Rapid heart rate  · Seizure  · New chest pain that becomes more severe, lasts longer, or spreads into your shoulder, arm, neck, jaw or back  When to seek medical advice  Call your healthcare provider right away if any of these occur:  · Worsening symptoms  · Unable to eat or sleep for three days in a row  · Feeling extreme depression, fear, anxiety, or anger toward yourself or others  · Feeling out of control  · Feeling that you may try to harm yourself  · family or friends express concern over your behavior and ask you to get help  Date Last Reviewed: 9/29/2015  © 0222-1899 [employer]. [address] [place] [address], [place] [address]. All rights reserved. This information is not intended as a substitute for professional medical care. Always follow your healthcare professional's instructions.

## 2019-07-03 NOTE — TELEPHONE ENCOUNTER
Called back to [facility] to cancel the Ativan script and and order Atarax 25 mg 1 tab po twice a day x 14 days for 28 days with no refills

## 2019-07-03 NOTE — TELEPHONE ENCOUNTER
Please let her know I have sent the referral to Dr. Juaquin Singh's office.  If she has any other problems or questions, please let me know.  Thanks.

## 2019-07-04 ENCOUNTER — PATIENT MESSAGE (OUTPATIENT)
Dept: FAMILY MEDICINE | Facility: CLINIC | Age: 39
End: 2019-07-04

## 2019-07-05 RX ORDER — HYDROXYZINE HYDROCHLORIDE 25 MG/1
25 TABLET, FILM COATED ORAL 2 TIMES DAILY PRN
Status: ON HOLD | COMMUNITY
Start: 2019-07-03 | End: 2019-10-01 | Stop reason: CLARIF

## 2019-08-05 ENCOUNTER — OFFICE VISIT (OUTPATIENT)
Dept: FAMILY MEDICINE | Facility: CLINIC | Age: 39
End: 2019-08-05
Payer: MEDICARE

## 2019-08-05 VITALS
RESPIRATION RATE: 18 BRPM | BODY MASS INDEX: 31.28 KG/M2 | HEART RATE: 100 BPM | WEIGHT: 170 LBS | HEIGHT: 62 IN | SYSTOLIC BLOOD PRESSURE: 120 MMHG | TEMPERATURE: 98 F | DIASTOLIC BLOOD PRESSURE: 84 MMHG | OXYGEN SATURATION: 97 %

## 2019-08-05 DIAGNOSIS — N30.10 CHRONIC INTERSTITIAL CYSTITIS: ICD-10-CM

## 2019-08-05 DIAGNOSIS — N30.01 ACUTE CYSTITIS WITH HEMATURIA: Primary | ICD-10-CM

## 2019-08-05 DIAGNOSIS — R11.0 NAUSEA: ICD-10-CM

## 2019-08-05 LAB
BILIRUB SERPL-MCNC: ABNORMAL MG/DL
BLOOD URINE, POC: ABNORMAL
COLOR, POC UA: YELLOW
GLUCOSE UR QL STRIP: NORMAL
KETONES UR QL STRIP: ABNORMAL
LEUKOCYTE ESTERASE URINE, POC: ABNORMAL
NITRITE, POC UA: POSITIVE
PH, POC UA: 5
PROTEIN, POC: ABNORMAL
SPECIFIC GRAVITY, POC UA: 1.01
UROBILINOGEN, POC UA: 1

## 2019-08-05 PROCEDURE — 99999 PR PBB SHADOW E&M-EST. PATIENT-LVL V: ICD-10-PCS | Mod: PBBFAC,,, | Performed by: NURSE PRACTITIONER

## 2019-08-05 PROCEDURE — 99999 PR PBB SHADOW E&M-EST. PATIENT-LVL V: CPT | Mod: PBBFAC,,, | Performed by: NURSE PRACTITIONER

## 2019-08-05 PROCEDURE — 99215 OFFICE O/P EST HI 40 MIN: CPT | Mod: PBBFAC,PN | Performed by: NURSE PRACTITIONER

## 2019-08-05 PROCEDURE — 99213 OFFICE O/P EST LOW 20 MIN: CPT | Mod: 25,S$PBB,, | Performed by: NURSE PRACTITIONER

## 2019-08-05 PROCEDURE — 99213 PR OFFICE/OUTPT VISIT, EST, LEVL III, 20-29 MIN: ICD-10-PCS | Mod: 25,S$PBB,, | Performed by: NURSE PRACTITIONER

## 2019-08-05 PROCEDURE — 87086 URINE CULTURE/COLONY COUNT: CPT

## 2019-08-05 PROCEDURE — 87077 CULTURE AEROBIC IDENTIFY: CPT

## 2019-08-05 PROCEDURE — 87088 URINE BACTERIA CULTURE: CPT

## 2019-08-05 PROCEDURE — 81002 URINALYSIS NONAUTO W/O SCOPE: CPT | Mod: PBBFAC,PN | Performed by: NURSE PRACTITIONER

## 2019-08-05 PROCEDURE — 87186 SC STD MICRODIL/AGAR DIL: CPT

## 2019-08-05 RX ORDER — SULFAMETHOXAZOLE AND TRIMETHOPRIM 800; 160 MG/1; MG/1
1 TABLET ORAL DAILY
Qty: 7 TABLET | Refills: 0 | Status: SHIPPED | OUTPATIENT
Start: 2019-08-05 | End: 2019-08-12

## 2019-08-05 RX ORDER — ONDANSETRON 8 MG/1
8 TABLET, ORALLY DISINTEGRATING ORAL 3 TIMES DAILY PRN
Qty: 30 TABLET | Refills: 0 | Status: SHIPPED | OUTPATIENT
Start: 2019-08-05 | End: 2020-01-09

## 2019-08-05 NOTE — PATIENT INSTRUCTIONS
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
## 2019-08-07 LAB — BACTERIA UR CULT: ABNORMAL

## 2019-08-12 ENCOUNTER — OFFICE VISIT (OUTPATIENT)
Dept: UROLOGY | Facility: CLINIC | Age: 39
End: 2019-08-12
Payer: MEDICARE

## 2019-08-12 VITALS
HEART RATE: 82 BPM | HEIGHT: 62 IN | DIASTOLIC BLOOD PRESSURE: 74 MMHG | BODY MASS INDEX: 29.81 KG/M2 | SYSTOLIC BLOOD PRESSURE: 136 MMHG | OXYGEN SATURATION: 97 % | WEIGHT: 162 LBS | TEMPERATURE: 99 F | RESPIRATION RATE: 18 BRPM

## 2019-08-12 DIAGNOSIS — R10.2 CHRONIC PELVIC PAIN IN FEMALE: ICD-10-CM

## 2019-08-12 DIAGNOSIS — G89.29 CHRONIC PELVIC PAIN IN FEMALE: ICD-10-CM

## 2019-08-12 DIAGNOSIS — Z98.890 POST-OPERATIVE STATE: Primary | ICD-10-CM

## 2019-08-12 DIAGNOSIS — N30.10 CHRONIC INTERSTITIAL CYSTITIS: ICD-10-CM

## 2019-08-12 DIAGNOSIS — N30.01 ACUTE CYSTITIS WITH HEMATURIA: ICD-10-CM

## 2019-08-12 LAB
BILIRUB SERPL-MCNC: NORMAL MG/DL
BLOOD URINE, POC: NORMAL
COLOR, POC UA: YELLOW
GLUCOSE UR QL STRIP: NORMAL
KETONES UR QL STRIP: NORMAL
LEUKOCYTE ESTERASE URINE, POC: NORMAL
NITRITE, POC UA: NORMAL
PH, POC UA: 5.5
PROTEIN, POC: NORMAL
SPECIFIC GRAVITY, POC UA: 1.01
UROBILINOGEN, POC UA: 1

## 2019-08-12 PROCEDURE — 99024 PR POST-OP FOLLOW-UP VISIT: ICD-10-PCS | Mod: POP,,, | Performed by: NURSE PRACTITIONER

## 2019-08-12 PROCEDURE — 99999 PR PBB SHADOW E&M-EST. PATIENT-LVL V: ICD-10-PCS | Mod: PBBFAC,,, | Performed by: NURSE PRACTITIONER

## 2019-08-12 PROCEDURE — 99024 POSTOP FOLLOW-UP VISIT: CPT | Mod: POP,,, | Performed by: NURSE PRACTITIONER

## 2019-08-12 PROCEDURE — 99999 PR PBB SHADOW E&M-EST. PATIENT-LVL V: CPT | Mod: PBBFAC,,, | Performed by: NURSE PRACTITIONER

## 2019-08-12 PROCEDURE — 81002 URINALYSIS NONAUTO W/O SCOPE: CPT | Mod: PBBFAC,PO | Performed by: NURSE PRACTITIONER

## 2019-08-12 PROCEDURE — 99215 OFFICE O/P EST HI 40 MIN: CPT | Mod: PBBFAC,PO | Performed by: NURSE PRACTITIONER

## 2019-08-12 PROCEDURE — 87086 URINE CULTURE/COLONY COUNT: CPT

## 2019-08-12 NOTE — PROGRESS NOTES
Subjective:       Patient ID: Jaycee Gray is a 38 y.o. female.    Chief Complaint: Post-op Evaluation (CYSTOSCOPY, WITH BLADDER HYDRODISTENSION)    Patient is here today for her post-op evaluation. She is S/P cystoscopy with bladder hydrodistention by Dr. Shelby on 7/1/2019. Patient reports everything was going well, until 1-2 weeks ago she started experiencing urinary frequency, urgency, and suprapubic pressure. She was diagnosed with UTI by PCP on 8/5/19 and started on Bactrim DS (once daily x7 days). Patient reports her symptoms have resolved since completing antibiotic. Patient is here today with her mother.     Urinary Tract Infection    This is a new problem. Episode onset: Approximately 1 week ago. The problem has been resolved. The quality of the pain is described as aching. The pain is at a severity of 0/10. The patient is experiencing no pain. There has been no fever. She is not sexually active. There is no history of pyelonephritis. Associated symptoms include frequency (resolved), nausea, urgency (resolved) and vomiting. Pertinent negatives include no behavior changes, chills, flank pain, hematuria, hesitancy, possible pregnancy, bubble bath use, constipation or withholding. She has tried antibiotics (Bactrim DS) for the symptoms. The treatment provided significant relief. Her past medical history is significant for kidney stones and a urological procedure. There is no history of diabetes mellitus, hypertension, recurrent UTIs or a single kidney.   Other   Chronicity: S/P cystoscopy with bladder hydrodistention. Episode onset: 7/1/19. The problem has been resolved. Associated symptoms include abdominal pain (suprapubic pressure resolved), fatigue, nausea and vomiting. Pertinent negatives include no anorexia, arthralgias, change in bowel habit, chills, diaphoresis, fever, headaches, swollen glands, urinary symptoms or weakness. Nothing aggravates the symptoms. Treatments tried: cystoscopy with  bladder hydrodistention. The treatment provided significant relief.     Review of Systems   Constitutional: Positive for appetite change (decreased; resolved) and fatigue. Negative for chills, diaphoresis and fever.   Gastrointestinal: Positive for abdominal pain (suprapubic pressure resolved), nausea and vomiting. Negative for anorexia, change in bowel habit, constipation and diarrhea.   Genitourinary: Positive for frequency (resolved) and urgency (resolved). Negative for decreased urine volume, difficulty urinating, dysuria, flank pain, hematuria, hesitancy, vaginal bleeding, vaginal discharge and vaginal pain.   Musculoskeletal: Negative for arthralgias.   Neurological: Negative for dizziness, weakness and headaches.   Psychiatric/Behavioral: Negative.        Objective:      Physical Exam   Constitutional: She is oriented to person, place, and time. She appears well-developed and well-nourished. No distress.   HENT:   Head: Normocephalic and atraumatic.   Eyes: Pupils are equal, round, and reactive to light. EOM are normal.   Neck: Normal range of motion.   Cardiovascular: Normal rate.   Pulmonary/Chest: Effort normal. No respiratory distress.   Abdominal: Soft. There is no tenderness.   Musculoskeletal: Normal range of motion. She exhibits no edema.   Neurological: She is alert and oriented to person, place, and time. Coordination normal.   Skin: Skin is warm and dry.   Psychiatric: She has a normal mood and affect. Her behavior is normal. Judgment and thought content normal.   Nursing note and vitals reviewed.      Assessment:       1. Post-operative state    2. Acute cystitis with hematuria    3. Chronic interstitial cystitis    4. Chronic pelvic pain in female        Plan:       Jaycee was seen today for post-op evaluation.    Diagnoses and all orders for this visit:    Post-operative state    Acute cystitis with hematuria  -     POCT URINE DIPSTICK WITHOUT MICROSCOPE  -     Urine culture    Chronic  interstitial cystitis    Chronic pelvic pain in female    Follow-up in 6 months and as needed.     YKg Rodriguez, NP

## 2019-08-13 LAB
BACTERIA UR CULT: NORMAL
BACTERIA UR CULT: NORMAL

## 2019-08-14 ENCOUNTER — TELEPHONE (OUTPATIENT)
Dept: UROLOGY | Facility: CLINIC | Age: 39
End: 2019-08-14

## 2019-08-14 NOTE — TELEPHONE ENCOUNTER
----- Message from Katie Rodriguez NP sent at 8/14/2019 10:15 AM CDT -----  Please inform patient her UTI appears to be resolved. If her urinary symptoms resurface, please notify our clinic.

## 2019-09-19 ENCOUNTER — TELEPHONE (OUTPATIENT)
Dept: UROLOGY | Facility: CLINIC | Age: 39
End: 2019-09-19

## 2019-09-19 DIAGNOSIS — N39.0 URINARY TRACT INFECTION WITHOUT HEMATURIA, SITE UNSPECIFIED: Primary | ICD-10-CM

## 2019-09-19 NOTE — TELEPHONE ENCOUNTER
----- Message from Sally Pierre sent at 9/19/2019  9:20 AM CDT -----  Contact: [phone]/self  Patient requesting to speak with you regarding scheduling her procedure. Please advise.

## 2019-09-19 NOTE — TELEPHONE ENCOUNTER
----- Message from Willem Roe sent at 9/19/2019 12:56 PM CDT -----  Contact: patient  Patient called to state she already did the urine test this morning.    Please call [phone] to discuss today.

## 2019-09-24 ENCOUNTER — TELEPHONE (OUTPATIENT)
Dept: UROLOGY | Facility: CLINIC | Age: 39
End: 2019-09-24

## 2019-09-24 NOTE — TELEPHONE ENCOUNTER
----- Message from Katie Rodriguez NP sent at 9/24/2019  9:33 AM CDT -----  Please inform patient her urine cx showed some bacteria, but none in predominance. May repeat urine cx (I&O cath) if urinary symptoms present.

## 2019-09-27 ENCOUNTER — TELEPHONE (OUTPATIENT)
Dept: FAMILY MEDICINE | Facility: CLINIC | Age: 39
End: 2019-09-27

## 2019-09-27 ENCOUNTER — PATIENT OUTREACH (OUTPATIENT)
Dept: ADMINISTRATIVE | Facility: OTHER | Age: 39
End: 2019-09-27

## 2019-09-27 ENCOUNTER — OFFICE VISIT (OUTPATIENT)
Dept: SURGERY | Facility: CLINIC | Age: 39
End: 2019-09-27
Attending: COLON & RECTAL SURGERY
Payer: MEDICARE

## 2019-09-27 VITALS
SYSTOLIC BLOOD PRESSURE: 138 MMHG | HEIGHT: 62 IN | BODY MASS INDEX: 31.72 KG/M2 | WEIGHT: 172.38 LBS | HEART RATE: 70 BPM | DIASTOLIC BLOOD PRESSURE: 79 MMHG

## 2019-09-27 VITALS
HEIGHT: 62 IN | BODY MASS INDEX: 31.72 KG/M2 | WEIGHT: 172.38 LBS | DIASTOLIC BLOOD PRESSURE: 79 MMHG | HEART RATE: 70 BPM | SYSTOLIC BLOOD PRESSURE: 138 MMHG

## 2019-09-27 DIAGNOSIS — K64.5 EXTERNAL HEMORRHOID, THROMBOSED: Primary | ICD-10-CM

## 2019-09-27 PROCEDURE — 99999 PR PBB SHADOW E&M-EST. PATIENT-LVL III: ICD-10-PCS | Mod: PBBFAC,,, | Performed by: COLON & RECTAL SURGERY

## 2019-09-27 PROCEDURE — 99203 PR OFFICE/OUTPT VISIT, NEW, LEVL III, 30-44 MIN: ICD-10-PCS | Mod: S$PBB,,, | Performed by: COLON & RECTAL SURGERY

## 2019-09-27 PROCEDURE — 99213 OFFICE O/P EST LOW 20 MIN: CPT | Mod: PBBFAC,27 | Performed by: COLON & RECTAL SURGERY

## 2019-09-27 PROCEDURE — 99999 PR PBB SHADOW E&M-EST. PATIENT-LVL III: CPT | Mod: PBBFAC,,, | Performed by: COLON & RECTAL SURGERY

## 2019-09-27 PROCEDURE — 99203 OFFICE O/P NEW LOW 30 MIN: CPT | Mod: S$PBB,,, | Performed by: COLON & RECTAL SURGERY

## 2019-09-27 PROCEDURE — 99213 OFFICE O/P EST LOW 20 MIN: CPT | Mod: PBBFAC | Performed by: COLON & RECTAL SURGERY

## 2019-09-27 RX ORDER — HYDROCORTISONE 25 MG/G
CREAM TOPICAL 2 TIMES DAILY
Qty: 1 TUBE | Refills: 3 | Status: SHIPPED | OUTPATIENT
Start: 2019-09-27 | End: 2020-06-08

## 2019-09-27 NOTE — TELEPHONE ENCOUNTER
Spoke with patient was able to schedule her an appointment for today with colon and rectal surgery.

## 2019-09-27 NOTE — TELEPHONE ENCOUNTER
----- Message from Sulema Sandoval sent at 9/27/2019  9:42 AM CDT -----  Contact: SARA CERDA [[id number]]  [phone]    Patient states she has a lump covering her rectum and wants to be seen as soon as possible this morning. She states she scheduled an appointment via the Patient Portal for 3:40pm today, but does not think she can wait that long. It is difficult and painful to sit down.

## 2019-09-27 NOTE — PROGRESS NOTES
CRS Office Visit History and Physical      SUBJECTIVE:     Chief Complaint:  Anal pain    History of Present Illness:  The patient is new patient to this practice.   Course is as follows:  Patient is a 39 y.o. female presents with complaint of 2-3 days of anal pain.  The associated with a lump that she noticed on her anus started today.  She denies any bleeding associated with this.  Has recently been seen in started on treatment at [facility] for what sounds like constipation and pelvic floor dysfunction.  Was seen the yesterday and told to start MiraLax.  States that she drinks more than 8 glasses of water per day.  His not tried any topical creams or pain medication.  Has been taking 1 baths per day since onset.  Had colonoscopy in 2017 which was normal per patient report.  Colon cancer in maternal grandfather at unknown age.    Review of patient's allergies indicates:   Allergen Reactions    Benadryl decongestant Shortness Of Breath    Carrot Hives and Shortness Of Breath    Sumatriptan succinate Other (See Comments)     paralysis    Tramadol Other (See Comments)     Faces swells. Tolerates percocet    Wasp sting [allergen ext-venom-honey bee] Anaphylaxis       Past Medical History:   Diagnosis Date    Anxiety     Breast abscess     Charcot-Amanda-Tooth disease     mild LE weakness    Chronic interstitial cystitis without hematuria     CMT (Charcot-Amanda-Tooth disease)     Depression     reports she is no longer depressed    Endometriosis of uterus     General anesthetics causing adverse effect in therapeutic use     Headache(784.0)     Herpes     History of psychiatric care     History of psychiatric hospitalization     Muscular dystrophy     PTSD (post-traumatic stress disorder)     Seizures     last seizure 14-15 yrs ago    Self-injurious behavior     Thyroid disease      Past Surgical History:   Procedure Laterality Date    APPENDECTOMY      arthroscopy right shoulder      BACK SURGERY   "07/01/2017    BACK SURGERY  02/21/2018    screw removal    BREAST SURGERY      reduction    CHOLECYSTECTOMY  03/2017    CYSTOSCOPY WITH HYDRODISTENSION OF BLADDER N/A 7/1/2019    Procedure: CYSTOSCOPY, WITH BLADDER HYDRODISTENSION;  Surgeon: Jay Shelby MD;  Location: [facility] OR;  Service: Urology;  Laterality: N/A;    ENDOSCOPIC ULTRASOUND OF UPPER GASTROINTESTINAL TRACT N/A 8/14/2018    Procedure: ULTRASOUND, ENDOSCOPIC, UPPER GI TRACT;  Surgeon: Marko Pereyra MD;  Location: [facility];  Service: Endoscopy;  Laterality: N/A;    ESOPHAGOGASTRODUODENOSCOPY  08/14/2018    ESOPHAGOGASTRODUODENOSCOPY N/A 8/14/2018    Procedure: ESOPHAGOGASTRODUODENOSCOPY (EGD);  Surgeon: Marko Pereyra MD;  Location: [facility];  Service: Endoscopy;  Laterality: N/A;    HYSTERECTOMY      TLH vs LAVH with BSO    KNEE SURGERY Bilateral     OOPHORECTOMY      Upper EUS  08/14/2018     Family History   Problem Relation Age of Onset    Cancer Maternal Grandfather         colon    Colon cancer Maternal Grandfather     No Known Problems Mother     No Known Problems Father     Bladder Cancer Maternal Grandmother     Breast cancer Paternal Aunt     Heart disease Neg Hx      Social History     Tobacco Use    Smoking status: Former Smoker     Packs/day: 0.50     Years: 3.00     Pack years: 1.50     Types: Cigarettes    Smokeless tobacco: Never Used    Tobacco comment: quit 4/2015   Substance Use Topics    Alcohol use: No    Drug use: No        Review of Systems:  ROS    OBJECTIVE:     Vital Signs (Most Recent)  /79 (BP Location: Left arm, Patient Position: Sitting, BP Method: Large (Automatic))   Pulse 70   Ht 5' 2" (1.575 m)   Wt 78.2 kg (172 lb 6.4 oz)   LMP 04/26/2009 (LMP Unknown)   BMI 31.53 kg/m²     Physical Exam:  General: White female in no distress   Neuro: Alert and oriented x 4.  Moves all extremities.     HEENT: No icterus.  Trachea midline  Respiratory: Respirations are even and " unlabored  Cardiac: Regular rate  Extremities: Warm dry and intact  Skin: No rashes    Anorectal:   External exam:  Thrombosed right lateral external hemorrhoid      ASSESSMENT/PLAN:     Thrombosed external hemorrhoid    The patient was instructed to:  Increase water intake to at least 8-10 glasses of water per day.  Take a daily fiber supplement (Konsyl, Benefiber, Metamucil) and increase dietary intake to 20-30 grams/day.  Avoid straining or spending >5min/event with bowel movements.  Analelizabeth farr t.i.d.  Follow-up in clinic in 6-8 weeks.      Jenifer Aragon MD  Staff Surgeon, Colon and Rectal Surgery  [facility]

## 2019-09-27 NOTE — PROGRESS NOTES
CRS Office Visit History and Physical        SUBJECTIVE:     Chief Complaint:  Painful anal lump    History of Present Illness:  The patient is new patient to this practice.   Course is as follows:  Patient is a 39 y.o. female presents with 3 days of painful anal lump.  Symptoms have been present for 3 days.  Has tried topical steroid cream without relief.  Associated bleeding: no  Previous anorectal procedures: no  confirms straining/prolonged time on toilet with bowel movements.  is not currently taking fiber supplement of stool softener.  She is on chronic narcotics at home.  Four Norco 10 per day  Blood thinners: No    Review of patient's allergies indicates:   Allergen Reactions    Benadryl decongestant Shortness Of Breath    Carrot Hives and Shortness Of Breath    Sumatriptan succinate Other (See Comments)     paralysis    Tramadol Other (See Comments)     Faces swells. Tolerates percocet    Wasp sting [allergen ext-venom-honey bee] Anaphylaxis       Past Medical History:   Diagnosis Date    Anxiety     Breast abscess     Charcot-Amanda-Tooth disease     mild LE weakness    Chronic interstitial cystitis without hematuria     CMT (Charcot-Amanda-Tooth disease)     Depression     reports she is no longer depressed    Endometriosis of uterus     Headache(784.0)     Herpes     History of psychiatric care     History of psychiatric hospitalization     Muscular dystrophy     PTSD (post-traumatic stress disorder)     Seizures     last seizure 14-15 yrs ago    Self-injurious behavior     Thyroid disease      Past Surgical History:   Procedure Laterality Date    APPENDECTOMY      arthroscopy right shoulder      BACK SURGERY  07/01/2017    BACK SURGERY  02/21/2018    screw removal    BREAST SURGERY      reduction    CHOLECYSTECTOMY  03/2017    CYSTOSCOPY WITH HYDRODISTENSION OF BLADDER N/A 7/1/2019    Procedure: CYSTOSCOPY, WITH BLADDER HYDRODISTENSION;  Surgeon: Jay Shelby MD;  Location:  "[facility] OR;  Service: Urology;  Laterality: N/A;    ENDOSCOPIC ULTRASOUND OF UPPER GASTROINTESTINAL TRACT N/A 2018    Procedure: ULTRASOUND, ENDOSCOPIC, UPPER GI TRACT;  Surgeon: Marko Pereyra MD;  Location: [facility] ENDO;  Service: Endoscopy;  Laterality: N/A;    ESOPHAGOGASTRODUODENOSCOPY  2018    ESOPHAGOGASTRODUODENOSCOPY N/A 2018    Procedure: ESOPHAGOGASTRODUODENOSCOPY (EGD);  Surgeon: Marko Pereyra MD;  Location: [facility] ENDO;  Service: Endoscopy;  Laterality: N/A;    HYSTERECTOMY      TLH vs LAVH with BSO    KNEE SURGERY Bilateral     OOPHORECTOMY      Upper EUS  2018     Family History   Problem Relation Age of Onset    Cancer Maternal Grandfather         colon    Colon cancer Maternal Grandfather     No Known Problems Mother     No Known Problems Father     Bladder Cancer Maternal Grandmother     Breast cancer Paternal Aunt     Heart disease Neg Hx      Social History     Tobacco Use    Smoking status: Former Smoker     Packs/day: 0.50     Years: 3.00     Pack years: 1.50     Types: Cigarettes     Last attempt to quit:      Years since quittin.7    Smokeless tobacco: Never Used    Tobacco comment: quit 2015   Substance Use Topics    Alcohol use: No    Drug use: No        Review of Systems:  ROS    OBJECTIVE:     Vital Signs (Most Recent)  /79 (BP Location: Left arm, Patient Position: Sitting, BP Method: Large (Automatic))   Pulse 70   Ht 5' 2" (1.575 m)   Wt 78.2 kg (172 lb 6.4 oz)   LMP 2009 (LMP Unknown)   BMI 31.53 kg/m²     Physical Exam:  General: Data Unavailable female in no distress   Neuro: Alert and oriented x 4.  Moves all extremities.     HEENT: No icterus.  Trachea midline  Respiratory: Respirations are even and unlabored  Cardiac: Regular rate  Extremities: Warm dry and intact  Skin: No rashes    Anorectal:   External exam:  Thrombosed left lateral external hemorrhoid      ASSESSMENT/PLAN:     Jaycee was seen today for " hemorrhoids.    Diagnoses and all orders for this visit:    External hemorrhoid, thrombosed      The patient was instructed to:  Increase water intake to at least 8-10 glasses of water per day.  Take a daily fiber supplement (Konsyl, Benefiber, Metamucil) and increase dietary intake to 20-30 grams/day.  Avoid straining or spending >5min/event with bowel movements.  Start Analpram cream t.i.d.  Sitz baths t.i.d.  Return to clinic in 6 weeks    Jenifer Aragon MD  Staff Surgeon, Colon and Rectal Surgery  [facility]

## 2019-09-30 DIAGNOSIS — G89.29 CHRONIC PELVIC PAIN IN FEMALE: Primary | ICD-10-CM

## 2019-09-30 DIAGNOSIS — G57.03 PIRIFORMIS SYNDROME OF BOTH SIDES: ICD-10-CM

## 2019-09-30 DIAGNOSIS — M79.18 DIFFUSE MYOFASCIAL PAIN SYNDROME: ICD-10-CM

## 2019-09-30 DIAGNOSIS — M62.89 PELVIC FLOOR DYSFUNCTION IN FEMALE: ICD-10-CM

## 2019-09-30 DIAGNOSIS — R35.1 NOCTURIA MORE THAN TWICE PER NIGHT: ICD-10-CM

## 2019-09-30 DIAGNOSIS — N94.810 VESTIBULITIS, VULVAR: ICD-10-CM

## 2019-09-30 DIAGNOSIS — N30.10 INTERSTITIAL CYSTITIS: ICD-10-CM

## 2019-09-30 DIAGNOSIS — K59.4 LEVATOR SYNDROME: ICD-10-CM

## 2019-09-30 DIAGNOSIS — N94.10 DYSPAREUNIA, FEMALE: ICD-10-CM

## 2019-09-30 DIAGNOSIS — M53.3 SACROILIAC JOINT DYSFUNCTION OF BOTH SIDES: ICD-10-CM

## 2019-09-30 DIAGNOSIS — R10.2 CHRONIC PELVIC PAIN IN FEMALE: Primary | ICD-10-CM

## 2019-09-30 NOTE — H&P (VIEW-ONLY)
"CRS Office Visit History and Physical      SUBJECTIVE:     Chief Complaint:  Anal pain    History of Present Illness:  The patient is an established patient to this practice.   Course is as follows:  Ms Gray is a 39 y.o. female presents with complaint of 5-6 days of anal pain. Was seen 4 days ago in clinic and diagnosed with thrombosed external hemorrhoid. Topical steroid has not helped pain, which is now severe 10/10. Her perianal area is also now too tender to apply topical medication. She did not sleep last night and will not sit due to pain. Denies fevers, chills, or bleeding.  The perianal "lump" that she noted on Friday's visit has enlarged.  Had colonoscopy in 2017 which was normal per patient report.  Colon cancer in maternal grandfather at unknown age.    Review of patient's allergies indicates:   Allergen Reactions    Benadryl decongestant Shortness Of Breath    Carrot Hives and Shortness Of Breath    Sumatriptan succinate Other (See Comments)     paralysis    Tramadol Other (See Comments)     Faces swells. Tolerates percocet    Wasp sting [allergen ext-venom-honey bee] Anaphylaxis       Past Medical History:   Diagnosis Date    Anxiety     Breast abscess     Charcot-Amanda-Tooth disease     mild LE weakness    Chronic interstitial cystitis without hematuria     CMT (Charcot-Amanda-Tooth disease)     Depression     reports she is no longer depressed    Endometriosis of uterus     General anesthetics causing adverse effect in therapeutic use     Headache(784.0)     Herpes     History of psychiatric care     History of psychiatric hospitalization     Muscular dystrophy     PTSD (post-traumatic stress disorder)     Seizures     last seizure 14-15 yrs ago    Self-injurious behavior     Thyroid disease      Past Surgical History:   Procedure Laterality Date    APPENDECTOMY      arthroscopy right shoulder      BACK SURGERY  07/01/2017    BACK SURGERY  02/21/2018    screw removal    " "BREAST SURGERY      reduction    CHOLECYSTECTOMY  2017    CYSTOSCOPY WITH HYDRODISTENSION OF BLADDER N/A 2019    Procedure: CYSTOSCOPY, WITH BLADDER HYDRODISTENSION;  Surgeon: Jay Shelby MD;  Location: [facility] OR;  Service: Urology;  Laterality: N/A;    ENDOSCOPIC ULTRASOUND OF UPPER GASTROINTESTINAL TRACT N/A 2018    Procedure: ULTRASOUND, ENDOSCOPIC, UPPER GI TRACT;  Surgeon: Marko Pereyra MD;  [facility] ENDO;  Service: Endoscopy;  Laterality: N/A;    ESOPHAGOGASTRODUODENOSCOPY  2018    ESOPHAGOGASTRODUODENOSCOPY N/A 2018    Procedure: ESOPHAGOGASTRODUODENOSCOPY (EGD);  Surgeon: Marko Pereyra MD;  Location: [facility];  Service: Endoscopy;  Laterality: N/A;    HYSTERECTOMY      TLH vs LAVH with BSO    KNEE SURGERY Bilateral     OOPHORECTOMY      Upper EUS  2018     Family History   Problem Relation Age of Onset    Cancer Maternal Grandfather         colon    Colon cancer Maternal Grandfather     No Known Problems Mother     No Known Problems Father     Bladder Cancer Maternal Grandmother     Breast cancer Paternal Aunt     Heart disease Neg Hx      Social History     Tobacco Use    Smoking status: Former Smoker     Packs/day: 0.50     Years: 3.00     Pack years: 1.50     Types: Cigarettes     Last attempt to quit: 2015     Years since quittin.7    Smokeless tobacco: Never Used    Tobacco comment: quit 2015   Substance Use Topics    Alcohol use: No    Drug use: No        Review of Systems:  ROS    OBJECTIVE:     Vital Signs (Most Recent)  /75 (BP Location: Left arm, Patient Position: Sitting, BP Method: Large (Automatic))   Pulse 69   Ht 5' 2" (1.575 m)   Wt 79 kg (174 lb 2.6 oz)   LMP 2009 (LMP Unknown)   BMI 31.85 kg/m²     Physical Exam:  General: White female in no distress   Neuro: Alert and oriented x 4.  Moves all extremities.     HEENT: No icterus.  Trachea midline  Respiratory: Respirations are even and " unlabored  Cardiac: Regular rate  Extremities: Warm dry and intact  Skin: No rashes    Anorectal:   External exam:  Thrombosed right lateral external hemorrhoid.  No evidence of necrosis or infection.      ASSESSMENT/PLAN:     Thrombosed external hemorrhoid    Long discussion with patient and her friend.  She states that she has been using topical steroid and warm soaks with no improvement in her pain. I do not believe that, given her overall level of anxiety and pain tolerance, she would tolerate excision in the office.  We did discuss that excision of this area would likely result in at least 1-2 weeks of severe pain in this area. She is adamant that she would like to proceed with surgery for excision of this area. We discussed risks of surgery including bleeding, infection, slow healing wound, up to 2 weeks of severe pain. Given that she states that she is unable to sit or resume her normal activities, I did offer her surgery today, which she accepted.      Jenifer Aragon MD  Staff Surgeon, Colon and Rectal Surgery  [facility]

## 2019-09-30 NOTE — PROGRESS NOTES
"CRS Office Visit History and Physical      SUBJECTIVE:     Chief Complaint:  Anal pain    History of Present Illness:  The patient is an established patient to this practice.   Course is as follows:  Ms Gray is a 39 y.o. female presents with complaint of 5-6 days of anal pain. Was seen 4 days ago in clinic and diagnosed with thrombosed external hemorrhoid. Topical steroid has not helped pain, which is now severe 10/10. Her perianal area is also now too tender to apply topical medication. She did not sleep last night and will not sit due to pain. Denies fevers, chills, or bleeding.  The perianal "lump" that she noted on Friday's visit has enlarged.  Had colonoscopy in 2017 which was normal per patient report.  Colon cancer in maternal grandfather at unknown age.    Review of patient's allergies indicates:   Allergen Reactions    Benadryl decongestant Shortness Of Breath    Carrot Hives and Shortness Of Breath    Sumatriptan succinate Other (See Comments)     paralysis    Tramadol Other (See Comments)     Faces swells. Tolerates percocet    Wasp sting [allergen ext-venom-honey bee] Anaphylaxis       Past Medical History:   Diagnosis Date    Anxiety     Breast abscess     Charcot-Amanda-Tooth disease     mild LE weakness    Chronic interstitial cystitis without hematuria     CMT (Charcot-Amanda-Tooth disease)     Depression     reports she is no longer depressed    Endometriosis of uterus     General anesthetics causing adverse effect in therapeutic use     Headache(784.0)     Herpes     History of psychiatric care     History of psychiatric hospitalization     Muscular dystrophy     PTSD (post-traumatic stress disorder)     Seizures     last seizure 14-15 yrs ago    Self-injurious behavior     Thyroid disease      Past Surgical History:   Procedure Laterality Date    APPENDECTOMY      arthroscopy right shoulder      BACK SURGERY  07/01/2017    BACK SURGERY  02/21/2018    screw removal    " "BREAST SURGERY      reduction    CHOLECYSTECTOMY  2017    CYSTOSCOPY WITH HYDRODISTENSION OF BLADDER N/A 2019    Procedure: CYSTOSCOPY, WITH BLADDER HYDRODISTENSION;  Surgeon: Jay Shelby MD;  Location: [facility] OR;  Service: Urology;  Laterality: N/A;    ENDOSCOPIC ULTRASOUND OF UPPER GASTROINTESTINAL TRACT N/A 2018    Procedure: ULTRASOUND, ENDOSCOPIC, UPPER GI TRACT;  Surgeon: Marko Pereyra MD;  Location: [facility] ENDO;  Service: Endoscopy;  Laterality: N/A;    ESOPHAGOGASTRODUODENOSCOPY  2018    ESOPHAGOGASTRODUODENOSCOPY N/A 2018    Procedure: ESOPHAGOGASTRODUODENOSCOPY (EGD);  Surgeon: Marko Pereyra MD;  Location: [facility];  Service: Endoscopy;  Laterality: N/A;    HYSTERECTOMY      TLH vs LAVH with BSO    KNEE SURGERY Bilateral     OOPHORECTOMY      Upper EUS  2018     Family History   Problem Relation Age of Onset    Cancer Maternal Grandfather         colon    Colon cancer Maternal Grandfather     No Known Problems Mother     No Known Problems Father     Bladder Cancer Maternal Grandmother     Breast cancer Paternal Aunt     Heart disease Neg Hx      Social History     Tobacco Use    Smoking status: Former Smoker     Packs/day: 0.50     Years: 3.00     Pack years: 1.50     Types: Cigarettes     Last attempt to quit: 2015     Years since quittin.7    Smokeless tobacco: Never Used    Tobacco comment: quit 2015   Substance Use Topics    Alcohol use: No    Drug use: No        Review of Systems:  ROS    OBJECTIVE:     Vital Signs (Most Recent)  /75 (BP Location: Left arm, Patient Position: Sitting, BP Method: Large (Automatic))   Pulse 69   Ht 5' 2" (1.575 m)   Wt 79 kg (174 lb 2.6 oz)   LMP 2009 (LMP Unknown)   BMI 31.85 kg/m²     Physical Exam:  General: White female in no distress   Neuro: Alert and oriented x 4.  Moves all extremities.     HEENT: No icterus.  Trachea midline  Respiratory: Respirations are even and " unlabored  Cardiac: Regular rate  Extremities: Warm dry and intact  Skin: No rashes    Anorectal:   External exam:  Thrombosed right lateral external hemorrhoid.  No evidence of necrosis or infection.      ASSESSMENT/PLAN:     Thrombosed external hemorrhoid    Long discussion with patient and her friend.  She states that she has been using topical steroid and warm soaks with no improvement in her pain. I do not believe that, given her overall level of anxiety and pain tolerance, she would tolerate excision in the office.  We did discuss that excision of this area would likely result in at least 1-2 weeks of severe pain in this area. She is adamant that she would like to proceed with surgery for excision of this area. We discussed risks of surgery including bleeding, infection, slow healing wound, up to 2 weeks of severe pain. Given that she states that she is unable to sit or resume her normal activities, I did offer her surgery today, which she accepted.      Jenifer Aragon MD  Staff Surgeon, Colon and Rectal Surgery  [facility]

## 2019-10-01 ENCOUNTER — HOSPITAL ENCOUNTER (OUTPATIENT)
Facility: HOSPITAL | Age: 39
Discharge: HOME | End: 2019-10-01
Attending: COLON & RECTAL SURGERY | Admitting: COLON & RECTAL SURGERY
Payer: MEDICARE

## 2019-10-01 ENCOUNTER — ANESTHESIA (OUTPATIENT)
Dept: SURGERY | Facility: HOSPITAL | Age: 39
End: 2019-10-01
Payer: MEDICARE

## 2019-10-01 ENCOUNTER — ANESTHESIA EVENT (OUTPATIENT)
Dept: SURGERY | Facility: HOSPITAL | Age: 39
End: 2019-10-01
Payer: MEDICARE

## 2019-10-01 ENCOUNTER — OFFICE VISIT (OUTPATIENT)
Dept: SURGERY | Facility: CLINIC | Age: 39
End: 2019-10-01
Attending: COLON & RECTAL SURGERY
Payer: MEDICARE

## 2019-10-01 VITALS
SYSTOLIC BLOOD PRESSURE: 122 MMHG | TEMPERATURE: 98 F | WEIGHT: 174 LBS | OXYGEN SATURATION: 100 % | RESPIRATION RATE: 20 BRPM | HEART RATE: 70 BPM | BODY MASS INDEX: 32.02 KG/M2 | DIASTOLIC BLOOD PRESSURE: 66 MMHG | HEIGHT: 62 IN

## 2019-10-01 VITALS
BODY MASS INDEX: 32.05 KG/M2 | HEIGHT: 62 IN | HEART RATE: 69 BPM | SYSTOLIC BLOOD PRESSURE: 125 MMHG | DIASTOLIC BLOOD PRESSURE: 75 MMHG | WEIGHT: 174.19 LBS

## 2019-10-01 DIAGNOSIS — K64.5 EXTERNAL HEMORRHOID, THROMBOSED: Primary | ICD-10-CM

## 2019-10-01 PROCEDURE — 37000009 HC ANESTHESIA EA ADD 15 MINS: Performed by: COLON & RECTAL SURGERY

## 2019-10-01 PROCEDURE — 37000008 HC ANESTHESIA 1ST 15 MINUTES: Performed by: COLON & RECTAL SURGERY

## 2019-10-01 PROCEDURE — 99213 OFFICE O/P EST LOW 20 MIN: CPT | Mod: PBBFAC | Performed by: COLON & RECTAL SURGERY

## 2019-10-01 PROCEDURE — 36000707: Performed by: COLON & RECTAL SURGERY

## 2019-10-01 PROCEDURE — 63600175 PHARM REV CODE 636 W HCPCS: Performed by: NURSE ANESTHETIST, CERTIFIED REGISTERED

## 2019-10-01 PROCEDURE — 88304 TISSUE SPECIMEN TO PATHOLOGY - SURGERY: ICD-10-PCS | Mod: 26,,, | Performed by: PATHOLOGY

## 2019-10-01 PROCEDURE — 99212 PR OFFICE/OUTPT VISIT, EST, LEVL II, 10-19 MIN: ICD-10-PCS | Mod: 25,S$PBB,, | Performed by: COLON & RECTAL SURGERY

## 2019-10-01 PROCEDURE — D9220A PRA ANESTHESIA: ICD-10-PCS | Mod: ANES,,, | Performed by: ANESTHESIOLOGY

## 2019-10-01 PROCEDURE — 25000003 PHARM REV CODE 250: Performed by: NURSE ANESTHETIST, CERTIFIED REGISTERED

## 2019-10-01 PROCEDURE — 99999 PR PBB SHADOW E&M-EST. PATIENT-LVL III: CPT | Mod: PBBFAC,,, | Performed by: COLON & RECTAL SURGERY

## 2019-10-01 PROCEDURE — 36000706: Performed by: COLON & RECTAL SURGERY

## 2019-10-01 PROCEDURE — 27201423 OPTIME MED/SURG SUP & DEVICES STERILE SUPPLY: Performed by: COLON & RECTAL SURGERY

## 2019-10-01 PROCEDURE — C9290 INJ, BUPIVACAINE LIPOSOME: HCPCS | Performed by: COLON & RECTAL SURGERY

## 2019-10-01 PROCEDURE — 63600175 PHARM REV CODE 636 W HCPCS: Performed by: COLON & RECTAL SURGERY

## 2019-10-01 PROCEDURE — 88304 TISSUE EXAM BY PATHOLOGIST: CPT | Mod: 26,,, | Performed by: PATHOLOGY

## 2019-10-01 PROCEDURE — 99212 OFFICE O/P EST SF 10 MIN: CPT | Mod: 25,S$PBB,, | Performed by: COLON & RECTAL SURGERY

## 2019-10-01 PROCEDURE — 63600175 PHARM REV CODE 636 W HCPCS: Performed by: ANESTHESIOLOGY

## 2019-10-01 PROCEDURE — D9220A PRA ANESTHESIA: Mod: CRNA,,, | Performed by: NURSE ANESTHETIST, CERTIFIED REGISTERED

## 2019-10-01 PROCEDURE — D9220A PRA ANESTHESIA: ICD-10-PCS | Mod: CRNA,,, | Performed by: NURSE ANESTHETIST, CERTIFIED REGISTERED

## 2019-10-01 PROCEDURE — 71000044 HC DOSC ROUTINE RECOVERY FIRST HOUR: Performed by: COLON & RECTAL SURGERY

## 2019-10-01 PROCEDURE — D9220A PRA ANESTHESIA: Mod: ANES,,, | Performed by: ANESTHESIOLOGY

## 2019-10-01 PROCEDURE — 88304 TISSUE EXAM BY PATHOLOGIST: CPT | Performed by: PATHOLOGY

## 2019-10-01 PROCEDURE — 25000003 PHARM REV CODE 250: Performed by: NURSE PRACTITIONER

## 2019-10-01 PROCEDURE — 46320 PR EXCISION THROMBOSED HEMORRHOID, EXTERNAL: ICD-10-PCS | Mod: ,,, | Performed by: COLON & RECTAL SURGERY

## 2019-10-01 PROCEDURE — 71000015 HC POSTOP RECOV 1ST HR: Performed by: COLON & RECTAL SURGERY

## 2019-10-01 PROCEDURE — 63600175 PHARM REV CODE 636 W HCPCS: Performed by: NURSE PRACTITIONER

## 2019-10-01 PROCEDURE — 25000003 PHARM REV CODE 250: Performed by: ANESTHESIOLOGY

## 2019-10-01 PROCEDURE — 25000003 PHARM REV CODE 250: Performed by: COLON & RECTAL SURGERY

## 2019-10-01 PROCEDURE — 46320 REMOVAL OF HEMORRHOID CLOT: CPT | Mod: ,,, | Performed by: COLON & RECTAL SURGERY

## 2019-10-01 PROCEDURE — 99999 PR PBB SHADOW E&M-EST. PATIENT-LVL III: ICD-10-PCS | Mod: PBBFAC,,, | Performed by: COLON & RECTAL SURGERY

## 2019-10-01 RX ORDER — MIDAZOLAM HYDROCHLORIDE 1 MG/ML
INJECTION INTRAMUSCULAR; INTRAVENOUS
Status: DISCONTINUED | OUTPATIENT
Start: 2019-10-01 | End: 2019-10-01

## 2019-10-01 RX ORDER — POLYETHYLENE GLYCOL 3350 17 G/17G
17 POWDER, FOR SOLUTION ORAL DAILY
Qty: 30 EACH | Refills: 0 | Status: SHIPPED | OUTPATIENT
Start: 2019-10-01 | End: 2020-06-08

## 2019-10-01 RX ORDER — MUPIROCIN 20 MG/G
OINTMENT TOPICAL
Status: DISCONTINUED | OUTPATIENT
Start: 2019-10-01 | End: 2019-10-01 | Stop reason: HOSPADM

## 2019-10-01 RX ORDER — IBUPROFEN 400 MG/1
400 TABLET ORAL 3 TIMES DAILY
Qty: 60 TABLET | Refills: 0 | Status: SHIPPED | OUTPATIENT
Start: 2019-10-01 | End: 2020-01-09

## 2019-10-01 RX ORDER — HYDROCODONE BITARTRATE AND ACETAMINOPHEN 7.5; 325 MG/1; MG/1
1 TABLET ORAL ONCE
Status: COMPLETED | OUTPATIENT
Start: 2019-10-01 | End: 2019-10-01

## 2019-10-01 RX ORDER — FENTANYL CITRATE 50 UG/ML
25 INJECTION, SOLUTION INTRAMUSCULAR; INTRAVENOUS EVERY 5 MIN PRN
Status: COMPLETED | OUTPATIENT
Start: 2019-10-01 | End: 2019-10-01

## 2019-10-01 RX ORDER — MUPIROCIN 20 MG/G
OINTMENT TOPICAL
Status: CANCELLED | OUTPATIENT
Start: 2019-10-01

## 2019-10-01 RX ORDER — SODIUM CHLORIDE 9 MG/ML
INJECTION, SOLUTION INTRAVENOUS CONTINUOUS
Status: DISCONTINUED | OUTPATIENT
Start: 2019-10-01 | End: 2019-10-01 | Stop reason: HOSPADM

## 2019-10-01 RX ORDER — ONDANSETRON HYDROCHLORIDE 2 MG/ML
INJECTION, SOLUTION INTRAMUSCULAR; INTRAVENOUS
Status: DISCONTINUED | OUTPATIENT
Start: 2019-10-01 | End: 2019-10-01

## 2019-10-01 RX ORDER — SODIUM CHLORIDE 9 MG/ML
INJECTION, SOLUTION INTRAVENOUS CONTINUOUS
Status: CANCELLED | OUTPATIENT
Start: 2019-10-01

## 2019-10-01 RX ORDER — LIDOCAINE HCL/PF 100 MG/5ML
SYRINGE (ML) INTRAVENOUS
Status: DISCONTINUED | OUTPATIENT
Start: 2019-10-01 | End: 2019-10-01

## 2019-10-01 RX ORDER — FENTANYL CITRATE 50 UG/ML
INJECTION, SOLUTION INTRAMUSCULAR; INTRAVENOUS
Status: DISCONTINUED | OUTPATIENT
Start: 2019-10-01 | End: 2019-10-01

## 2019-10-01 RX ORDER — BUPIVACAINE HYDROCHLORIDE 2.5 MG/ML
INJECTION, SOLUTION EPIDURAL; INFILTRATION; INTRACAUDAL
Status: DISCONTINUED | OUTPATIENT
Start: 2019-10-01 | End: 2019-10-01 | Stop reason: HOSPADM

## 2019-10-01 RX ORDER — PROPOFOL 10 MG/ML
VIAL (ML) INTRAVENOUS CONTINUOUS PRN
Status: DISCONTINUED | OUTPATIENT
Start: 2019-10-01 | End: 2019-10-01

## 2019-10-01 RX ORDER — PROPOFOL 10 MG/ML
VIAL (ML) INTRAVENOUS
Status: DISCONTINUED | OUTPATIENT
Start: 2019-10-01 | End: 2019-10-01

## 2019-10-01 RX ADMIN — PROPOFOL 20 MG: 10 INJECTION, EMULSION INTRAVENOUS at 05:10

## 2019-10-01 RX ADMIN — FENTANYL CITRATE 50 MCG: 50 INJECTION, SOLUTION INTRAMUSCULAR; INTRAVENOUS at 06:10

## 2019-10-01 RX ADMIN — PROPOFOL 30 MG: 10 INJECTION, EMULSION INTRAVENOUS at 05:10

## 2019-10-01 RX ADMIN — MIDAZOLAM HYDROCHLORIDE 2 MG: 1 INJECTION, SOLUTION INTRAMUSCULAR; INTRAVENOUS at 05:10

## 2019-10-01 RX ADMIN — ONDANSETRON 4 MG: 2 INJECTION, SOLUTION INTRAMUSCULAR; INTRAVENOUS at 05:10

## 2019-10-01 RX ADMIN — FENTANYL CITRATE 25 MCG: 50 INJECTION, SOLUTION INTRAMUSCULAR; INTRAVENOUS at 05:10

## 2019-10-01 RX ADMIN — MUPIROCIN: 20 OINTMENT TOPICAL at 02:10

## 2019-10-01 RX ADMIN — FENTANYL CITRATE 25 MCG: 50 INJECTION INTRAMUSCULAR; INTRAVENOUS at 06:10

## 2019-10-01 RX ADMIN — SODIUM CHLORIDE, SODIUM GLUCONATE, SODIUM ACETATE, POTASSIUM CHLORIDE, MAGNESIUM CHLORIDE, SODIUM PHOSPHATE, DIBASIC, AND POTASSIUM PHOSPHATE: .53; .5; .37; .037; .03; .012; .00082 INJECTION, SOLUTION INTRAVENOUS at 05:10

## 2019-10-01 RX ADMIN — LIDOCAINE HYDROCHLORIDE 60 MG: 20 INJECTION, SOLUTION INTRAVENOUS at 05:10

## 2019-10-01 RX ADMIN — SODIUM CHLORIDE: 0.9 INJECTION, SOLUTION INTRAVENOUS at 02:10

## 2019-10-01 RX ADMIN — HYDROCODONE BITARTRATE AND ACETAMINOPHEN 1 TABLET: 7.5; 325 TABLET ORAL at 06:10

## 2019-10-01 RX ADMIN — PROPOFOL 150 MCG/KG/MIN: 10 INJECTION, EMULSION INTRAVENOUS at 05:10

## 2019-10-01 NOTE — PLAN OF CARE
Patient states they are ready to be discharged. Instructions and prescription given to patient and family. Both verbalize understanding. Patient tolerating po liquids with no difficulty. Patient states pain is at a tolerable level for them. Anesthesia consent and surgical consent in chart upon patient's discharge from [facility].

## 2019-10-01 NOTE — ANESTHESIA PREPROCEDURE EVALUATION
10/01/2019  Jaycee Gray is a 39 y.o., female with PMH of muscular dystrophy and seizures who is scheduled for hemorrhoidectomy. No issues with anesthesia in the past. She hasn't had a seizure in over 10 years.     Anesthesia Evaluation    I have reviewed the Patient Summary Reports.     I have reviewed the Medications.     Review of Systems  Anesthesia Hx:  No problems with previous Anesthesia Denies Hx of Anesthetic complications  History of prior surgery of interest to airway management or planning: Denies Family Hx of Anesthesia complications.   Denies Personal Hx of Anesthesia complications.   Social:  No Alcohol Use, Former Smoker    Hematology/Oncology:  Hematology Normal   Oncology Normal     EENT/Dental:EENT/Dental Normal   Cardiovascular:  Cardiovascular Normal Exercise tolerance: good     Pulmonary:  Pulmonary Normal    Renal/:   Urinary frequency, urge, incontinence   Hepatic/GI:  Hepatic/GI Normal    Neurological:   Headaches Seizures    Endocrine:   Hypothyroidism    Psych:   Psychiatric History depression          Physical Exam  General:  Well nourished    Airway/Jaw/Neck:  Airway Findings: Mouth Opening: Normal Tongue: Normal  General Airway Assessment: Adult, Average  Mallampati: II  TM Distance: Normal, at least 6 cm  Jaw/Neck Findings:  Neck ROM: Normal ROM      Dental:  Dental Findings: In tact   Chest/Lungs:  Chest/Lungs Findings: Normal Respiratory Rate, Clear to auscultation     Heart/Vascular:  Heart Findings: Rate: Normal  Rhythm: Regular Rhythm        Mental Status:  Mental Status Findings:  Alert and Oriented, Cooperative         Anesthesia Plan  Type of Anesthesia, risks & benefits discussed:  Anesthesia Type:  general  Patient's Preference:   Intra-op Monitoring Plan: standard ASA monitors  Intra-op Monitoring Plan Comments:   Post Op Pain Control Plan: multimodal  analgesia  Post Op Pain Control Plan Comments:   Induction:   IV  Beta Blocker:  Patient is not currently on a Beta-Blocker (No further documentation required).       Informed Consent: Patient understands risks and agrees with Anesthesia plan.  Questions answered. Anesthesia consent signed with patient.  ASA Score: 2     Day of Surgery Review of History & Physical: I have interviewed and examined the patient. I have reviewed the patient's H&P dated:  There are no significant changes.          Ready For Surgery From Anesthesia Perspective.

## 2019-10-01 NOTE — DISCHARGE INSTRUCTIONS
Take miralax and metamucil daily.  Take ibuprofen and tylenol around the clock, with hydrocodone as needed for pain            Discharge Instructions for Hemorrhoid Surgery  You had surgery to remove hemorrhoids. These are large, swollen veins inside and outside the anus. Hemorrhoids are caused by too much pressure on the anus. This is often due to straining during bowel movements or pressure during pregnancy. After surgery, it may take a few weeks or longer to recover. This sheet tells you how to care for yourself once youre home.   Home care  You may have some bleeding, discharge, or itching for a short time after surgery. This is common. Once at home, be sure to:  · Take prescribed pain medicines on time as directed. Dont skip doses or wait until pain gets bad, as it may be harder to control.  · Take sitz baths. Fill a tub with 3 inches of warm water. Sit in the basin or tub for 10 to 20 minutes a few times a day and after each bowel movement.  · Avoid straining to pass stool. This can increase pressure on the anus. It can also lead to swelling.  · Avoid constipation:  ¨ Use a laxative or stool softener as advised.  ¨ Eat more high-fiber foods. These include whole grains, fruit, and veggies.  ¨ Drink plenty of fluids.  · Avoid heavy lifting and strenuous activity for 1 to 2 weeks.  · Use suppositories and pads, if needed. These can help relieve symptoms.  · Avoid driving until youre able to sit and move without pain. Ask someone to drive you to appointments, if needed.  · Practice good bowel habits. Dont ignore the urge to go. But avoid spending too much time on the toilet.  Follow-up  Youll have a follow-up visit with the healthcare provider. During this visit, the healthcare provider will check how well youre healing. This visit will likely happen within 1 to 2 weeks.  When to call your healthcare provider  Call your healthcare provider right away if you have any of the following:  · Fever of  100.4°F (38.0°C) or higher, or as directed by your healthcare provider  · A large amount of drainage or bleeding from the rectum  · Trouble urinating  · No bowel movement for more than 48 hours   Date Last Reviewed: 7/1/2016 © 2000-2017 [employer]. [address] [place] [id number]. All rights reserved. This information is not intended as a substitute for professional medical care. Always follow your healthcare professional's instructions.        PATIENT INSTRUCTIONS  POST-ANESTHESIA    IMMEDIATELY FOLLOWING SURGERY:  Do not drive or operate machinery for the first twenty four hours after surgery.  Do not make any important decisions for twenty four hours after surgery or while taking narcotic pain medications or sedatives.  If you develop intractable nausea and vomiting or a severe headache please notify your doctor immediately.    FOLLOW-UP:  Please make an appointment with your surgeon as instructed. You do not need to follow up with anesthesia unless specifically instructed to do so.    WOUND CARE INSTRUCTIONS (if applicable):  Keep a dry clean dressing on the anesthesia/puncture wound site if there is drainage.  Once the wound has quit draining you may leave it open to air.  Generally you should leave the bandage intact for twenty four hours unless there is drainage.  If the epidural site drains for more than 36-48 hours please call the anesthesia department.    QUESTIONS?:  Please feel free to call your physician or the hospital  if you have any questions, and they will be happy to assist you.       [facility] Anesthesia Department  [address] [facility]  [phone]

## 2019-10-01 NOTE — TRANSFER OF CARE
"Anesthesia Transfer of Care Note    Patient: Jaycee Gray    Procedure(s) Performed: Procedure(s) (LRB):  HEMORRHOIDECTOMY (N/A)    Patient location: [facility]    Anesthesia Type: general    Transport from OR: Transported from OR on room air with adequate spontaneous ventilation    Post pain: adequate analgesia    Post assessment: no apparent anesthetic complications and tolerated procedure well    Post vital signs: stable    Level of consciousness: awake, alert and oriented    Nausea/Vomiting: no nausea/vomiting    Complications: none    Transfer of care protocol was followed      Last vitals:   Visit Vitals  /71 (BP Location: Right arm, Patient Position: Lying)   Pulse 76   Temp 37.1 °C (98.8 °F) (Oral)   Resp 18   Ht 5' 2" (1.575 m)   Wt 78.9 kg (174 lb)   LMP 04/26/2009 (LMP Unknown)   SpO2 100%   Breastfeeding? No   BMI 31.83 kg/m²     "

## 2019-10-01 NOTE — ANESTHESIA POSTPROCEDURE EVALUATION
Anesthesia Post Evaluation    Patient: Jaycee Gray    Procedure(s) Performed: Procedure(s) (LRB):  HEMORRHOIDECTOMY (N/A)    Final Anesthesia Type: general  Patient location during evaluation: PACU  Patient participation: Yes- Able to Participate  Level of consciousness: awake and alert and oriented  Post-procedure vital signs: reviewed and stable  Pain management: adequate  Airway patency: patent  PONV status at discharge: No PONV  Anesthetic complications: no      Cardiovascular status: blood pressure returned to baseline  Respiratory status: unassisted  Hydration status: euvolemic  Follow-up not needed.          Vitals Value Taken Time   /71 10/1/2019  6:16 PM   Temp 36.4 °C (97.5 °F) 10/1/2019  5:58 PM   Pulse 70 10/1/2019  6:20 PM   Resp 31 10/1/2019  6:20 PM   SpO2 100 % 10/1/2019  6:20 PM   Vitals shown include unvalidated device data.      No case tracking events are documented in the log.      Pain/Shena Score: Pain Rating Prior to Med Admin: 7 (10/1/2019  6:21 PM)

## 2019-10-01 NOTE — INTERVAL H&P NOTE
The patient has been examined and the H&P has been reviewed:    I concur with the findings and no changes have occurred since H&P was written.    Anesthesia/Surgery risks, benefits and alternative options discussed and understood by patient/family.          Active Hospital Problems    Diagnosis  POA    External hemorrhoid, thrombosed [K64.5]  Yes      Resolved Hospital Problems   No resolved problems to display.

## 2019-10-01 NOTE — OP NOTE
[facility]  Operative Note    Date: 10/01/2019    Name: Jaycee Gray    MRN: [id number]    Pre-Op Diagnosis:  Thrombosed external hemorrhoid    Post-Op Diagnosis:  Same    Procedure(s) Performed:  Left lateral single column excisional hemorrhoidectomy    Specimen(s):  Hemorrhoid    Staff Surgeon: Jenifer Aragon    Assistant Surgeon: None    Anesthesia: Monitored Local Anesthesia with Sedation    Indications: Jaycee Gray is a 39 y.o. female who presented to my clinic today with worsening pain from thrombosed left lateral external hemorrhoid.  She does have a history of constipation and chronic narcotic use.  She was seen by me 3 days ago and started on Analpram cream, which she stated did not give her any relief.  At the time of my exam today her hemorrhoid appeared similar to when I 1st examined her, there was no evidence of necrosis or strangulation of internal hemorrhoid tissue. Long discussion with patient and her friend who was there with her.  We discussed that I would expect this to resolve within the next week with conservative management.  We also discussed that excision of that hemorrhoid would likely result in severe pain for at least 1-2 weeks after surgery. She was insistent that she wanted the area removed. After discussion of risks and benefits including infection, severe pain, recurrence of hemorrhoid thrombosis, bleeding she agreed to proceed with surgery.    Procedure:  The patient was taken to the operating room and placed onto the OR table. SCD boots were applied. She was then placed under monitored anesthesia care.  She was put into lithotomy position. Her anus was prepped and draped with Betadine.  A time-out was performed. I then performed a pudendal nerve a perianal block with a mixture of Exparel and 0.25% Marcaine. External exam revealed thrombosis of the left lateral external hemorrhoid.  Digital exam revealed smooth anal canal without any masses or gross blood.  A  medium Hill-Dejesus retractor was placed into the anal canal in all 4 quadrants were carefully examined. This revealed grade 2-3 internal hemorrhoids.  There was no evidence of any abscess, fissure, or fistula.  I then placed a large Hill-Dejesus retractor into the anal canal exposing the thrombosed column.  A Treasure clamp was placed on the external hemorrhoid tissue. Cautery was used to create a skin incision and dissect the hemorrhoid tissue off of the external internal sphincter muscles.  The base of this hemorrhoid tissue was ligated with a 2 0 chromic suture. The wound was then closed with a locking 2 0 chromic.  We confirmed that the area of excision was hemostatic. The sutures were trimmed.  A piece of Gel-Foam was rolled and placed into the anal canal.  The procedure was then terminated.  The patient was awakened and transferred to the recovery room in good condition.  All sponge and instrument counts were correct. I personally performed the entire procedure.    Estimated Blood Loss: 5mL    Drains: None    Wound Class: IV    Jenifer Aragon

## 2019-10-07 ENCOUNTER — TELEPHONE (OUTPATIENT)
Dept: SURGERY | Facility: CLINIC | Age: 39
End: 2019-10-07

## 2019-10-07 NOTE — TELEPHONE ENCOUNTER
----- Message from Alan Cruz sent at 10/7/2019  3:37 PM CDT -----  Contact: Jaycee Carbonell would like for you to contact her in regards her post op appointment. She can be reached at [phone]

## 2019-10-09 ENCOUNTER — TELEPHONE (OUTPATIENT)
Dept: ENDOCRINOLOGY | Facility: CLINIC | Age: 39
End: 2019-10-09

## 2019-10-09 NOTE — TELEPHONE ENCOUNTER
----- Message from Vanessa Drake MA sent at 10/9/2019 12:59 PM CDT -----  Contact: self/[phone]  Pt states she did not know about the appt that was florian, and she needs to speak with some one in regards to this appt to r/s.

## 2019-10-18 ENCOUNTER — OFFICE VISIT (OUTPATIENT)
Dept: URGENT CARE | Facility: CLINIC | Age: 39
End: 2019-10-18
Payer: MEDICARE

## 2019-10-18 VITALS
SYSTOLIC BLOOD PRESSURE: 128 MMHG | OXYGEN SATURATION: 99 % | TEMPERATURE: 98 F | HEART RATE: 78 BPM | RESPIRATION RATE: 16 BRPM | BODY MASS INDEX: 31.65 KG/M2 | HEIGHT: 62 IN | DIASTOLIC BLOOD PRESSURE: 75 MMHG | WEIGHT: 172 LBS

## 2019-10-18 DIAGNOSIS — N64.4 BREAST PAIN, LEFT: Primary | ICD-10-CM

## 2019-10-18 PROCEDURE — 99214 OFFICE O/P EST MOD 30 MIN: CPT | Mod: S$GLB,,, | Performed by: NURSE PRACTITIONER

## 2019-10-18 PROCEDURE — 99214 PR OFFICE/OUTPT VISIT, EST, LEVL IV, 30-39 MIN: ICD-10-PCS | Mod: S$GLB,,, | Performed by: NURSE PRACTITIONER

## 2019-10-18 RX ORDER — ESTRADIOL 1 MG/1
TABLET ORAL
COMMUNITY
Start: 2019-09-25 | End: 2020-09-14

## 2019-10-18 RX ORDER — SULFAMETHOXAZOLE AND TRIMETHOPRIM 800; 160 MG/1; MG/1
1 TABLET ORAL 2 TIMES DAILY
Qty: 20 TABLET | Refills: 0 | Status: SHIPPED | OUTPATIENT
Start: 2019-10-18 | End: 2019-10-28

## 2019-10-18 NOTE — PROGRESS NOTES
"Subjective:       Patient ID: Jaycee Gray is a 39 y.o. female.    Vitals:  height is 5' 2" (1.575 m) and weight is 78 kg (172 lb). Her oral temperature is 98.2 °F (36.8 °C). Her blood pressure is 128/75 and her pulse is 78. Her respiration is 16 and oxygen saturation is 99%.     Chief Complaint: Breast Pain    Patient states she is having left breast internal burning sensation. She had an staff infection 2016 in left breast.    Other   This is a new problem. The problem occurs constantly. The problem has been gradually worsening. Associated symptoms include nausea. Pertinent negatives include no abdominal pain, anorexia, arthralgias, change in bowel habit, chest pain, chills, congestion, coughing, diaphoresis, fatigue, fever, headaches, joint swelling, myalgias, neck pain, numbness, rash, sore throat, swollen glands, urinary symptoms, vertigo, visual change, vomiting or weakness. Nothing aggravates the symptoms. She has tried ice (pain medication) for the symptoms. The treatment provided no relief.       Constitution: Negative for chills, sweating, fatigue and fever.   HENT: Negative for congestion and sore throat.    Neck: Negative for neck pain and painful lymph nodes.   Cardiovascular: Negative for chest pain and leg swelling.   Eyes: Negative for double vision and blurred vision.   Respiratory: Negative for cough and shortness of breath.    Gastrointestinal: Positive for nausea. Negative for abdominal pain, vomiting and diarrhea.   Genitourinary: Negative for dysuria, frequency, urgency and history of kidney stones.   Musculoskeletal: Negative for joint pain, joint swelling, muscle cramps and muscle ache.   Skin: Positive for erythema (upper breast TTP with very slight erythema to area above nipple.  No nodular lesion, no warmth,, or edema. ). Negative for color change, pale, rash and bruising.   Allergic/Immunologic: Negative for seasonal allergies.   Neurological: Negative for dizziness, history of " vertigo, light-headedness, passing out, headaches and numbness.   Hematologic/Lymphatic: Negative for swollen lymph nodes.   Psychiatric/Behavioral: Negative for nervous/anxious, sleep disturbance and depression. The patient is not nervous/anxious.        Objective:      Physical Exam   Constitutional: She is oriented to person, place, and time. She appears well-developed and well-nourished.   HENT:   Head: Normocephalic and atraumatic. Head is without abrasion, without contusion and without laceration.   Right Ear: External ear normal.   Left Ear: External ear normal.   Nose: Nose normal.   Mouth/Throat: Oropharynx is clear and moist and mucous membranes are normal.   Eyes: Pupils are equal, round, and reactive to light. Conjunctivae, EOM and lids are normal.   Neck: Trachea normal, full passive range of motion without pain and phonation normal. Neck supple.   Cardiovascular: Normal rate, regular rhythm and normal heart sounds.   Pulmonary/Chest: Effort normal and breath sounds normal. No stridor. No respiratory distress.   Musculoskeletal: Normal range of motion.   Neurological: She is alert and oriented to person, place, and time.   Skin: Skin is warm, dry, intact and no rash. Capillary refill takes less than 2 seconds. Lesions:  erythema (upper breast TTP with very slight erythema to area above nipple.  No nodular lesion, no warmth,, or edema. )abrasion, burn, bruising, ecchymosis, lesion and petechiae  Psychiatric: She has a normal mood and affect. Her speech is normal and behavior is normal. Judgment and thought content normal. Cognition and memory are normal.   Nursing note and vitals reviewed.        Assessment:       1. Breast pain, left        Plan:         Breast pain, left  -     sulfamethoxazole-trimethoprim 800-160mg (BACTRIM DS) 800-160 mg Tab; Take 1 tablet by mouth 2 (two) times daily. for 10 days  Dispense: 20 tablet; Refill: 0      Patient Instructions   Please follow up with your Primary care  provider within 2-5 days if your signs and symptoms have not resolved or worsen.     If your condition worsens or fails to improve we recommend that you receive another evaluation at the emergency room immediately or contact your primary medical clinic to discuss your concerns.    You must understand that you have received an Urgent Care treatment only and that you may be released before all of your medical problems are known or treated.   You, the patient, will arrange for follow up care as instructed.       You have been given an antibiotic to treat your condition today.  Please complete the antibiotic as directed on the bottle.     As with any antibiotics, use probiotics and/or high culture yogurt about 2 hours apart from the antibiotic and about 1 week after the antibiotic to replace the gut michele lost with antibiotic use.      If you are female and on BCP use additional methods to prevent pregnancy while on antibiotics and for one cycle after.         Mastitis  Mastitis occurs when breast tissue becomes swollen and inflamed. This is almost always due to infection. Mastitis most often affects breastfeeding women during the first 6 weeks after childbirth. For this reason, its also known as lactation mastitis. Infection may happen after a duct becomes clogged, causing milk to back up in the breast. Mastitis may also occur if bacteria enter the breast through small cracks in the nipple. (Less often, mastitis occurs in women who arent breastfeeding. If you have mastitis that is not due to breastfeeding, your healthcare provider will give you more information as needed. Treatment may include some of the same home care measures listed below.)  Mastitis may cause flu-like symptoms such as fever, aches, and fatigue. The affected breast may feel painful, warm, tender, firm, or swollen. The skin over the breast may be red (often in a wedge-shaped pattern). You may feel a burning a sensation when breastfeeding.  In most  cases, mastitis can be treated with antibiotics. This should clear the infection. If treatment is delayed, a pocket of pus (abscess) can form in the breast tissue. A procedure may then be needed to drain the pus. In severe cases of infection, other treatments may be needed.  Home care  Breastfeeding  · Its very important to keep the milk flowing from the infected breast. Continue breastfeeding from both breasts as usual. This will not hurt the baby. If this is too painful, use a breast pump to remove milk from the infected side. This can be fed to your baby or discarded. Note: If you don't continue to breastfeed or pump your breast, bacteria can grow in the milk that is left in your breast. This can make your infection worse.  · Tell your healthcare provider if you have problems with breastfeeding. He or she may suggest changes to your technique, if needed. You may also be referred to a lactation nurse or consultant for support with breastfeeding.  General care  · Take any medicines youre prescribed as directed. If youre taking antibiotics, be sure to complete all of the medicine even if you start to feel better. Over-the-counter pain medicines may also be recommended. Dont use breast creams or other products or medicines without talking to your healthcare provider first. Note: If youre concerned about taking medicines while breastfeeding, talk to your healthcare provider.  · Rest as often as needed. Also be sure to drink plenty of fluids.  · To help relieve pain and swelling, heat or ice may be used. Apply as often as directed by your provider.  ¨ Heat: Place a warm compress on the breast. Use a towel soaked in hot water, a heating pad, or a hot water bottle.  ¨ Cold: Place a cold compress on the breast. Use an ice pack or bag of ice wrapped in a thin towel. Never place a cold source directly on the skin.  Follow-up care  Follow up with your healthcare provider as advised.  When to seek medical advice  Call  your healthcare provider right away if any of these occur:  · Fever of 100.4°F (38°C) or higher, or as directed by your provider  · Shaking chills  · Symptoms worsen or do not improve within 48 to 72 hours of starting treatment  · New symptoms develop  Date Last Reviewed: 7/30/2015  © 7265-1169 [employer]. [address] [place] [address], [place] [address]. All rights reserved. This information is not intended as a substitute for professional medical care. Always follow your healthcare professional's instructions.

## 2019-10-18 NOTE — PATIENT INSTRUCTIONS
Please follow up with your Primary care provider within 2-5 days if your signs and symptoms have not resolved or worsen.     If your condition worsens or fails to improve we recommend that you receive another evaluation at the emergency room immediately or contact your primary medical clinic to discuss your concerns.    You must understand that you have received an Urgent Care treatment only and that you may be released before all of your medical problems are known or treated.   You, the patient, will arrange for follow up care as instructed.       You have been given an antibiotic to treat your condition today.  Please complete the antibiotic as directed on the bottle.     As with any antibiotics, use probiotics and/or high culture yogurt about 2 hours apart from the antibiotic and about 1 week after the antibiotic to replace the gut michele lost with antibiotic use.      If you are female and on BCP use additional methods to prevent pregnancy while on antibiotics and for one cycle after.         Mastitis  Mastitis occurs when breast tissue becomes swollen and inflamed. This is almost always due to infection. Mastitis most often affects breastfeeding women during the first 6 weeks after childbirth. For this reason, its also known as lactation mastitis. Infection may happen after a duct becomes clogged, causing milk to back up in the breast. Mastitis may also occur if bacteria enter the breast through small cracks in the nipple. (Less often, mastitis occurs in women who arent breastfeeding. If you have mastitis that is not due to breastfeeding, your healthcare provider will give you more information as needed. Treatment may include some of the same home care measures listed below.)  Mastitis may cause flu-like symptoms such as fever, aches, and fatigue. The affected breast may feel painful, warm, tender, firm, or swollen. The skin over the breast may be red (often in a wedge-shaped pattern). You may feel a burning a  sensation when breastfeeding.  In most cases, mastitis can be treated with antibiotics. This should clear the infection. If treatment is delayed, a pocket of pus (abscess) can form in the breast tissue. A procedure may then be needed to drain the pus. In severe cases of infection, other treatments may be needed.  Home care  Breastfeeding  · Its very important to keep the milk flowing from the infected breast. Continue breastfeeding from both breasts as usual. This will not hurt the baby. If this is too painful, use a breast pump to remove milk from the infected side. This can be fed to your baby or discarded. Note: If you don't continue to breastfeed or pump your breast, bacteria can grow in the milk that is left in your breast. This can make your infection worse.  · Tell your healthcare provider if you have problems with breastfeeding. He or she may suggest changes to your technique, if needed. You may also be referred to a lactation nurse or consultant for support with breastfeeding.  General care  · Take any medicines youre prescribed as directed. If youre taking antibiotics, be sure to complete all of the medicine even if you start to feel better. Over-the-counter pain medicines may also be recommended. Dont use breast creams or other products or medicines without talking to your healthcare provider first. Note: If youre concerned about taking medicines while breastfeeding, talk to your healthcare provider.  · Rest as often as needed. Also be sure to drink plenty of fluids.  · To help relieve pain and swelling, heat or ice may be used. Apply as often as directed by your provider.  ¨ Heat: Place a warm compress on the breast. Use a towel soaked in hot water, a heating pad, or a hot water bottle.  ¨ Cold: Place a cold compress on the breast. Use an ice pack or bag of ice wrapped in a thin towel. Never place a cold source directly on the skin.  Follow-up care  Follow up with your healthcare provider as  advised.  When to seek medical advice  Call your healthcare provider right away if any of these occur:  · Fever of 100.4°F (38°C) or higher, or as directed by your provider  · Shaking chills  · Symptoms worsen or do not improve within 48 to 72 hours of starting treatment  · New symptoms develop  Date Last Reviewed: 7/30/2015  © 4425-8672 [employer]. [address] [place] [address], [place] [address]. All rights reserved. This information is not intended as a substitute for professional medical care. Always follow your healthcare professional's instructions.

## 2019-10-18 NOTE — DISCHARGE SUMMARY
[facility]  Colorectal Surgery  Discharge Summary      Patient Name: Jaycee Gray  MRN: [id number]  Admission Date: 10/1/2019  Hospital Length of Stay: 0 days  Discharge Date and Time: 10/1/2019  7:13 PM  Attending Physician: No att. providers found   Discharging Provider: Royce Zavaleta MD  Primary Care Provider: JAVI Augustine     HPI:   39 year old female presented to clinic with thrombosed external hemorrhoid    Procedure(s) (LRB):  HEMORRHOIDECTOMY (N/A)     Hospital Course:   Was added on to OR schedule from clinic and had excisional hemorrhoidectomy performed. Was discharged home uneventfully from post operative recovery area.        Significant Diagnostic Studies: none    Pending Diagnostic Studies:     None        Final Active Diagnoses:    Diagnosis Date Noted POA    PRINCIPAL PROBLEM:  External hemorrhoid, thrombosed [K64.5] 10/01/2019 Yes      Problems Resolved During this Admission:      Discharged Condition: good    Disposition: Home or Self Care    Follow Up:  Follow-up Information     Jenifer Aragon MD.    Specialty:  Colon and Rectal Surgery  Contact information:  [address] [place] [id number]  [phone]                 Patient Instructions:      Diet Adult Regular     Notify your health care provider if you experience any of the following:  temperature >100.4     Notify your health care provider if you experience any of the following:  persistent nausea and vomiting or diarrhea     Notify your health care provider if you experience any of the following:  severe uncontrolled pain     Activity as tolerated     Medications:  Reconciled Home Medications:      Medication List      START taking these medications    ibuprofen 400 MG tablet  Commonly known as:  ADVIL,MOTRIN  Take 1 tablet (400 mg total) by mouth 3 (three) times daily.     polyethylene glycol 17 gram Pwpk  Commonly known as:  GLYCOLAX  Take 17 g by mouth once daily.        CONTINUE  taking these medications    acetaminophen 325 MG tablet  Commonly known as:  TYLENOL  Take 1 tablet (325 mg total) by mouth every 6 (six) hours as needed for Pain.     EPINEPHrine 0.3 mg/0.3 mL Atin  Commonly known as:  EPIPEN 2-JAYSON  Inject 0.3 mLs (0.3 mg total) into the muscle once. Inject 1 pen as directed as needed. for 1 dose     HYDROcodone-acetaminophen 7.5-325 mg per tablet  Commonly known as:  NORCO  Take 1 tablet by mouth every 4 to 6 hours as needed for Pain.     hydrocortisone 2.5 % cream  Apply topically 2 (two) times daily.     levothyroxine 50 MCG tablet  Commonly known as:  SYNTHROID  Take 1 tablet (50 mcg total) by mouth once daily.     lidocaine 5 % Gel  Apply 1 application topically 3 (three) times daily.     ondansetron 4 MG tablet  Commonly known as:  ZOFRAN  Take 1 tablet (4 mg total) by mouth every 6 (six) hours.     ondansetron 8 MG Tbdl  Commonly known as:  ZOFRAN-ODT  Take 1 tablet (8 mg total) by mouth 3 (three) times daily as needed.     oxyCODONE-acetaminophen 5-325 mg per tablet  Commonly known as:  PERCOCET  Take 1 tablet by mouth every 6 (six) hours as needed for Pain.     tiZANidine 4 mg Cap  Take by mouth nightly as needed.        ASK your doctor about these medications    ranitidine 150 MG tablet  Commonly known as:  ZANTAC  Take 1 tablet (150 mg total) by mouth 2 (two) times daily.            Royce Zavaleta MD  Colorectal Surgery  [facility]

## 2019-10-21 ENCOUNTER — TELEPHONE (OUTPATIENT)
Dept: URGENT CARE | Facility: CLINIC | Age: 39
End: 2019-10-21

## 2019-10-30 ENCOUNTER — CLINICAL SUPPORT (OUTPATIENT)
Dept: REHABILITATION | Facility: HOSPITAL | Age: 39
End: 2019-10-30
Payer: MEDICARE

## 2019-10-30 DIAGNOSIS — R10.2 SUPRAPUBIC PAIN: ICD-10-CM

## 2019-10-30 DIAGNOSIS — N39.46 MIXED STRESS AND URGE URINARY INCONTINENCE: Primary | ICD-10-CM

## 2019-10-30 PROCEDURE — 97163 PT EVAL HIGH COMPLEX 45 MIN: CPT | Mod: PO

## 2019-10-30 PROCEDURE — 97110 THERAPEUTIC EXERCISES: CPT | Mod: PO

## 2019-10-30 NOTE — PATIENT INSTRUCTIONS
"Home Exercise Program: 10/30/2019    DIAPHRAGMATIC BREATHING     The correct use of diaphragmatic breathing can help to quiet brain activity resulting in the relaxation of all the muscles and organs of the body. This is accomplished by slow rhythmic breathing concentrated in the diaphragm muscle rather than the chest.    How to do proper relaxation breathing:     Start by lying on your back or reclining in a chair in a relaxed position. Place one hand on your chest and the other on your abdomen.   Relax your jaw by placing your tongue on the roof of your mouth and keeping your teeth slightly apart.    Take a deep breath in, letting the abdomen expand and rise while you keep your upper chest, neck and shoulders relaxed.    As you breathe out, allow your abdomen and chest to fall. Exhale completely.   It doesn't matter if you breathe in/out through your nose and/or mouth. Do whichever feels comfortable.   Remember to breathe slowly.  Do not force your breathing. Do not hold your breath.   Repeat for 5 minutes, twice per day.      Also:  Gentle "Effleurage" to your belly.  (as shown in clinic).    "
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
## 2019-11-22 ENCOUNTER — CLINICAL SUPPORT (OUTPATIENT)
Dept: REHABILITATION | Facility: HOSPITAL | Age: 39
End: 2019-11-22
Payer: MEDICARE

## 2019-11-22 ENCOUNTER — PATIENT MESSAGE (OUTPATIENT)
Dept: FAMILY MEDICINE | Facility: CLINIC | Age: 39
End: 2019-11-22

## 2019-11-22 DIAGNOSIS — N39.46 MIXED STRESS AND URGE URINARY INCONTINENCE: ICD-10-CM

## 2019-11-22 PROCEDURE — 97140 MANUAL THERAPY 1/> REGIONS: CPT | Mod: PO

## 2019-11-22 PROCEDURE — 97014 ELECTRIC STIMULATION THERAPY: CPT | Mod: PO

## 2019-11-22 RX ORDER — HYDROXYZINE HYDROCHLORIDE 25 MG/1
25 TABLET, FILM COATED ORAL 3 TIMES DAILY PRN
Qty: 30 TABLET | Refills: 1 | Status: SHIPPED | OUTPATIENT
Start: 2019-11-22 | End: 2019-12-22

## 2019-11-22 NOTE — PROGRESS NOTES
OUTPATIENT PHYSICAL THERAPY DAILY NOTE       Patient: Jaycee MITCHELL [facility] #:  [id number]    Diagnosis:   Encounter Diagnosis   Name Primary?    Mixed stress and urge urinary incontinence       Date of treatment: 11/22/2019     Time in: 11:00  Time out: 12:00  Billable time: 55 minutes  Visit #: 2  Auth: 20 exp 12/31  POC expiration: 1/30/2020    SUBJECTIVE   Pt reports: pt reports that she got SI joint injections under sedation.  She is not sure exactly what structures were injected.  She doesn't really feel any different, but she was told she could see improvement up until 3 months post.  She is also have significant rectal bleeding with clots; no pain with defecation but is scheduled for sigmoidoscopy next month.    Pain: 8/10 on VAS scale- she has a Lidocaine patch on it.    Pain location: LLQ    OBJECTIVE     Manual Therapy to develop desensitization for 40 minutes including: effleurage, skin rolling, and attempted respiratory and pelvic diaphragm releases.  Pt was poorly tolerant of most attempted manual therapy to her belly today.  Muscle jerks and twitches noted throughout.  We worked on DB in supine hooklying- she had a hard time coordinating this despite different cues and instructions to place hands on belly.  She also tended to take shallow breaths due to pain.  Most pain was noted to be in the L U and LQ below the ribcage and above the ilium.      Modalities to improve pain tolerance for 15 minutes including:  IFC  applied to the low abdomen and sacral area with patient positioned in supine hooklying for 15 minutes.    Education: Discussed progression of plan of care with patient; educated pt in activity modification; reviewed HEP with pt. Pt demonstrated and verbalized understanding of all instruction.    ASSESSMENT      Pt tolerated entire treatment fair with no visible adverse effects. Will reassess the pelvic floor next session, revisit Estim for pain mgmt, and will try to get her moving and  less passive in her care.    Will the patient continue to benefit from skilled PT intervention? Yes  Medical necessity demonstrated by: Skilled PT supervision required for HEP and treatment progression  Progress towards goals:  fair  New/Revised goals: none      PLAN     Continue with established Plan of Care, working toward established PT goals.

## 2019-11-22 NOTE — TELEPHONE ENCOUNTER
Called and spoke with pt in regards of Rx being sent to pharmacy. Pt verbalized understanding of message.

## 2019-12-12 ENCOUNTER — CLINICAL SUPPORT (OUTPATIENT)
Dept: REHABILITATION | Facility: HOSPITAL | Age: 39
End: 2019-12-12
Payer: MEDICARE

## 2019-12-12 DIAGNOSIS — N39.46 MIXED STRESS AND URGE URINARY INCONTINENCE: ICD-10-CM

## 2019-12-12 PROCEDURE — 97014 ELECTRIC STIMULATION THERAPY: CPT | Mod: PO

## 2019-12-12 PROCEDURE — 97140 MANUAL THERAPY 1/> REGIONS: CPT | Mod: PO

## 2019-12-12 NOTE — PROGRESS NOTES
OUTPATIENT PHYSICAL THERAPY DAILY NOTE       Patient: Jaycee MITCHELL [facility] #:  [id number]    Diagnosis:   Encounter Diagnosis   Name Primary?    Mixed stress and urge urinary incontinence       Date of treatment: 12/12/2019     Time in: 10:50  Time out: 11:50  Billable time: 55 minutes  Visit #: 3  Auth: 20 exp 12/31  POC expiration: 1/30/2020    SUBJECTIVE   Pt reports: pt reports that her rectal bleeding has stopped.  Her L belly is still hurting.  She is waiting for her MRI results.  Stim felt good but her belly pain is getting worse and not better.    Pain: 8/10 on VAS scale  Pain location: LLQ    OBJECTIVE     Manual Therapy to develop desensitization for 40 minutes including: effleurage and stick rolling to her abdomen; ischemic pressure, MFR and gentle contract/relax to the LA and OI B'ly.  We worked intravaginally today with pt's verbal consent.  She was poorly tolerant of manual therapy to the L LA and OI (OI very tender with muscle guarding present- pt could not relax her hip no matter how much manual support she was given. I mentioned vaginal dilator work for muscle release at home, but she rejected that idea stating that she felt it would be triggering with her PTSD.  She tolerated external MFR to the L OI fairly well.      Modalities to improve pain tolerance for 15 minutes including:  IFC  applied to the low abdomen and sacral area with patient positioned in supine hooklying for 15 minutes.  She asked about acquiring a TENS unit and I advised her to obtain orders from her physician.      Education: Discussed progression of plan of care with patient; educated pt in activity modification; reviewed HEP with pt. Pt demonstrated and verbalized understanding of all instruction.    ASSESSMENT      Pt tolerated entire treatment fair with no visible adverse effects. Pt with extensive muscle guarding internally on the L side, despite gentle technique and cues to perform DB (she does not take in a deep  breath despite my cueing her to do so).     Will the patient continue to benefit from skilled PT intervention? Yes  Medical necessity demonstrated by: Skilled PT supervision required for HEP and treatment progression  Progress towards goals:  fair  New/Revised goals: none      PLAN     Continue with established Plan of Care, working toward established PT goals.

## 2020-01-09 ENCOUNTER — OFFICE VISIT (OUTPATIENT)
Dept: FAMILY MEDICINE | Facility: CLINIC | Age: 40
End: 2020-01-09
Payer: MEDICARE

## 2020-01-09 ENCOUNTER — TELEPHONE (OUTPATIENT)
Dept: FAMILY MEDICINE | Facility: CLINIC | Age: 40
End: 2020-01-09

## 2020-01-09 VITALS
HEIGHT: 62 IN | SYSTOLIC BLOOD PRESSURE: 116 MMHG | BODY MASS INDEX: 31.03 KG/M2 | HEART RATE: 98 BPM | OXYGEN SATURATION: 99 % | TEMPERATURE: 98 F | DIASTOLIC BLOOD PRESSURE: 88 MMHG | WEIGHT: 168.63 LBS

## 2020-01-09 DIAGNOSIS — R11.2 NAUSEA AND VOMITING, INTRACTABILITY OF VOMITING NOT SPECIFIED, UNSPECIFIED VOMITING TYPE: ICD-10-CM

## 2020-01-09 DIAGNOSIS — L24.9 IRRITANT CONTACT DERMATITIS, UNSPECIFIED TRIGGER: Primary | ICD-10-CM

## 2020-01-09 DIAGNOSIS — T63.441A ANAPHYLACTIC REACTION TO BEE STING, ACCIDENTAL OR UNINTENTIONAL, INITIAL ENCOUNTER: ICD-10-CM

## 2020-01-09 DIAGNOSIS — J02.9 SORE THROAT: ICD-10-CM

## 2020-01-09 DIAGNOSIS — E03.9 ACQUIRED HYPOTHYROIDISM: ICD-10-CM

## 2020-01-09 LAB
ALBUMIN SERPL BCP-MCNC: 3.7 G/DL (ref 3.5–5.2)
ALP SERPL-CCNC: 70 U/L (ref 55–135)
ALT SERPL W/O P-5'-P-CCNC: 12 U/L (ref 10–44)
ANION GAP SERPL CALC-SCNC: 9 MMOL/L (ref 8–16)
AST SERPL-CCNC: 15 U/L (ref 10–40)
BILIRUB SERPL-MCNC: 0.5 MG/DL (ref 0.1–1)
BUN SERPL-MCNC: 7 MG/DL (ref 6–20)
CALCIUM SERPL-MCNC: 10.4 MG/DL (ref 8.7–10.5)
CHLORIDE SERPL-SCNC: 106 MMOL/L (ref 95–110)
CO2 SERPL-SCNC: 30 MMOL/L (ref 23–29)
CREAT SERPL-MCNC: 0.6 MG/DL (ref 0.5–1.4)
CTP QC/QA: YES
EST. GFR  (AFRICAN AMERICAN): >60 ML/MIN/1.73 M^2
EST. GFR  (NON AFRICAN AMERICAN): >60 ML/MIN/1.73 M^2
GLUCOSE SERPL-MCNC: 93 MG/DL (ref 70–110)
POTASSIUM SERPL-SCNC: 5.3 MMOL/L (ref 3.5–5.1)
PROT SERPL-MCNC: 7.8 G/DL (ref 6–8.4)
S PYO RRNA THROAT QL PROBE: NEGATIVE
SODIUM SERPL-SCNC: 145 MMOL/L (ref 136–145)
T4 FREE SERPL-MCNC: 1.16 NG/DL (ref 0.71–1.51)
TSH SERPL DL<=0.005 MIU/L-ACNC: 1.69 UIU/ML (ref 0.4–4)

## 2020-01-09 PROCEDURE — 84443 ASSAY THYROID STIM HORMONE: CPT

## 2020-01-09 PROCEDURE — 80053 COMPREHEN METABOLIC PANEL: CPT

## 2020-01-09 PROCEDURE — 99215 OFFICE O/P EST HI 40 MIN: CPT | Mod: PBBFAC,PN | Performed by: NURSE PRACTITIONER

## 2020-01-09 PROCEDURE — 87880 STREP A ASSAY W/OPTIC: CPT | Mod: PBBFAC,PN | Performed by: NURSE PRACTITIONER

## 2020-01-09 PROCEDURE — 99214 OFFICE O/P EST MOD 30 MIN: CPT | Mod: S$PBB,,, | Performed by: NURSE PRACTITIONER

## 2020-01-09 PROCEDURE — 99999 PR PBB SHADOW E&M-EST. PATIENT-LVL V: CPT | Mod: PBBFAC,,, | Performed by: NURSE PRACTITIONER

## 2020-01-09 PROCEDURE — 84439 ASSAY OF FREE THYROXINE: CPT

## 2020-01-09 PROCEDURE — 99999 PR PBB SHADOW E&M-EST. PATIENT-LVL V: ICD-10-PCS | Mod: PBBFAC,,, | Performed by: NURSE PRACTITIONER

## 2020-01-09 PROCEDURE — 99214 PR OFFICE/OUTPT VISIT, EST, LEVL IV, 30-39 MIN: ICD-10-PCS | Mod: S$PBB,,, | Performed by: NURSE PRACTITIONER

## 2020-01-09 PROCEDURE — 36415 COLL VENOUS BLD VENIPUNCTURE: CPT

## 2020-01-09 RX ORDER — LEVOTHYROXINE SODIUM 50 UG/1
50 TABLET ORAL DAILY
Qty: 30 TABLET | Refills: 2 | Status: SHIPPED | OUTPATIENT
Start: 2020-01-09 | End: 2020-05-05 | Stop reason: SDUPTHER

## 2020-01-09 RX ORDER — ONDANSETRON 4 MG/1
4 TABLET, FILM COATED ORAL EVERY 8 HOURS PRN
Qty: 30 TABLET | Refills: 0 | Status: SHIPPED | OUTPATIENT
Start: 2020-01-09 | End: 2020-09-14

## 2020-01-09 RX ORDER — MUPIROCIN 20 MG/G
OINTMENT TOPICAL
COMMUNITY
Start: 2020-01-03 | End: 2020-06-08

## 2020-01-09 RX ORDER — PROMETHAZINE HYDROCHLORIDE 25 MG/1
25 TABLET ORAL
COMMUNITY
End: 2020-09-01 | Stop reason: SDUPTHER

## 2020-01-09 RX ORDER — EPINEPHRINE 0.3 MG/.3ML
1 INJECTION SUBCUTANEOUS ONCE
Qty: 0.3 ML | Refills: 0 | Status: SHIPPED | OUTPATIENT
Start: 2020-01-09 | End: 2020-01-09

## 2020-01-09 NOTE — TELEPHONE ENCOUNTER
----- Message from JAVI Augustine sent at 1/9/2020 10:52 AM CST -----  Please inform patient phenergan is not covered by her insurance provider. I can send rx for zofran instead.

## 2020-01-09 NOTE — TELEPHONE ENCOUNTER
Patient called back in regards of message on voicemail. Pt verbalized understanding of message. Pt states to please send the Zofran instead to [facility] in [place] for her please.
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
## 2020-01-14 ENCOUNTER — TELEPHONE (OUTPATIENT)
Dept: FAMILY MEDICINE | Facility: CLINIC | Age: 40
End: 2020-01-14

## 2020-01-14 NOTE — TELEPHONE ENCOUNTER
----- Message from JAVI Augustine sent at 1/14/2020  3:46 PM CST -----  Please inform patient labs thyroid labs normal. Continue current dose of levothyroxine (Synthroid).

## 2020-02-05 ENCOUNTER — OFFICE VISIT (OUTPATIENT)
Dept: URGENT CARE | Facility: CLINIC | Age: 40
End: 2020-02-05
Payer: MEDICARE

## 2020-02-05 VITALS
RESPIRATION RATE: 17 BRPM | SYSTOLIC BLOOD PRESSURE: 110 MMHG | BODY MASS INDEX: 28.52 KG/M2 | DIASTOLIC BLOOD PRESSURE: 61 MMHG | HEART RATE: 72 BPM | WEIGHT: 155 LBS | TEMPERATURE: 98 F | OXYGEN SATURATION: 99 % | HEIGHT: 62 IN

## 2020-02-05 DIAGNOSIS — T78.40XA ALLERGIC REACTION, INITIAL ENCOUNTER: Primary | ICD-10-CM

## 2020-02-05 DIAGNOSIS — L50.9 HIVES: ICD-10-CM

## 2020-02-05 PROCEDURE — 99214 PR OFFICE/OUTPT VISIT, EST, LEVL IV, 30-39 MIN: ICD-10-PCS | Mod: S$GLB,,, | Performed by: NURSE PRACTITIONER

## 2020-02-05 PROCEDURE — 99214 OFFICE O/P EST MOD 30 MIN: CPT | Mod: S$GLB,,, | Performed by: NURSE PRACTITIONER

## 2020-02-05 RX ORDER — EPINEPHRINE 0.3 MG/.3ML
INJECTION SUBCUTANEOUS
COMMUNITY
Start: 2020-01-09 | End: 2020-10-05 | Stop reason: SDUPTHER

## 2020-02-05 RX ORDER — TIZANIDINE 4 MG/1
TABLET ORAL
COMMUNITY
Start: 2020-01-31 | End: 2020-02-05 | Stop reason: SDUPTHER

## 2020-02-05 RX ORDER — LEVOTHYROXINE SODIUM 50 UG/1
50 TABLET ORAL
COMMUNITY
Start: 2018-10-22 | End: 2020-02-05 | Stop reason: SDUPTHER

## 2020-02-05 RX ORDER — PREDNISONE 20 MG/1
20 TABLET ORAL DAILY
Qty: 3 TABLET | Refills: 0 | Status: SHIPPED | OUTPATIENT
Start: 2020-02-05 | End: 2020-02-08

## 2020-02-05 RX ORDER — HYDROCODONE BITARTRATE AND ACETAMINOPHEN 10; 325 MG/1; MG/1
TABLET ORAL
COMMUNITY
Start: 2020-01-20 | End: 2020-02-05 | Stop reason: SDUPTHER

## 2020-02-05 NOTE — PATIENT INSTRUCTIONS
Always follow your healthcare professional's instructions.    You have received urgent care diagnosis and treatment and you may be released before all of your medical problems are known or treated. Unless you have been given a referral, you (the patient), will arrange for follow-up care as instructed.     If you have been given a referral, [facility] will contact you (their phone number is [phone]). If they do NOT call you by the end of the business day, please call them the following day.      First Aid: Allergic Reactions  Limited reaction  A limited (localized) reaction affects only the area of contact. Some reactions may not show up for days. Others can occur almost immediately.  Step 1  Stop the source  · If the person has been stung, scrape the stinger away with the edge of a credit card or the dull edge of a knife. Dont use fingers or tweezers to remove a stinger. If pinched, the stinger may empty its venom into the skin.  · If the reaction is caused by eating a specific food or taking a medicine, the person should not eat or take the substance again.  Step 2  Treat skin irritation  · Wash insect bites with soap and water.  · Remove and wash in hot water all clothing that may have plant oils (or any other substance that has caused a reaction) on them. Shower with plenty of soap to wash remaining plant oils (or other allergens) off the skin.  · Control itching by making a thick paste of baking soda and water. Apply the paste directly to the skin.  Severe reaction  A severe (systemic) reaction affects the entire body. In extreme cases, the airways from mouth to lungs may swell (anaphylaxis). The reaction may happen right away or over several hours.  Step 1  Calm the person  · Help the person into a comfortable position. Prop up his or her head to aid breathing.  · Tell the person to remain still and limit talking.  · If the person carries medicine (epinephrine) to control anaphylaxis, help him or her use  it.  · Prevent any further contact with or exposure to allergen.   Step 2  Monitor breathing  · Watch for signs of airway swelling such as wheezing or swollen lips. With an extreme reaction, the person may have trouble getting any breath.  · Perform rescue breathing, if needed. In extreme cases, you may not be able to get air into the lungs.  Call 911 immediately if the person has any of the following:  · Trouble breathing  · A history of airway swelling (anaphylaxis)  While you wait for help:  · Reassure the person.  · Treat for shock or provide rescue breathing or CPR, if needed.   An allergic reaction may become more serious over time. Seek medical help if any of the following is true:  · A rash or hives covers the face, genitals, or most of the body.  · An entire body part, such as an arm or leg, swells.  · The tongue or lips begin to swell.   Date Last Reviewed: 12/1/2016  © 5092-2412 [employer]. [address] [place] [address]. All rights reserved. This information is not intended as a substitute for professional medical care. Always follow your healthcare professional's instructions.    Your provider discussed your plan of care with you during your physical exam. It was reviewed once more by the provider when giving you an after visit summary. If the patient is a minor, the discharge instructions were discussed with an adult and that adult acknowledge their understanding of the provider's teaching.

## 2020-02-05 NOTE — PROGRESS NOTES
"Subjective:       Patient ID: Jaycee Gray is a 39 y.o. female.    Vitals:  height is 5' 2" (1.575 m) and weight is 70.3 kg (155 lb). Her oral temperature is 97.5 °F (36.4 °C). Her blood pressure is 110/61 and her pulse is 72. Her respiration is 17 and oxygen saturation is 99%.     Chief Complaint: Rash    Patient says she has a rash that is spreading . She reports it started 4 days ago and has spread. She complains of itching and burning .     Rash   This is a new problem. The current episode started in the past 7 days (4 days ago ). The problem is unchanged. The affected locations include the neck. The rash is characterized by burning, redness and itchiness. She was exposed to nothing. Pertinent negatives include no cough, fever or sore throat. Treatments tried: steroid cream  The treatment provided no relief. There is no history of allergies or asthma.       Constitution: Negative for chills and fever.   HENT: Negative for facial swelling and sore throat.    Neck: Negative for painful lymph nodes.   Eyes: Negative for eye itching and eyelid swelling.   Respiratory: Negative for cough.    Musculoskeletal: Negative for joint pain and joint swelling.   Skin: Positive for color change and rash. Negative for pale, wound, abrasion, laceration, lesion, skin thickening/induration, puncture wound, erythema, bruising, abscess, avulsion and hives.   Allergic/Immunologic: Positive for itching. Negative for environmental allergies, immunocompromised state and hives.   Hematologic/Lymphatic: Negative for swollen lymph nodes.       Objective:      Physical Exam   Constitutional: She is oriented to person, place, and time. She appears well-developed and well-nourished. She is cooperative.  Non-toxic appearance. She does not appear ill. No distress.   HENT:   Head: Normocephalic and atraumatic.   Right Ear: Hearing, tympanic membrane, external ear and ear canal normal.   Left Ear: Hearing, tympanic membrane, external ear and " ear canal normal.   Nose: Nose normal. No mucosal edema, rhinorrhea or nasal deformity. No epistaxis. Right sinus exhibits no maxillary sinus tenderness and no frontal sinus tenderness. Left sinus exhibits no maxillary sinus tenderness and no frontal sinus tenderness.   Mouth/Throat: Uvula is midline, oropharynx is clear and moist and mucous membranes are normal. No trismus in the jaw. Normal dentition. No uvula swelling. No posterior oropharyngeal erythema.   Eyes: Conjunctivae and lids are normal. Right eye exhibits no discharge. Left eye exhibits no discharge. No scleral icterus.   Neck: Trachea normal, normal range of motion, full passive range of motion without pain and phonation normal. Neck supple.   Cardiovascular: Normal rate, regular rhythm, normal heart sounds, intact distal pulses and normal pulses.   Pulmonary/Chest: Effort normal and breath sounds normal. No respiratory distress.   Abdominal: Soft. Normal appearance and bowel sounds are normal. She exhibits no distension, no pulsatile midline mass and no mass. There is no tenderness.   Musculoskeletal: Normal range of motion. She exhibits no edema or deformity.   Neurological: She is alert and oriented to person, place, and time. She exhibits normal muscle tone. Coordination normal.   Skin: Skin is warm, dry, intact, not diaphoretic, not pale, rash and urticarial (isolated to neck). erythema  Psychiatric: She has a normal mood and affect. Her speech is normal and behavior is normal. Judgment and thought content normal. Cognition and memory are normal.   Nursing note and vitals reviewed.        Assessment:       1. Allergic reaction, initial encounter    2. Hives        Plan:         Allergic reaction, initial encounter  -     predniSONE (DELTASONE) 20 MG tablet; Take 1 tablet (20 mg total) by mouth once daily. for 3 days  Dispense: 3 tablet; Refill: 0    Hives      Patient Instructions     Always follow your healthcare professional's  instructions.    You have received urgent care diagnosis and treatment and you may be released before all of your medical problems are known or treated. Unless you have been given a referral, you (the patient), will arrange for follow-up care as instructed.     If you have been given a referral, [facility] will contact you (their phone number is [phone]). If they do NOT call you by the end of the business day, please call them the following day.      First Aid: Allergic Reactions  Limited reaction  A limited (localized) reaction affects only the area of contact. Some reactions may not show up for days. Others can occur almost immediately.  Step 1  Stop the source  · If the person has been stung, scrape the stinger away with the edge of a credit card or the dull edge of a knife. Dont use fingers or tweezers to remove a stinger. If pinched, the stinger may empty its venom into the skin.  · If the reaction is caused by eating a specific food or taking a medicine, the person should not eat or take the substance again.  Step 2  Treat skin irritation  · Wash insect bites with soap and water.  · Remove and wash in hot water all clothing that may have plant oils (or any other substance that has caused a reaction) on them. Shower with plenty of soap to wash remaining plant oils (or other allergens) off the skin.  · Control itching by making a thick paste of baking soda and water. Apply the paste directly to the skin.  Severe reaction  A severe (systemic) reaction affects the entire body. In extreme cases, the airways from mouth to lungs may swell (anaphylaxis). The reaction may happen right away or over several hours.  Step 1  Calm the person  · Help the person into a comfortable position. Prop up his or her head to aid breathing.  · Tell the person to remain still and limit talking.  · If the person carries medicine (epinephrine) to control anaphylaxis, help him or her use it.  · Prevent any further contact with or  exposure to allergen.   Step 2  Monitor breathing  · Watch for signs of airway swelling such as wheezing or swollen lips. With an extreme reaction, the person may have trouble getting any breath.  · Perform rescue breathing, if needed. In extreme cases, you may not be able to get air into the lungs.  Call 911 immediately if the person has any of the following:  · Trouble breathing  · A history of airway swelling (anaphylaxis)  While you wait for help:  · Reassure the person.  · Treat for shock or provide rescue breathing or CPR, if needed.   An allergic reaction may become more serious over time. Seek medical help if any of the following is true:  · A rash or hives covers the face, genitals, or most of the body.  · An entire body part, such as an arm or leg, swells.  · The tongue or lips begin to swell.   Date Last Reviewed: 12/1/2016  © 1271-7770 [employer]. [address] [place] [address]. All rights reserved. This information is not intended as a substitute for professional medical care. Always follow your healthcare professional's instructions.    Your provider discussed your plan of care with you during your physical exam. It was reviewed once more by the provider when giving you an after visit summary. If the patient is a minor, the discharge instructions were discussed with an adult and that adult acknowledge their understanding of the provider's teaching.

## 2020-02-08 ENCOUNTER — TELEPHONE (OUTPATIENT)
Dept: URGENT CARE | Facility: CLINIC | Age: 40
End: 2020-02-08

## 2020-02-10 ENCOUNTER — OFFICE VISIT (OUTPATIENT)
Dept: URGENT CARE | Facility: CLINIC | Age: 40
End: 2020-02-10
Payer: MEDICARE

## 2020-02-10 VITALS
WEIGHT: 155 LBS | OXYGEN SATURATION: 98 % | SYSTOLIC BLOOD PRESSURE: 128 MMHG | HEART RATE: 76 BPM | DIASTOLIC BLOOD PRESSURE: 80 MMHG | HEIGHT: 62 IN | RESPIRATION RATE: 16 BRPM | TEMPERATURE: 98 F | BODY MASS INDEX: 28.52 KG/M2

## 2020-02-10 DIAGNOSIS — R52 PAIN: ICD-10-CM

## 2020-02-10 DIAGNOSIS — Z20.818 EXPOSURE TO STREP THROAT: ICD-10-CM

## 2020-02-10 DIAGNOSIS — B34.9 VIRAL SYNDROME: Primary | ICD-10-CM

## 2020-02-10 DIAGNOSIS — R11.2 NAUSEA AND VOMITING, INTRACTABILITY OF VOMITING NOT SPECIFIED, UNSPECIFIED VOMITING TYPE: ICD-10-CM

## 2020-02-10 DIAGNOSIS — R10.84 GENERALIZED ABDOMINAL PAIN: ICD-10-CM

## 2020-02-10 DIAGNOSIS — R05.9 COUGH: ICD-10-CM

## 2020-02-10 DIAGNOSIS — J02.9 SORE THROAT: ICD-10-CM

## 2020-02-10 LAB
CTP QC/QA: YES
MOLECULAR STREP A: NEGATIVE

## 2020-02-10 PROCEDURE — 87651 STREP A DNA AMP PROBE: CPT | Mod: QW,S$GLB,, | Performed by: NURSE PRACTITIONER

## 2020-02-10 PROCEDURE — 87651 POCT STREP A MOLECULAR: ICD-10-PCS | Mod: QW,S$GLB,, | Performed by: NURSE PRACTITIONER

## 2020-02-10 PROCEDURE — 99214 PR OFFICE/OUTPT VISIT, EST, LEVL IV, 30-39 MIN: ICD-10-PCS | Mod: S$GLB,,, | Performed by: NURSE PRACTITIONER

## 2020-02-10 PROCEDURE — 99214 OFFICE O/P EST MOD 30 MIN: CPT | Mod: S$GLB,,, | Performed by: NURSE PRACTITIONER

## 2020-02-10 RX ORDER — AZELASTINE 1 MG/ML
1 SPRAY, METERED NASAL 2 TIMES DAILY
Qty: 30 ML | Refills: 0 | Status: SHIPPED | OUTPATIENT
Start: 2020-02-10 | End: 2020-06-08

## 2020-02-10 RX ORDER — ONDANSETRON 4 MG/1
4 TABLET, FILM COATED ORAL EVERY 12 HOURS PRN
Qty: 14 TABLET | Refills: 0 | Status: SHIPPED | OUTPATIENT
Start: 2020-02-10 | End: 2020-02-17

## 2020-02-10 NOTE — PATIENT INSTRUCTIONS
"Always follow your healthcare professional's instructions.    You have received urgent care diagnosis and treatment and you may be released before all of your medical problems are known or treated. Unless you have been given a referral, you (the patient), will arrange for follow-up care as instructed.     If you have been given a referral, [facility] will contact you (their phone number is [phone]). If they do NOT call you by the end of the business day, please call them the following day.    TREATMENT: YOUR TREATMENT IS AIMED AT SYMPTOM MANAGEMENT.     You have been diagnosed with Viral Syndrome   A viral illness may cause a number of symptoms. The symptoms depend on the part of the body that the virus affects. If it settles in your nose, throat, and lungs, it may cause cough, sore throat, congestion, and sometimes headache. If it settles in your stomach and intestinal tract, it may cause vomiting and diarrhea. Sometimes it causes vague symptoms like "aching all over," feeling tired, loss of appetite, or fever.  A viral illness usually lasts 1 to 2 weeks, but sometimes it lasts longer. In some cases, a more serious infection can look like a viral syndrome in the first few days of the illness. You may need another exam and additional tests to know the difference.     Home care  Follow these guidelines for taking care of yourself at home:  · If symptoms are severe, rest at home for the first 2 to 3 days.  · Stay away from cigarette smoke - both your smoke and the smoke from others.  · You may use over-the-counter acetaminophen or ibuprofen for fever, muscle aching, and headache, unless another medicine was prescribed for this. If you have chronic liver or kidney disease or ever had a stomach ulcer or GI bleeding, talk with your doctor before using these medicines. No one who is younger than 18 and ill with a fever should take aspirin. It may cause severe disease or death.  · Your appetite may be poor, so a light " diet is fine. Avoid dehydration by drinking 8 to 12 8-ounce glasses of fluids each day. This may include water; orange juice; lemonade; apple, grape, and cranberry juice; clear fruit drinks; electrolyte replacement and sports drinks; and decaffeinated teas and coffee. If you have been diagnosed with a kidney disease, ask your doctor how much and what types of fluids you should drink to prevent dehydration. If you have kidney disease, drinking too much fluid can cause it build up in the your body and be dangerous to your health.  · Over-the-counter remedies won't shorten the length of the illness but may be helpful for cough, sore throat; and nasal and sinus congestion. Don't use decongestants if you have high blood pressure.    Follow-up care  Follow up with your healthcare provider if your symptoms continue for more than 7 days.    Fever Cough Body Aches Nausea and Vomiting Diarrhea Congestion or Runny Nose    OTC Tylenol   OTC NSAIDs  Tessalon Pearls   Phenergan DM   OTC cough medications  Mobic   OTC Tylenol   OTC NSAIDs  Zofran   Phenergan   OTC Emetrol  DO NOT take ant-diarrheal medications  OTC Claritin, Zyrtec, Allegra, OR Xyzal   OTC Flonase   OTC Benadryl      Cough Allergy Symptoms Asthma Headache or Body Aches   Tessalon Perles are a non-narcotic cough medicine. It works by numbing the throat and lungs, making the cough reflex less active, it is used to relieve coughing during the day. If you have been given Phenergan DM cough syrup, you do NOT need to take both medications at the same time.    Phenergan DM is a combination medication that is used to treat cough. Phenergan DM works like an antihistamine and cough suppressant. This medication will make you sleepy like Benadryl, have a drying effect, and act on a part of the brain (cough center) to reduce the need to cough. DO NOT take Benadryl and Phenergan together. You may take over-the-counter claritin, zyrtec, allegra, or xyzal as  "directed, these are antihistamines that will work to dry up secretions/mucus. Antihistamines work to block the effects of a certain natural substance (histamine), which causes allergy symptoms.    OR    Use over-the-counter Flonase (a nasal steroid) or prescribed Azelastine (a nasal antihistamine) to treat runny nose, sneezing, itchy nose, nasal congestion, and postnasal drip.    Proper administration is to "look down at your toes and aim for your nose". The goal is to aim for your nasolabial folds, the creases in your nose. If you feel the medication drip down your throat, you have NOT administered it correctly. If you can "smell the roses" (floral scent), then you have administered it correctly. If you have a history of asthma, expressed a concern about wheezing or have been told you were wheezing during your exam today, you are being given Albuterol. Albuterol is a bronchodilator that relaxes muscles in the airways and increases air flow to the lungs; it is used to treat or prevent bronchospasm, or narrowing of the airways in the lungs.     If you have a history of asthma, expressed a concern about wheezing or have been told you were wheezing during your exam today and are NOT being prescribed Albuterol that is because you have insured me that you have an adequate supply of the drug at home.    Mobic is a nonsteroidal anti-inflammatory drug (NSAID), it is used to treat inflammation. It reduces pain, swelling, and stiffness of the joints. Please do NOT take any other NSAID medications while on this medication, you can take Tylenol if you feel the need. If you were not prescribed an anti-inflammatory medication, and if you do not have any history of stomach/intestinal ulcers, or kidney disease, or are not taking a blood thinner such as Coumadin, Plavix, Pradaxa, Eloquis, or Xaralta for example, it is OK to take over the counter Ibuprofen or Advil or Motrin or Aleve as directed.  Do not take any of these medications " on an empty stomach.         Dehydration (Adult)  Dehydration occurs when your body loses too much fluid. This may be the result of prolonged vomiting or diarrhea, excessive sweating, or a high fever. It may also happen if you don't drink enough fluid when you're sick or out in the heat. Misuse of diuretics (water pills) can also be a cause.  Symptoms include thirst and decreased urine output. You may also feel dizzy, weak, fatigued, or very drowsy. The diet described below is usually enough to treat dehydration. In some cases, you may need medicine.    Home care  · Drink at least 12 8-ounce glasses of fluid every day to resolve the dehydration. Fluid may include water; orange juice; lemonade; apple, grape, or cranberry juice; clear fruit drinks; electrolyte replacement and sports drinks; and teas and coffee without caffeine. If you have been diagnosed with a kidney disease, ask your doctor how much and what types of fluids you should drink to prevent dehydration. If you have kidney disease, fluid can build up in the body. This can be dangerous to your health.  · If you have a fever, muscle aches, or a headache as a result of a cold or flu, you may take acetaminophen or ibuprofen, unless another medicine was prescribed. If you have chronic liver or kidney disease, or have ever had a stomach ulcer or gastrointestinal bleeding, talk with your health care provider before using these medicines. Don't take aspirin if you are younger than 18 and have a fever. Aspirin raises the chance for severe liver injury.    When to seek medical advice  DEHYDRATION:  · Continued vomiting  · Frequent diarrhea (more than 5 times a day); blood (red or black color) or mucus in diarrhea  · Blood in vomit or stool  · Swollen abdomen or increasing abdominal pain  · Weakness, dizziness, or fainting  · Unusual drowsiness or confusion  · Reduced urine output or extreme thirst    FEVER:  Call your healthcare provider right away if any of these  occur:  · Your child is 3 months old or younger and has a fever of 100.4°F (38°C) or higher. Get medical care right away. Fever in a young baby can be a sign of a dangerous infection.  · Your child is of any age and has repeated fevers above 104°F (40°C).  · Your child is younger than 2 years of age and a fever of 100.4°F (38°C) continues for more than 1 day.  · A fever of 100.4°F (38°C) for more than 3 days in anyone older than 2 years of age.       Why didn't I get a steroid today?    The benefits are small and, unlike topical glucocorticoids, systemic glucocorticoids possess a significant side effect profile. Major side effects include:    · Effects immunity predisposing you to getting a more severe infection and increases your white blood cell count. You have the flu, you do not need anything to weaken your immune system right now.  · Young children -- Growth impairment   · Skin consequences: Skin thinning and ecchymoses, Cushingoid appearance (rounded face), acne, weight gain, mild hirsutism, facial erythema, and striae.  · Eye consequences: Cataracts, increased intraocular pressure, exophthalmos.   · Cardiovascular consequences: Fluid retention, premature atherosclerotic disease, and arrhythmias.   · GI consequences: Increased risk for adverse gastrointestinal effects, such as gastritis, ulcer formation, and gastrointestinal bleeding  · Bone and muscle consequences: Osteoporosis, osteonecrosis, and myopathy.  · Neuropsychiatric effects: Mood disorders, psychosis, memory impairment, and   · Metabolic and Endocrine consequences: Suppress the hypothalamic-pituitary-adrenal (HPA) axis and increase blood sugar.     Can I just have a shot instead of pills?  No. If you are sick, you need a course of treatment (not just a one time dose).     Why didn't I get an antibiotic today?  You have been diagnosed with a virus an antibiotic will not help you. Antibiotics work by destroying the cell walls of bacteria, viruses do  "not have cell walls.     When to seek medical advice:    DEHYDRATION:  · Continued vomiting  · Frequent diarrhea (more than 5 times a day); blood (red or black color) or mucus in diarrhea  · Blood in vomit or stool  · Swollen abdomen or increasing abdominal pain  · Reduced urine output or extreme thirst  · Repeated vomiting after the first 4 hours on fluids  · Occasional vomiting for more than 48 hours  · Frequent diarrhea (more than 5 times a day), blood in diarrhea (red or black color), or mucus in diarrhea  · Blood in vomit or stool  · Swollen abdomen or signs of abdominal pain  · No urine for 8 hours, no tears when crying, "sunken" eyes, or dry mouth  · Unusual behavior changes, fussiness, drowsiness, confusion, or seizure  · Fever (see Fever and children, below)  FEVER:  Call your healthcare provider right away if any of these occur:  · Your child is 3 months old or younger and has a fever of 100.4°F (38°C) or higher. Get medical care right away. Fever in a young baby can be a sign of a dangerous infection.  · Your child is of any age and has repeated fevers above 104°F (40°C).  · Your child is younger than 2 years of age and a fever of 100.4°F (38°C) continues for more than 1 day.  · A fever of 100.4°F (38°C) for more than 3 days in anyone older than 2 years of age.  Call 911  Call 911 or your local emergency services number if the child shows any of these symptoms or signs:  · Trouble breathing  · Confusion  · Is very drowsy or difficult to awaken  · Fainting or loss of consciousness  · Rapid heart rate  · Seizure  · Stiff neck     Your provider discussed your plan of care with you during your physical exam. It was reviewed once more by the provider when giving you an after visit summary. If the patient is a minor, the discharge instructions were discussed with an adult and that adult acknowledge their understanding of the provider's teaching.      "
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
## 2020-02-13 ENCOUNTER — TELEPHONE (OUTPATIENT)
Dept: URGENT CARE | Facility: CLINIC | Age: 40
End: 2020-02-13

## 2020-02-13 PROBLEM — R10.2 PELVIC PAIN: Status: ACTIVE | Noted: 2020-02-13

## 2020-02-17 ENCOUNTER — TELEPHONE (OUTPATIENT)
Dept: ENDOCRINOLOGY | Facility: CLINIC | Age: 40
End: 2020-02-17

## 2020-02-17 NOTE — TELEPHONE ENCOUNTER
Left a MESSAGE for pt. I scheduled her for May 5 at 10am to see dr wright but also I put her on wait list for sooner appt with dr wright. Advise she look on [facility] everyday after 7pm for any cancellations appts.

## 2020-02-17 NOTE — TELEPHONE ENCOUNTER
----- Message from Miriam Alexander sent at 2/17/2020 12:24 PM CST -----  Contact: Self [phone]  New Patient would like to speak with you about scheduling an appointment for her thyroid and testing positive for pregnancy but knows for a fact she can't get pregnant. Please advise

## 2020-02-27 RX ORDER — PROMETHAZINE HYDROCHLORIDE 25 MG/1
25 TABLET ORAL
OUTPATIENT
Start: 2020-02-27

## 2020-02-27 NOTE — TELEPHONE ENCOUNTER
Called and informed pt that NP Abraham could not refill medication for her, but she could send a refill for Zofran for pt. Pt stated that Zofran does not help her and she really needs medication. Informed and advised pt that she could go to Urgent Care .

## 2020-02-27 NOTE — TELEPHONE ENCOUNTER
----- Message from Pamela Tolentino sent at 2/27/2020 11:05 AM CST -----  Contact: Self  Pt is calling to speak with Staff regarding a refill of promethazine (PHENERGAN) 25 MG tablet.  Pt says she is out of town and not feeling well; nausea and the medication is the only thing that helps her.  She is requesting a prescription is called into the [address], [place], [address], [phone].    Thank you.

## 2020-02-27 NOTE — TELEPHONE ENCOUNTER
Called and spoke with pt in regards of message. Pt states she is out of town, and has been breaking out in hives around neck and being nauseated.  Pt states she can't take benadryl, and has been taking the phenergan 25mg. Pt states she is running out and wanted to know if you could send a med refill of the medication to [facility] in [place], [place]. Please advise.

## 2020-02-27 NOTE — TELEPHONE ENCOUNTER
----- Message from JAVI Augustine sent at 2/27/2020 11:37 AM CST -----  Please inform patient I have never prescribed promethazine for her. She needs to send request to original prescriber or I can send refill for Zofran.

## 2020-03-23 ENCOUNTER — TELEPHONE (OUTPATIENT)
Dept: ORTHOPEDICS | Facility: CLINIC | Age: 40
End: 2020-03-23

## 2020-03-23 NOTE — TELEPHONE ENCOUNTER
----- Message from Guido Mark sent at 3/23/2020 10:07 AM CDT -----  Contact: [phone] / self   Patient would like to speak with your office regarding her right shoulder pain, states it is becoming really severe. Please Advise.

## 2020-03-23 NOTE — TELEPHONE ENCOUNTER
joya with pt. Re: shoulder pain, advised she is immunocompromised and is unable to come in for a visit. Advised we will get her setup with a virtual visit later this week.

## 2020-03-24 ENCOUNTER — DOCUMENTATION ONLY (OUTPATIENT)
Dept: REHABILITATION | Facility: HOSPITAL | Age: 40
End: 2020-03-24

## 2020-03-24 ENCOUNTER — TELEPHONE (OUTPATIENT)
Dept: NEUROSURGERY | Facility: CLINIC | Age: 40
End: 2020-03-24

## 2020-03-24 DIAGNOSIS — R20.0 NUMBNESS: Primary | ICD-10-CM

## 2020-03-24 NOTE — PROGRESS NOTES
3/24/2020    Pt has not attended PT sessions since 12/12/2019 and will be discharged with goal status unknown.

## 2020-03-25 NOTE — TELEPHONE ENCOUNTER
Spoke with Ms Gray, she states she will call clinic back to schedule x-ray / virtual  appointment . Due to covid concerns and immunocompromised she will await scheduling for now.

## 2020-04-10 ENCOUNTER — PATIENT MESSAGE (OUTPATIENT)
Dept: FAMILY MEDICINE | Facility: CLINIC | Age: 40
End: 2020-04-10

## 2020-04-10 ENCOUNTER — TELEPHONE (OUTPATIENT)
Dept: FAMILY MEDICINE | Facility: CLINIC | Age: 40
End: 2020-04-10

## 2020-04-10 ENCOUNTER — OFFICE VISIT (OUTPATIENT)
Dept: FAMILY MEDICINE | Facility: CLINIC | Age: 40
End: 2020-04-10
Payer: MEDICARE

## 2020-04-10 DIAGNOSIS — R07.81 RIB PAIN ON RIGHT SIDE: Primary | ICD-10-CM

## 2020-04-10 DIAGNOSIS — R10.11 RIGHT UPPER QUADRANT PAIN: ICD-10-CM

## 2020-04-10 PROCEDURE — 99214 PR OFFICE/OUTPT VISIT, EST, LEVL IV, 30-39 MIN: ICD-10-PCS | Mod: 95,,, | Performed by: INTERNAL MEDICINE

## 2020-04-10 PROCEDURE — 99214 OFFICE O/P EST MOD 30 MIN: CPT | Mod: 95,,, | Performed by: INTERNAL MEDICINE

## 2020-04-10 RX ORDER — METHYLPREDNISOLONE 4 MG/1
TABLET ORAL
Qty: 1 PACKAGE | Refills: 0 | Status: SHIPPED | OUTPATIENT
Start: 2020-04-10 | End: 2020-05-01

## 2020-04-10 NOTE — PROGRESS NOTES
[facility] Virtual Visit Note    The patient location is: Home  The chief complaint leading to consultation is: chest wall pain  Visit type: Virtual visit with synchronous audio and video  Total time spent with patient: 20 min  Each patient to whom he or she provides medical services by telemedicine is:  (1) informed of the relationship between the physician and patient and the respective role of any other health care provider with respect to management of the patient; and (2) notified that he or she may decline to receive medical services by telemedicine and may withdraw from such care at any time.      Chief Complaint      Chief Complaint   Patient presents with    Chest Pain       History of Present Illness      Jaycee Gray is a 39 y.o. female with chronic conditions of CMT, hypothyroidism, interstitial cystitis, chronic pain who presents today for: complaints of 2 weeks of right sided rib pain, worse with breathing, raising right arm.  Pressing on rib improves.  Has tried hydrocodone 10 mg and lidocaine patch without much improvement.  No inciting trauma.  Reports similar pain previously which was due to an issue with spinal fluid leaking?  Ended up having back surgery with Dr. Rivera for this similar pain.  Pt is not sure the details surrounding that event.  Pt has adverse reactions with ibuprofen and aleve.      Past Medical History:  Past Medical History:   Diagnosis Date    Anxiety     Breast abscess     Charcot-Amanda-Tooth disease     mild LE weakness    Chronic interstitial cystitis without hematuria     CMT (Charcot-Amanda-Tooth disease)     Depression     reports she is no longer depressed    Endometriosis of uterus     Headache(784.0)     Herpes     History of psychiatric care     History of psychiatric hospitalization     Muscular dystrophy     PTSD (post-traumatic stress disorder)     S/P cholecystectomy     Seizures     last seizure 14-15 yrs ago    Self-injurious  behavior     Thyroid disease        Past Surgical History:   has a past surgical history that includes Knee surgery (Bilateral); Hysterectomy; Appendectomy; Breast surgery; Oophorectomy; Cholecystectomy (2017); Back surgery (2017); Back surgery (2018); Esophagogastroduodenoscopy (2018); Upper EUS (2018); Esophagogastroduodenoscopy (N/A, 2018); Endoscopic ultrasound of upper gastrointestinal tract (N/A, 2018); arthroscopy right shoulder; Cystoscopy with hydrodistension of bladder (N/A, 2019); and Excisional hemorrhoidectomy (N/A, 10/1/2019).    Family History:  family history includes Bladder Cancer in her maternal grandmother; Breast cancer in her paternal aunt; Cancer in her maternal grandfather; Colon cancer in her maternal grandfather; No Known Problems in her father and mother.     Social History:  Social History     Tobacco Use    Smoking status: Former Smoker     Packs/day: 0.50     Years: 3.00     Pack years: 1.50     Types: Cigarettes     Last attempt to quit:      Years since quittin.2    Smokeless tobacco: Never Used    Tobacco comment: quit 2015   Substance Use Topics    Alcohol use: No    Drug use: No       Review of Systems   Constitutional: Negative for chills, fever and malaise/fatigue.   Respiratory: Negative for cough, sputum production and shortness of breath.    Cardiovascular: Positive for chest pain (Right anterior inferior ribs).   Gastrointestinal: Negative for constipation, diarrhea, nausea and vomiting.   Skin: Negative for rash.   Neurological: Negative for weakness.        Medications:  Outpatient Encounter Medications as of 4/10/2020   Medication Sig Note Dispense Refill    acetaminophen (TYLENOL) 325 MG tablet Take 1 tablet (325 mg total) by mouth every 6 (six) hours as needed for Pain. (Patient not taking: Reported on 2/10/2020) 2019: Take as needed      azelastine (ASTELIN) 137 mcg (0.1 %) nasal spray 1 spray (137 mcg  total) by Nasal route 2 (two) times daily. for 14 days  30 mL 0    EPINEPHrine (EPIPEN) 0.3 mg/0.3 mL AtIn        estradiol (ESTRACE) 1 MG tablet        HYDROcodone-acetaminophen (NORCO) 7.5-325 mg per tablet Take 1 tablet by mouth every 4 to 6 hours as needed for Pain. (Patient not taking: Reported on 2/10/2020)  42 tablet 0    HYDROCODONE-ACETAMINOPHEN ORAL Take 1 tablet by mouth.       hydrocortisone 2.5 % cream Apply topically 2 (two) times daily. (Patient not taking: Reported on 2/10/2020)  1 Tube 3    levothyroxine (SYNTHROID) 50 MCG tablet Take 1 tablet (50 mcg total) by mouth once daily.  30 tablet 2    lidocaine 5 % Gel Apply 1 application topically 3 (three) times daily.  1 Tube     methylPREDNISolone (MEDROL DOSEPACK) 4 mg tablet use as directed  1 Package 0    mupirocin (BACTROBAN) 2 % ointment Apply to affected area BID x 5 days.       ondansetron (ZOFRAN) 4 MG tablet Take 1 tablet (4 mg total) by mouth every 8 (eight) hours as needed for Nausea.  30 tablet 0    polyethylene glycol (GLYCOLAX) 17 gram PwPk Take 17 g by mouth once daily. (Patient not taking: Reported on 2/10/2020)  30 each 0    promethazine (PHENERGAN) 25 MG tablet Take 25 mg by mouth.       tiZANidine 4 mg Cap Take by mouth nightly as needed.  8/5/2019: Take as needed      [DISCONTINUED] escitalopram (LEXAPRO) 10 MG tablet Take 1 tablet (10 mg total) by mouth once daily.  30 tablet 11    [DISCONTINUED] ranitidine (ZANTAC) 150 MG tablet Take 1 tablet (150 mg total) by mouth 2 (two) times daily.  60 tablet 11    [DISCONTINUED] topiramate (TOPAMAX) 50 MG tablet Take 1 tablet (50 mg total) by mouth 2 (two) times daily.  60 tablet 0     No facility-administered encounter medications on file as of 4/10/2020.        Allergies:  Review of patient's allergies indicates:   Allergen Reactions    Benadryl decongestant Shortness Of Breath    Carrot Hives and Shortness Of Breath    Sumatriptan     Sumatriptan succinate Other (See  Comments)     paralysis    Tramadol Other (See Comments)     Faces swells. Tolerates percocet    Venom-honey bee Anaphylaxis    Wasp sting [allergen ext-venom-honey bee] Anaphylaxis    Bupropion hcl        Health Maintenance:  Immunization History   Administered Date(s) Administered    Influenza 09/08/2011, 11/26/2013, 11/02/2015    Influenza - Quadrivalent 08/26/2019    Influenza - Quadrivalent - MDCK - PF 09/28/2018    Influenza - Quadrivalent - PF (6 months and older) 11/26/2013, 11/02/2015, 09/19/2016    Influenza - Trivalent - PF (ADULT) 11/26/2013, 09/18/2014    Influenza Split 09/08/2011    Tdap 10/07/2019      Health Maintenance   Topic Date Due    TETANUS VACCINE  10/07/2029    Lipid Panel  Completed        Physical Exam       ]    Physical Exam   Constitutional: She is oriented to person, place, and time. She appears well-developed and well-nourished. No distress.   Eyes: Conjunctivae and EOM are normal. Right eye exhibits no discharge. Left eye exhibits no discharge. No scleral icterus.   Pulmonary/Chest: Effort normal. No respiratory distress.   Neurological: She is alert and oriented to person, place, and time.   Skin: She is not diaphoretic.   Psychiatric: She has a normal mood and affect. Her behavior is normal. Judgment and thought content normal.        Laboratory:  CBC:  Recent Labs   Lab 04/26/19  1010 06/27/19  1119 07/08/19  0948   WBC 5.93 7.89 4.68   RBC 4.76 4.91 4.58   Hemoglobin 14.5 15.0 14.1   Hematocrit 42.2 43.6 41.6   Platelets 206 206 182   Mean Corpuscular Volume 89 89 91   Mean Corpuscular Hemoglobin 30.5 30.5 30.8   Mean Corpuscular Hemoglobin Conc 34.4 34.4 33.9     CMP:  Recent Labs   Lab 04/26/19  1010  07/08/19  0948 01/09/20  1042   Glucose 98   < > 107 93   Calcium 9.8   < > 9.6 10.4   Albumin 4.5  --  4.2 3.7   Total Protein 8.3  --  7.3 7.8   Sodium 144   < > 144 145   Potassium 3.4 L   < > 3.7 5.3 H   CO2 26   < > 24 30 H   Chloride 104   < > 105 106   BUN,  Bld 9   < > 9 7   Alkaline Phosphatase 93  --  103 70   ALT 17  --  114 H 12   AST 19  --  80 H 15   Total Bilirubin 0.7  --  0.5 0.5    < > = values in this interval not displayed.     URINALYSIS:  Recent Labs   Lab 04/26/19  1049  09/19/19  1207   Color, UA Yellow   < > Yellow   Specific Gravity, UA 1.025   < > 1.020   Spec Grav UA  --    < >  --    pH, UA 6.0   < > 6.0   Protein, UA 1+ A   < > Negative   Bacteria Many A  --   --    Nitrite, UA Negative   < > Negative   Leukocytes, UA Negative   < > Negative   Urobilinogen, UA Negative   < > Negative   Hyaline Casts, UA 0  --   --     < > = values in this interval not displayed.      LIPIDS:  Recent Labs   Lab 06/01/18  0911 06/06/18  1157 05/28/19  0927 01/09/20  1042   TSH 5.220 H 3.310 4.878 H 1.694   HDL 54  --   --   --    Cholesterol 167  --   --   --    Triglycerides 59  --   --   --    LDL Cholesterol 101.2  --   --   --    Hdl/Cholesterol Ratio 32.3  --   --   --    Non-HDL Cholesterol 113  --   --   --    Total Cholesterol/HDL Ratio 3.1  --   --   --      TSH:  Recent Labs   Lab 06/06/18  1157 05/28/19 0927 01/09/20  1042   TSH 3.310 4.878 H 1.694     A1C:        Assessment/Plan     Jaycee Gray is a 39 y.o.female New to me with:    1. Rib pain on right side  - X-Ray Ribs 2 View Right; Future  - methylPREDNISolone (MEDROL DOSEPACK) 4 mg tablet; use as directed  Dispense: 1 Package; Refill: 0    2. Right upper quadrant pain  - US Abdomen Complete; Future    Will send for xrays and ultrasound to further evaluate.  Her symptoms sound most consistent with costochondritis.  Sending in steroid pack to see if it helps.  I'm not sure what she's talking about regarding her spinal fluid leak.  On Monday, will try to get Dr. Rivera's records to further investigate.  Pt instructed to go to ER if symptoms worsen.    Juancarlos Carrillo MD  [facility]

## 2020-04-30 DIAGNOSIS — B34.9 VIRAL SYNDROME: ICD-10-CM

## 2020-04-30 RX ORDER — AZELASTINE 1 MG/ML
SPRAY, METERED NASAL
Qty: 30 ML | Refills: 0 | OUTPATIENT
Start: 2020-04-30

## 2020-05-05 ENCOUNTER — OFFICE VISIT (OUTPATIENT)
Dept: ENDOCRINOLOGY | Facility: CLINIC | Age: 40
End: 2020-05-05
Payer: MEDICARE

## 2020-05-05 DIAGNOSIS — E03.9 ACQUIRED HYPOTHYROIDISM: ICD-10-CM

## 2020-05-05 PROCEDURE — 99201 PR OFFICE/OUTPT VISIT,NEW,LEVL I: ICD-10-PCS | Mod: 95,,, | Performed by: INTERNAL MEDICINE

## 2020-05-05 PROCEDURE — 99201 PR OFFICE/OUTPT VISIT,NEW,LEVL I: CPT | Mod: 95,,, | Performed by: INTERNAL MEDICINE

## 2020-05-05 RX ORDER — LEVOTHYROXINE SODIUM 50 UG/1
50 TABLET ORAL DAILY
Qty: 30 TABLET | Refills: 9 | Status: SHIPPED | OUTPATIENT
Start: 2020-05-05 | End: 2020-06-07 | Stop reason: SDUPTHER

## 2020-05-05 NOTE — ASSESSMENT & PLAN NOTE
Appears clinically and biochemically euthyroid   Continue Levothyroxine 50 mcg   OK to repeat labs in 1/2021

## 2020-05-05 NOTE — PROGRESS NOTES
Subjective:      Patient ID: Jaycee Gray is a 39 y.o. female.    Chief Complaint:  No chief complaint on file.      History of Present Illness  Ms. Gray is a 39 year old woman who is here for evaluation of hypothyroidism.    This was diagnosed six years ago.  Presented with fatigue.     She is currently feeling well on LT4. Last dose change two years ago. Denies any current symptoms including weight gain, cold intolerance, depressed mood, fatigue and constipation.    Denies any symptoms of over-treatment including, palpitations.   Able to exercise, walks to her friends house daily.     Current medication is levothyroxine 50 mcg daily.   Takes it properly without food first thing in the morning with juice/water. Waits 30 - 45 minutes before breakfast or other pills.     PCP  Hysterectomy 23 --> endometriosis, bleeding and muscular atrophy.  Family history: No thyroid issues    Review of Systems   Constitutional: Negative for chills and fever.   HENT: Negative for congestion and sinus pain.    Eyes: Negative for visual disturbance.   Respiratory: Negative for cough and shortness of breath.    Cardiovascular: Negative for chest pain and leg swelling.   Gastrointestinal: Positive for constipation (  takes miralax).        Abdominal and pelvic pain that is chronic.    Genitourinary: Negative for dysuria.   Musculoskeletal: Negative for arthralgias.   Skin: Negative for rash.   Neurological: Negative for weakness.   Hematological: Does not bruise/bleed easily.   Psychiatric/Behavioral: Positive for sleep disturbance.       Objective:   Physical Exam  There were no vitals filed for this visit.    BP Readings from Last 3 Encounters:   04/10/20 104/81   02/10/20 128/80   02/05/20 110/61     Wt Readings from Last 1 Encounters:   04/10/20 1550 74.8 kg (165 lb)         There is no height or weight on file to calculate BMI.    Lab Review:   No results found for: HGBA1C  Lab Results   Component Value Date    CHOL  167 06/01/2018    HDL 54 06/01/2018    LDLCALC 101.2 06/01/2018    TRIG 59 06/01/2018    CHOLHDL 32.3 06/01/2018     Lab Results   Component Value Date     04/10/2020    K 3.5 04/10/2020     04/10/2020    CO2 24 04/10/2020    GLU 89 04/10/2020    BUN 9 04/10/2020    CREATININE 0.52 04/10/2020    CALCIUM 9.5 04/10/2020    PROT 7.7 04/10/2020    ALBUMIN 4.2 04/10/2020    BILITOT 0.4 04/10/2020    ALKPHOS 68 04/10/2020    AST 20 04/10/2020    ALT 10 04/10/2020    ANIONGAP 9 04/10/2020    ESTGFRAFRICA >60.0 04/10/2020    EGFRNONAA >60.0 04/10/2020    TSH 1.694 01/09/2020     Results for SARA CERDA (MRN [id number]) as of 5/5/2020 10:17   Ref. Range 6/1/2018 09:11 6/6/2018 11:57 5/28/2019 09:27 1/9/2020 10:42   TSH Latest Ref Range: 0.400 - 4.000 uIU/mL 5.220 (H) 3.310 4.878 (H) 1.694   Free T4 Latest Ref Range: 0.71 - 1.51 ng/dL 0.98 1.07 1.28 1.16       Assessment and Plan     Acquired hypothyroidism  Appears clinically and biochemically euthyroid   Continue Levothyroxine 50 mcg   OK to repeat labs in 1/2021        The patient location is: home  The chief complaint leading to consultation is: hypothyroidism   Visit type: audiovisual  Total time spent with patient: 15 min  Each patient to whom he or she provides medical services by telemedicine is:  (1) informed of the relationship between the physician and patient and the respective role of any other health care provider with respect to management of the patient; and (2) notified that he or she may decline to receive medical services by telemedicine and may withdraw from such care at any time.

## 2020-05-08 ENCOUNTER — PATIENT MESSAGE (OUTPATIENT)
Dept: FAMILY MEDICINE | Facility: CLINIC | Age: 40
End: 2020-05-08

## 2020-05-25 ENCOUNTER — TELEPHONE (OUTPATIENT)
Dept: ORTHOPEDICS | Facility: CLINIC | Age: 40
End: 2020-05-25

## 2020-05-25 NOTE — TELEPHONE ENCOUNTER
----- Message from Ariel Workman sent at 5/25/2020  1:03 PM CDT -----  Contact: self  Pt ask for a call in regards needing the name of the provider that Dr Blanca referred her to back in March 2020       Contact info  [phone]

## 2020-05-29 NOTE — TELEPHONE ENCOUNTER
----- Message from Kassy Sterling LMSW sent at 6/7/2018  8:53 AM CDT -----  Thank you for the referral.  Patient has been assigned to Yuli Jackson LMSW for low risk screening for Outpatient Case Management.     Reason for referral: Charcot Amanda Tooth muscular atrophy    Please contact [facility] at ecn. [phone] with any questions.    Thank you,    Kassy Sterling LMSW   125.4

## 2020-06-05 ENCOUNTER — TELEPHONE (OUTPATIENT)
Dept: ORTHOPEDICS | Facility: CLINIC | Age: 40
End: 2020-06-05

## 2020-06-05 DIAGNOSIS — R52 PAIN: Primary | ICD-10-CM

## 2020-06-07 ENCOUNTER — PATIENT MESSAGE (OUTPATIENT)
Dept: ENDOCRINOLOGY | Facility: CLINIC | Age: 40
End: 2020-06-07

## 2020-06-07 DIAGNOSIS — E03.9 ACQUIRED HYPOTHYROIDISM: Primary | ICD-10-CM

## 2020-06-07 RX ORDER — LEVOTHYROXINE SODIUM 50 UG/1
50 TABLET ORAL DAILY
Qty: 30 TABLET | Refills: 11 | Status: SHIPPED | OUTPATIENT
Start: 2020-06-07 | End: 2021-08-16 | Stop reason: SDUPTHER

## 2020-06-08 ENCOUNTER — OFFICE VISIT (OUTPATIENT)
Dept: ORTHOPEDICS | Facility: CLINIC | Age: 40
End: 2020-06-08
Payer: MEDICARE

## 2020-06-08 ENCOUNTER — HOSPITAL ENCOUNTER (OUTPATIENT)
Dept: RADIOLOGY | Facility: HOSPITAL | Age: 40
Discharge: HOME | End: 2020-06-08
Attending: ORTHOPAEDIC SURGERY
Payer: MEDICARE

## 2020-06-08 VITALS — HEIGHT: 62 IN | WEIGHT: 165 LBS | BODY MASS INDEX: 30.36 KG/M2

## 2020-06-08 DIAGNOSIS — M54.2 NECK PAIN: Primary | ICD-10-CM

## 2020-06-08 DIAGNOSIS — M62.838 MUSCLE SPASM: ICD-10-CM

## 2020-06-08 DIAGNOSIS — R52 PAIN: ICD-10-CM

## 2020-06-08 PROCEDURE — 20610 DRAIN/INJ JOINT/BURSA W/O US: CPT | Mod: PBBFAC,PN | Performed by: ORTHOPAEDIC SURGERY

## 2020-06-08 PROCEDURE — 73030 XR SHOULDER COMPLETE 2 OR MORE VIEWS RIGHT: ICD-10-PCS | Mod: 26,RT,, | Performed by: INTERNAL MEDICINE

## 2020-06-08 PROCEDURE — 99999 PR PBB SHADOW E&M-EST. PATIENT-LVL III: CPT | Mod: PBBFAC,,, | Performed by: ORTHOPAEDIC SURGERY

## 2020-06-08 PROCEDURE — 73030 X-RAY EXAM OF SHOULDER: CPT | Mod: 26,RT,, | Performed by: INTERNAL MEDICINE

## 2020-06-08 PROCEDURE — 20610 PR DRAIN/INJECT LARGE JOINT/BURSA: ICD-10-PCS | Mod: S$PBB,RT,, | Performed by: ORTHOPAEDIC SURGERY

## 2020-06-08 PROCEDURE — 99213 OFFICE O/P EST LOW 20 MIN: CPT | Mod: PBBFAC,25,PN | Performed by: ORTHOPAEDIC SURGERY

## 2020-06-08 PROCEDURE — 99213 PR OFFICE/OUTPT VISIT, EST, LEVL III, 20-29 MIN: ICD-10-PCS | Mod: 25,S$PBB,, | Performed by: ORTHOPAEDIC SURGERY

## 2020-06-08 PROCEDURE — 99999 PR PBB SHADOW E&M-EST. PATIENT-LVL III: ICD-10-PCS | Mod: PBBFAC,,, | Performed by: ORTHOPAEDIC SURGERY

## 2020-06-08 PROCEDURE — 73030 X-RAY EXAM OF SHOULDER: CPT | Mod: TC,PN,RT

## 2020-06-08 PROCEDURE — 20610 DRAIN/INJ JOINT/BURSA W/O US: CPT | Mod: S$PBB,RT,, | Performed by: ORTHOPAEDIC SURGERY

## 2020-06-08 PROCEDURE — 99213 OFFICE O/P EST LOW 20 MIN: CPT | Mod: 25,S$PBB,, | Performed by: ORTHOPAEDIC SURGERY

## 2020-06-08 RX ORDER — OXYCODONE AND ACETAMINOPHEN 5; 325 MG/1; MG/1
TABLET ORAL EVERY 8 HOURS PRN
COMMUNITY
Start: 2020-05-14 | End: 2020-10-08

## 2020-06-08 RX ORDER — TRIAMCINOLONE ACETONIDE 40 MG/ML
40 INJECTION, SUSPENSION INTRA-ARTICULAR; INTRAMUSCULAR
Status: COMPLETED | OUTPATIENT
Start: 2020-06-08 | End: 2020-06-08

## 2020-06-08 RX ADMIN — TRIAMCINOLONE ACETONIDE 40 MG: 40 INJECTION, SUSPENSION INTRA-ARTICULAR; INTRAMUSCULAR at 03:06

## 2020-06-08 NOTE — PROGRESS NOTES
Subjective:      Patient ID: Jaycee Gray is a 39 y.o. female.  Chief Complaint: Pain and Numbness of the Right Shoulder      HPI  Jaycee Gray is a  39 y.o. female presenting today for follow up of right shoulder and neck pain.  She reports that she is still having see the same symptoms that she was seen for last year which include right shoulder pain and arm pain including numbness tingling in the right arm  She does have other issues and this sees a neurologist for apparent Charcot-Amanda-Tooth syndrome  She also has previously been evaluated for neck and back pain as well as pelvic pain.    Review of patient's allergies indicates:   Allergen Reactions    Benadryl decongestant Shortness Of Breath    Carrot Hives and Shortness Of Breath    Sumatriptan     Sumatriptan succinate Other (See Comments)     paralysis    Tramadol Other (See Comments)     Faces swells. Tolerates percocet    Venom-honey bee Anaphylaxis    Wasp sting [allergen ext-venom-honey bee] Anaphylaxis    Bupropion hcl          Current Outpatient Medications   Medication Sig Dispense Refill    EPINEPHrine (EPIPEN) 0.3 mg/0.3 mL AtIn       estradiol (ESTRACE) 1 MG tablet       HYDROCODONE-ACETAMINOPHEN ORAL Take 1 tablet by mouth.      levothyroxine (SYNTHROID) 50 MCG tablet Take 1 tablet (50 mcg total) by mouth once daily. 30 tablet 11    ondansetron (ZOFRAN) 4 MG tablet Take 1 tablet (4 mg total) by mouth every 8 (eight) hours as needed for Nausea. 30 tablet 0    oxyCODONE-acetaminophen (PERCOCET) 5-325 mg per tablet       promethazine (PHENERGAN) 25 MG tablet Take 25 mg by mouth.      tiZANidine 4 mg Cap Take by mouth nightly as needed.       ondansetron (ZOFRAN) 4 MG tablet Take 1 tablet (4 mg total) by mouth every 8 (eight) hours as needed for Nausea. (Patient not taking: Reported on 6/8/2020) 12 tablet 0     No current facility-administered medications for this visit.        Past Medical History:   Diagnosis Date     "Anxiety     Breast abscess     Charcot-Amanda-Tooth disease     mild LE weakness    Chronic interstitial cystitis without hematuria     CMT (Charcot-Amanda-Tooth disease)     Depression     reports she is no longer depressed    Endometriosis of uterus     Headache(784.0)     Herpes     History of psychiatric care     History of psychiatric hospitalization     Muscular dystrophy     PTSD (post-traumatic stress disorder)     S/P cholecystectomy     Seizures     last seizure 14-15 yrs ago    Self-injurious behavior     Thyroid disease        Past Surgical History:   Procedure Laterality Date    APPENDECTOMY      arthroscopy right shoulder      BACK SURGERY  07/01/2017    BACK SURGERY  02/21/2018    screw removal    BREAST SURGERY      reduction    CHOLECYSTECTOMY  03/2017    CYSTOSCOPY WITH HYDRODISTENSION OF BLADDER N/A 7/1/2019    Procedure: CYSTOSCOPY, WITH BLADDER HYDRODISTENSION;  Surgeon: Jay Shelby MD;  [facility];  Service: Urology;  Laterality: N/A;    ENDOSCOPIC ULTRASOUND OF UPPER GASTROINTESTINAL TRACT N/A 8/14/2018    Procedure: ULTRASOUND, ENDOSCOPIC, UPPER GI TRACT;  Surgeon: Marko Pereyra MD;  Location: [facility];  Service: Endoscopy;  Laterality: N/A;    ESOPHAGOGASTRODUODENOSCOPY  08/14/2018    ESOPHAGOGASTRODUODENOSCOPY N/A 8/14/2018    Procedure: ESOPHAGOGASTRODUODENOSCOPY (EGD);  Surgeon: Marko Pereyra MD;  Location: [facility];  Service: Endoscopy;  Laterality: N/A;    EXCISIONAL HEMORRHOIDECTOMY N/A 10/1/2019    Procedure: HEMORRHOIDECTOMY;  Surgeon: Jenifer Aragon MD;  Location: [facility] OR [address] [place] [address];  Service: Colon and Rectal;  Laterality: N/A;    HYSTERECTOMY      TLH vs LAVH with BSO    KNEE SURGERY Bilateral     OOPHORECTOMY      Upper EUS  08/14/2018       OBJECTIVE:   PHYSICAL EXAM:  Height: 5' 2" (157.5 cm) Weight: 74.8 kg (165 lb)  Vitals:    06/08/20 1457   Weight: 74.8 kg (165 lb)   Height: 5' 2" (1.575 m)   PainSc:   9     Ortho/SPM " Exam  Examination of the right shoulder there is mild tenderness posteriorly over the joint  Range of motion right shoulder is full  Mildly positive impingement sign  No instability  Rotator cuff strength intact  Neurologic exam intact  She does have a muscle spasm in the right upper back and levator scapular area    RADIOGRAPHS:  AP lateral x-rays of the right shoulder demonstrate no abnormalities previous x-rays of the cervical spine AP and lateral demonstrate straightening of the normal lordotic curvature  Comments: I have personally reviewed the imaging and I agree with the above radiologist's report.    ASSESSMENT/PLAN:     IMPRESSION:  Impression right shoulder pain.  2.  Myofasciitis right upper back and neck    PLAN:  I believe the majority of her symptoms are coming from the neck and upper back and I have recommended an injection today for the shoulder  After pause for time-out identified the right shoulder injected with Kenalog 40 mg 2 cc xylocaine sterile technique  She tolerated the procedure well without complication  I have also made referral to pain management to see if we can get a hand long some of her symptoms since she does seem to have generalized pain in different parts of her body  She may also benefit from an MRI of the cervical spine but  hold off and defer to pain management on that      FOLLOW UP:  As needed    Disclaimer: This note has been generated using voice-recognition software. There may be typographical errors that have been missed during proof-reading.

## 2020-06-16 ENCOUNTER — OFFICE VISIT (OUTPATIENT)
Dept: PAIN MEDICINE | Facility: CLINIC | Age: 40
End: 2020-06-16
Payer: MEDICARE

## 2020-06-16 VITALS
RESPIRATION RATE: 16 BRPM | OXYGEN SATURATION: 97 % | TEMPERATURE: 99 F | HEIGHT: 62 IN | BODY MASS INDEX: 30.39 KG/M2 | SYSTOLIC BLOOD PRESSURE: 110 MMHG | WEIGHT: 165.13 LBS | DIASTOLIC BLOOD PRESSURE: 62 MMHG | HEART RATE: 64 BPM

## 2020-06-16 DIAGNOSIS — Z98.890 HX OF SHOULDER SURGERY: ICD-10-CM

## 2020-06-16 DIAGNOSIS — M54.2 NECK PAIN: ICD-10-CM

## 2020-06-16 DIAGNOSIS — M25.511 CHRONIC RIGHT SHOULDER PAIN: ICD-10-CM

## 2020-06-16 DIAGNOSIS — M79.18 MYOFASCIAL PAIN: Primary | ICD-10-CM

## 2020-06-16 DIAGNOSIS — G89.29 CHRONIC RIGHT SHOULDER PAIN: ICD-10-CM

## 2020-06-16 PROCEDURE — 99215 OFFICE O/P EST HI 40 MIN: CPT | Mod: PBBFAC,PN | Performed by: ANESTHESIOLOGY

## 2020-06-16 PROCEDURE — 99999 PR PBB SHADOW E&M-EST. PATIENT-LVL V: CPT | Mod: PBBFAC,,, | Performed by: ANESTHESIOLOGY

## 2020-06-16 PROCEDURE — 20553 NJX 1/MLT TRIGGER POINTS 3/>: CPT | Mod: PBBFAC,PN | Performed by: ANESTHESIOLOGY

## 2020-06-16 PROCEDURE — 20553 PR INJECT TRIGGER POINTS, > 3: ICD-10-PCS | Mod: S$PBB,,, | Performed by: ANESTHESIOLOGY

## 2020-06-16 PROCEDURE — 99204 OFFICE O/P NEW MOD 45 MIN: CPT | Mod: S$PBB,25,, | Performed by: ANESTHESIOLOGY

## 2020-06-16 PROCEDURE — 99204 PR OFFICE/OUTPT VISIT, NEW, LEVL IV, 45-59 MIN: ICD-10-PCS | Mod: S$PBB,25,, | Performed by: ANESTHESIOLOGY

## 2020-06-16 PROCEDURE — 99999 PR PBB SHADOW E&M-EST. PATIENT-LVL V: ICD-10-PCS | Mod: PBBFAC,,, | Performed by: ANESTHESIOLOGY

## 2020-06-16 PROCEDURE — 20553 NJX 1/MLT TRIGGER POINTS 3/>: CPT | Mod: S$PBB,,, | Performed by: ANESTHESIOLOGY

## 2020-06-16 RX ORDER — LIDOCAINE HYDROCHLORIDE 10 MG/ML
1 INJECTION INFILTRATION; PERINEURAL
Status: COMPLETED | OUTPATIENT
Start: 2020-06-16 | End: 2020-06-16

## 2020-06-16 RX ORDER — TRIAMCINOLONE ACETONIDE 40 MG/ML
40 INJECTION, SUSPENSION INTRA-ARTICULAR; INTRAMUSCULAR
Status: COMPLETED | OUTPATIENT
Start: 2020-06-16 | End: 2020-06-16

## 2020-06-16 RX ADMIN — TRIAMCINOLONE ACETONIDE 40 MG: 40 INJECTION, SUSPENSION INTRA-ARTICULAR; INTRAMUSCULAR at 04:06

## 2020-06-16 RX ADMIN — LIDOCAINE HYDROCHLORIDE 1 ML: 10 INJECTION, SOLUTION INFILTRATION; PERINEURAL at 02:06
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
## 2020-06-16 NOTE — LETTER
June 18, 2020      Mike Blanca Jr., MD  [address]  [facility] [address]           [place] [facility]  [address]  [facility] [address]  Phone: [phone]  Fax: [phone]          Patient: Jaycee Gray   MR Number: [id number]   YOB: 1980   Date of Visit: 6/16/2020       Dear Dr. Mike Blanca Jr.:    Thank you for referring Jaycee Gray to me for evaluation. Attached you will find relevant portions of my assessment and plan of care.    If you have questions, please do not hesitate to call me. I look forward to following Jaycee Gray along with you.    Sincerely,    Mario Avila III, MD    Enclosure  CC:  No Recipients    If you would like to receive this communication electronically, please contact [email or web address] or [phone] to request more information on [employer] Link access.    For providers and/or their staff who would like to refer a patient to [facility], please contact us through our one-stop-shop provider referral line, [facility], at [phone].    If you feel you have received this communication in error or would no longer like to receive these types of communications, please e-mail [email or web address]

## 2020-07-31 ENCOUNTER — TELEPHONE (OUTPATIENT)
Dept: UROGYNECOLOGY | Facility: CLINIC | Age: 40
End: 2020-07-31

## 2020-07-31 NOTE — TELEPHONE ENCOUNTER
Spoke to pt, she has questions regarding if someone accompany her at her visit on 8/18/2020 because she sometimes has trouble understanding. Pt was informed it's ok to have someone with her at her visit.

## 2020-07-31 NOTE — TELEPHONE ENCOUNTER
----- Message from Ana Cristina Fuentes MD sent at 7/30/2020  8:45 PM CDT -----  Regarding: FW: Patient Advice  Please call patient and see what she needs? Thanks!  ----- Message -----  From: Suzanne Ivy  Sent: 7/30/2020   1:46 PM CDT  To: Ana Cristina Fuentes MD  Subject: Patient Advice                                   Patient Advice:    Pt called to speak to the nurse regarding her upcoming on 8/18/20 and would like a call back today.    Pt can be reached at [phone]

## 2020-08-12 PROBLEM — N39.46 MIXED STRESS AND URGE URINARY INCONTINENCE: Status: RESOLVED | Noted: 2019-06-11 | Resolved: 2020-08-12

## 2020-08-18 ENCOUNTER — OFFICE VISIT (OUTPATIENT)
Dept: UROGYNECOLOGY | Facility: CLINIC | Age: 40
End: 2020-08-18
Payer: MEDICARE

## 2020-08-18 VITALS — BODY MASS INDEX: 30.83 KG/M2 | HEIGHT: 62 IN | WEIGHT: 167.56 LBS

## 2020-08-18 DIAGNOSIS — Z87.42 HISTORY OF ENDOMETRIOSIS: Primary | ICD-10-CM

## 2020-08-18 DIAGNOSIS — R10.2 PELVIC PAIN IN FEMALE: ICD-10-CM

## 2020-08-18 DIAGNOSIS — M79.18 MYALGIA OF PELVIC FLOOR: ICD-10-CM

## 2020-08-18 DIAGNOSIS — Z91.49: ICD-10-CM

## 2020-08-18 DIAGNOSIS — N30.10 INTERSTITIAL CYSTITIS: ICD-10-CM

## 2020-08-18 DIAGNOSIS — K59.00 CONSTIPATION, UNSPECIFIED CONSTIPATION TYPE: ICD-10-CM

## 2020-08-18 DIAGNOSIS — R11.2 NAUSEA AND VOMITING, INTRACTABILITY OF VOMITING NOT SPECIFIED, UNSPECIFIED VOMITING TYPE: ICD-10-CM

## 2020-08-18 PROCEDURE — 87086 URINE CULTURE/COLONY COUNT: CPT

## 2020-08-18 PROCEDURE — 51701 INSERT BLADDER CATHETER: CPT | Mod: PBBFAC | Performed by: OBSTETRICS & GYNECOLOGY

## 2020-08-18 PROCEDURE — 99999 PR PBB SHADOW E&M-EST. PATIENT-LVL V: CPT | Mod: PBBFAC,,, | Performed by: OBSTETRICS & GYNECOLOGY

## 2020-08-18 PROCEDURE — 51701 INSERT BLADDER CATHETER: CPT | Mod: S$PBB,,, | Performed by: OBSTETRICS & GYNECOLOGY

## 2020-08-18 PROCEDURE — 99215 OFFICE O/P EST HI 40 MIN: CPT | Mod: 25,S$PBB,, | Performed by: OBSTETRICS & GYNECOLOGY

## 2020-08-18 PROCEDURE — 51701 PR INSERTION OF NON-INDWELLING BLADDER CATHETERIZATION FOR RESIDUAL UR: ICD-10-PCS | Mod: S$PBB,,, | Performed by: OBSTETRICS & GYNECOLOGY

## 2020-08-18 PROCEDURE — 99215 PR OFFICE/OUTPT VISIT, EST, LEVL V, 40-54 MIN: ICD-10-PCS | Mod: 25,S$PBB,, | Performed by: OBSTETRICS & GYNECOLOGY

## 2020-08-18 PROCEDURE — 87077 CULTURE AEROBIC IDENTIFY: CPT

## 2020-08-18 PROCEDURE — 99215 OFFICE O/P EST HI 40 MIN: CPT | Mod: PBBFAC,25 | Performed by: OBSTETRICS & GYNECOLOGY

## 2020-08-18 PROCEDURE — 87186 SC STD MICRODIL/AGAR DIL: CPT

## 2020-08-18 PROCEDURE — 99999 PR PBB SHADOW E&M-EST. PATIENT-LVL V: ICD-10-PCS | Mod: PBBFAC,,, | Performed by: OBSTETRICS & GYNECOLOGY

## 2020-08-18 PROCEDURE — 87088 URINE BACTERIA CULTURE: CPT

## 2020-08-18 RX ORDER — LORAZEPAM 1 MG/1
TABLET ORAL CONTINUOUS PRN
COMMUNITY
Start: 2020-07-15 | End: 2020-10-26

## 2020-08-18 RX ORDER — LIDOCAINE AND PRILOCAINE 25; 25 MG/G; MG/G
CREAM TOPICAL
COMMUNITY
Start: 2020-07-15 | End: 2020-09-14

## 2020-08-18 RX ORDER — DICLOFENAC SODIUM 10 MG/G
GEL TOPICAL DAILY PRN
COMMUNITY
Start: 2020-07-15 | End: 2020-10-26
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
## 2020-08-21 ENCOUNTER — TELEPHONE (OUTPATIENT)
Dept: UROGYNECOLOGY | Facility: CLINIC | Age: 40
End: 2020-08-21

## 2020-08-21 DIAGNOSIS — N39.0 ACUTE UTI: Primary | ICD-10-CM

## 2020-08-21 LAB — BACTERIA UR CULT: ABNORMAL

## 2020-08-21 RX ORDER — NITROFURANTOIN (MACROCRYSTALS) 100 MG/1
100 CAPSULE ORAL 2 TIMES DAILY
Qty: 14 CAPSULE | Refills: 0 | Status: SHIPPED | OUTPATIENT
Start: 2020-08-21 | End: 2020-08-28

## 2020-08-21 NOTE — TELEPHONE ENCOUNTER
Spoke to pt, she was informed that her urine culture was negative for infection and an RX for macrobid was sent to her pharmacy to  ans start. Pt verbalized understanding.

## 2020-08-21 NOTE — TELEPHONE ENCOUNTER
Please let patient know it looks like she has a UTI, which could be contributing to some of her pelvic discomfort. I sent Macrobid, which should work, to [facility]. Can you please remind her to  and start? Thanks!

## 2020-08-31 ENCOUNTER — TELEPHONE (OUTPATIENT)
Dept: UROGYNECOLOGY | Facility: CLINIC | Age: 40
End: 2020-08-31

## 2020-08-31 ENCOUNTER — OFFICE VISIT (OUTPATIENT)
Dept: OBSTETRICS AND GYNECOLOGY | Facility: CLINIC | Age: 40
End: 2020-08-31
Payer: MEDICARE

## 2020-08-31 VITALS
WEIGHT: 164.44 LBS | SYSTOLIC BLOOD PRESSURE: 122 MMHG | DIASTOLIC BLOOD PRESSURE: 74 MMHG | HEIGHT: 62 IN | BODY MASS INDEX: 30.26 KG/M2

## 2020-08-31 DIAGNOSIS — N30.10 INTERSTITIAL CYSTITIS: ICD-10-CM

## 2020-08-31 DIAGNOSIS — M79.18 MYALGIA OF PELVIC FLOOR: ICD-10-CM

## 2020-08-31 DIAGNOSIS — R10.2 PELVIC PAIN IN FEMALE: Primary | ICD-10-CM

## 2020-08-31 PROCEDURE — 99215 OFFICE O/P EST HI 40 MIN: CPT | Mod: S$PBB,,, | Performed by: OBSTETRICS & GYNECOLOGY

## 2020-08-31 PROCEDURE — 99215 PR OFFICE/OUTPT VISIT, EST, LEVL V, 40-54 MIN: ICD-10-PCS | Mod: S$PBB,,, | Performed by: OBSTETRICS & GYNECOLOGY

## 2020-08-31 PROCEDURE — 99999 PR PBB SHADOW E&M-EST. PATIENT-LVL III: ICD-10-PCS | Mod: PBBFAC,,, | Performed by: OBSTETRICS & GYNECOLOGY

## 2020-08-31 PROCEDURE — 99999 PR PBB SHADOW E&M-EST. PATIENT-LVL III: CPT | Mod: PBBFAC,,, | Performed by: OBSTETRICS & GYNECOLOGY

## 2020-08-31 PROCEDURE — 99213 OFFICE O/P EST LOW 20 MIN: CPT | Mod: PBBFAC | Performed by: OBSTETRICS & GYNECOLOGY

## 2020-08-31 NOTE — TELEPHONE ENCOUNTER
Contacted pelvic floor PT.  We would like patient to start using tens unit as part of her pelvic floor therapy plan.  Erica Del Cid, JULIANP-BC

## 2020-09-01 PROBLEM — K59.00 CONSTIPATION: Status: RESOLVED | Noted: 2020-08-18 | Resolved: 2020-09-01

## 2020-09-01 PROBLEM — R11.2 NAUSEA & VOMITING: Status: RESOLVED | Noted: 2019-05-30 | Resolved: 2020-09-01

## 2020-09-01 RX ORDER — PROMETHAZINE HYDROCHLORIDE 25 MG/1
25 TABLET ORAL EVERY 6 HOURS PRN
Qty: 30 TABLET | Refills: 1 | Status: SHIPPED | OUTPATIENT
Start: 2020-09-01 | End: 2020-10-27

## 2020-09-01 NOTE — PROGRESS NOTES
Subjective:       Patient ID: Jaycee Gray is a 39 y.o. female.    Chief Complaint:  Pelvic Pain      History of Present Illness  Pt is 39 y.o. here in consultation for further evaluation of pelvic pain.  Pt has a very complicated surgical history with a hysterectomy performed at age 23 for endometriosis (hyst, BSO).  Pt states that she has continued to have pain issues and had a repeat laparoscopic procedure 7 years ago in [place] (op note requested).  Pt's family was told that there was extensive scar tissue/endo but per pt documentation notes minimal disease.    Pt has been struggling with increased pelvic pain (L>R) and has been doing everything she can -- pelvic floor PT, contraceptive suppression, exercises -- and nothing is helping.  Pt is concerned that there is remaining endo/scar tissue and wants to discuss surgical re-exploration.  Pt does not want to try any additional medical options, including depo lupron or orlissa.    Pelvic Pain  The patient's primary symptoms include pelvic pain. The patient's pertinent negatives include no vaginal discharge. This is a recurrent problem. The current episode started more than 1 year ago. The problem occurs constantly. The problem has been rapidly worsening. The pain is severe. The problem affects the left side. She is not pregnant. Associated symptoms include abdominal pain, anorexia, back pain, diarrhea, headaches, nausea, painful intercourse and vomiting. Pertinent negatives include no chills, constipation, discolored urine, dysuria, fever, flank pain, frequency, hematuria, rash or urgency. The symptoms are aggravated by activity, heavy lifting, intercourse, tactile pressure and urinating. She has tried acetaminophen, heating pad, NSAIDs, oral narcotics and warm bath for the symptoms. The treatment provided no relief. She is sexually active. No, her partner does not have an STD. Her past medical history is significant for an abdominal surgery, endometriosis  and miscarriage. There is no history of a  section, an ectopic pregnancy, a gynecological surgery, herpes simplex, metrorrhagia, ovarian cysts, perineal abscess, PID, an STD, a terminated pregnancy or vaginosis.         GYN & OB History  Patient's last menstrual period was 2009 (lmp unknown).   Date of Last Pap: No result found    OB History    Para Term  AB Living   1 0 0 0 1     SAB TAB Ectopic Multiple Live Births   1 0 0          # Outcome Date GA Lbr Darren/2nd Weight Sex Delivery Anes PTL Lv   1 SAB               Birth Comments: at age 18       Review of Systems  Review of Systems   Constitutional: Negative for activity change, appetite change, chills, fatigue and fever.   HENT: Negative.  Negative for tinnitus.    Eyes: Negative.    Respiratory: Negative for cough and shortness of breath.    Cardiovascular: Negative for chest pain and palpitations.   Gastrointestinal: Positive for abdominal pain, anorexia, diarrhea, nausea and vomiting. Negative for blood in stool and constipation.   Endocrine: Negative.  Negative for hot flashes.   Genitourinary: Positive for dyspareunia and pelvic pain. Negative for dysmenorrhea, dysuria, flank pain, frequency, hematuria, menstrual problem, urgency, vaginal discharge, urinary incontinence and postcoital bleeding.   Musculoskeletal: Positive for back pain. Negative for joint swelling.   Integumentary:  Negative for rash, breast mass, nipple discharge and breast skin changes.   Neurological: Positive for headaches.   Hematological: Negative.  Does not bruise/bleed easily.   Psychiatric/Behavioral: The patient is not nervous/anxious.    Breast: negative.  Negative for mass, nipple discharge and skin changes          Objective:    Physical Exam:   Constitutional: She is oriented to person, place, and time. She appears well-developed and well-nourished. No distress.    HENT:   Head: Normocephalic and atraumatic.    Eyes: Pupils are equal, round, and  reactive to light. Conjunctivae and lids are normal.    Neck: Normal range of motion and full passive range of motion without pain. Neck supple.    Cardiovascular: Normal rate and regular rhythm.  Exam reveals no edema.     Pulmonary/Chest: Effort normal and breath sounds normal. She has no wheezes.        Abdominal: Soft. Normal appearance and bowel sounds are normal. She exhibits no distension. There is no abdominal tenderness. There is no rebound and no guarding. Hernia confirmed negative in the right inguinal area and confirmed negative in the left inguinal area.     Genitourinary:    Inguinal canal normal.   There is no rash or tenderness on the right labia. There is no rash or tenderness on the left labia. Uterus is absent. Right adnexum displays no mass and no tenderness. Left adnexum displays no mass and no tenderness. There is tenderness in the vagina. No bleeding, rectocele or cystocele in the vagina.    No signs of injury in the vagina.   Cervix exhibits absence.           Musculoskeletal: Normal range of motion and moves all extremeties.       Neurological: She is alert and oriented to person, place, and time. She has normal strength.    Skin: Skin is warm and dry.    Psychiatric: She has a normal mood and affect. Her speech is normal and behavior is normal. Thought content normal. Her mood appears not anxious. She does not exhibit a depressed mood. She expresses no suicidal plans and no homicidal plans.          Assessment:        1. Pelvic pain in female                Plan:      Jaycee was seen today for pelvic pain.    Diagnoses and all orders for this visit:    Pelvic pain in female  -     promethazine (PHENERGAN) 25 MG tablet; Take 1 tablet (25 mg total) by mouth every 6 (six) hours as needed for Nausea.  I had an extensive discussion with pt and support person, lasting approx 50 min, about the utility of surgery and natural history of endometriosis.  Pt apparently had full removal of ovaries  and per last imaging, no adnexal structures were seen.  Aware that BSO is currative of endometriosis and she may have alternative sources for her pelvic pain.  Pt had colonoscopy 2 years ago that was normal.    Pt aware that surgery carries significant risk of damage to other organs due to scar tissue but pt accepts that risk and feels like she needs another re-assessment to rule out new endometriosis.  We also discussed how there is a 50% chance that we may not find any pathology that explains her pain.  And finally, with each surgery, she has increased risk of new scar formation.  All questions answered.  Will proceed with diagnostic laparoscopy.

## 2020-09-03 ENCOUNTER — TELEPHONE (OUTPATIENT)
Dept: OBSTETRICS AND GYNECOLOGY | Facility: CLINIC | Age: 40
End: 2020-09-03

## 2020-09-03 ENCOUNTER — TELEPHONE (OUTPATIENT)
Dept: UROGYNECOLOGY | Facility: CLINIC | Age: 40
End: 2020-09-03

## 2020-09-03 DIAGNOSIS — R10.2 PELVIC PAIN IN FEMALE: Primary | ICD-10-CM

## 2020-09-03 DIAGNOSIS — Z01.818 PREOP TESTING: ICD-10-CM

## 2020-09-03 NOTE — TELEPHONE ENCOUNTER
Spoke with pt  Staff told pt that we were still working on scheduling for her surgery and would call her ASAP with information.    Pt verbalized understating.

## 2020-09-03 NOTE — TELEPHONE ENCOUNTER
----- Message from Chery Waterman sent at 9/3/2020  2:18 PM CDT -----  Name of Who is Calling: SARA CERDA [[id number]]    What is the request in detail: Patient is requesting a call back regards to antibiotics.....   Please contact to further discuss and advise      Can the clinic reply by [facility]: Yes     [facility]:  [phone] (home)

## 2020-09-03 NOTE — TELEPHONE ENCOUNTER
----- Message from Cristina Roe sent at 9/3/2020  8:34 AM CDT -----  Regarding: PROCEDURE DATE  Name of Who is Calling: SARA CERDA [[id number]]      What is the request in detail: Patient is requesting a call back to see if they have decided on a surgery date       Can the clinic reply by [facility]: no      What Number to Call Back if not in [facility]: [phone]

## 2020-09-03 NOTE — TELEPHONE ENCOUNTER
Spoke to pt, she states she was informed she had a UTI and and an RX was sent to her pharmacy to  and start. Pt states her RX was never sent to the pharmacy.    mary per Dr. Fuentes's phone note on 8/21/2020 was [facility] in [facility]'s chart. Pt was informed.

## 2020-09-03 NOTE — TELEPHONE ENCOUNTER
----- Message from Jenifer Quach sent at 9/3/2020 12:48 PM CDT -----  Contact: SARA CERDA [[id number]]  Type: Patient Call Back    Who called:SARA CERDA [[id number]]    What is the request in detail: Patient is requesting a call back. She states that she would like to let the staff know that she will be faxing over her medical records.   Please advise.    Can the clinic reply by [facility]? No    Best call back number: [phone]    Additional Information: N/A

## 2020-09-14 ENCOUNTER — OFFICE VISIT (OUTPATIENT)
Dept: OBSTETRICS AND GYNECOLOGY | Facility: CLINIC | Age: 40
End: 2020-09-14
Payer: MEDICARE

## 2020-09-14 ENCOUNTER — ANESTHESIA EVENT (OUTPATIENT)
Dept: SURGERY | Facility: OTHER | Age: 40
End: 2020-09-14
Payer: MEDICARE

## 2020-09-14 ENCOUNTER — HOSPITAL ENCOUNTER (OUTPATIENT)
Dept: PREADMISSION TESTING | Facility: OTHER | Age: 40
Discharge: HOME | End: 2020-09-14
Attending: OBSTETRICS & GYNECOLOGY
Payer: MEDICARE

## 2020-09-14 VITALS
TEMPERATURE: 97 F | OXYGEN SATURATION: 98 % | SYSTOLIC BLOOD PRESSURE: 134 MMHG | DIASTOLIC BLOOD PRESSURE: 82 MMHG | HEART RATE: 71 BPM

## 2020-09-14 VITALS
SYSTOLIC BLOOD PRESSURE: 128 MMHG | HEIGHT: 62 IN | WEIGHT: 170.88 LBS | BODY MASS INDEX: 31.45 KG/M2 | DIASTOLIC BLOOD PRESSURE: 78 MMHG

## 2020-09-14 DIAGNOSIS — R10.2 PELVIC PAIN IN FEMALE: Primary | ICD-10-CM

## 2020-09-14 DIAGNOSIS — G60.0 CHARCOT MARIE TOOTH MUSCULAR ATROPHY: ICD-10-CM

## 2020-09-14 PROCEDURE — 99213 OFFICE O/P EST LOW 20 MIN: CPT | Mod: PBBFAC | Performed by: OBSTETRICS & GYNECOLOGY

## 2020-09-14 PROCEDURE — 99999 PR PBB SHADOW E&M-EST. PATIENT-LVL III: CPT | Mod: PBBFAC,,, | Performed by: OBSTETRICS & GYNECOLOGY

## 2020-09-14 PROCEDURE — 99499 UNLISTED E&M SERVICE: CPT | Mod: S$PBB,,, | Performed by: OBSTETRICS & GYNECOLOGY

## 2020-09-14 PROCEDURE — 99999 PR PBB SHADOW E&M-EST. PATIENT-LVL III: ICD-10-PCS | Mod: PBBFAC,,, | Performed by: OBSTETRICS & GYNECOLOGY

## 2020-09-14 PROCEDURE — 99499 NO LOS: ICD-10-PCS | Mod: S$PBB,,, | Performed by: OBSTETRICS & GYNECOLOGY

## 2020-09-14 RX ORDER — LIDOCAINE HYDROCHLORIDE 10 MG/ML
0.5 INJECTION, SOLUTION EPIDURAL; INFILTRATION; INTRACAUDAL; PERINEURAL ONCE
Status: CANCELLED | OUTPATIENT
Start: 2020-09-14 | End: 2020-09-14

## 2020-09-14 RX ORDER — SODIUM CHLORIDE, SODIUM LACTATE, POTASSIUM CHLORIDE, CALCIUM CHLORIDE 600; 310; 30; 20 MG/100ML; MG/100ML; MG/100ML; MG/100ML
INJECTION, SOLUTION INTRAVENOUS CONTINUOUS
Status: CANCELLED | OUTPATIENT
Start: 2020-09-14

## 2020-09-14 RX ORDER — SODIUM CHLORIDE 9 MG/ML
INJECTION, SOLUTION INTRAVENOUS CONTINUOUS
Status: CANCELLED | OUTPATIENT
Start: 2020-09-14

## 2020-09-14 RX ORDER — MUPIROCIN 20 MG/G
OINTMENT TOPICAL
Status: CANCELLED | OUTPATIENT
Start: 2020-09-14

## 2020-09-14 RX ORDER — MIDAZOLAM HYDROCHLORIDE 1 MG/ML
2 INJECTION INTRAMUSCULAR; INTRAVENOUS
Status: CANCELLED | OUTPATIENT
Start: 2020-09-14 | End: 2020-09-14

## 2020-09-14 RX ORDER — ACETAMINOPHEN 500 MG
1000 TABLET ORAL
Status: CANCELLED | OUTPATIENT
Start: 2020-09-14 | End: 2020-09-14

## 2020-09-14 NOTE — ANESTHESIA PREPROCEDURE EVALUATION
09/14/2020  Jaycee Gray is a 40 y.o., female.    Anesthesia Evaluation    I have reviewed the Patient Summary Reports.    I have reviewed the Nursing Notes.    I have reviewed the Medications.     Review of Systems  Anesthesia Hx:  No problems with previous Anesthesia  History of prior surgery of interest to airway management or planning: Previous anesthesia: General Shoulder surg EJ 3 yrs ago with general anesthesia.  Denies Family Hx of Anesthesia complications.   Denies Personal Hx of Anesthesia complications.   Social:  Non-Smoker    Hematology/Oncology:  Hematology Normal   Oncology Normal     EENT/Dental:EENT/Dental Normal   Cardiovascular:   Exercise tolerance: good    Pulmonary:  Pulmonary Normal    Renal/:   interstitial cystitis   Hepatic/GI:  Hepatic/GI Normal    Musculoskeletal:  Musculoskeletal Normal    Neurological:   Neuromuscular Disease, Headaches Seizures Muscular Dystrophy  No seizure in 15 yrs,no meds now  Chronic Pain Syndrome   Endocrine:   Hypothyroidism    Psych:   Psychiatric History          Physical Exam  General:  Well nourished    Airway/Jaw/Neck:  Airway Findings: Mouth Opening: Normal Tongue: Normal  General Airway Assessment: Adult  Mallampati: II      Dental:  Dental Findings: Periodontal disease, Mild, In tact        Mental Status:  Mental Status Findings:  Cooperative, Anxious         Anesthesia Plan  Type of Anesthesia, risks & benefits discussed:  Anesthesia Type:  general  Patient's Preference:   Intra-op Monitoring Plan: standard ASA monitors  Intra-op Monitoring Plan Comments:   Post Op Pain Control Plan: multimodal analgesia and per primary service following discharge from PACU  Post Op Pain Control Plan Comments:   Induction:   rapid sequence and IV  Beta Blocker:         Informed Consent: Patient understands risks and agrees with Anesthesia plan.   Questions answered. Anesthesia consent signed with patient.  ASA Score: 3     Day of Surgery Review of History & Physical:    H&P update referred to the surgeon.     Anesthesia Plan Notes: Recent labs ok in epic,claustrophobic,wants IV Versed before going to OR,dont strap down before GA induced        Ready For Surgery From Anesthesia Perspective.

## 2020-09-14 NOTE — PROGRESS NOTES
Subjective:       Patient ID: Jaycee Gray is a 40 y.o. female.    Chief Complaint:  Pre-op Exam      History of Present Illness  HPI  Pt is 40 y.o. here in preop for Dx LSC, possible excision of scar tissue/endometriosis for pelvic pain (L>R).  Pt aware that we may not find anything to explain her LLQ pain as she has had a Hyst/BSO in past.  Last LSC found minimal scar tissue (5-6 years ago)    GYN & OB History  Patient's last menstrual period was 2009 (lmp unknown).   Date of Last Pap: No result found    OB History    Para Term  AB Living   1 0 0 0 1     SAB TAB Ectopic Multiple Live Births   1 0 0          # Outcome Date GA Lbr Darren/2nd Weight Sex Delivery Anes PTL Lv   1 SAB               Birth Comments: at age 18       Review of Systems  Review of Systems   Constitutional: Negative for activity change, appetite change and fatigue.   HENT: Negative.  Negative for tinnitus.    Eyes: Negative.    Respiratory: Negative for cough and shortness of breath.    Cardiovascular: Negative for chest pain and palpitations.   Gastrointestinal: Negative.  Negative for abdominal pain, blood in stool, constipation, diarrhea and nausea.   Endocrine: Negative.  Negative for hot flashes.   Genitourinary: Positive for pelvic pain. Negative for dysmenorrhea, dyspareunia, dysuria, frequency, menstrual problem, vaginal discharge, urinary incontinence and postcoital bleeding.   Musculoskeletal: Negative for back pain and joint swelling.   Integumentary:  Negative for rash, breast mass, nipple discharge and breast skin changes.   Neurological: Negative.  Negative for headaches.   Hematological: Negative.  Does not bruise/bleed easily.   Psychiatric/Behavioral: The patient is not nervous/anxious.    Breast: negative.  Negative for mass, nipple discharge and skin changes          Objective:    Physical Exam:   Constitutional: She is oriented to person, place, and time. She appears well-developed and  well-nourished. No distress.    HENT:   Head: Normocephalic and atraumatic.    Eyes: Pupils are equal, round, and reactive to light. Conjunctivae and lids are normal.    Neck: Normal range of motion and full passive range of motion without pain. Neck supple.    Cardiovascular: Normal rate and regular rhythm.  Exam reveals no edema.     Pulmonary/Chest: Effort normal and breath sounds normal. She has no wheezes.        Abdominal: Soft. Normal appearance and bowel sounds are normal. She exhibits no distension. There is no abdominal tenderness. There is no rebound and no guarding.             Musculoskeletal: Normal range of motion and moves all extremeties.       Neurological: She is alert and oriented to person, place, and time. She has normal strength.    Skin: Skin is warm and dry.    Psychiatric: She has a normal mood and affect. Her speech is normal and behavior is normal. Thought content normal. Her mood appears not anxious. She does not exhibit a depressed mood. She expresses no suicidal plans and no homicidal plans.          Assessment:        1. Pelvic pain in female    2. Charcot Amanda Tooth muscular atrophy                Plan:      Jaycee was seen today for pre-op exam.    Diagnoses and all orders for this visit:    Pelvic pain in female  -     Full code; Standing  -     Vital signs; Standing  -     Insert peripheral IV; Standing  -     Chlorhexidine (CHG) 2% Wipes; Standing  -     Notify Physician/Vital Signs Parameters Urine output less than 0.5mL/kg/hr (with indwelling catheter) or 30 mL/hr (without indwelling catheter) or blood glucose greater than 200 mg/dL; Standing  -     Notify physician; Standing  -     Notify Physician - Potential Need of Opioid Reversal; Standing  -     Diet NPO; Standing  -     Chlorohexidine Gluconate Bath; Standing  -     Place in Outpatient; Standing    Factual information regarding the medical condition and the need for DX LSC, possible removal of endometriosis was  discussed with the patient, who verbalized understanding. The therapeutic rationale, benefits, risks and potential complications were discussed, including the possibility of pain, bleeding, infection, loss of function, disfigurement, death or other unforeseen complications. Alternatives to the procedure were discussed (including potential risks, complications and benefits of each). No guarantee was expressed or implied as to the results of the procedure. Informed consent was given, as well as a request to proceed.    We will take pictures so pt can see what we see.      Charcot Amanda Tooth muscular atrophy  Pt is able to ambulate independently but does have weakness.    Other orders  -     0.9%  NaCl infusion  -     IP VTE MODERATE RISK PATIENT; Standing  -     mupirocin 2 % ointment  -     Ambulate; Standing

## 2020-09-14 NOTE — DISCHARGE INSTRUCTIONS
Information to Prepare you for your Surgery    PRE-ADMIT TESTING -  [phone]    [address]  [place] [facility]          Your surgery has been scheduled at [facility]. We are pleased to have the opportunity to serve you. For Further Information please call [phone].    On the day of surgery please report to the Information Desk on the 1st floor.    · CONTACT YOUR PHYSICIAN'S OFFICE THE DAY PRIOR TO YOUR SURGERY TO OBTAIN YOUR ARRIVAL TIME.     · The evening before surgery do not eat anything after 9 p.m. ( this includes hard candy, chewing gum and mints).  You may only have GATORADE, POWERADE AND WATER  from 9 p.m. until you leave your home.   DO NOT DRINK ANY LIQUIDS ON THE WAY TO THE HOSPITAL.      SPECIAL MEDICATION INSTRUCTIONS: TAKE medications checked off by the Anesthesiologist on your Medication List.    Angiogram Patients: Take medications as instructed by your physician, including aspirin.     Surgery Patients:    If you take ASPIRIN - Your PHYSICIAN/SURGEON will need to inform you IF/OR when you need to stop taking aspirin prior to your surgery.     Do Not take any medications containing IBUPROFEN.  Do Not Wear any make-up or dark nail polish   (especially eye make-up) to surgery. If you come to surgery with makeup on you will be required to remove the makeup or nail polish.    Do not shave your surgical area at least 5 days prior to your surgery. The surgical prep will be performed at the hospital according to Infection Control regulations.    Leave all valuables at home.   Do Not wear any jewelry or watches, including any metal in body piercings. Jewelry must be removed prior to coming to the hospital.  There is a possibility that rings that are unable to be removed may be cut off if they are on the surgical extremity.    Contact Lens must be removed before surgery. Either do not wear the contact lens or bring a case and solution for  storage.  Please bring a container for eyeglasses or dentures as required.  Bring any paperwork your physician has provided, such as consent forms,  history and physicals, doctor's orders, etc.   Bring comfortable clothes that are loose fitting to wear upon discharge. Take into consideration the type of surgery being performed.  Maintain your diet as advised per your physician the day prior to surgery.      Adequate rest the night before surgery is advised.   Park in the Parking lot behind the hospital or in the [facility] across the street from the parking lot. Parking is complimentary.  If you will be discharged the same day as your procedure, please arrange for a responsible adult to drive you home or to accompany you if traveling by taxi.   YOU WILL NOT BE PERMITTED TO DRIVE OR TO LEAVE THE HOSPITAL ALONE AFTER SURGERY.   If you are being discharged the same day, it is strongly recommended that you arrange for someone to remain with you for the first 24 hrs following your surgery.    The Surgeon will speak to your family/visitor after your surgery regarding the outcome of your surgery and post op care.  The Surgeon may speak to you after your surgery, but there is a possibility you may not remember the details.  Please check with your family members regarding the conversation with the Surgeon.    We strongly recommend whoever is bringing you home be present for discharge instructions.  This will ensure a thorough understanding for your post op home care.    ALL CHILDREN MUST ALWAYS BE ACCOMPANIED BY AN ADULT.    Visitors-Refer to current Visitor policy handouts.    Thank you for your cooperation.  The Staff of [facility].                Bathing Instructions with Hibiclens     Shower the evening before and morning of your procedure with Hibiclens:   Wash your face with water and your regular face wash/soap   Apply Hibiclens directly on your skin or on a wet washcloth and wash  gently. When showering: Move away from the shower stream when applying Hibiclens to avoid rinsing off too soon.   Rinse thoroughly with warm water   Do not dilute Hibiclens         Dry off as usual, do not use any deodorant, powder, body lotions, perfume, after shave or cologne.

## 2020-09-18 ENCOUNTER — HOSPITAL ENCOUNTER (OUTPATIENT)
Dept: PREADMISSION TESTING | Facility: OTHER | Age: 40
Discharge: HOME | End: 2020-09-18
Attending: OBSTETRICS & GYNECOLOGY
Payer: MEDICARE

## 2020-09-18 ENCOUNTER — TELEPHONE (OUTPATIENT)
Dept: OBSTETRICS AND GYNECOLOGY | Facility: CLINIC | Age: 40
End: 2020-09-18

## 2020-09-18 DIAGNOSIS — Z01.818 PREOP TESTING: ICD-10-CM

## 2020-09-18 PROCEDURE — U0003 INFECTIOUS AGENT DETECTION BY NUCLEIC ACID (DNA OR RNA); SEVERE ACUTE RESPIRATORY SYNDROME CORONAVIRUS 2 (SARS-COV-2) (CORONAVIRUS DISEASE [COVID-19]), AMPLIFIED PROBE TECHNIQUE, MAKING USE OF HIGH THROUGHPUT TECHNOLOGIES AS DESCRIBED BY CMS-2020-01-R: HCPCS

## 2020-09-19 LAB — SARS-COV-2 RNA RESP QL NAA+PROBE: NOT DETECTED

## 2020-09-21 ENCOUNTER — ANESTHESIA (OUTPATIENT)
Dept: SURGERY | Facility: OTHER | Age: 40
End: 2020-09-21
Payer: MEDICARE

## 2020-09-21 ENCOUNTER — HOSPITAL ENCOUNTER (OUTPATIENT)
Facility: OTHER | Age: 40
Discharge: HOME | End: 2020-09-21
Attending: OBSTETRICS & GYNECOLOGY | Admitting: OBSTETRICS & GYNECOLOGY
Payer: MEDICARE

## 2020-09-21 VITALS
TEMPERATURE: 98 F | SYSTOLIC BLOOD PRESSURE: 129 MMHG | RESPIRATION RATE: 16 BRPM | WEIGHT: 160 LBS | HEART RATE: 77 BPM | HEIGHT: 62 IN | OXYGEN SATURATION: 100 % | DIASTOLIC BLOOD PRESSURE: 73 MMHG | BODY MASS INDEX: 29.44 KG/M2

## 2020-09-21 DIAGNOSIS — R10.2 PELVIC PAIN IN FEMALE: Primary | ICD-10-CM

## 2020-09-21 PROCEDURE — 63600175 PHARM REV CODE 636 W HCPCS: Performed by: ANESTHESIOLOGY

## 2020-09-21 PROCEDURE — 63600175 PHARM REV CODE 636 W HCPCS: Performed by: NURSE ANESTHETIST, CERTIFIED REGISTERED

## 2020-09-21 PROCEDURE — 71000016 HC POSTOP RECOV ADDL HR: Performed by: OBSTETRICS & GYNECOLOGY

## 2020-09-21 PROCEDURE — 58662 LAPAROSCOPY EXCISE LESIONS: CPT | Mod: 82,22,, | Performed by: OBSTETRICS & GYNECOLOGY

## 2020-09-21 PROCEDURE — 58662 PR LAP,FULGURATE/EXCISE LESIONS: ICD-10-PCS | Mod: 82,22,, | Performed by: OBSTETRICS & GYNECOLOGY

## 2020-09-21 PROCEDURE — 88305 TISSUE EXAM BY PATHOLOGIST: CPT | Performed by: PATHOLOGY

## 2020-09-21 PROCEDURE — 37000008 HC ANESTHESIA 1ST 15 MINUTES: Performed by: OBSTETRICS & GYNECOLOGY

## 2020-09-21 PROCEDURE — 36000709 HC OR TIME LEV III EA ADD 15 MIN: Performed by: OBSTETRICS & GYNECOLOGY

## 2020-09-21 PROCEDURE — 58662 LAPAROSCOPY EXCISE LESIONS: CPT | Mod: 22,,, | Performed by: OBSTETRICS & GYNECOLOGY

## 2020-09-21 PROCEDURE — 25000003 PHARM REV CODE 250: Performed by: NURSE ANESTHETIST, CERTIFIED REGISTERED

## 2020-09-21 PROCEDURE — 71000039 HC RECOVERY, EACH ADD'L HOUR: Performed by: OBSTETRICS & GYNECOLOGY

## 2020-09-21 PROCEDURE — 71000033 HC RECOVERY, INTIAL HOUR: Performed by: OBSTETRICS & GYNECOLOGY

## 2020-09-21 PROCEDURE — 25000003 PHARM REV CODE 250: Performed by: ANESTHESIOLOGY

## 2020-09-21 PROCEDURE — 71000015 HC POSTOP RECOV 1ST HR: Performed by: OBSTETRICS & GYNECOLOGY

## 2020-09-21 PROCEDURE — 88305 TISSUE EXAM BY PATHOLOGIST: ICD-10-PCS | Mod: 26,,, | Performed by: PATHOLOGY

## 2020-09-21 PROCEDURE — 25000003 PHARM REV CODE 250: Performed by: OBSTETRICS & GYNECOLOGY

## 2020-09-21 PROCEDURE — 88305 TISSUE EXAM BY PATHOLOGIST: CPT | Mod: 26,,, | Performed by: PATHOLOGY

## 2020-09-21 PROCEDURE — 27201423 OPTIME MED/SURG SUP & DEVICES STERILE SUPPLY: Performed by: OBSTETRICS & GYNECOLOGY

## 2020-09-21 PROCEDURE — 36000708 HC OR TIME LEV III 1ST 15 MIN: Performed by: OBSTETRICS & GYNECOLOGY

## 2020-09-21 PROCEDURE — 37000009 HC ANESTHESIA EA ADD 15 MINS: Performed by: OBSTETRICS & GYNECOLOGY

## 2020-09-21 PROCEDURE — 58662 PR LAP,FULGURATE/EXCISE LESIONS: ICD-10-PCS | Mod: 22,,, | Performed by: OBSTETRICS & GYNECOLOGY

## 2020-09-21 RX ORDER — HYDROCODONE BITARTRATE AND ACETAMINOPHEN 5; 325 MG/1; MG/1
1 TABLET ORAL EVERY 4 HOURS PRN
Qty: 14 TABLET | Refills: 0 | Status: SHIPPED | OUTPATIENT
Start: 2020-09-21 | End: 2020-10-08

## 2020-09-21 RX ORDER — MIDAZOLAM HYDROCHLORIDE 1 MG/ML
INJECTION INTRAMUSCULAR; INTRAVENOUS
Status: DISCONTINUED | OUTPATIENT
Start: 2020-09-21 | End: 2020-09-21

## 2020-09-21 RX ORDER — MUPIROCIN 20 MG/G
OINTMENT TOPICAL
Status: DISCONTINUED | OUTPATIENT
Start: 2020-09-21 | End: 2020-09-21 | Stop reason: HOSPADM

## 2020-09-21 RX ORDER — ONDANSETRON 2 MG/ML
INJECTION INTRAMUSCULAR; INTRAVENOUS
Status: DISCONTINUED | OUTPATIENT
Start: 2020-09-21 | End: 2020-09-21

## 2020-09-21 RX ORDER — NEOSTIGMINE METHYLSULFATE 1 MG/ML
INJECTION, SOLUTION INTRAVENOUS
Status: DISCONTINUED | OUTPATIENT
Start: 2020-09-21 | End: 2020-09-21

## 2020-09-21 RX ORDER — ROCURONIUM BROMIDE 10 MG/ML
INJECTION, SOLUTION INTRAVENOUS
Status: DISCONTINUED | OUTPATIENT
Start: 2020-09-21 | End: 2020-09-21

## 2020-09-21 RX ORDER — FENTANYL CITRATE 50 UG/ML
INJECTION, SOLUTION INTRAMUSCULAR; INTRAVENOUS
Status: DISCONTINUED | OUTPATIENT
Start: 2020-09-21 | End: 2020-09-21

## 2020-09-21 RX ORDER — ONDANSETRON 2 MG/ML
4 INJECTION INTRAMUSCULAR; INTRAVENOUS DAILY PRN
Status: DISCONTINUED | OUTPATIENT
Start: 2020-09-21 | End: 2020-09-21 | Stop reason: HOSPADM

## 2020-09-21 RX ORDER — ONDANSETRON 8 MG/1
8 TABLET, ORALLY DISINTEGRATING ORAL EVERY 8 HOURS PRN
Status: DISCONTINUED | OUTPATIENT
Start: 2020-09-21 | End: 2020-09-21 | Stop reason: HOSPADM

## 2020-09-21 RX ORDER — SODIUM CHLORIDE 9 MG/ML
INJECTION, SOLUTION INTRAVENOUS CONTINUOUS
Status: DISCONTINUED | OUTPATIENT
Start: 2020-09-21 | End: 2020-09-21 | Stop reason: HOSPADM

## 2020-09-21 RX ORDER — SODIUM CHLORIDE, SODIUM LACTATE, POTASSIUM CHLORIDE, CALCIUM CHLORIDE 600; 310; 30; 20 MG/100ML; MG/100ML; MG/100ML; MG/100ML
INJECTION, SOLUTION INTRAVENOUS CONTINUOUS
Status: DISCONTINUED | OUTPATIENT
Start: 2020-09-21 | End: 2020-09-21 | Stop reason: HOSPADM

## 2020-09-21 RX ORDER — HYDROCODONE BITARTRATE AND ACETAMINOPHEN 5; 325 MG/1; MG/1
1 TABLET ORAL EVERY 4 HOURS PRN
Status: CANCELLED | OUTPATIENT
Start: 2020-09-21

## 2020-09-21 RX ORDER — ACETAMINOPHEN 500 MG
1000 TABLET ORAL
Status: COMPLETED | OUTPATIENT
Start: 2020-09-21 | End: 2020-09-21

## 2020-09-21 RX ORDER — LIDOCAINE HYDROCHLORIDE 10 MG/ML
0.5 INJECTION, SOLUTION EPIDURAL; INFILTRATION; INTRACAUDAL; PERINEURAL ONCE
Status: DISCONTINUED | OUTPATIENT
Start: 2020-09-21 | End: 2020-09-21 | Stop reason: HOSPADM

## 2020-09-21 RX ORDER — OXYCODONE HYDROCHLORIDE 5 MG/1
5 TABLET ORAL ONCE
Status: COMPLETED | OUTPATIENT
Start: 2020-09-21 | End: 2020-09-21

## 2020-09-21 RX ORDER — DEXAMETHASONE SODIUM PHOSPHATE 4 MG/ML
INJECTION, SOLUTION INTRA-ARTICULAR; INTRALESIONAL; INTRAMUSCULAR; INTRAVENOUS; SOFT TISSUE
Status: DISCONTINUED | OUTPATIENT
Start: 2020-09-21 | End: 2020-09-21

## 2020-09-21 RX ORDER — LIDOCAINE HYDROCHLORIDE 20 MG/ML
INJECTION INTRAVENOUS
Status: DISCONTINUED | OUTPATIENT
Start: 2020-09-21 | End: 2020-09-21

## 2020-09-21 RX ORDER — PHENYLEPHRINE HYDROCHLORIDE 10 MG/ML
INJECTION INTRAVENOUS
Status: DISCONTINUED | OUTPATIENT
Start: 2020-09-21 | End: 2020-09-21

## 2020-09-21 RX ORDER — OXYCODONE HYDROCHLORIDE 5 MG/1
5 TABLET ORAL
Status: DISCONTINUED | OUTPATIENT
Start: 2020-09-21 | End: 2020-09-21 | Stop reason: HOSPADM

## 2020-09-21 RX ORDER — MIDAZOLAM HYDROCHLORIDE 1 MG/ML
2 INJECTION INTRAMUSCULAR; INTRAVENOUS
Status: COMPLETED | OUTPATIENT
Start: 2020-09-21 | End: 2020-09-21

## 2020-09-21 RX ORDER — PROPOFOL 10 MG/ML
VIAL (ML) INTRAVENOUS
Status: DISCONTINUED | OUTPATIENT
Start: 2020-09-21 | End: 2020-09-21

## 2020-09-21 RX ORDER — HYDROMORPHONE HYDROCHLORIDE 2 MG/ML
0.4 INJECTION, SOLUTION INTRAMUSCULAR; INTRAVENOUS; SUBCUTANEOUS EVERY 5 MIN PRN
Status: DISCONTINUED | OUTPATIENT
Start: 2020-09-21 | End: 2020-09-21 | Stop reason: HOSPADM

## 2020-09-21 RX ORDER — SODIUM CHLORIDE 0.9 % (FLUSH) 0.9 %
3 SYRINGE (ML) INJECTION
Status: DISCONTINUED | OUTPATIENT
Start: 2020-09-21 | End: 2020-09-21 | Stop reason: HOSPADM

## 2020-09-21 RX ADMIN — ONDANSETRON HYDROCHLORIDE 4 MG: 2 INJECTION INTRAMUSCULAR; INTRAVENOUS at 08:09

## 2020-09-21 RX ADMIN — PHENYLEPHRINE HYDROCHLORIDE 200 MCG: 10 INJECTION INTRAVENOUS at 07:09

## 2020-09-21 RX ADMIN — CARBOXYMETHYLCELLULOSE SODIUM 3 DROP: 2.5 SOLUTION/ DROPS OPHTHALMIC at 07:09

## 2020-09-21 RX ADMIN — FENTANYL CITRATE 50 MCG: 50 INJECTION, SOLUTION INTRAMUSCULAR; INTRAVENOUS at 08:09

## 2020-09-21 RX ADMIN — HYDROMORPHONE HYDROCHLORIDE 0.4 MG: 2 INJECTION, SOLUTION INTRAMUSCULAR; INTRAVENOUS; SUBCUTANEOUS at 10:09

## 2020-09-21 RX ADMIN — OXYCODONE HYDROCHLORIDE 5 MG: 5 TABLET ORAL at 09:09

## 2020-09-21 RX ADMIN — MIDAZOLAM HYDROCHLORIDE 2 MG: 1 INJECTION, SOLUTION INTRAMUSCULAR; INTRAVENOUS at 05:09

## 2020-09-21 RX ADMIN — ONDANSETRON 4 MG: 2 INJECTION INTRAMUSCULAR; INTRAVENOUS at 10:09

## 2020-09-21 RX ADMIN — PROMETHAZINE HYDROCHLORIDE 6.25 MG: 25 INJECTION INTRAMUSCULAR; INTRAVENOUS at 10:09

## 2020-09-21 RX ADMIN — FENTANYL CITRATE 50 MCG: 50 INJECTION, SOLUTION INTRAMUSCULAR; INTRAVENOUS at 06:09

## 2020-09-21 RX ADMIN — FENTANYL CITRATE 25 MCG: 50 INJECTION, SOLUTION INTRAMUSCULAR; INTRAVENOUS at 08:09

## 2020-09-21 RX ADMIN — HYDROMORPHONE HYDROCHLORIDE 0.4 MG: 2 INJECTION, SOLUTION INTRAMUSCULAR; INTRAVENOUS; SUBCUTANEOUS at 09:09

## 2020-09-21 RX ADMIN — PROPOFOL 200 MG: 10 INJECTION, EMULSION INTRAVENOUS at 07:09

## 2020-09-21 RX ADMIN — LIDOCAINE HYDROCHLORIDE 100 MG: 20 INJECTION, SOLUTION INTRAVENOUS at 07:09

## 2020-09-21 RX ADMIN — DEXAMETHASONE SODIUM PHOSPHATE 8 MG: 4 INJECTION, SOLUTION INTRAMUSCULAR; INTRAVENOUS at 07:09

## 2020-09-21 RX ADMIN — MIDAZOLAM HYDROCHLORIDE 4 MG: 1 INJECTION, SOLUTION INTRAMUSCULAR; INTRAVENOUS at 06:09

## 2020-09-21 RX ADMIN — FENTANYL CITRATE 100 MCG: 50 INJECTION, SOLUTION INTRAMUSCULAR; INTRAVENOUS at 07:09

## 2020-09-21 RX ADMIN — NEOSTIGMINE METHYLSULFATE 5 MG: 1 INJECTION INTRAVENOUS at 08:09

## 2020-09-21 RX ADMIN — SODIUM CHLORIDE, SODIUM LACTATE, POTASSIUM CHLORIDE, AND CALCIUM CHLORIDE: 600; 310; 30; 20 INJECTION, SOLUTION INTRAVENOUS at 06:09

## 2020-09-21 RX ADMIN — ROCURONIUM BROMIDE 50 MG: 10 INJECTION, SOLUTION INTRAVENOUS at 07:09

## 2020-09-21 RX ADMIN — ACETAMINOPHEN 1000 MG: 500 TABLET, FILM COATED ORAL at 05:09

## 2020-09-21 RX ADMIN — OXYCODONE 5 MG: 5 TABLET ORAL at 11:09

## 2020-09-21 RX ADMIN — MUPIROCIN: 20 OINTMENT TOPICAL at 05:09

## 2020-09-21 NOTE — PLAN OF CARE
Jaycee Gray has met all discharge criteria from Phase II. Vital Signs are stable, ambulating  without difficulty.Pain is now under control and tolerable for the pt. Pain score 7 at this time.  Discharge instructions given, patient verbalized understanding. Discharged from facility via wheelchair in stable condition.

## 2020-09-21 NOTE — ANESTHESIA PROCEDURE NOTES
Intubation  Performed by: Vic Cherry CRNA  Authorized by: Russell Presley MD     Intubation:     Induction:  Intravenous    Intubated:  Postinduction    Mask Ventilation:  Easy mask    Attempts:  1    Attempted By:  CRNA    Method of Intubation:  Video laryngoscopy    Blade:  De Jesus 3    Laryngeal View Grade: Grade I - full view of chords      Difficult Airway Encountered?: No      Complications:  None    Airway Device:  Oral endotracheal tube    Airway Device Size:  7.5    Style/Cuff Inflation:  Cuffed (inflated to minimal occlusive pressure)    Inflation Amount (mL):  4    Tube secured:  21    Secured at:  The lips    Placement Verified By:  Capnometry    DIFFICULT INTUBATION DESCRIPTOR: poor dentition     Findings Post-Intubation:  BS equal bilateral and atraumatic/condition of teeth unchanged

## 2020-09-21 NOTE — DISCHARGE INSTRUCTIONS
Abdominal Laparoscopy Discharge Instructions      Activity  · Limit your activity for 4-6 weeks.  · Dont lift anything heavier than 5-10 pounds.  · Avoid strenuous activities, such as mowing the lawn, vacuuming, or playing sports.  · Limit your activity to short, slow walks. Gradually increase your pace and distance as you feel able.  · Listen to your body. If an activity causes pain, stop.  · Rest when you are tired.  · Dont have sexual intercourse or use tampons or douches until your doctor says its safe to do so.    Home Care  · Always keep your incision clean and dry  · Shower as instructed in 24 hours. You may wash your incision gently with mild soap and warm water and pat dry.  Avoid tub baths, hot tubs and swimming pools until seen by your physician for a post-op follow up.  · If you have steri strips they should fall off in 7-10 days.    · If you have band aids covering your incisions change daily or when soiled.  The band aids may be removed post op day 1 if they are clean and dry.  · Take your medication exactly as instructed by your doctor.  · Return to your diet as you feel able. Eat a healthy, well-balanced diet.  · Avoid constipation.  ¨ Use laxatives, stool softeners, or enemas as directed by your doctor.  ¨ Eat more high-fiber foods.  ¨ Drink 6-8 glasses of water every day, unless directed otherwise.  Follow-Up  Make a follow-up appointment as directed by our staff.       When to Call Your Doctor  Call your doctor right away if you have any of the following:  · Fever above 101.5°F  (38.6°C) or chills  · Bright red vaginal bleeding or a foul-smelling discharge  · Vaginal bleeding that soaks more than one sanitary pad per hour  · Trouble urinating or burning sensation when you urinate  · Severe abdominal pain or bloating  · Redness, swelling, or drainage at your incision site  · Shortness of breath        ________________________________________________________________  FOR EMERGENCIES:  If any  unusual problems or difficulties occur, contact                 ___[place]_____________________ or the resident at [phone] (page ) or at the Clinic office, [phone].        Anesthesia: After Your Surgery  Youve just had surgery. During surgery, you received medication called anesthesia to keep you comfortable and pain-free. After surgery, you may experience some pain or nausea. This is common. Here are some tips for feeling better and recovering after surgery.    Going home  Your doctor or nurse will show you how to take care of yourself when you go home. He or she will also answer your questions. Have an adult family member or friend drive you home. For the first 24 hours after your surgery:  · Do not drive or use heavy equipment.  · Do not make important decisions or sign legal documents.  · Avoid alcohol.  · Have someone stay with you, if needed. He or she can watch for problems and help keep you safe.  · Take your time getting up from a seated or lying position. You may experience dizziness for 24 hours  Be sure to keep all follow-up appointments with your doctor. And rest after your procedure for as long as your doctor tells you to.    Coping with pain  If you have pain after surgery, pain medication will help you feel better. Take it as directed, before pain becomes severe. Also, ask your doctor or pharmacist about other ways to control pain, such as with heat, ice, and relaxation. And follow any other instructions your surgeon or nurse gives you.    URINARY RETENTION  Should you experience a decrease in your urine output or are unable to urinate following surgery, this can be due to the medications given during surgery.  We recommend you going to the nearest Emergency Department.    Tips for taking pain medication  To get the best relief possible, remember these points:  · Pain medications can upset your stomach. Taking them with a little food may help.  · Most pain relievers taken by mouth  need at least 20 to 30 minutes to take effect.  · Taking medication on a schedule can help you remember to take it. Try to time your medication so that you can take it before beginning an activity, such as dressing, walking, or sitting down for dinner.  · Constipation is a common side effect of pain medications. Contact your doctor before taking any medications like laxatives or stool softeners to help relieve constipation. Also ask about any dietary restrictions, because drinking lots of fluids and eating foods like fruits and vegetables that are high in fiber can also help. Remember, dont take laxatives unless your surgeon has prescribed them.  · Mixing alcohol and pain medication can cause dizziness and slow your breathing. It can even be fatal. Dont drink alcohol while taking pain medication.  · Pain medication can slow your reflexes. Dont drive or operate machinery while taking pain medication.  If your health care provider tells you to take acetaminophen to help relieve your pain, ask him or her how much you are supposed to take each day. (Acetaminophen is the generic name for Tylenol and other brand-name pain relievers.) Acetaminophen or other pain relievers may interact with your prescription medicines or other over-the-counter (OTC) drugs. Some prescription medications contain acetaminophen along with other active ingredients. Using both prescription and OTC acetaminophen for pain can cause you to overdose. The FDA recommends that you read the labels on your OTC medications carefully. This will help you to clearly understand the list of active ingredients, dosing instructions, and any warnings. It may also help you avoid taking too much acetaminophen. If you have questions or don't understand the information, ask your pharmacist or health care provider to explain it to you before you take the OTC medication.    Managing nausea  Some people have an upset stomach after surgery. This is often due to  anesthesia, pain, pain medications, or the stress of surgery. The following tips will help you manage nausea and get good nutrition as you recover. If you were on a special diet before surgery, ask your doctor if you should follow it during recovery. These tips may help:  · Dont push yourself to eat. Your body will tell you when to eat and how much.  · Start off with clear liquids and soup. They are easier to digest.  · Progress to semi-solid foods (mashed potatoes, applesauce, and gelatin) as you feel ready.  · Slowly move to solid foods. Dont eat fatty, rich, or spicy foods at first.  · Dont force yourself to have three large meals a day. Instead, eat smaller amounts more often.  · Take pain medications with a small amount of solid food, such as crackers or toast to avoid nausea.      Call your surgeon if    · You feel too sleepy, dizzy, or groggy (medication may be too strong).  · You have side effects like nausea, vomiting, or skin changes (rash, itching, or hives).   © 9183-2198 [employer]. [address] [place] [address], [place] [address]. All rights reserved. This information is not intended as a substitute for professional medical care. Always follow your healthcare professional's instructions.                PLEASE FOLLOW ANY ADDITIONAL INSTRUCTIONS GIVEN TO YOU BY DR HINTON!

## 2020-09-21 NOTE — TRANSFER OF CARE
"Anesthesia Transfer of Care Note    Patient: Jaycee Gray    Procedure(s) Performed: Procedure(s) (LRB):  LAPAROSCOPY, DIAGNOSTIC (Bilateral)  DESTRUCTION, ENDOMETRIOSIS (N/A)    Patient location: PACU    Anesthesia Type: general    Transport from OR: Transported from OR on 2-3 L/min O2 by NC with adequate spontaneous ventilation    Post pain: adequate analgesia    Post assessment: no apparent anesthetic complications and tolerated procedure well    Post vital signs: stable    Level of consciousness: awake, alert and oriented    Nausea/Vomiting: no nausea/vomiting    Complications: none    Transfer of care protocol was followed      Last vitals:   Visit Vitals  /85 (BP Location: Right arm, Patient Position: Sitting)   Pulse 97   Temp 36.3 °C (97.4 °F) (Oral)   Resp 16   Ht 5' 2" (1.575 m)   Wt 72.6 kg (160 lb)   LMP 04/26/2009 (LMP Unknown)   SpO2 97%   Breastfeeding No   BMI 29.26 kg/m²     "

## 2020-09-21 NOTE — OP NOTE
OPERATIVE REPORT FOR ROBOT ASSISTED LAPAROSCOPIC EXCISION OF ENDOMETRIOSIS                                                                                                                                 Date of Procedure: 09/21/2020                                                                               SURGEON:  Santy Elaine M.D.                                                                                                                    ASSISTANT:  Ivis Sommer MD (No resident available)                                                                                               PREOPERATIVE DIAGNOSIS:          1. Pelvic pain in female                                                                                   POSTOPERATIVE DIAGNOSIS:       1. Pelvic pain in female                                                                                   PROCEDURE:  Diagnostic laparoscopy, excision of endometriosis, lysis of adhesions lasting for 45 min (mod 22),                                                                                                                    ANESTHESIA:  GETA    BLOOD LOSS:  10  mL.                                                                                                                                      URINE OUTPUT:  150 mL.                                                                                       IV FLUIDS:  1100 cc    COMPLICATIONS:  None    SPECIMINES:  Peritoneal biopsies    DRAINS:  None    FINDINGS:    Normal upper abdomen  Surgical absent ovaries/uterus.   Surgical clip on posterior aspect of left vagina  Scar tissue of RV septum  ? White tissue in right sidewall.      STATEMENT OF MEDICAL NECESSITY:  Pt is a 40 y.o. year old female who has persistent pain unresponsive to medical treatment.  After discussing risks/benefits/alternatives/and indications, pt wished to proceed with the above stated case.                                                                                PROCEDURE IN DETAIL:  After appropriate consents had been obtained and time out performed, the patient was taken to the procedure room at which time general anesthesia was administered.  The patient was then placed in the lithotomy position, prepped in the appropriate sterile fashion.  A sterile sponge stick was placed in the vagina.  Attention was then taken to the umbilicus at which time a Veress needle was inserted.  After confirming an opening pressure of 1 mmHg, the abdomen was insufflated to a maximum pressure of 15 mmHg.  A 10mm skin incision was then made and the trocar was inserted under direct visualization.  The [facility] was [facility] in [facility] and 2 5mm trocars were placed in the bilateral lower quadrants under direct visualization to avoid injury to the underlying structures.  After confirming proper port placement, the robot was docked.  A thorough inspection of the abdomen and pelvis was performed with the above findings.  Attention was first taken to the left side at which time a surgical clip with scar tissue on the left vaginal apex were found.  Attention was then taken to the cul-de-sac at which time no endometriosis was found.  Attention was then taken to the right side at which time atypical white thickening was found on the sidewall.  And finally, a thorough upper abdominal survey was performed and no scar tissue was found.  Bilateral uteters were identified.   All scar tissue was excised using monopolar cautery made hemostatic after tissue removal.  Tenting of the RV septum in the left was noted and released using monopolar scissors.  And white thickening on right side wall was excised out.  After confirming hemostasis, gordy was not used.  The pneumoperitoneum was released and the trocars were removed under visualization.  The skin was closed using a 4-0 monocryl suture.  Fascia was closed using 0 vicryl suture at the umbilicus.  The patient was then  extubated and taken to the recovery area in stable condition.  The uterine manipulator was removed.       All counts were correct x 2 at the end of the procedure.  Antibiotics were not indicated for this case.

## 2020-09-21 NOTE — OPERATIVE NOTE ADDENDUM
Certification of Assistant at Surgery       Surgery Date: 9/21/2020     Participating Surgeons:  Surgeon(s) and Role:     * Santy Elaine MD - Primary     * Charles Sommer IV, MD - Assisting    Procedures:  Procedure(s) (LRB):  LAPAROSCOPY, DIAGNOSTIC (Bilateral)  DESTRUCTION, ENDOMETRIOSIS (N/A)    Assistant Surgeon's Certification of Necessity:  I understand that section 1842 (b) (6) (d) of the Social Security Act generally prohibits Medicare Part B reasonable charge payment for the services of assistants at surgery in [facility] hospitals when qualified residents are available to furnish such services. I certify that the services for which payment is claimed were medically necessary, and that no qualified resident was available to perform the services. I further understand that these services are subject to post-payment review by the Medicare carrier.      ELMER Valdes    09/21/2020  8:42 AM

## 2020-09-21 NOTE — INTERVAL H&P NOTE
The patient has been examined and the H&P has been reviewed:    I concur with the findings and no changes have occurred since H&P was written.    Surgery risks, benefits and alternative options discussed and understood by patient/family.      Active Hospital Problems    Diagnosis  POA    Pelvic pain in female [R10.2]  Yes      Resolved Hospital Problems   No resolved problems to display.     Proceed to the OR.    CHRISTIE Hanley, FNP-C

## 2020-09-21 NOTE — DISCHARGE SUMMARY
[facility]  Brief Operative Note    Surgery Date: 9/21/2020     Surgeon(s) and Role:     * Santy Elaine MD - Primary     * Charles Sommer IV, MD - Assisting        Pre-op Diagnosis:  Pelvic pain in female [R10.2]    Post-op Diagnosis:  Post-Op Diagnosis Codes:     * Pelvic pain in female [R10.2]    Procedure(s) (LRB):  LAPAROSCOPY, DIAGNOSTIC (Bilateral)  DESTRUCTION, ENDOMETRIOSIS (N/A)    Anesthesia: General    Description of the findings of the procedure(s): surgical clip in LLQ; scar tissue in LLQ with ? White endometriosis in right sidewall.    Estimated Blood Loss: * No values recorded between 9/21/2020  7:20 AM and 9/21/2020  8:54 AM *         Specimens:   Specimen (12h ago, onward)    None            Discharge Note    OUTCOME: Patient tolerated treatment/procedure well without complication and is now ready for discharge.    DISPOSITION: Home or Self Care    FINAL DIAGNOSIS:  Pelvic pain in female    FOLLOWUP: In clinic    DISCHARGE INSTRUCTIONS:    Discharge Procedure Orders   Diet general     Call MD for:  temperature >100.4     Call MD for:  persistent nausea and vomiting     Call MD for:  severe uncontrolled pain     Call MD for:  difficulty breathing, headache or visual disturbances     Call MD for:  redness, tenderness, or signs of infection (pain, swelling, redness, odor or green/yellow discharge around incision site)     Call MD for:  hives     Call MD for:  persistent dizziness or light-headedness     Call MD for:  extreme fatigue     Remove dressing in 72 hours     Activity as tolerated

## 2020-09-21 NOTE — ANESTHESIA POSTPROCEDURE EVALUATION
Anesthesia Post Evaluation    Patient: Jaycee Gray    Procedure(s) Performed: Procedure(s) (LRB):  LAPAROSCOPY, DIAGNOSTIC (Bilateral)  DESTRUCTION, ENDOMETRIOSIS (N/A)    Final Anesthesia Type: general    Patient location during evaluation: PACU  Patient participation: Yes- Able to Participate  Level of consciousness: awake and alert  Post-procedure vital signs: reviewed and stable  Pain management: adequate  Airway patency: patent    PONV status at discharge: No PONV  Anesthetic complications: no      Cardiovascular status: blood pressure returned to baseline  Respiratory status: unassisted  Hydration status: euvolemic  Follow-up not needed.          Vitals Value Taken Time   /73 09/21/20 1026   Temp 36.4 °C (97.6 °F) 09/21/20 1016   Pulse 60 09/21/20 1026   Resp 16 09/21/20 1025   SpO2 100 % 09/21/20 1026   Vitals shown include unvalidated device data.      No case tracking events are documented in the log.      Pain/Shena Score: Pain Rating Prior to Med Admin: 9 (9/21/2020 10:25 AM)  Pain Rating Post Med Admin: 8 (9/21/2020  9:56 AM)  Shena Score: 10 (9/21/2020  9:54 AM)

## 2020-09-22 LAB
FINAL PATHOLOGIC DIAGNOSIS: NORMAL
GROSS: NORMAL

## 2020-09-23 ENCOUNTER — TELEPHONE (OUTPATIENT)
Dept: OBSTETRICS AND GYNECOLOGY | Facility: CLINIC | Age: 40
End: 2020-09-23

## 2020-09-23 NOTE — TELEPHONE ENCOUNTER
----- Message from Cristina Roe sent at 9/23/2020  9:46 AM CDT -----  Regarding: fall  Name of Who is Calling: SARA CERDA [[id number]]      What is the request in detail: Patient is requesting a call back she states she fell once she got home after surgery she states she is doing fine just wanted to le the doctor know       Can the clinic reply by [facility]: no      What Number to Call Back if not in [facility]: [phone]

## 2020-09-23 NOTE — TELEPHONE ENCOUNTER
Spoke with pt    Pt reports recovering well after procedure, however, she fell yesterday and is feeling some pain around her belly button area that she was not feeling before - pt scheduled with Dr Elaine for post op appt tomorrow.  Pt is passing gas, had BM, and does not report any constipation.  Pt is urinating fine.  Denies any fever, vomiting, bleeding, discharge, or sign of infection.    Pt scheduled for Post op in 4 weeks    Pt verbalized understanding.

## 2020-09-24 ENCOUNTER — OFFICE VISIT (OUTPATIENT)
Dept: OBSTETRICS AND GYNECOLOGY | Facility: CLINIC | Age: 40
End: 2020-09-24
Payer: MEDICARE

## 2020-09-24 VITALS
SYSTOLIC BLOOD PRESSURE: 128 MMHG | BODY MASS INDEX: 29.45 KG/M2 | DIASTOLIC BLOOD PRESSURE: 70 MMHG | WEIGHT: 160.06 LBS | HEIGHT: 62 IN

## 2020-09-24 DIAGNOSIS — Z09 POSTOPERATIVE EXAMINATION: Primary | ICD-10-CM

## 2020-09-24 PROCEDURE — 99024 PR POST-OP FOLLOW-UP VISIT: ICD-10-PCS | Mod: POP,,, | Performed by: OBSTETRICS & GYNECOLOGY

## 2020-09-24 PROCEDURE — 99024 POSTOP FOLLOW-UP VISIT: CPT | Mod: POP,,, | Performed by: OBSTETRICS & GYNECOLOGY

## 2020-09-24 PROCEDURE — 99213 OFFICE O/P EST LOW 20 MIN: CPT | Mod: PBBFAC | Performed by: OBSTETRICS & GYNECOLOGY

## 2020-09-24 PROCEDURE — 99999 PR PBB SHADOW E&M-EST. PATIENT-LVL III: ICD-10-PCS | Mod: PBBFAC,,, | Performed by: OBSTETRICS & GYNECOLOGY

## 2020-09-24 PROCEDURE — 99999 PR PBB SHADOW E&M-EST. PATIENT-LVL III: CPT | Mod: PBBFAC,,, | Performed by: OBSTETRICS & GYNECOLOGY

## 2020-09-24 RX ORDER — OXYCODONE AND ACETAMINOPHEN 10; 325 MG/1; MG/1
TABLET ORAL
COMMUNITY
Start: 2020-09-07 | End: 2020-10-26

## 2020-09-24 RX ORDER — TIZANIDINE 4 MG/1
TABLET ORAL
COMMUNITY
Start: 2020-08-23 | End: 2020-10-26 | Stop reason: SDUPTHER

## 2020-09-24 NOTE — PROGRESS NOTES
CC: Postop visit    Jaycee Gray is a 40 y.o. female  post-op from a LSC, removal of scar tissue, surgical clip removal on .  Patient is doing well postoperatively.  No pain.  No bowel or bladder complaints.  No fever.  Pt did fall Monday night and having slight pain on abd.  Hit her stomach, not head.  No pelvic pain like before    The pathology revealed:  benign    Past Medical History:   Diagnosis Date    Anxiety     Breast abscess     Charcot-Amanda-Tooth disease     mild LE weakness    Chronic interstitial cystitis without hematuria     CMT (Charcot-Amanda-Tooth disease)     Depression     reports she is no longer depressed    Endometriosis     Endometriosis of uterus     Headache(784.0)     Herpes     History of psychiatric care     History of psychiatric hospitalization     Muscular dystrophy     PTSD (post-traumatic stress disorder)     S/P cholecystectomy     Seizures     last seizure 14-15 yrs ago    Self-injurious behavior     Thyroid disease      Past Surgical History:   Procedure Laterality Date    APPENDECTOMY      arthroscopy right shoulder      BACK SURGERY  2017    BACK SURGERY  2018    screw removal    BREAST SURGERY      reduction    CHOLECYSTECTOMY  2017    CYSTOSCOPY WITH HYDRODISTENSION OF BLADDER N/A 2019    Procedure: CYSTOSCOPY, WITH BLADDER HYDRODISTENSION;  Surgeon: Jay Shelby MD;  Location: [facility] OR;  Service: Urology;  Laterality: N/A;    DIAGNOSTIC LAPAROSCOPY Bilateral 2020    Procedure: LAPAROSCOPY, DIAGNOSTIC;  Surgeon: Santy Elaine MD;  Location: [facility];  Service: OB/GYN;  Laterality: Bilateral;  Plan to use robot in room 12 with Dr. Kemal LOGAN    ENDOSCOPIC ULTRASOUND OF UPPER GASTROINTESTINAL TRACT N/A 2018    Procedure: ULTRASOUND, ENDOSCOPIC, UPPER GI TRACT;  Surgeon: Marko Pereyra MD;  Location: [facility];  Service: Endoscopy;  Laterality: N/A;    ESOPHAGOGASTRODUODENOSCOPY  2018     "ESOPHAGOGASTRODUODENOSCOPY N/A 2018    Procedure: ESOPHAGOGASTRODUODENOSCOPY (EGD);  Surgeon: Marko Pereyra MD;  Location[facility];  Service: Endoscopy;  Laterality: N/A;    EXCISIONAL HEMORRHOIDECTOMY N/A 10/1/2019    Procedure: HEMORRHOIDECTOMY;  Surgeon: Jenifer Aragon MD;  Location: [facility] OR 2ND FLR;  Service: Colon and Rectal;  Laterality: N/A;    HYSTERECTOMY      TLH vs LAVH with BSO    KNEE SURGERY Bilateral     OOPHORECTOMY      SURGICAL REMOVAL OF ENDOMETRIOSIS N/A 2020    Procedure: DESTRUCTION, ENDOMETRIOSIS;  Surgeon: Santy Elaine MD;  Location: [facility] OR;  Service: OB/GYN;  Laterality: N/A;    Upper EUS  2018     Family History   Problem Relation Age of Onset    Cancer Maternal Grandfather         colon    Colon cancer Maternal Grandfather     No Known Problems Mother     No Known Problems Father     Bladder Cancer Maternal Grandmother     Breast cancer Paternal Aunt     Heart disease Neg Hx      Social History     Tobacco Use    Smoking status: Former Smoker     Packs/day: 0.50     Years: 3.00     Pack years: 1.50     Types: Cigarettes     Quit date:      Years since quittin.7    Smokeless tobacco: Never Used    Tobacco comment: quit 2015   Substance Use Topics    Alcohol use: No    Drug use: No     OB History    Para Term  AB Living   1 0 0 0 1     SAB TAB Ectopic Multiple Live Births   1 0 0          # Outcome Date GA Lbr Darren/2nd Weight Sex Delivery Anes PTL Lv   1 SAB               Birth Comments: at age 18       /70   Ht 5' 2" (1.575 m)   Wt 72.6 kg (160 lb 0.9 oz)   LMP 2009 (LMP Unknown)   BMI 29.27 kg/m²     ROS:  GENERAL: No fever, chills, fatigability or weight loss.  VULVAR: No pain, no lesions and no itching.  VAGINAL: No relaxation, no itching, no discharge, no abnormal bleeding and no lesions.  ABDOMEN: No abdominal pain. Denies nausea. Denies vomiting. No diarrhea. No constipation  BREAST: Denies pain. " No lumps. No discharge.  URINARY: No incontinence, no nocturia, no frequency and no dysuria.  CARDIOVASCULAR: No chest pain. No shortness of breath. No leg cramps.  NEUROLOGICAL: No headaches. No vision changes.    PE:   Abd:  Incision c/d/i.  No bruising.  Umbilical bandage removed.      ICD-10-CM ICD-9-CM    1. Postoperative examination  Z09 V67.00        Use ice/motrin to help with abdominal wall tenderness.  F/u 2 weeks for postop visit.

## 2020-10-04 ENCOUNTER — PATIENT MESSAGE (OUTPATIENT)
Dept: FAMILY MEDICINE | Facility: CLINIC | Age: 40
End: 2020-10-04

## 2020-10-05 ENCOUNTER — TELEPHONE (OUTPATIENT)
Dept: UROLOGY | Facility: CLINIC | Age: 40
End: 2020-10-05

## 2020-10-05 ENCOUNTER — PATIENT MESSAGE (OUTPATIENT)
Dept: UROLOGY | Facility: CLINIC | Age: 40
End: 2020-10-05

## 2020-10-05 ENCOUNTER — OFFICE VISIT (OUTPATIENT)
Dept: FAMILY MEDICINE | Facility: CLINIC | Age: 40
End: 2020-10-05
Payer: MEDICARE

## 2020-10-05 VITALS
SYSTOLIC BLOOD PRESSURE: 118 MMHG | OXYGEN SATURATION: 96 % | TEMPERATURE: 98 F | HEART RATE: 74 BPM | HEIGHT: 62 IN | BODY MASS INDEX: 30.63 KG/M2 | DIASTOLIC BLOOD PRESSURE: 76 MMHG | WEIGHT: 166.44 LBS

## 2020-10-05 DIAGNOSIS — R10.2 CHRONIC PELVIC PAIN IN FEMALE: ICD-10-CM

## 2020-10-05 DIAGNOSIS — G89.29 CHRONIC PELVIC PAIN IN FEMALE: ICD-10-CM

## 2020-10-05 DIAGNOSIS — E03.9 ACQUIRED HYPOTHYROIDISM: ICD-10-CM

## 2020-10-05 DIAGNOSIS — E89.40 PREMATURE SURGICAL MENOPAUSE: ICD-10-CM

## 2020-10-05 DIAGNOSIS — Z87.42 HISTORY OF ENDOMETRIOSIS: ICD-10-CM

## 2020-10-05 DIAGNOSIS — T14.8XXA SURGICAL WOUND PRESENT: ICD-10-CM

## 2020-10-05 DIAGNOSIS — R30.0 DYSURIA: Primary | ICD-10-CM

## 2020-10-05 PROCEDURE — 99214 PR OFFICE/OUTPT VISIT, EST, LEVL IV, 30-39 MIN: ICD-10-PCS | Mod: S$PBB,,, | Performed by: INTERNAL MEDICINE

## 2020-10-05 PROCEDURE — 99214 OFFICE O/P EST MOD 30 MIN: CPT | Mod: PBBFAC,PN | Performed by: INTERNAL MEDICINE

## 2020-10-05 PROCEDURE — 99214 OFFICE O/P EST MOD 30 MIN: CPT | Mod: S$PBB,,, | Performed by: INTERNAL MEDICINE

## 2020-10-05 PROCEDURE — 99999 PR PBB SHADOW E&M-EST. PATIENT-LVL IV: CPT | Mod: PBBFAC,,, | Performed by: INTERNAL MEDICINE

## 2020-10-05 PROCEDURE — 99999 PR PBB SHADOW E&M-EST. PATIENT-LVL IV: ICD-10-PCS | Mod: PBBFAC,,, | Performed by: INTERNAL MEDICINE

## 2020-10-05 RX ORDER — EPINEPHRINE 0.3 MG/.3ML
1 INJECTION SUBCUTANEOUS ONCE
Qty: 2 EACH | Refills: 0 | Status: SHIPPED | OUTPATIENT
Start: 2020-10-05 | End: 2021-01-27

## 2020-10-05 RX ORDER — SULFAMETHOXAZOLE AND TRIMETHOPRIM 800; 160 MG/1; MG/1
1 TABLET ORAL 2 TIMES DAILY
Qty: 20 TABLET | Refills: 0 | Status: SHIPPED | OUTPATIENT
Start: 2020-10-05 | End: 2020-10-26

## 2020-10-05 NOTE — TELEPHONE ENCOUNTER
----- Message from Jay Shelby MD sent at 10/5/2020  9:41 AM CDT -----  The patient needs an appointment procedures will not be schedule without seeing the patient in the office the patient has not been seen since July of 2019.  Please arrange appointment for patient so she can tell me withdrawn we can discuss whether she needs surgery not this will not be done over the phone thank you, Jay Shelby  ----- Message -----  From: Rea Aguilera MA  Sent: 10/5/2020   9:23 AM CDT  To: Jay Shelby MD    Patient called this morning to speak to dr shelby regarding setting up her next procedure.    She stated she wanted to speak to dr shelby before the procedure was scheduled    She is aware you are in surgery today  ----- Message -----  From: Damien Jacobs  Sent: 10/5/2020   8:14 AM CDT  To: Heron Gottlieb    Good Morning,    Patient is requesting to have a procedure that she had done on 07/01/2019.   She asked to be called with the information on when will this be scheduled.  The procedure that she had on 07/01/2019 was a Cystosopy with bladder Hydrodistension.

## 2020-10-05 NOTE — ASSESSMENT & PLAN NOTE
S/P lap with removal of surgical clip/right abd wall endometriosis/scar tissues on 9/21/2020 with Dr. Elaine.  On Percocet, Norco, Zanaflex and TENS unit for pain.  Has done PT in past.  S/P hysterectomy at age 23 for endometriosis.

## 2020-10-05 NOTE — PROGRESS NOTES
[facility] Note    Chief Complaint      Chief Complaint   Patient presents with    Pyelonephritis     pt stated that she thinks she may have a kidney infection/ had surgery 2 weeks ago/ pt had a fall after the surgery/ right side/ pt is having vaginal pressure     Medication Refill     epi pen     Establish Care     History of Present Illness      Jaycee Gray is a 40 y.o. female who [id number] today for dysuria.  Patient comes to appointment with mom.     Started with pressure, flank pain on Saturday night.  Tried to give urine sample prior to visit but unable to urinate.  Last urinated this AM, no hematuria but did have some on Saturday.  Had surgery on 9/21/2020, had mabry, no issues that she is aware of.  Was supposed to see Dr. Shelby, hx of difficulty urinatin    Has one incision on abdomen that she is worried about, no bleeding/drainage.  Wound was hot, red. Has seen surgeon for post op visit.    Problem List Items Addressed This Visit     Chronic pelvic pain in female    Current Assessment & Plan     S/P lap with removal of surgical clip/right abd wall endometriosis/scar tissues on 9/21/2020 with Dr. Elaine.  On Percocet, Norco, Zanaflex and TENS unit for pain.  Has done PT in past.  S/P hysterectomy at age 23 for endometriosis.         History of endometriosis    Premature surgical menopause    Acquired hypothyroidism    Current Assessment & Plan     Stable on Synthroid 50 mcg daily, no S/S of hypo/hyperthyroidism.         Relevant Orders    CBC auto differential    Lipid Panel    Comprehensive metabolic panel    TSH    T4, free      Other Visit Diagnoses     Dysuria    -  Primary    Relevant Medications    sulfamethoxazole-trimethoprim 800-160mg (BACTRIM DS) 800-160 mg Tab    Other Relevant Orders    POCT urinalysis, dipstick or tablet reag    URINALYSIS    Urine culture    Surgical wound present        Relevant Medications    sulfamethoxazole-trimethoprim 800-160mg  (BACTRIM DS) 800-160 mg Tab          Health Maintenance   Topic Date Due    TETANUS VACCINE  10/07/2029    Hepatitis C Screening  Completed    Lipid Panel  Completed       Past Medical History:   Diagnosis Date    Anxiety     Breast abscess     Charcot-Amanda-Tooth disease     mild LE weakness    Chronic interstitial cystitis without hematuria     CMT (Charcot-Amnada-Tooth disease)     Depression     reports she is no longer depressed    Endometriosis     Endometriosis of uterus     Headache(784.0)     Herpes     History of psychiatric care     History of psychiatric hospitalization     Muscular dystrophy     PTSD (post-traumatic stress disorder)     S/P cholecystectomy     Seizures     last seizure 14-15 yrs ago    Self-injurious behavior     Thyroid disease        Past Surgical History:   Procedure Laterality Date    APPENDECTOMY      arthroscopy right shoulder      BACK SURGERY  07/01/2017    BACK SURGERY  02/21/2018    screw removal    BREAST SURGERY      reduction    CHOLECYSTECTOMY  03/2017    CYSTOSCOPY WITH HYDRODISTENSION OF BLADDER N/A 7/1/2019    Procedure: CYSTOSCOPY, WITH BLADDER HYDRODISTENSION;  Surgeon: Jay Shelby MD;  Location: [facility] OR;  Service: Urology;  Laterality: N/A;    DIAGNOSTIC LAPAROSCOPY Bilateral 9/21/2020    Procedure: LAPAROSCOPY, DIAGNOSTIC;  Surgeon: Santy Elaine MD;  Location: [facility];  Service: OB/GYN;  Laterality: Bilateral;  Plan to use robot in room 12 with Dr. Kemal LOGAN    ENDOSCOPIC ULTRASOUND OF UPPER GASTROINTESTINAL TRACT N/A 8/14/2018    Procedure: ULTRASOUND, ENDOSCOPIC, UPPER GI TRACT;  Surgeon: Marko Pereyra MD;  Location: [facility];  Service: Endoscopy;  Laterality: N/A;    ESOPHAGOGASTRODUODENOSCOPY  08/14/2018    ESOPHAGOGASTRODUODENOSCOPY N/A 8/14/2018    Procedure: ESOPHAGOGASTRODUODENOSCOPY (EGD);  Surgeon: Marko Pereyra MD;  Location: [facility];  Service: Endoscopy;  Laterality: N/A;    EXCISIONAL HEMORRHOIDECTOMY  N/A 10/1/2019    Procedure: HEMORRHOIDECTOMY;  Surgeon: Jenifer Aragon MD;  [facility];  Service: Colon and Rectal;  Laterality: N/A;    HYSTERECTOMY      TLH vs LAVH with BSO    KNEE SURGERY Bilateral     OOPHORECTOMY      SURGICAL REMOVAL OF ENDOMETRIOSIS N/A 2020    Procedure: DESTRUCTION, ENDOMETRIOSIS;  Surgeon: Santy Elaine MD;  Location: [facility] OR;  Service: OB/GYN;  Laterality: N/A;    Upper EUS  2018       family history includes Bladder Cancer in her maternal grandmother; Breast cancer in her paternal aunt; Cancer in her maternal grandfather; Colon cancer in her maternal grandfather; No Known Problems in her father and mother.    Social History     Tobacco Use    Smoking status: Former Smoker     Packs/day: 0.50     Years: 3.00     Pack years: 1.50     Types: Cigarettes     Quit date:      Years since quittin.7    Smokeless tobacco: Never Used    Tobacco comment: quit 2015   Substance Use Topics    Alcohol use: No    Drug use: No       Review of Systems   Constitutional: Negative for chills and fever.   HENT: Negative for congestion and sore throat.    Eyes: Negative for blurred vision and discharge.   Respiratory: Negative for cough and shortness of breath.    Cardiovascular: Negative for chest pain and palpitations.   Gastrointestinal: Negative for constipation, diarrhea, nausea and vomiting.   Genitourinary: Negative for dysuria and hematuria.   Musculoskeletal: Negative for falls and myalgias.   Skin: Negative for itching and rash.   Neurological: Negative for dizziness and headaches.        Outpatient Encounter Medications as of 10/5/2020   Medication Sig Note Dispense Refill    diclofenac sodium (VOLTAREN) 1 % Gel daily as needed.        EPINEPHrine (EPIPEN) 0.3 mg/0.3 mL AtIn Inject 0.3 mLs (0.3 mg total) into the muscle once. for 1 dose  2 each 0    HYDROcodone-acetaminophen (NORCO) 5-325 mg per tablet Take 1 tablet by mouth every 4 (four) hours  "as needed for Pain.  14 tablet 0    levothyroxine (SYNTHROID) 50 MCG tablet Take 1 tablet (50 mcg total) by mouth once daily.  30 tablet 11    LORazepam (ATIVAN) 1 MG tablet continuous prn.        oxyCODONE-acetaminophen (PERCOCET)  mg per tablet        oxyCODONE-acetaminophen (PERCOCET) 5-325 mg per tablet every 8 (eight) hours as needed.        promethazine (PHENERGAN) 25 MG tablet Take 1 tablet (25 mg total) by mouth every 6 (six) hours as needed for Nausea.  30 tablet 1    TENS unit and electrodes Cmpk 1 Device by Misc.(Non-Drug; Combo Route) route once daily.       tiZANidine (ZANAFLEX) 4 MG tablet        tiZANidine 4 mg Cap Take by mouth nightly as needed.  8/5/2019: Take as needed      [DISCONTINUED] EPINEPHrine (EPIPEN) 0.3 mg/0.3 mL AtIn        sulfamethoxazole-trimethoprim 800-160mg (BACTRIM DS) 800-160 mg Tab Take 1 tablet by mouth 2 (two) times daily.  20 tablet 0    [DISCONTINUED] escitalopram (LEXAPRO) 10 MG tablet Take 1 tablet (10 mg total) by mouth once daily.  30 tablet 11    [DISCONTINUED] topiramate (TOPAMAX) 50 MG tablet Take 1 tablet (50 mg total) by mouth 2 (two) times daily.  60 tablet 0     No facility-administered encounter medications on file as of 10/5/2020.         Review of patient's allergies indicates:   Allergen Reactions    Benadryl decongestant Shortness Of Breath    Carrot Hives and Shortness Of Breath    Sumatriptan     Sumatriptan succinate Other (See Comments)     paralysis    Tramadol Other (See Comments)     Faces swells. Tolerates percocet  Pt states she  can take Dilaudid.     Venom-honey bee Anaphylaxis    Wasp sting [allergen ext-venom-honey bee] Anaphylaxis    Bupropion hcl        Physical Exam      Vital Signs  Temp: 97.9 °F (36.6 °C)  Temp src: Oral  Pulse: 74  SpO2: 96 %  BP: 118/76  BP Location: Left arm  Patient Position: Sitting  Pain Score: 10-Worst pain ever  Pain Loc: Abdomen  Height and Weight  Height: 5' 2" (157.5 cm)  Weight: 75.5 kg " (166 lb 7.2 oz)  BSA (Calculated - sq m): 1.82 sq meters  BMI (Calculated): 30.4  Weight in (lb) to have BMI = 25: 136.4]    Physical Exam  Constitutional:       Appearance: She is well-developed.   HENT:      Head: Normocephalic and atraumatic.      Right Ear: External ear normal.      Left Ear: External ear normal.   Eyes:      General:         Right eye: No discharge.         Left eye: No discharge.   Neck:      Musculoskeletal: Normal range of motion.      Thyroid: No thyromegaly.   Cardiovascular:      Rate and Rhythm: Normal rate and regular rhythm.      Heart sounds: Normal heart sounds. No murmur.   Pulmonary:      Effort: Pulmonary effort is normal. No respiratory distress.      Breath sounds: Normal breath sounds.   Abdominal:      General: Bowel sounds are normal. There is no distension.      Palpations: Abdomen is soft.      Tenderness: There is no abdominal tenderness.   Musculoskeletal: Normal range of motion.         General: No deformity.   Skin:     General: Skin is warm and dry.      Findings: No rash.   Neurological:      Mental Status: She is alert and oriented to person, place, and time.   Psychiatric:         Behavior: Behavior normal.          Laboratory:  CBC:  No results for input(s): WBC, RBC, HGB, HCT, PLT, MCV, MCH, MCHC in the last 2160 hours.  CMP:  No results for input(s): GLU, CALCIUM, ALBUMIN, PROT, NA, K, CO2, CL, BUN, ALKPHOS, ALT, AST, BILITOT in the last 2160 hours.    Invalid input(s): CREATININ  URINALYSIS:  No results for input(s): COLORU, CLARITYU, SPECGRAV, PHUR, PROTEINUA, GLUCOSEU, BILIRUBINCON, BLOODU, WBCU, RBCU, BACTERIA, MUCUS, NITRITE, LEUKOCYTESUR, UROBILINOGEN, HYALINECASTS in the last 2160 hours.   LIPIDS:  No results for input(s): TSH, HDL, CHOL, TRIG, LDLCALC, CHOLHDL, NONHDLCHOL, TOTALCHOLEST in the last 2160 hours.  TSH:  No results for input(s): TSH in the last 2160 hours.  A1C:  No results for input(s): HGBA1C in the last 2160 hours.    Radiology:  6/2020  right shoulder xrays: WNL    Assessment/Plan     Jaycee Gray is a 40 y.o.female with:    1. Dysuria  - POCT urinalysis, dipstick or tablet reag  - URINALYSIS  - Urine culture  - sulfamethoxazole-trimethoprim 800-160mg (BACTRIM DS) 800-160 mg Tab; Take 1 tablet by mouth 2 (two) times daily.  Dispense: 20 tablet; Refill: 0    2. Acquired hypothyroidism  - CBC auto differential; Future  - Lipid Panel; Future  - Comprehensive metabolic panel; Future  - TSH; Future  - T4, free; Future    3. Chronic pelvic pain in female    4. History of endometriosis    5. Premature surgical menopause    6. Surgical wound present  - sulfamethoxazole-trimethoprim 800-160mg (BACTRIM DS) 800-160 mg Tab; Take 1 tablet by mouth 2 (two) times daily.  Dispense: 20 tablet; Refill: 0    -Bactrim DS for UTI, also has coverage for cellulitis given small amount of redness around wound. Follow up with surgeon as scheduled  -labs ordered  -Continue current medications and maintain follow up with specialists.  Return to clinic in 6 months.      Emi Lechuga MD  [facility]

## 2020-10-06 NOTE — OP NOTE
Certification of Assistant at Surgery       Surgery Date: 9/21/2020     Participating Surgeons:  Surgeon(s) and Role:     * Santy Elaine MD - Primary     * Charles Sommer IV, MD - Assisting    Procedures:  Procedure(s) (LRB):  LAPAROSCOPY, DIAGNOSTIC (Bilateral)  DESTRUCTION, ENDOMETRIOSIS (N/A)    Assistant Surgeon's Certification of Necessity:  I understand that section 1842 (b) (6) (d) of the Social Security Act generally prohibits Medicare Part B reasonable charge payment for the services of assistants at surgery in teaching hospitals when qualified residents are available to furnish such services. I certify that the services for which payment is claimed were medically necessary, and that no qualified resident was available to perform the services. I further understand that these services are subject to post-payment review by the Medicare carrier.      Charles Sommer Iv, MD    10/06/2020  1:15 PM

## 2020-10-08 ENCOUNTER — PATIENT MESSAGE (OUTPATIENT)
Dept: UROLOGY | Facility: CLINIC | Age: 40
End: 2020-10-08

## 2020-10-08 ENCOUNTER — PATIENT OUTREACH (OUTPATIENT)
Dept: ADMINISTRATIVE | Facility: OTHER | Age: 40
End: 2020-10-08

## 2020-10-08 ENCOUNTER — OFFICE VISIT (OUTPATIENT)
Dept: OBSTETRICS AND GYNECOLOGY | Facility: CLINIC | Age: 40
End: 2020-10-08
Payer: MEDICARE

## 2020-10-08 VITALS
HEIGHT: 62 IN | DIASTOLIC BLOOD PRESSURE: 70 MMHG | WEIGHT: 171.06 LBS | BODY MASS INDEX: 31.48 KG/M2 | SYSTOLIC BLOOD PRESSURE: 100 MMHG

## 2020-10-08 DIAGNOSIS — Z09 POSTOPERATIVE EXAMINATION: Primary | ICD-10-CM

## 2020-10-08 PROCEDURE — 99999 PR PBB SHADOW E&M-EST. PATIENT-LVL III: CPT | Mod: PBBFAC,,, | Performed by: OBSTETRICS & GYNECOLOGY

## 2020-10-08 PROCEDURE — 99024 PR POST-OP FOLLOW-UP VISIT: ICD-10-PCS | Mod: POP,,, | Performed by: OBSTETRICS & GYNECOLOGY

## 2020-10-08 PROCEDURE — 99024 POSTOP FOLLOW-UP VISIT: CPT | Mod: POP,,, | Performed by: OBSTETRICS & GYNECOLOGY

## 2020-10-08 PROCEDURE — 99999 PR PBB SHADOW E&M-EST. PATIENT-LVL III: ICD-10-PCS | Mod: PBBFAC,,, | Performed by: OBSTETRICS & GYNECOLOGY

## 2020-10-08 PROCEDURE — 99213 OFFICE O/P EST LOW 20 MIN: CPT | Mod: PBBFAC | Performed by: OBSTETRICS & GYNECOLOGY

## 2020-10-08 NOTE — PROGRESS NOTES
CC: Postop visit    Jaycee Gray is a 40 y.o. female  post-op from a Dx LSC, removal of foreign body on 20.  Patient is doing well postoperatively.  No pain.  No bowel or bladder complaints.  No fever.  Only tenderness she has is related to her IC (when her bladder is full)    The pathology revealed:  No endo    Past Medical History:   Diagnosis Date    Anxiety     Breast abscess     Charcot-Amanda-Tooth disease     mild LE weakness    Chronic interstitial cystitis without hematuria     CMT (Charcot-Amanda-Tooth disease)     Depression     reports she is no longer depressed    Endometriosis     Endometriosis of uterus     Headache(784.0)     Herpes     History of psychiatric care     History of psychiatric hospitalization     Muscular dystrophy     PTSD (post-traumatic stress disorder)     S/P cholecystectomy     Seizures     last seizure 14-15 yrs ago    Self-injurious behavior     Thyroid disease      Past Surgical History:   Procedure Laterality Date    APPENDECTOMY      arthroscopy right shoulder      BACK SURGERY  2017    BACK SURGERY  2018    screw removal    BREAST SURGERY      reduction    CHOLECYSTECTOMY  2017    CYSTOSCOPY WITH HYDRODISTENSION OF BLADDER N/A 2019    Procedure: CYSTOSCOPY, WITH BLADDER HYDRODISTENSION;  Surgeon: Jay Shelby MD;  Location: [facility] OR;  Service: Urology;  Laterality: N/A;    DIAGNOSTIC LAPAROSCOPY Bilateral 2020    Procedure: LAPAROSCOPY, DIAGNOSTIC;  Surgeon: Santy Elaine MD;  Location: [facility] OR;  Service: OB/GYN;  Laterality: Bilateral;  Plan to use robot in room 12 with Dr. Kemal LOGAN    ENDOSCOPIC ULTRASOUND OF UPPER GASTROINTESTINAL TRACT N/A 2018    Procedure: ULTRASOUND, ENDOSCOPIC, UPPER GI TRACT;  Surgeon: Marko Pereyra MD;  Location: [facility];  Service: Endoscopy;  Laterality: N/A;    ESOPHAGOGASTRODUODENOSCOPY  2018    ESOPHAGOGASTRODUODENOSCOPY N/A 2018    Procedure:  "ESOPHAGOGASTRODUODENOSCOPY (EGD);  Surgeon: Marko Pereyra MD;  Location: [facility] ENDO;  Service: Endoscopy;  Laterality: N/A;    EXCISIONAL HEMORRHOIDECTOMY N/A 10/1/2019    Procedure: HEMORRHOIDECTOMY;  Surgeon: Jenifer Aragon MD;  Location: [facility] OR 2ND FLR;  Service: Colon and Rectal;  Laterality: N/A;    HYSTERECTOMY      TLH vs LAVH with BSO    KNEE SURGERY Bilateral     OOPHORECTOMY      SURGICAL REMOVAL OF ENDOMETRIOSIS N/A 2020    Procedure: DESTRUCTION, ENDOMETRIOSIS;  Surgeon: Santy Elaine MD;  Location: [facility] OR;  Service: OB/GYN;  Laterality: N/A;    Upper EUS  2018     Family History   Problem Relation Age of Onset    Cancer Maternal Grandfather         colon    Colon cancer Maternal Grandfather     No Known Problems Mother     No Known Problems Father     Bladder Cancer Maternal Grandmother     Breast cancer Paternal Aunt     Heart disease Neg Hx      Social History     Tobacco Use    Smoking status: Former Smoker     Packs/day: 0.50     Years: 3.00     Pack years: 1.50     Types: Cigarettes     Quit date:      Years since quittin.7    Smokeless tobacco: Never Used    Tobacco comment: quit 2015   Substance Use Topics    Alcohol use: No    Drug use: No     OB History    Para Term  AB Living   1 0 0 0 1     SAB TAB Ectopic Multiple Live Births   1 0 0          # Outcome Date GA Lbr Darren/2nd Weight Sex Delivery Anes PTL Lv   1 SAB               Birth Comments: at age 18       /70   Ht 5' 2" (1.575 m)   Wt 77.6 kg (171 lb 1.2 oz)   LMP 2009 (Exact Date)   BMI 31.29 kg/m²     ROS:  GENERAL: No fever, chills, fatigability or weight loss.  VULVAR: No pain, no lesions and no itching.  VAGINAL: No relaxation, no itching, no discharge, no abnormal bleeding and no lesions.  ABDOMEN: No abdominal pain. Denies nausea. Denies vomiting. No diarrhea. No constipation  BREAST: Denies pain. No lumps. No discharge.  URINARY: No incontinence, no " nocturia, no frequency and no dysuria.  CARDIOVASCULAR: No chest pain. No shortness of breath. No leg cramps.  NEUROLOGICAL: No headaches. No vision changes.    PE:   Abd:  Sutures from incisions removed.  Intact, no evidence of infection.      ICD-10-CM ICD-9-CM    1. Postoperative examination  Z09 V67.00

## 2020-10-21 ENCOUNTER — OFFICE VISIT (OUTPATIENT)
Dept: UROLOGY | Facility: CLINIC | Age: 40
End: 2020-10-21
Payer: MEDICARE

## 2020-10-21 VITALS
DIASTOLIC BLOOD PRESSURE: 88 MMHG | HEIGHT: 62 IN | BODY MASS INDEX: 31.83 KG/M2 | SYSTOLIC BLOOD PRESSURE: 129 MMHG | HEART RATE: 67 BPM | TEMPERATURE: 98 F | WEIGHT: 173 LBS

## 2020-10-21 DIAGNOSIS — G89.29 CHRONIC PELVIC PAIN IN FEMALE: ICD-10-CM

## 2020-10-21 DIAGNOSIS — N30.10 INTERSTITIAL CYSTITIS: Primary | ICD-10-CM

## 2020-10-21 DIAGNOSIS — Z01.818 PRE-OP TESTING: ICD-10-CM

## 2020-10-21 DIAGNOSIS — R10.2 CHRONIC PELVIC PAIN IN FEMALE: ICD-10-CM

## 2020-10-21 DIAGNOSIS — N30.10 IC (INTERSTITIAL CYSTITIS): ICD-10-CM

## 2020-10-21 DIAGNOSIS — N30.10 CHRONIC INTERSTITIAL CYSTITIS: ICD-10-CM

## 2020-10-21 LAB
BILIRUB UR QL STRIP: NEGATIVE
CLARITY UR REFRACT.AUTO: CLEAR
COLOR UR AUTO: YELLOW
GLUCOSE UR QL STRIP: NEGATIVE
HGB UR QL STRIP: NEGATIVE
KETONES UR QL STRIP: NEGATIVE
LEUKOCYTE ESTERASE UR QL STRIP: NEGATIVE
NITRITE UR QL STRIP: NEGATIVE
PH UR STRIP: 6 [PH] (ref 5–8)
PROT UR QL STRIP: NEGATIVE
SP GR UR STRIP: 1.02 (ref 1–1.03)
URN SPEC COLLECT METH UR: NORMAL

## 2020-10-21 PROCEDURE — 87086 URINE CULTURE/COLONY COUNT: CPT

## 2020-10-21 PROCEDURE — 99999 PR PBB SHADOW E&M-EST. PATIENT-LVL IV: ICD-10-PCS | Mod: PBBFAC,,, | Performed by: NURSE PRACTITIONER

## 2020-10-21 PROCEDURE — 99213 PR OFFICE/OUTPT VISIT, EST, LEVL III, 20-29 MIN: ICD-10-PCS | Mod: S$PBB,,, | Performed by: NURSE PRACTITIONER

## 2020-10-21 PROCEDURE — 99999 PR PBB SHADOW E&M-EST. PATIENT-LVL IV: CPT | Mod: PBBFAC,,, | Performed by: NURSE PRACTITIONER

## 2020-10-21 PROCEDURE — 99213 OFFICE O/P EST LOW 20 MIN: CPT | Mod: S$PBB,,, | Performed by: NURSE PRACTITIONER

## 2020-10-21 PROCEDURE — 99214 OFFICE O/P EST MOD 30 MIN: CPT | Mod: PBBFAC,PO | Performed by: NURSE PRACTITIONER

## 2020-10-21 PROCEDURE — 81003 URINALYSIS AUTO W/O SCOPE: CPT

## 2020-10-21 RX ORDER — SODIUM CHLORIDE 9 MG/ML
INJECTION, SOLUTION INTRAVENOUS CONTINUOUS
Status: CANCELLED | OUTPATIENT
Start: 2020-10-21

## 2020-10-21 NOTE — PATIENT INSTRUCTIONS
1. U/A   2. Urine cx  3. Schedule patient for cystoscopy with bladder hydrodistension by Dr. Shelby on 10/28/2020.  4. Pre-op Covid-19 testing to be done on Sunday, 10/25/2020.  5. Follow-up post-op.

## 2020-10-21 NOTE — PROGRESS NOTES
Subjective:       Patient ID: Jaycee Gray is a 40 y.o. female.    Chief Complaint: Cystitis    Patient is here today with c/o I.C flare-up.    Pelvic Pain  The patient's primary symptoms include pelvic pain. The patient's pertinent negatives include no genital itching, genital lesions, genital odor, genital rash, missed menses, vaginal bleeding or vaginal discharge. This is a chronic problem. The current episode started more than 1 year ago. The problem occurs daily. The problem has been unchanged. The pain is moderate. She is not pregnant. Associated symptoms include constipation and urgency. Pertinent negatives include no abdominal pain, anorexia, chills, diarrhea, discolored urine, dysuria, fever, flank pain, frequency, headaches, hematuria, joint pain, nausea or vomiting. Exacerbated by: full bladder. She has tried nothing for the symptoms. She uses hysterectomy for contraception. Her past medical history is significant for an abdominal surgery, endometriosis and herpes simplex.     Review of Systems   Constitutional: Negative for appetite change, chills, fatigue and fever.   Gastrointestinal: Positive for blood in stool (seeing Gastro 10/23/2020) and constipation. Negative for abdominal pain, anorexia, diarrhea, nausea and vomiting.   Genitourinary: Positive for pelvic pain and urgency. Negative for bladder incontinence, decreased urine volume, difficulty urinating, dysuria, flank pain, frequency, hematuria, missed menses, vaginal bleeding, vaginal discharge and vaginal pain.   Musculoskeletal: Negative for joint pain.   Neurological: Negative for dizziness and headaches.   Psychiatric/Behavioral: Negative for agitation. The patient is not nervous/anxious.          Objective:      Physical Exam  Vitals signs and nursing note reviewed.   Constitutional:       General: She is not in acute distress.     Appearance: She is well-developed. She is obese. She is not ill-appearing.   HENT:      Head:  Normocephalic and atraumatic.   Eyes:      Pupils: Pupils are equal, round, and reactive to light.   Neck:      Musculoskeletal: Normal range of motion.   Cardiovascular:      Rate and Rhythm: Normal rate.   Pulmonary:      Effort: Pulmonary effort is normal. No respiratory distress.   Abdominal:      Palpations: Abdomen is soft.      Tenderness: There is no abdominal tenderness.   Musculoskeletal: Normal range of motion.   Lymphadenopathy:      Cervical: No cervical adenopathy.   Skin:     General: Skin is warm and dry.   Neurological:      Mental Status: She is alert and oriented to person, place, and time.      Coordination: Coordination normal.   Psychiatric:         Mood and Affect: Mood normal.         Behavior: Behavior normal.         Thought Content: Thought content normal.         Judgment: Judgment normal.         Assessment:       1. Interstitial cystitis    2. Chronic pelvic pain in female        Plan:       Jaycee was seen today for cystitis.    Diagnoses and all orders for this visit:    Interstitial cystitis    Chronic pelvic pain in female  -     Urinalysis  -     Urine culture    Other orders  1. Schedule patient for cystoscopy with bladder hydrodistension by Dr. Shelby on 10/28/2020.  2. Pre-op Covid-19 testing to be done on Sunday, 10/25/2020.    Follow-up post-op.     Katie Rodriguez NP

## 2020-10-22 ENCOUNTER — OFFICE VISIT (OUTPATIENT)
Dept: GASTROENTEROLOGY | Facility: CLINIC | Age: 40
End: 2020-10-22
Attending: COLON & RECTAL SURGERY
Payer: MEDICARE

## 2020-10-22 VITALS
HEART RATE: 82 BPM | DIASTOLIC BLOOD PRESSURE: 68 MMHG | SYSTOLIC BLOOD PRESSURE: 118 MMHG | WEIGHT: 168.63 LBS | BODY MASS INDEX: 30.84 KG/M2

## 2020-10-22 DIAGNOSIS — K62.5 RECTAL BLEEDING: Primary | ICD-10-CM

## 2020-10-22 LAB — BACTERIA UR CULT: NO GROWTH

## 2020-10-22 PROCEDURE — 99999 PR PBB SHADOW E&M-EST. PATIENT-LVL IV: ICD-10-PCS | Mod: PBBFAC,,, | Performed by: NURSE PRACTITIONER

## 2020-10-22 PROCEDURE — 99214 OFFICE O/P EST MOD 30 MIN: CPT | Mod: PBBFAC,PO | Performed by: NURSE PRACTITIONER

## 2020-10-22 PROCEDURE — 99999 PR PBB SHADOW E&M-EST. PATIENT-LVL IV: CPT | Mod: PBBFAC,,, | Performed by: NURSE PRACTITIONER

## 2020-10-22 PROCEDURE — 99214 PR OFFICE/OUTPT VISIT, EST, LEVL IV, 30-39 MIN: ICD-10-PCS | Mod: S$PBB,,, | Performed by: NURSE PRACTITIONER

## 2020-10-22 PROCEDURE — 99214 OFFICE O/P EST MOD 30 MIN: CPT | Mod: S$PBB,,, | Performed by: NURSE PRACTITIONER

## 2020-10-22 NOTE — PATIENT INSTRUCTIONS
Understanding Rectal Bleeding    Rectal bleeding is when blood passes through your rectum and anus. It can happen with or without a bowel movement. Rectal bleeding may be a sign of a serious problem in your rectum, colon, or upper GI tract. Call your healthcare provider right away if you have any rectal bleeding.  The GI Tract  The gastrointestinal (GI) tract includes the mouth, esophagus, stomach, small intestine, large intestine (colon), rectum, and anus. The food you eat is digested as it passes through the GI tract. Solid waste leaves the body through the rectum.   Rectal bleeding and GI problems  The cause of rectal bleeding may be found in any region of the GI tract. The colon or rectum may be the site of your bleeding problem. Or, bleeding may be due to problems farther up the GI tract, such as in the small intestine, duodenum, or stomach.  Causes of rectal bleeding  Rectal bleeding causes include the following:  · Hemorrhoids (swollen veins in the rectum and anus)  · Fissures (tears in or near the anus)  · Diverticulosis (inflamed pockets in the colon wall)  · Infection  · Ischemia (low blood flow)  · Radiation damage  · Inflammatory bowel disease (Crohn's disease or ulcerative colitis)  · Ulcers in the upper GI tract and inflammation of the large intestine  · Abnormal tissue growths (tumors or polyps) in the GI tract  · A bulging rectum (also called a rectal prolapse)  · Abnormal blood vessels in the small intestine or in the colon  Common symptoms  Common symptoms include the following:  · Rectal pain, itching, or soreness  · Belly pain or epigastric pain  · Minor occasional drops of blood that appear on the stool or toilet paper, to greater amounts of stool that appear black or tarry   Rectal bleeding can also happen without pain.  Date Last Reviewed: 7/1/2016  © 8671-3941 [employer]. [address] [place] [address], [place] [address]. All rights reserved. This information is not intended as a  substitute for professional medical care. Always follow your healthcare professional's instructions.        Treating Hemorrhoids: Self-Care    Follow your healthcare providers advice about caring for your hemorrhoids at home. Some treatments help relieve symptoms right away. Others involve making changes in your diet and exercise habits. These can help ease constipation and prevent hemorrhoid symptoms from coming back.  Relieving symptoms  Your healthcare provider may prescribe anti-inflammatory medicine to help ease your symptoms. The following tips will also help relieve pain and swelling.  · Take sitz baths. Taking a sitz bath means sitting in a few inches of warm bath water. Soaking for 10 minutes twice a day can provide welcome relief from painful hemorrhoids. It can also help the area stay clean.  · Develop good bowel habits. Use the bathroom when you need to. Dont ignore the urge to move your bowels. This can lead to constipation, hard stools, and straining. Also, dont read while on the toilet. Sit only as long as needed. Wipe gently with soft, unscented toilet tissue or baby wipes.  · Use ice packs. Placing an ice pack on a thrombosed external hemorrhoid can help relieve pain right away. It will also help reduce the blood clot. Use the ice for 15 to 20 minutes at a time. Keep a cloth between the ice and your skin to prevent skin damage.  · Use other measures. Laxatives and enemas can help ease constipation. But use them only on your healthcare providers advice. For symptom relief, try using cotton pads soaked in witch hazel. These are available at most drugstores. Over-the-counter hemorrhoid ointments and petroleum jelly can also provide relief.  Add fiber to your diet  Adding fiber to your diet can help relieve constipation by making stools softer and easier to pass. To increase your fiber intake, your healthcare provider may recommend a bulking agent, such as psyllium. This is a high-fiber supplement  available at most grocery stores and drugstores. Eating more fiber-rich foods will also help. There are two types of fiber:  · Insoluble fiber is the main ingredient in bulking agents. Its also found in foods such as wheat bran, whole-grain breads, fresh fruits, and vegetables.  · Soluble fiber is found in foods such as oat bran. Although soluble fiber is good for you, it may not ease constipation as much as foods high in insoluble fiber.  Drink more water  Along with a high-fiber diet, drinking more water can help ease constipation. This is because insoluble fiber absorbs water, making stools soft and bulky. Be sure to drink plenty of water throughout the day. Drinking fruit juices, such as prune juice or apple juice, can also help prevent constipation.  Get more exercise  Regular exercise aids digestion and helps prevent constipation. Its also great for your health. So talk with your healthcare provider about starting an exercise program. Low-impact activities, such as swimming or walking, are good places to start. Take it easy at first. And remember to drink plenty of water when you exercise.  High-fiber foods  High-fiber foods offer many benefits. By making your stools softer, they help heal and prevent swollen hemorrhoids. They may also help reduce the risk of colon and rectal cancer. Best of all, theyre usually low in calories and taste great. Here are some examples of fiber-rich foods.  · Whole grains, such as wheat bran, corn bran, and brown rice.  · Vegetables, especially carrots, broccoli, cabbage, and peas.  · Fruits, such as apples, bananas, raisins, peaches, and pears.  · Nuts and legumes, especially peanuts, lentils, and kidney beans.  Easy ways to add fiber  The tips below offer some simple ways to add more high-fiber foods to your meals.  · Start your day with a high-fiber breakfast. Eat a wheat bran cereal along with a sliced banana. Or, try peanut butter on whole-wheat toast.  · Eat carrot  sticks for snacks. Theyre easy to prepare, taste great, and are low in calories.  · Use whole-grain breads instead of white bread for sandwiches.  · Eat fruits for treats. Try an apple and some raisins instead of a candy bar.   Date Last Reviewed: 7/1/2016  © 3418-2531 [employer]. [address] [place] [address], [place] [address]. All rights reserved. This information is not intended as a substitute for professional medical care. Always follow your healthcare professional's instructions.
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
## 2020-10-23 ENCOUNTER — OFFICE VISIT (OUTPATIENT)
Dept: SURGERY | Facility: CLINIC | Age: 40
End: 2020-10-23
Attending: COLON & RECTAL SURGERY
Payer: MEDICARE

## 2020-10-23 VITALS
SYSTOLIC BLOOD PRESSURE: 106 MMHG | WEIGHT: 170 LBS | BODY MASS INDEX: 27.32 KG/M2 | HEIGHT: 66 IN | DIASTOLIC BLOOD PRESSURE: 72 MMHG | HEART RATE: 78 BPM

## 2020-10-23 DIAGNOSIS — K92.1 BLOOD IN STOOL: Primary | ICD-10-CM

## 2020-10-23 DIAGNOSIS — Z01.818 PRE-OP EVALUATION: Primary | ICD-10-CM

## 2020-10-23 PROCEDURE — 99999 PR PBB SHADOW E&M-EST. PATIENT-LVL III: ICD-10-PCS | Mod: PBBFAC,,, | Performed by: COLON & RECTAL SURGERY

## 2020-10-23 PROCEDURE — 99213 OFFICE O/P EST LOW 20 MIN: CPT | Mod: PBBFAC | Performed by: COLON & RECTAL SURGERY

## 2020-10-23 PROCEDURE — 99999 PR PBB SHADOW E&M-EST. PATIENT-LVL III: CPT | Mod: PBBFAC,,, | Performed by: COLON & RECTAL SURGERY

## 2020-10-23 PROCEDURE — 99214 PR OFFICE/OUTPT VISIT, EST, LEVL IV, 30-39 MIN: ICD-10-PCS | Mod: S$PBB,,, | Performed by: COLON & RECTAL SURGERY

## 2020-10-23 PROCEDURE — 99214 OFFICE O/P EST MOD 30 MIN: CPT | Mod: S$PBB,,, | Performed by: COLON & RECTAL SURGERY

## 2020-10-23 NOTE — PROGRESS NOTES
CRS Office Visit History and Physical      SUBJECTIVE:     Chief Complaint:  Rectal bleeding    History of Present Illness:  Ms Gray is a 40 y.o. female presents with 3 days of bright red blood per rectum.  States that she has been taking MiraLax every day, and not straining with bowel movements.  Underwent left lateral hemorrhoidectomy with me Oct 2019 for thrombosed hemorrhoid.  Did not return for follow-up, but states that she had minimal discomfort after this procedure and that she thinks that she has healed well from it.    Had colonoscopy in 2017 which was normal per patient report.  Colon cancer in maternal grandfather at unknown age.    Review of patient's allergies indicates:   Allergen Reactions    Benadryl decongestant Shortness Of Breath    Carrot Hives and Shortness Of Breath    Sumatriptan     Sumatriptan succinate Other (See Comments)     paralysis    Tramadol Other (See Comments)     Faces swells. Tolerates percocet  Pt states she  can take Dilaudid.     Venom-honey bee Anaphylaxis    Wasp sting [allergen ext-venom-honey bee] Anaphylaxis    Bupropion hcl        Past Medical History:   Diagnosis Date    Anxiety     Breast abscess     Charcot-Amanda-Tooth disease     mild LE weakness    Chronic interstitial cystitis without hematuria     CMT (Charcot-Amanda-Tooth disease)     Depression     reports she is no longer depressed    Endometriosis     Endometriosis of uterus     Headache(784.0)     Herpes     History of psychiatric care     History of psychiatric hospitalization     Muscular dystrophy     PTSD (post-traumatic stress disorder)     S/P cholecystectomy     Seizures     last seizure 14-15 yrs ago    Self-injurious behavior     Thyroid disease      Past Surgical History:   Procedure Laterality Date    APPENDECTOMY      arthroscopy right shoulder      BACK SURGERY  07/01/2017    BACK SURGERY  02/21/2018    screw removal    BREAST SURGERY      reduction     CHOLECYSTECTOMY  2017    CYSTOSCOPY WITH HYDRODISTENSION OF BLADDER N/A 2019    Procedure: CYSTOSCOPY, WITH BLADDER HYDRODISTENSION;  Surgeon: Jay Shelby MD;  [facility];  Service: Urology;  Laterality: N/A;    DIAGNOSTIC LAPAROSCOPY Bilateral 2020    Procedure: LAPAROSCOPY, DIAGNOSTIC;  Surgeon: Santy Elaine MD;  Location: [facility];  Service: OB/GYN;  Laterality: Bilateral;  Plan to use robot in room 12 with Dr. Kemal LOGAN    ENDOSCOPIC ULTRASOUND OF UPPER GASTROINTESTINAL TRACT N/A 2018    Procedure: ULTRASOUND, ENDOSCOPIC, UPPER GI TRACT;  Surgeon: Marko Pereyra MD;  Location: [facility] ENDO;  Service: Endoscopy;  Laterality: N/A;    ESOPHAGOGASTRODUODENOSCOPY  2018    ESOPHAGOGASTRODUODENOSCOPY N/A 2018    Procedure: ESOPHAGOGASTRODUODENOSCOPY (EGD);  Surgeon: Marko Pereyra MD;  Location: [facility];  Service: Endoscopy;  Laterality: N/A;    EXCISIONAL HEMORRHOIDECTOMY N/A 10/1/2019    Procedure: HEMORRHOIDECTOMY;  Surgeon: Jenifer Aragon MD;  Location: [facility] OR [address] [place] [address];  Service: Colon and Rectal;  Laterality: N/A;    HYSTERECTOMY      TLH vs LAVH with BSO    KNEE SURGERY Bilateral     OOPHORECTOMY      SURGICAL REMOVAL OF ENDOMETRIOSIS N/A 2020    Procedure: DESTRUCTION, ENDOMETRIOSIS;  Surgeon: Santy Elaine MD;  Location: [facility];  Service: OB/GYN;  Laterality: N/A;    Upper EUS  2018     Family History   Problem Relation Age of Onset    Cancer Maternal Grandfather         colon    Colon cancer Maternal Grandfather     No Known Problems Mother     No Known Problems Father     Bladder Cancer Maternal Grandmother     Breast cancer Paternal Aunt     Heart disease Neg Hx      Social History     Tobacco Use    Smoking status: Former Smoker     Packs/day: 0.50     Years: 3.00     Pack years: 1.50     Types: Cigarettes     Quit date:      Years since quittin.8    Smokeless tobacco: Never Used    Tobacco comment: quit 2015  "  Substance Use Topics    Alcohol use: No    Drug use: No        Review of Systems:  Review of Systems   Gastrointestinal: Positive for blood in stool. Negative for constipation.       OBJECTIVE:     Vital Signs (Most Recent)  /72 (BP Location: Left arm, Patient Position: Sitting, BP Method: Large (Automatic))   Pulse 78   Ht 5' 6" (1.676 m)   Wt 77.1 kg (170 lb)   LMP 04/26/2009 (Exact Date)   BMI 27.44 kg/m²     Physical Exam:  General: White female in no distress   Neuro: Alert and oriented x 4.  Moves all extremities.     HEENT: No icterus.  Trachea midline  Respiratory: Respirations are even and unlabored  Cardiac: Regular rate  Extremities: Warm dry and intact  Skin: No rashes    Anorectal:   External exam:  Nonthrombosed external hemorrhoid tissue, no evidence of fissure with good eversion of perianal skin.  Digital exam with patient discomfort, no palpable masses.  She is deferring anoscopy at this time given discomfort.  I did carefully examined the area again, and still did not see any evidence of fissure.      ASSESSMENT/PLAN:     40-year-old female with history of excisional hemorrhoidectomy for thrombosed hemorrhoid, now with rectal bleeding.  Unable to tolerate exam and anoscopy in the office.  I recommended an exam under anesthesia with possible banding.  Consent signed today in clinic, asked if any additional questions, none at this time.      Jenifer Aragon MD  Staff Surgeon, Colon and Rectal Surgery  [facility]      "

## 2020-10-23 NOTE — H&P (VIEW-ONLY)
CRS Office Visit History and Physical      SUBJECTIVE:     Chief Complaint:  Rectal bleeding    History of Present Illness:  Ms Gray is a 40 y.o. female presents with 3 days of bright red blood per rectum.  States that she has been taking MiraLax every day, and not straining with bowel movements.  Underwent left lateral hemorrhoidectomy with me Oct 2019 for thrombosed hemorrhoid.  Did not return for follow-up, but states that she had minimal discomfort after this procedure and that she thinks that she has healed well from it.    Had colonoscopy in 2017 which was normal per patient report.  Colon cancer in maternal grandfather at unknown age.    Review of patient's allergies indicates:   Allergen Reactions    Benadryl decongestant Shortness Of Breath    Carrot Hives and Shortness Of Breath    Sumatriptan     Sumatriptan succinate Other (See Comments)     paralysis    Tramadol Other (See Comments)     Faces swells. Tolerates percocet  Pt states she  can take Dilaudid.     Venom-honey bee Anaphylaxis    Wasp sting [allergen ext-venom-honey bee] Anaphylaxis    Bupropion hcl        Past Medical History:   Diagnosis Date    Anxiety     Breast abscess     Charcot-Amanda-Tooth disease     mild LE weakness    Chronic interstitial cystitis without hematuria     CMT (Charcot-Amanda-Tooth disease)     Depression     reports she is no longer depressed    Endometriosis     Endometriosis of uterus     Headache(784.0)     Herpes     History of psychiatric care     History of psychiatric hospitalization     Muscular dystrophy     PTSD (post-traumatic stress disorder)     S/P cholecystectomy     Seizures     last seizure 14-15 yrs ago    Self-injurious behavior     Thyroid disease      Past Surgical History:   Procedure Laterality Date    APPENDECTOMY      arthroscopy right shoulder      BACK SURGERY  07/01/2017    BACK SURGERY  02/21/2018    screw removal    BREAST SURGERY      reduction     CHOLECYSTECTOMY  2017    CYSTOSCOPY WITH HYDRODISTENSION OF BLADDER N/A 2019    Procedure: CYSTOSCOPY, WITH BLADDER HYDRODISTENSION;  Surgeon: Jay Shelby MD;  Location: [facility] OR;  Service: Urology;  Laterality: N/A;    DIAGNOSTIC LAPAROSCOPY Bilateral 2020    Procedure: LAPAROSCOPY, DIAGNOSTIC;  Surgeon: Santy Elaine MD;  Location: [facility];  Service: OB/GYN;  Laterality: Bilateral;  Plan to use robot in room 12 with Dr. Kemal LOGAN    ENDOSCOPIC ULTRASOUND OF UPPER GASTROINTESTINAL TRACT N/A 2018    Procedure: ULTRASOUND, ENDOSCOPIC, UPPER GI TRACT;  Surgeon: Marko Pereyra MD;  Location: [facility] ENDO;  Service: Endoscopy;  Laterality: N/A;    ESOPHAGOGASTRODUODENOSCOPY  2018    ESOPHAGOGASTRODUODENOSCOPY N/A 2018    Procedure: ESOPHAGOGASTRODUODENOSCOPY (EGD);  Surgeon: Marko Pereyra MD;  Location: [facility];  Service: Endoscopy;  Laterality: N/A;    EXCISIONAL HEMORRHOIDECTOMY N/A 10/1/2019    Procedure: HEMORRHOIDECTOMY;  Surgeon: Jenifer Aragon MD;  Location: [facility] OR [address] [place] [address];  Service: Colon and Rectal;  Laterality: N/A;    HYSTERECTOMY      TLH vs LAVH with BSO    KNEE SURGERY Bilateral     OOPHORECTOMY      SURGICAL REMOVAL OF ENDOMETRIOSIS N/A 2020    Procedure: DESTRUCTION, ENDOMETRIOSIS;  Surgeon: Santy Elaine MD;  Location: [facility];  Service: OB/GYN;  Laterality: N/A;    Upper EUS  2018     Family History   Problem Relation Age of Onset    Cancer Maternal Grandfather         colon    Colon cancer Maternal Grandfather     No Known Problems Mother     No Known Problems Father     Bladder Cancer Maternal Grandmother     Breast cancer Paternal Aunt     Heart disease Neg Hx      Social History     Tobacco Use    Smoking status: Former Smoker     Packs/day: 0.50     Years: 3.00     Pack years: 1.50     Types: Cigarettes     Quit date:      Years since quittin.8    Smokeless tobacco: Never Used    Tobacco comment: quit 2015  "  Substance Use Topics    Alcohol use: No    Drug use: No        Review of Systems:  Review of Systems   Gastrointestinal: Positive for blood in stool. Negative for constipation.       OBJECTIVE:     Vital Signs (Most Recent)  /72 (BP Location: Left arm, Patient Position: Sitting, BP Method: Large (Automatic))   Pulse 78   Ht 5' 6" (1.676 m)   Wt 77.1 kg (170 lb)   LMP 04/26/2009 (Exact Date)   BMI 27.44 kg/m²     Physical Exam:  General: White female in no distress   Neuro: Alert and oriented x 4.  Moves all extremities.     HEENT: No icterus.  Trachea midline  Respiratory: Respirations are even and unlabored  Cardiac: Regular rate  Extremities: Warm dry and intact  Skin: No rashes    Anorectal:   External exam:  Nonthrombosed external hemorrhoid tissue, no evidence of fissure with good eversion of perianal skin.  Digital exam with patient discomfort, no palpable masses.  She is deferring anoscopy at this time given discomfort.  I did carefully examined the area again, and still did not see any evidence of fissure.      ASSESSMENT/PLAN:     40-year-old female with history of excisional hemorrhoidectomy for thrombosed hemorrhoid, now with rectal bleeding.  Unable to tolerate exam and anoscopy in the office.  I recommended an exam under anesthesia with possible banding.  Consent signed today in clinic, asked if any additional questions, none at this time.      Jenifer Aragon MD  Staff Surgeon, Colon and Rectal Surgery  [facility]      "

## 2020-10-25 ENCOUNTER — CLINICAL SUPPORT (OUTPATIENT)
Dept: URGENT CARE | Facility: CLINIC | Age: 40
End: 2020-10-25
Payer: MEDICARE

## 2020-10-25 VITALS — TEMPERATURE: 98 F

## 2020-10-25 DIAGNOSIS — Z01.818 PRE-OP TESTING: ICD-10-CM

## 2020-10-25 PROCEDURE — U0003 INFECTIOUS AGENT DETECTION BY NUCLEIC ACID (DNA OR RNA); SEVERE ACUTE RESPIRATORY SYNDROME CORONAVIRUS 2 (SARS-COV-2) (CORONAVIRUS DISEASE [COVID-19]), AMPLIFIED PROBE TECHNIQUE, MAKING USE OF HIGH THROUGHPUT TECHNOLOGIES AS DESCRIBED BY CMS-2020-01-R: HCPCS

## 2020-10-26 ENCOUNTER — TELEPHONE (OUTPATIENT)
Dept: SURGERY | Facility: CLINIC | Age: 40
End: 2020-10-26

## 2020-10-26 DIAGNOSIS — K62.89 RECTAL OR ANAL PAIN: Primary | ICD-10-CM

## 2020-10-26 LAB — SARS-COV-2 RNA RESP QL NAA+PROBE: NOT DETECTED

## 2020-10-26 NOTE — TELEPHONE ENCOUNTER
----- Message from Yen Mario sent at 10/26/2020 11:39 AM CDT -----  Pt is calling to have covid19 done at [facility] in [place]      Pt# [phone]

## 2020-10-26 NOTE — TELEPHONE ENCOUNTER
Spoke with patient regarding her procedure on 11/2 with Dr. Aragon.  Informed her that the case is being put into the computer system and she can have her covid test done in [place] at the [facility].

## 2020-10-30 ENCOUNTER — TELEPHONE (OUTPATIENT)
Dept: SURGERY | Facility: CLINIC | Age: 40
End: 2020-10-30

## 2020-10-30 ENCOUNTER — CLINICAL SUPPORT (OUTPATIENT)
Dept: URGENT CARE | Facility: CLINIC | Age: 40
End: 2020-10-30
Payer: MEDICARE

## 2020-10-30 VITALS — TEMPERATURE: 97 F

## 2020-10-30 DIAGNOSIS — Z01.818 PRE-OP EVALUATION: ICD-10-CM

## 2020-10-30 PROCEDURE — U0003 INFECTIOUS AGENT DETECTION BY NUCLEIC ACID (DNA OR RNA); SEVERE ACUTE RESPIRATORY SYNDROME CORONAVIRUS 2 (SARS-COV-2) (CORONAVIRUS DISEASE [COVID-19]), AMPLIFIED PROBE TECHNIQUE, MAKING USE OF HIGH THROUGHPUT TECHNOLOGIES AS DESCRIBED BY CMS-2020-01-R: HCPCS

## 2020-10-31 LAB — SARS-COV-2 RNA RESP QL NAA+PROBE: NOT DETECTED

## 2020-11-02 ENCOUNTER — ANESTHESIA (OUTPATIENT)
Dept: SURGERY | Facility: HOSPITAL | Age: 40
End: 2020-11-02
Payer: MEDICARE

## 2020-11-02 ENCOUNTER — HOSPITAL ENCOUNTER (OUTPATIENT)
Facility: HOSPITAL | Age: 40
Discharge: HOME | End: 2020-11-02
Attending: COLON & RECTAL SURGERY | Admitting: COLON & RECTAL SURGERY
Payer: MEDICARE

## 2020-11-02 ENCOUNTER — ANESTHESIA EVENT (OUTPATIENT)
Dept: SURGERY | Facility: HOSPITAL | Age: 40
End: 2020-11-02
Payer: MEDICARE

## 2020-11-02 VITALS
WEIGHT: 170 LBS | RESPIRATION RATE: 20 BRPM | BODY MASS INDEX: 29.02 KG/M2 | HEART RATE: 80 BPM | DIASTOLIC BLOOD PRESSURE: 85 MMHG | OXYGEN SATURATION: 99 % | SYSTOLIC BLOOD PRESSURE: 130 MMHG | HEIGHT: 64 IN | TEMPERATURE: 98 F

## 2020-11-02 DIAGNOSIS — K62.89 RECTAL PAIN: Primary | ICD-10-CM

## 2020-11-02 PROCEDURE — 71000044 HC DOSC ROUTINE RECOVERY FIRST HOUR: Performed by: COLON & RECTAL SURGERY

## 2020-11-02 PROCEDURE — 46221 PR HEMORRHOIDECTOMY INTERNAL RUBBER BAND LIGATIONS: ICD-10-PCS | Mod: ,,, | Performed by: COLON & RECTAL SURGERY

## 2020-11-02 PROCEDURE — 71000015 HC POSTOP RECOV 1ST HR: Performed by: COLON & RECTAL SURGERY

## 2020-11-02 PROCEDURE — 37000008 HC ANESTHESIA 1ST 15 MINUTES: Performed by: COLON & RECTAL SURGERY

## 2020-11-02 PROCEDURE — D9220A PRA ANESTHESIA: ICD-10-PCS | Mod: CRNA,,, | Performed by: NURSE ANESTHETIST, CERTIFIED REGISTERED

## 2020-11-02 PROCEDURE — 37000009 HC ANESTHESIA EA ADD 15 MINS: Performed by: COLON & RECTAL SURGERY

## 2020-11-02 PROCEDURE — 25000003 PHARM REV CODE 250: Performed by: COLON & RECTAL SURGERY

## 2020-11-02 PROCEDURE — 36000706: Performed by: COLON & RECTAL SURGERY

## 2020-11-02 PROCEDURE — D9220A PRA ANESTHESIA: ICD-10-PCS | Mod: ANES,,, | Performed by: ANESTHESIOLOGY

## 2020-11-02 PROCEDURE — D9220A PRA ANESTHESIA: Mod: CRNA,,, | Performed by: NURSE ANESTHETIST, CERTIFIED REGISTERED

## 2020-11-02 PROCEDURE — 36000707: Performed by: COLON & RECTAL SURGERY

## 2020-11-02 PROCEDURE — 25000003 PHARM REV CODE 250: Performed by: NURSE PRACTITIONER

## 2020-11-02 PROCEDURE — 63600175 PHARM REV CODE 636 W HCPCS: Performed by: NURSE ANESTHETIST, CERTIFIED REGISTERED

## 2020-11-02 PROCEDURE — 46221 LIGATION OF HEMORRHOID(S): CPT | Mod: ,,, | Performed by: COLON & RECTAL SURGERY

## 2020-11-02 PROCEDURE — 63600175 PHARM REV CODE 636 W HCPCS: Performed by: ANESTHESIOLOGY

## 2020-11-02 PROCEDURE — D9220A PRA ANESTHESIA: Mod: ANES,,, | Performed by: ANESTHESIOLOGY

## 2020-11-02 PROCEDURE — 25000003 PHARM REV CODE 250: Performed by: NURSE ANESTHETIST, CERTIFIED REGISTERED

## 2020-11-02 RX ORDER — IBUPROFEN 400 MG/1
400 TABLET ORAL EVERY 6 HOURS PRN
Qty: 30 TABLET | Refills: 0 | Status: SHIPPED | OUTPATIENT
Start: 2020-11-02 | End: 2021-01-16

## 2020-11-02 RX ORDER — ONDANSETRON 2 MG/ML
4 INJECTION INTRAMUSCULAR; INTRAVENOUS ONCE AS NEEDED
Status: DISCONTINUED | OUTPATIENT
Start: 2020-11-02 | End: 2020-11-02 | Stop reason: HOSPADM

## 2020-11-02 RX ORDER — MUPIROCIN 20 MG/G
OINTMENT TOPICAL
Status: DISCONTINUED | OUTPATIENT
Start: 2020-11-02 | End: 2021-05-10

## 2020-11-02 RX ORDER — POLYETHYLENE GLYCOL 3350 17 G/17G
17 POWDER, FOR SOLUTION ORAL DAILY
Qty: 30 PACKET | Refills: 3 | Status: SHIPPED | OUTPATIENT
Start: 2020-11-02 | End: 2020-12-04

## 2020-11-02 RX ORDER — FENTANYL CITRATE 50 UG/ML
25 INJECTION, SOLUTION INTRAMUSCULAR; INTRAVENOUS EVERY 5 MIN PRN
Status: DISCONTINUED | OUTPATIENT
Start: 2020-11-02 | End: 2020-11-02 | Stop reason: HOSPADM

## 2020-11-02 RX ORDER — MIDAZOLAM HYDROCHLORIDE 1 MG/ML
INJECTION, SOLUTION INTRAMUSCULAR; INTRAVENOUS
Status: DISCONTINUED | OUTPATIENT
Start: 2020-11-02 | End: 2020-11-02

## 2020-11-02 RX ORDER — ROCURONIUM BROMIDE 10 MG/ML
INJECTION, SOLUTION INTRAVENOUS
Status: DISCONTINUED | OUTPATIENT
Start: 2020-11-02 | End: 2020-11-02

## 2020-11-02 RX ORDER — PROCHLORPERAZINE EDISYLATE 5 MG/ML
5 INJECTION INTRAMUSCULAR; INTRAVENOUS EVERY 30 MIN PRN
Status: DISCONTINUED | OUTPATIENT
Start: 2020-11-02 | End: 2020-11-02 | Stop reason: HOSPADM

## 2020-11-02 RX ORDER — BUPIVACAINE HYDROCHLORIDE AND EPINEPHRINE 2.5; 5 MG/ML; UG/ML
INJECTION, SOLUTION EPIDURAL; INFILTRATION; INTRACAUDAL; PERINEURAL
Status: DISCONTINUED | OUTPATIENT
Start: 2020-11-02 | End: 2020-11-02 | Stop reason: HOSPADM

## 2020-11-02 RX ORDER — FENTANYL CITRATE 50 UG/ML
INJECTION, SOLUTION INTRAMUSCULAR; INTRAVENOUS
Status: DISCONTINUED | OUTPATIENT
Start: 2020-11-02 | End: 2020-11-02

## 2020-11-02 RX ORDER — ACETAMINOPHEN 325 MG/1
325 TABLET ORAL EVERY 6 HOURS PRN
Qty: 30 TABLET | Refills: 0 | Status: SHIPPED | OUTPATIENT
Start: 2020-11-02 | End: 2021-01-27

## 2020-11-02 RX ORDER — PROPOFOL 10 MG/ML
VIAL (ML) INTRAVENOUS
Status: DISCONTINUED | OUTPATIENT
Start: 2020-11-02 | End: 2020-11-02

## 2020-11-02 RX ORDER — ONDANSETRON 2 MG/ML
INJECTION INTRAMUSCULAR; INTRAVENOUS
Status: DISCONTINUED | OUTPATIENT
Start: 2020-11-02 | End: 2020-11-02

## 2020-11-02 RX ORDER — DEXAMETHASONE SODIUM PHOSPHATE 4 MG/ML
INJECTION, SOLUTION INTRA-ARTICULAR; INTRALESIONAL; INTRAMUSCULAR; INTRAVENOUS; SOFT TISSUE
Status: DISCONTINUED | OUTPATIENT
Start: 2020-11-02 | End: 2020-11-02

## 2020-11-02 RX ORDER — SODIUM CHLORIDE 9 MG/ML
INJECTION, SOLUTION INTRAVENOUS CONTINUOUS
Status: DISCONTINUED | OUTPATIENT
Start: 2020-11-02 | End: 2021-05-10

## 2020-11-02 RX ORDER — SODIUM CHLORIDE 0.9 % (FLUSH) 0.9 %
10 SYRINGE (ML) INJECTION
Status: DISCONTINUED | OUTPATIENT
Start: 2020-11-02 | End: 2020-11-02 | Stop reason: HOSPADM

## 2020-11-02 RX ORDER — LIDOCAINE HYDROCHLORIDE 10 MG/ML
1 INJECTION, SOLUTION EPIDURAL; INFILTRATION; INTRACAUDAL; PERINEURAL ONCE
Status: DISCONTINUED | OUTPATIENT
Start: 2020-11-02 | End: 2021-05-10

## 2020-11-02 RX ADMIN — FENTANYL CITRATE 25 MCG: 50 INJECTION INTRAMUSCULAR; INTRAVENOUS at 12:11

## 2020-11-02 RX ADMIN — SUGAMMADEX 250 MG: 100 INJECTION, SOLUTION INTRAVENOUS at 11:11

## 2020-11-02 RX ADMIN — PROPOFOL 150 MG: 10 INJECTION, EMULSION INTRAVENOUS at 11:11

## 2020-11-02 RX ADMIN — MUPIROCIN: 20 OINTMENT TOPICAL at 10:11

## 2020-11-02 RX ADMIN — FENTANYL CITRATE 25 MCG: 50 INJECTION, SOLUTION INTRAMUSCULAR; INTRAVENOUS at 11:11

## 2020-11-02 RX ADMIN — ONDANSETRON 4 MG: 2 INJECTION, SOLUTION INTRAMUSCULAR; INTRAVENOUS at 11:11

## 2020-11-02 RX ADMIN — SODIUM CHLORIDE: 0.9 INJECTION, SOLUTION INTRAVENOUS at 10:11

## 2020-11-02 RX ADMIN — ROCURONIUM BROMIDE 50 MG: 10 INJECTION, SOLUTION INTRAVENOUS at 11:11

## 2020-11-02 RX ADMIN — DEXAMETHASONE SODIUM PHOSPHATE 8 MG: 4 INJECTION, SOLUTION INTRAMUSCULAR; INTRAVENOUS at 11:11

## 2020-11-02 RX ADMIN — MIDAZOLAM HYDROCHLORIDE 2 MG: 1 INJECTION, SOLUTION INTRAMUSCULAR; INTRAVENOUS at 11:11

## 2020-11-02 RX ADMIN — FENTANYL CITRATE 75 MCG: 50 INJECTION, SOLUTION INTRAMUSCULAR; INTRAVENOUS at 11:11

## 2020-11-02 NOTE — ANESTHESIA PROCEDURE NOTES
Intubation  Performed by: Kiersten Nunez CRNA  Authorized by: Don Dhaliwal MD     Intubation:     Induction:  Intravenous    Intubated:  Postinduction    Mask Ventilation:  Easy mask    Attempts:  1    Attempted By:  CRNA    Blade:  Elfego 3    Laryngeal View Grade: Grade I - full view of chords      Difficult Airway Encountered?: No      Complications:  None    Airway Device:  Oral endotracheal tube    Airway Device Size:  7.0    Style/Cuff Inflation:  Cuffed (inflated to minimal occlusive pressure)    Tube secured:  22    Secured at:  The lips    Placement Verified By:  Capnometry    Complicating Factors:  None    Findings Post-Intubation:  BS equal bilateral and atraumatic/condition of teeth unchanged

## 2020-11-02 NOTE — ANESTHESIA PREPROCEDURE EVALUATION
11/02/2020  Jaycee Gray is a 40 y.o., female.  Pre-operative evaluation for Exam under anesthesia (N/A), LIGATION, HEMORRHOIDS (N/A)    Chief Complaint: hemorrhoids    PMH:  Amanda-Charcot-Tooth  anxiety, breast abscess,  endometriosis, headache, herpes, PTSD, seizures, interstitial cystitis, and thyroid disease    On Percocet, Norco, Zanaflex and TENS unit for pain.  Past Surgical History:   Procedure Laterality Date    APPENDECTOMY      arthroscopy right shoulder      BACK SURGERY  07/01/2017    BACK SURGERY  02/21/2018    screw removal    BREAST SURGERY      reduction    CHOLECYSTECTOMY  03/2017    CYSTOSCOPY WITH HYDRODISTENSION OF BLADDER N/A 7/1/2019    Procedure: CYSTOSCOPY, WITH BLADDER HYDRODISTENSION;  Surgeon: Jay Shelby MD;  Location: [facility] OR;  Service: Urology;  Laterality: N/A;    DIAGNOSTIC LAPAROSCOPY Bilateral 9/21/2020    Procedure: LAPAROSCOPY, DIAGNOSTIC;  Surgeon: Santy Elaine MD;  Location: [facility] OR;  Service: OB/GYN;  Laterality: Bilateral;  Plan to use robot in room 12 with Dr. Kemal LOGAN    ENDOSCOPIC ULTRASOUND OF UPPER GASTROINTESTINAL TRACT N/A 8/14/2018    Procedure: ULTRASOUND, ENDOSCOPIC, UPPER GI TRACT;  Surgeon: Marko Pereyra MD;  Location: [facility];  Service: Endoscopy;  Laterality: N/A;    ESOPHAGOGASTRODUODENOSCOPY  08/14/2018    ESOPHAGOGASTRODUODENOSCOPY N/A 8/14/2018    Procedure: ESOPHAGOGASTRODUODENOSCOPY (EGD);  Surgeon: Marko Pereyra MD;  Location: [facility];  Service: Endoscopy;  Laterality: N/A;    EXCISIONAL HEMORRHOIDECTOMY N/A 10/1/2019    Procedure: HEMORRHOIDECTOMY;  Surgeon: Jenifer Aragon MD;  Location: [facility] OR [address] [place] [address];  Service: Colon and Rectal;  Laterality: N/A;    HYSTERECTOMY      TLH vs LAVH with BSO    KNEE SURGERY Bilateral     OOPHORECTOMY      SURGICAL REMOVAL OF ENDOMETRIOSIS N/A 9/21/2020    Procedure:  DESTRUCTION, ENDOMETRIOSIS;  Surgeon: Santy Elaine MD;  Location: [facility];  Service: OB/GYN;  Laterality: N/A;    Upper EUS  2018         Vital Signs Range (Last 24H):         CBC:     Recent Labs   Lab 10/05/20  0831 10/27/20  1158   WBC 9.26 16.15*   RBC 4.59 4.99   HGB 14.5 15.6   HCT 43.7 46.5    258   MCV 95 93   MCH 31.6* 31.3*   MCHC 33.2 33.5       CMP:   Recent Labs   Lab 10/05/20  0831 10/27/20  1158    146*   K 4.2 3.5    105   CO2 27 28   BUN 8 10   CREATININE 0.52 0.58    111*   CALCIUM 9.6 10.9*   ALBUMIN 4.4 5.2   PROT 7.9 9.0*   ALKPHOS 86 115   ALT 13 19   AST 20 26   BILITOT 0.8 0.7       INR:  No results for input(s): PT, INR, PROTIME, APTT in the last 720 hours.      Diagnostic Studies:      EKD Echo:  Date of Procedure: 2018     PRE-TEST DATA   EKG: Resting electrocardiogram reveals normal sinus rhythm at a rate of 86 bpm.     TEST DESCRIPTION   The patient received 0.4 mg of Regadenoson, achieving a peak heart rate of 142 bpm, which is 81% of the age predicted maximum heart rate. . The patient reported 8/10 chest pain during the protocol.  All symptoms resolved in recovery.     EKG Conclusions:     1. The EKG portion of this study is negative for ischemia at a peak heart rate of 142 bpm (81% of predicted).   2. Blood pressure remained stable throughout the protocol  (Presenting BP: 119/73 Peak BP: 107/70).   3. No significant arrhythmias were present.   4. The patient reported significant chest pain during the protocol.  All symptoms resolved in recovery.     Anesthesia Evaluation    I have reviewed the Patient Summary Reports.    I have reviewed the Nursing Notes. I have reviewed the NPO Status.   I have reviewed the Medications.     Review of Systems  Anesthesia Hx:  No problems with previous Anesthesia    Social:  Non-Smoker, No Alcohol Use    Cardiovascular:  Cardiovascular Normal     Pulmonary:  Pulmonary Normal    Musculoskeletal:    Charcot eros tooth       Physical Exam  General:  Obesity    Airway/Jaw/Neck:  Airway Findings: Mouth Opening: Normal Tongue: Normal  General Airway Assessment: Good  Mallampati: I  TM Distance: Normal, at least 6 cm  Jaw/Neck Findings:  Neck ROM: Normal ROM      Dental:  Dental Findings: In tact   Chest/Lungs:  Chest/Lungs Findings: Normal Respiratory Rate     Heart/Vascular:  Heart Findings:       Mental Status:  Mental Status Findings:  Cooperative, Alert and Oriented         Anesthesia Plan  Type of Anesthesia, risks & benefits discussed:  Anesthesia Type:  general  Patient's Preference:   Intra-op Monitoring Plan: standard ASA monitors  Intra-op Monitoring Plan Comments:   Post Op Pain Control Plan: multimodal analgesia  Post Op Pain Control Plan Comments:   Induction:   IV  Beta Blocker:  Patient is not currently on a Beta-Blocker (No further documentation required).       Informed Consent: Patient understands risks and agrees with Anesthesia plan.  Questions answered. Anesthesia consent signed with patient.  ASA Score: 3     Day of Surgery Review of History & Physical:    H&P update referred to the surgeon.         Ready For Surgery From Anesthesia Perspective.

## 2020-11-02 NOTE — TRANSFER OF CARE
"Anesthesia Transfer of Care Note    Patient: Jaycee Gray    Procedure(s) Performed: Procedure(s) (LRB):  EXAM UNDER ANESTHESIA (N/A)  LIGATION, HEMORRHOIDS (N/A)    Patient location: PACU    Anesthesia Type: general    Transport from OR: Transported from OR on room air with adequate spontaneous ventilation    Post pain: adequate analgesia    Post assessment: no apparent anesthetic complications    Post vital signs: stable    Level of consciousness: awake    Nausea/Vomiting: no nausea/vomiting    Complications: none    Transfer of care protocol was followed      Last vitals:   Visit Vitals  /81 (BP Location: Right arm, Patient Position: Lying)   Pulse 79   Temp 37 °C (98.6 °F) (Oral)   Resp 18   Ht 5' 4" (1.626 m)   Wt 77.1 kg (170 lb)   LMP 04/26/2009 (Exact Date)   SpO2 100%   Breastfeeding No   BMI 29.18 kg/m²     "

## 2020-11-02 NOTE — OP NOTE
[facility]  Operative Note    Date: 11/02/2020    Name: Jaycee Gray    MRN: [id number]    Pre-Op Diagnosis: Rectal bleeding    Post-Op Diagnosis: Grade II hemorrhoids    Procedure(s) Performed: Procedure(s):  EXAM UNDER ANESTHESIA  Rubber band ligation of internal hemorrhoids x3    Specimen(s): None    Staff Surgeon: Jenifer Aragon    Assistant Surgeon: Erik Patterson (resident)    Anesthesia: General    Indications: Jaycee Gray is a 40 y.o. female who initially presented to my office 1 year ago with a thrombosed external hemorrhoid, which was treated with excision.  She recovered well from this per her report.  She presented to my office again 1 week ago with complaint of 3 days of bright red blood per rectum.  She had a normal colonoscopy in 2017.  She was unable to tolerate examination and anoscopy in the office.  After discussion of risks and benefits she agreed to proceed with EUA and likely rubber band ligation of internal hemorrhoids.    Details of procedure:  The patient was taken to the operating room.  SCD boots were applied and she was placed under general endotracheal anesthesia.  She was then transferred to the OR table in the prone position.  Her anus was prepped and draped with Betadine in the standard sterile fashion.  After an appropriate time-out, we performed a perianal block using 0.25% Marcaine with epinephrine.  Then we performed an external exam.  There was no evidence of abscess or anal fissure.  There was some mild vascular engorgement of the hemorrhoid tissue at the anal margin.  We then performed a digital rectal exam which revealed no evidence of any mass or active bleeding.  A medium Hill-Dejesus anoscope was then inserted into the anal canal in all 4 quadrants were carefully examined.  This revealed grade 2 hemorrhoids with no stigmata of recent bleeding.  Her exam was otherwise normal.  We then placed 3 rubber bands in the right posterior, right anterior,  and left lateral columns approximately 1-2 cm proximal to the dentate line.  We then terminated the procedure.  The patient was awakened and transferred to the recovery room in good condition.  I was present scrubbed for the entire procedure.    Estimated Blood Loss: 3mL    Drains/Implants: None    Wound Class: IV    Jenifer Aragon

## 2020-11-02 NOTE — BRIEF OP NOTE
[facility]  Brief Operative Note    Surgery Date: 11/2/2020     Surgeon(s) and Role:     * Jenifer Aragon MD - Primary     * Erik Patterson MD - Resident - Assisting        Pre-op Diagnosis:  Rectal or anal pain [K62.89]    Post-op Diagnosis:  Post-Op Diagnosis Codes:     * Rectal or anal pain [K62.89]    Procedure(s) (LRB):  EXAM UNDER ANESTHESIA (N/A)  LIGATION, HEMORRHOIDS (N/A)    Anesthesia: General    Description of the findings of the procedure(s): Three bands applied to the left lateral, right anterior, and right posterior hemorrhoid columns.     Estimated Blood Loss: <5mL         Specimens:   Specimen (12h ago, onward)    None            Discharge Note    OUTCOME: Patient tolerated treatment/procedure well without complication and is now ready for discharge.    DISPOSITION: Home or Self Care    FINAL DIAGNOSIS:  Rectal pain    FOLLOWUP: In clinic    DISCHARGE INSTRUCTIONS:    Discharge Procedure Orders   Diet Adult Regular     Other restrictions (specify):   Order Comments: For pain, alternate between ibuprofen (advil) and acetaminophen (tylenol) every four hours.   Take a tablespoon of miralax daily to soften stool.   Ok for baths/showers immediately post-operatively.     Notify your health care provider if you experience any of the following:  temperature >100.4     Notify your health care provider if you experience any of the following:  persistent nausea and vomiting or diarrhea     Notify your health care provider if you experience any of the following:  redness, tenderness, or signs of infection (pain, swelling, redness, odor or green/yellow discharge around incision site)     Notify your health care provider if you experience any of the following:  difficulty breathing or increased cough     Notify your health care provider if you experience any of the following:  severe uncontrolled pain     No dressing needed     Activity as tolerated

## 2020-11-02 NOTE — ANESTHESIA POSTPROCEDURE EVALUATION
Anesthesia Post Evaluation    Patient: Jaycee Gray    Procedure(s) Performed: Procedure(s) (LRB):  EXAM UNDER ANESTHESIA (N/A)  LIGATION, HEMORRHOIDS (N/A)    Final Anesthesia Type: general    Patient location during evaluation: PACU  Patient participation: Yes- Able to Participate  Level of consciousness: awake and alert and oriented  Post-procedure vital signs: reviewed and stable  Pain management: adequate  Airway patency: patent    PONV status at discharge: No PONV  Anesthetic complications: no      Cardiovascular status: hemodynamically stable  Respiratory status: unassisted, spontaneous ventilation and room air  Hydration status: euvolemic  Follow-up not needed.          Vitals Value Taken Time   /84 11/02/20 1246   Temp 36.7 °C (98.1 °F) 11/02/20 1242   Pulse 79 11/02/20 1255   Resp 20 11/02/20 1242   SpO2 100 % 11/02/20 1255   Vitals shown include unvalidated device data.      No case tracking events are documented in the log.      Pain/Shena Score: Pain Rating Prior to Med Admin: 7 (11/2/2020 12:24 PM)  Pain Rating Post Med Admin: 5 (11/2/2020 12:30 PM)  Shena Score: 10 (11/2/2020 11:58 AM)

## 2020-11-02 NOTE — DISCHARGE INSTRUCTIONS
Home Care Instructions  ANAL/RECTAL SURGERY    ACTIVITY LEVEL:  If you received sedation and/or an anesthetic, you may feel sleepy for several hours. Rest until you feel more awake. Gradually resume your normal activities.    DIET:  At home, continue with liquids. If there is no nausea, you may eat a soft diet and gradually resume a high fiber diet. Encourage fluids.    SPECIAL INSTRUCTIONS:  Eat plenty of fruits and vegetables. Drink 8-10 glasses of water or juice every day. Eat whole grain cereals and breads. Salads with raw vegetables are also a good source of fiber.      MEDICIATIONS:  You will receive instructions for your pain and/or antibiotic prescriptions. Pain medication should be taken only if needed, and as directed. Antibiotics should be taken as directed until the entire prescription is completed. If ordered, take stool softeners as directed.    WHEN TO CALL THE DOCTOR:   For any obvious active bleeding   Redness or swelling around the incision   Fever over 101°F (38.4°C)   Drainage (pus) from the wound   Persistent pain not relieved by the pain medication    FOR EMERGENCIES:  If any unusual problems or difficulties occur, contact Dr. Aragon  or the resident at [phone] (page ) or at the Clinic office, [phone].          Anesthesia: General Anesthesia     You are watched continuously during your procedure by your anesthesia provider.     Youre due to have surgery. During surgery, youll be given medicine called anesthesia or anesthetic. This will keep you comfortable and pain-free. Your anesthesia provider will use general anesthesia.  What is general anesthesia?  General anesthesia puts you into a state like deep sleep. It goes into the bloodstream (IV anesthetics), into the lungs (gas anesthetics), or both. You feel nothing during the procedure. You will not remember it. During the procedure, the anesthesia provider monitors you continuously. He or she checks your heart rate  and rhythm, blood pressure, breathing, and blood oxygen.  · IV anesthetics. IV anesthetics are given through an IV line in your arm. Theyre often given first. This is so you are asleep before a gas anesthetic is started. Some kinds of IV anesthetics relieve pain. Others relax you. Your doctor will decide which kind is best in your case.  · Gas anesthetics. Gas anesthetics are breathed into the lungs. They are often used to keep you asleep. They can be given through a facemask or a tube placed in your larynx or trachea (breathing tube).  ¨ If you have a facemask, your anesthesia provider will most likely place it over your nose and mouth while youre still awake. Youll breathe oxygen through the mask as your IV anesthetic is started. Gas anesthetic may be added through the mask.  ¨ If you have a tube in the larynx or trachea, it will be inserted into your throat after youre asleep.  Anesthesia tools and medicines  You will likely have:  · IV anesthetics. These are put into an IV line into your bloodstream.  · Gas anesthetics. You breathe these anesthetics into your lungs, where they pass into your bloodstream.  · Pulse oximeter. This is a small clip that is attached to the end of your finger. This measures your blood oxygen level.  · Electrocardiography leads (electrodes). These are small sticky pads that are placed on your chest. They record your heart rate and rhythm.  · Blood pressure cuff. This reads your blood pressure.  Risks and possible complications  General anesthesia has some risks. These include:  · Breathing problems  · Nausea and vomiting  · Sore throat or hoarseness (usually temporary)  · Allergic reaction to the anesthetic  · Irregular heartbeat (rare)  · Cardiac arrest (rare)   Anesthesia safety  · Follow all instructions you are given for how long not to eat or drink before your procedure.  · Be sure your doctor knows what medicines and drugs you take. This includes over-the-counter medicines,  herbs, supplements, alcohol or other drugs. You will be asked when those were last taken.  · Have an adult family member or friend drive you home after the procedure.  · For the first 24 hours after your surgery:  ¨ Do not drive or use heavy equipment.  ¨ Do not make important decisions or sign legal documents. If important decisions or signing legal documents is necessary during the first 24 hours after surgery, have a trusted family member or spouse act on your behalf.  ¨ Avoid alcohol.  ¨ Have a responsible adult stay with you. He or she can watch for problems and help keep you safe.  Date Last Reviewed: 12/1/2016  © 6421-6070 [employer]. [address] [place] [address], [place] [address]. All rights reserved. This information is not intended as a substitute for professional medical care. Always follow your healthcare professional's instructions.

## 2020-11-12 ENCOUNTER — PATIENT MESSAGE (OUTPATIENT)
Dept: SURGERY | Facility: CLINIC | Age: 40
End: 2020-11-12

## 2020-11-30 ENCOUNTER — PATIENT MESSAGE (OUTPATIENT)
Dept: UROLOGY | Facility: CLINIC | Age: 40
End: 2020-11-30

## 2020-12-01 ENCOUNTER — TELEPHONE (OUTPATIENT)
Dept: UROLOGY | Facility: CLINIC | Age: 40
End: 2020-12-01

## 2020-12-01 DIAGNOSIS — N30.10 INTERSTITIAL CYSTITIS: Primary | ICD-10-CM

## 2020-12-01 DIAGNOSIS — Z01.818 PRE-OP TESTING: ICD-10-CM

## 2020-12-04 ENCOUNTER — CLINICAL SUPPORT (OUTPATIENT)
Dept: URGENT CARE | Facility: CLINIC | Age: 40
End: 2020-12-04
Payer: MEDICARE

## 2020-12-04 VITALS — TEMPERATURE: 98 F

## 2020-12-04 DIAGNOSIS — Z01.818 PRE-OP TESTING: ICD-10-CM

## 2020-12-04 DIAGNOSIS — N30.10 INTERSTITIAL CYSTITIS: ICD-10-CM

## 2020-12-04 PROCEDURE — U0003 INFECTIOUS AGENT DETECTION BY NUCLEIC ACID (DNA OR RNA); SEVERE ACUTE RESPIRATORY SYNDROME CORONAVIRUS 2 (SARS-COV-2) (CORONAVIRUS DISEASE [COVID-19]), AMPLIFIED PROBE TECHNIQUE, MAKING USE OF HIGH THROUGHPUT TECHNOLOGIES AS DESCRIBED BY CMS-2020-01-R: HCPCS

## 2020-12-05 LAB — SARS-COV-2 RNA RESP QL NAA+PROBE: NOT DETECTED

## 2020-12-07 PROBLEM — N30.10 IC (INTERSTITIAL CYSTITIS): Status: ACTIVE | Noted: 2020-12-07

## 2021-01-05 ENCOUNTER — TELEPHONE (OUTPATIENT)
Dept: FAMILY MEDICINE | Facility: CLINIC | Age: 41
End: 2021-01-05

## 2021-01-05 ENCOUNTER — TELEPHONE (OUTPATIENT)
Dept: ORTHOPEDICS | Facility: CLINIC | Age: 41
End: 2021-01-05

## 2021-01-06 ENCOUNTER — TELEPHONE (OUTPATIENT)
Dept: ORTHOPEDICS | Facility: CLINIC | Age: 41
End: 2021-01-06

## 2021-01-06 RX ORDER — ALPRAZOLAM 1 MG/1
1 TABLET ORAL DAILY PRN
Qty: 30 TABLET | Refills: 0 | Status: SHIPPED | OUTPATIENT
Start: 2021-01-06 | End: 2021-01-27

## 2021-01-11 ENCOUNTER — HOSPITAL ENCOUNTER (EMERGENCY)
Facility: HOSPITAL | Age: 41
Discharge: HOME | End: 2021-01-11
Attending: EMERGENCY MEDICINE
Payer: MEDICARE

## 2021-01-11 VITALS
BODY MASS INDEX: 32.2 KG/M2 | TEMPERATURE: 98 F | WEIGHT: 175 LBS | OXYGEN SATURATION: 99 % | SYSTOLIC BLOOD PRESSURE: 123 MMHG | RESPIRATION RATE: 18 BRPM | DIASTOLIC BLOOD PRESSURE: 81 MMHG | HEART RATE: 81 BPM | HEIGHT: 62 IN

## 2021-01-11 DIAGNOSIS — M25.511 RIGHT SHOULDER PAIN: ICD-10-CM

## 2021-01-11 PROCEDURE — 99283 EMERGENCY DEPT VISIT LOW MDM: CPT | Mod: 25

## 2021-01-11 PROCEDURE — 25000003 PHARM REV CODE 250: Performed by: NURSE PRACTITIONER

## 2021-01-11 PROCEDURE — 99284 EMERGENCY DEPT VISIT MOD MDM: CPT | Mod: ,,, | Performed by: NURSE PRACTITIONER

## 2021-01-11 PROCEDURE — 99284 PR EMERGENCY DEPT VISIT,LEVEL IV: ICD-10-PCS | Mod: ,,, | Performed by: NURSE PRACTITIONER

## 2021-01-11 RX ORDER — ONDANSETRON 4 MG/1
4 TABLET, ORALLY DISINTEGRATING ORAL
Status: COMPLETED | OUTPATIENT
Start: 2021-01-11 | End: 2021-01-11

## 2021-01-11 RX ORDER — HYDROCODONE BITARTRATE AND ACETAMINOPHEN 5; 325 MG/1; MG/1
1 TABLET ORAL
Status: COMPLETED | OUTPATIENT
Start: 2021-01-11 | End: 2021-01-11

## 2021-01-11 RX ADMIN — HYDROCODONE BITARTRATE AND ACETAMINOPHEN 1 TABLET: 5; 325 TABLET ORAL at 09:01

## 2021-01-11 RX ADMIN — ONDANSETRON 4 MG: 4 TABLET, ORALLY DISINTEGRATING ORAL at 10:01

## 2021-01-11 RX ADMIN — HYDROCODONE BITARTRATE AND ACETAMINOPHEN 1 TABLET: 5; 325 TABLET ORAL at 10:01

## 2021-01-12 ENCOUNTER — PATIENT OUTREACH (OUTPATIENT)
Dept: ADMINISTRATIVE | Facility: OTHER | Age: 41
End: 2021-01-12

## 2021-01-12 ENCOUNTER — TELEPHONE (OUTPATIENT)
Dept: ORTHOPEDICS | Facility: CLINIC | Age: 41
End: 2021-01-12

## 2021-01-14 ENCOUNTER — OFFICE VISIT (OUTPATIENT)
Dept: ORTHOPEDICS | Facility: CLINIC | Age: 41
End: 2021-01-14
Attending: ORTHOPAEDIC SURGERY
Payer: MEDICARE

## 2021-01-14 VITALS — WEIGHT: 175.06 LBS | HEIGHT: 62 IN | BODY MASS INDEX: 32.22 KG/M2

## 2021-01-14 DIAGNOSIS — G89.29 CHRONIC RIGHT SHOULDER PAIN: ICD-10-CM

## 2021-01-14 DIAGNOSIS — M25.511 CHRONIC RIGHT SHOULDER PAIN: ICD-10-CM

## 2021-01-14 PROCEDURE — 99213 OFFICE O/P EST LOW 20 MIN: CPT | Mod: S$PBB,,, | Performed by: PHYSICIAN ASSISTANT

## 2021-01-14 PROCEDURE — 99999 PR PBB SHADOW E&M-EST. PATIENT-LVL IV: ICD-10-PCS | Mod: PBBFAC,,, | Performed by: PHYSICIAN ASSISTANT

## 2021-01-14 PROCEDURE — 99999 PR PBB SHADOW E&M-EST. PATIENT-LVL IV: CPT | Mod: PBBFAC,,, | Performed by: PHYSICIAN ASSISTANT

## 2021-01-14 PROCEDURE — 99214 OFFICE O/P EST MOD 30 MIN: CPT | Mod: PBBFAC,PN | Performed by: PHYSICIAN ASSISTANT

## 2021-01-14 PROCEDURE — 99213 PR OFFICE/OUTPT VISIT, EST, LEVL III, 20-29 MIN: ICD-10-PCS | Mod: S$PBB,,, | Performed by: PHYSICIAN ASSISTANT

## 2021-01-21 ENCOUNTER — HOSPITAL ENCOUNTER (OUTPATIENT)
Dept: RADIOLOGY | Facility: HOSPITAL | Age: 41
Discharge: HOME | End: 2021-01-21
Attending: PHYSICIAN ASSISTANT
Payer: MEDICARE

## 2021-01-21 DIAGNOSIS — G89.29 CHRONIC RIGHT SHOULDER PAIN: ICD-10-CM

## 2021-01-21 DIAGNOSIS — M25.511 CHRONIC RIGHT SHOULDER PAIN: ICD-10-CM

## 2021-01-21 PROCEDURE — 73221 MRI JOINT UPR EXTREM W/O DYE: CPT | Mod: 26,RT,, | Performed by: RADIOLOGY

## 2021-01-21 PROCEDURE — 73221 MRI SHOULDER WITHOUT CONTRAST RIGHT: ICD-10-PCS | Mod: 26,RT,, | Performed by: RADIOLOGY

## 2021-01-21 PROCEDURE — 73221 MRI JOINT UPR EXTREM W/O DYE: CPT | Mod: TC,RT

## 2021-01-25 ENCOUNTER — OFFICE VISIT (OUTPATIENT)
Dept: ORTHOPEDICS | Facility: CLINIC | Age: 41
End: 2021-01-25
Payer: MEDICARE

## 2021-01-25 VITALS — WEIGHT: 175 LBS | HEIGHT: 62 IN | BODY MASS INDEX: 32.2 KG/M2

## 2021-01-25 DIAGNOSIS — S43.004A DISLOCATION OF RIGHT SHOULDER JOINT, INITIAL ENCOUNTER: ICD-10-CM

## 2021-01-25 DIAGNOSIS — G56.03 BILATERAL CARPAL TUNNEL SYNDROME: Primary | ICD-10-CM

## 2021-01-25 PROCEDURE — 99999 PR PBB SHADOW E&M-EST. PATIENT-LVL III: ICD-10-PCS | Mod: PBBFAC,,, | Performed by: ORTHOPAEDIC SURGERY

## 2021-01-25 PROCEDURE — 99213 OFFICE O/P EST LOW 20 MIN: CPT | Mod: PBBFAC,PN | Performed by: ORTHOPAEDIC SURGERY

## 2021-01-25 PROCEDURE — 99213 OFFICE O/P EST LOW 20 MIN: CPT | Mod: S$PBB,,, | Performed by: ORTHOPAEDIC SURGERY

## 2021-01-25 PROCEDURE — 99999 PR PBB SHADOW E&M-EST. PATIENT-LVL III: CPT | Mod: PBBFAC,,, | Performed by: ORTHOPAEDIC SURGERY

## 2021-01-25 PROCEDURE — 99213 PR OFFICE/OUTPT VISIT, EST, LEVL III, 20-29 MIN: ICD-10-PCS | Mod: S$PBB,,, | Performed by: ORTHOPAEDIC SURGERY

## 2021-01-26 ENCOUNTER — TELEPHONE (OUTPATIENT)
Dept: ORTHOPEDICS | Facility: CLINIC | Age: 41
End: 2021-01-26

## 2021-01-26 DIAGNOSIS — S43.006D DISLOCATION OF SHOULDER REGION, UNSPECIFIED LATERALITY, SUBSEQUENT ENCOUNTER: Primary | ICD-10-CM

## 2021-01-27 ENCOUNTER — OFFICE VISIT (OUTPATIENT)
Dept: FAMILY MEDICINE | Facility: CLINIC | Age: 41
End: 2021-01-27
Payer: MEDICARE

## 2021-01-27 VITALS
DIASTOLIC BLOOD PRESSURE: 82 MMHG | TEMPERATURE: 98 F | WEIGHT: 175.63 LBS | HEART RATE: 85 BPM | OXYGEN SATURATION: 98 % | SYSTOLIC BLOOD PRESSURE: 124 MMHG | BODY MASS INDEX: 32.12 KG/M2

## 2021-01-27 DIAGNOSIS — F51.01 PRIMARY INSOMNIA: Primary | ICD-10-CM

## 2021-01-27 DIAGNOSIS — F41.9 ANXIETY: ICD-10-CM

## 2021-01-27 PROCEDURE — 99214 OFFICE O/P EST MOD 30 MIN: CPT | Mod: S$PBB,,, | Performed by: INTERNAL MEDICINE

## 2021-01-27 PROCEDURE — 99214 PR OFFICE/OUTPT VISIT, EST, LEVL IV, 30-39 MIN: ICD-10-PCS | Mod: S$PBB,,, | Performed by: INTERNAL MEDICINE

## 2021-01-27 PROCEDURE — 99213 OFFICE O/P EST LOW 20 MIN: CPT | Mod: PBBFAC,PN | Performed by: INTERNAL MEDICINE

## 2021-01-27 PROCEDURE — 99999 PR PBB SHADOW E&M-EST. PATIENT-LVL III: CPT | Mod: PBBFAC,,, | Performed by: INTERNAL MEDICINE

## 2021-01-27 PROCEDURE — 99999 PR PBB SHADOW E&M-EST. PATIENT-LVL III: ICD-10-PCS | Mod: PBBFAC,,, | Performed by: INTERNAL MEDICINE

## 2021-01-27 RX ORDER — ZOLPIDEM TARTRATE 5 MG/1
5 TABLET ORAL NIGHTLY PRN
Qty: 30 TABLET | Refills: 5 | Status: SHIPPED | OUTPATIENT
Start: 2021-01-27 | End: 2021-05-10

## 2021-01-27 RX ORDER — DESVENLAFAXINE SUCCINATE 50 MG/1
50 TABLET, EXTENDED RELEASE ORAL DAILY
Qty: 30 TABLET | Refills: 11 | Status: SHIPPED | OUTPATIENT
Start: 2021-01-27 | End: 2021-05-04 | Stop reason: ALTCHOICE

## 2021-01-29 PROBLEM — G89.29 CHRONIC RIGHT SHOULDER PAIN: Status: ACTIVE | Noted: 2021-01-29

## 2021-01-29 PROBLEM — M25.511 CHRONIC RIGHT SHOULDER PAIN: Status: ACTIVE | Noted: 2021-01-29

## 2021-01-29 PROBLEM — R53.1 WEAKNESS: Status: ACTIVE | Noted: 2021-01-29

## 2021-02-03 ENCOUNTER — TELEPHONE (OUTPATIENT)
Dept: ORTHOPEDICS | Facility: CLINIC | Age: 41
End: 2021-02-03

## 2021-02-04 ENCOUNTER — OFFICE VISIT (OUTPATIENT)
Dept: ORTHOPEDICS | Facility: CLINIC | Age: 41
End: 2021-02-04
Payer: MEDICARE

## 2021-02-04 DIAGNOSIS — M79.601 PAIN OF RIGHT UPPER EXTREMITY: Primary | ICD-10-CM

## 2021-02-04 PROCEDURE — 99213 PR OFFICE/OUTPT VISIT, EST, LEVL III, 20-29 MIN: ICD-10-PCS | Mod: 95,,, | Performed by: ORTHOPAEDIC SURGERY

## 2021-02-04 PROCEDURE — 99213 OFFICE O/P EST LOW 20 MIN: CPT | Mod: 95,,, | Performed by: ORTHOPAEDIC SURGERY

## 2021-02-17 ENCOUNTER — TELEPHONE (OUTPATIENT)
Dept: ORTHOPEDICS | Facility: CLINIC | Age: 41
End: 2021-02-17

## 2021-02-25 ENCOUNTER — OFFICE VISIT (OUTPATIENT)
Dept: ORTHOPEDICS | Facility: CLINIC | Age: 41
End: 2021-02-25
Payer: MEDICARE

## 2021-02-25 DIAGNOSIS — M79.601 PAIN OF RIGHT UPPER EXTREMITY: Primary | ICD-10-CM

## 2021-02-25 PROCEDURE — 99213 PR OFFICE/OUTPT VISIT, EST, LEVL III, 20-29 MIN: ICD-10-PCS | Mod: 95,,, | Performed by: ORTHOPAEDIC SURGERY

## 2021-02-25 PROCEDURE — 99213 OFFICE O/P EST LOW 20 MIN: CPT | Mod: 95,,, | Performed by: ORTHOPAEDIC SURGERY

## 2021-03-04 ENCOUNTER — TELEPHONE (OUTPATIENT)
Dept: FAMILY MEDICINE | Facility: CLINIC | Age: 41
End: 2021-03-04

## 2021-03-17 ENCOUNTER — TELEPHONE (OUTPATIENT)
Dept: FAMILY MEDICINE | Facility: CLINIC | Age: 41
End: 2021-03-17

## 2021-03-18 ENCOUNTER — TELEPHONE (OUTPATIENT)
Dept: FAMILY MEDICINE | Facility: CLINIC | Age: 41
End: 2021-03-18

## 2021-03-19 DIAGNOSIS — G43.909 MIGRAINE WITHOUT STATUS MIGRAINOSUS, NOT INTRACTABLE, UNSPECIFIED MIGRAINE TYPE: Primary | ICD-10-CM

## 2021-03-19 RX ORDER — PROMETHAZINE HYDROCHLORIDE 12.5 MG/1
12.5 TABLET ORAL 4 TIMES DAILY
COMMUNITY
End: 2021-03-19 | Stop reason: SDUPTHER

## 2021-03-19 RX ORDER — PROMETHAZINE HYDROCHLORIDE 12.5 MG/1
12.5 TABLET ORAL 4 TIMES DAILY
Qty: 30 TABLET | Refills: 5 | Status: SHIPPED | OUTPATIENT
Start: 2021-03-19 | End: 2021-10-29 | Stop reason: SDUPTHER

## 2021-03-27 ENCOUNTER — OFFICE VISIT (OUTPATIENT)
Dept: URGENT CARE | Facility: CLINIC | Age: 41
End: 2021-03-27
Payer: MEDICARE

## 2021-03-27 VITALS
SYSTOLIC BLOOD PRESSURE: 99 MMHG | BODY MASS INDEX: 31.83 KG/M2 | OXYGEN SATURATION: 96 % | TEMPERATURE: 98 F | WEIGHT: 173 LBS | HEART RATE: 97 BPM | RESPIRATION RATE: 18 BRPM | DIASTOLIC BLOOD PRESSURE: 68 MMHG | HEIGHT: 62 IN

## 2021-03-27 DIAGNOSIS — N30.10 INTERSTITIAL CYSTITIS: ICD-10-CM

## 2021-03-27 DIAGNOSIS — R32 URINARY INCONTINENCE, UNSPECIFIED TYPE: ICD-10-CM

## 2021-03-27 DIAGNOSIS — K12.0 APHTHOUS ULCER: ICD-10-CM

## 2021-03-27 DIAGNOSIS — T26.91XA CHEMICAL INJURY OF RIGHT EYE, INITIAL ENCOUNTER: Primary | ICD-10-CM

## 2021-03-27 LAB
B-HCG UR QL: NEGATIVE
BILIRUB UR QL STRIP: NEGATIVE
CTP QC/QA: YES
GLUCOSE UR QL STRIP: NEGATIVE
KETONES UR QL STRIP: NEGATIVE
LEUKOCYTE ESTERASE UR QL STRIP: NEGATIVE
PH, POC UA: 5 (ref 5–8)
POC BLOOD, URINE: NEGATIVE
POC NITRATES, URINE: NEGATIVE
PROT UR QL STRIP: NEGATIVE
SP GR UR STRIP: 1.02 (ref 1–1.03)
UROBILINOGEN UR STRIP-ACNC: NORMAL (ref 0.1–1.1)

## 2021-03-27 PROCEDURE — 81003 URINALYSIS AUTO W/O SCOPE: CPT | Mod: QW,S$GLB,, | Performed by: NURSE PRACTITIONER

## 2021-03-27 PROCEDURE — 99214 PR OFFICE/OUTPT VISIT, EST, LEVL IV, 30-39 MIN: ICD-10-PCS | Mod: 25,S$GLB,, | Performed by: NURSE PRACTITIONER

## 2021-03-27 PROCEDURE — 81025 POCT URINE PREGNANCY: ICD-10-PCS | Mod: S$GLB,,, | Performed by: NURSE PRACTITIONER

## 2021-03-27 PROCEDURE — 81025 URINE PREGNANCY TEST: CPT | Mod: S$GLB,,, | Performed by: NURSE PRACTITIONER

## 2021-03-27 PROCEDURE — 99214 OFFICE O/P EST MOD 30 MIN: CPT | Mod: 25,S$GLB,, | Performed by: NURSE PRACTITIONER

## 2021-03-27 PROCEDURE — 81003 POCT URINALYSIS, DIPSTICK, AUTOMATED, W/O SCOPE: ICD-10-PCS | Mod: QW,S$GLB,, | Performed by: NURSE PRACTITIONER

## 2021-03-27 RX ORDER — TRIAMCINOLONE ACETONIDE 1 MG/G
PASTE DENTAL
Qty: 5 G | Refills: 12 | Status: SHIPPED | OUTPATIENT
Start: 2021-03-27 | End: 2021-05-04 | Stop reason: ALTCHOICE

## 2021-03-27 RX ORDER — ERYTHROMYCIN 5 MG/G
OINTMENT OPHTHALMIC EVERY 6 HOURS
Qty: 1 TUBE | Refills: 1 | Status: SHIPPED | OUTPATIENT
Start: 2021-03-27 | End: 2021-05-04 | Stop reason: ALTCHOICE

## 2021-03-30 ENCOUNTER — PATIENT OUTREACH (OUTPATIENT)
Dept: ADMINISTRATIVE | Facility: OTHER | Age: 41
End: 2021-03-30

## 2021-03-31 ENCOUNTER — OFFICE VISIT (OUTPATIENT)
Dept: UROLOGY | Facility: CLINIC | Age: 41
End: 2021-03-31
Payer: MEDICARE

## 2021-03-31 VITALS
HEIGHT: 62 IN | OXYGEN SATURATION: 97 % | BODY MASS INDEX: 31.47 KG/M2 | TEMPERATURE: 98 F | WEIGHT: 171 LBS | HEART RATE: 74 BPM | DIASTOLIC BLOOD PRESSURE: 60 MMHG | SYSTOLIC BLOOD PRESSURE: 119 MMHG

## 2021-03-31 DIAGNOSIS — N30.10 IC (INTERSTITIAL CYSTITIS): Primary | ICD-10-CM

## 2021-03-31 DIAGNOSIS — R10.2 CHRONIC PELVIC PAIN IN FEMALE: ICD-10-CM

## 2021-03-31 DIAGNOSIS — R30.0 DYSURIA: ICD-10-CM

## 2021-03-31 DIAGNOSIS — R39.15 URINARY URGENCY: ICD-10-CM

## 2021-03-31 DIAGNOSIS — R35.1 NOCTURIA: ICD-10-CM

## 2021-03-31 DIAGNOSIS — G89.29 CHRONIC PELVIC PAIN IN FEMALE: ICD-10-CM

## 2021-03-31 LAB
BILIRUB SERPL-MCNC: NORMAL MG/DL
BLOOD URINE, POC: NORMAL
CLARITY, POC UA: CLEAR
COLOR, POC UA: YELLOW
GLUCOSE UR QL STRIP: NORMAL
KETONES UR QL STRIP: NORMAL
LEUKOCYTE ESTERASE URINE, POC: NORMAL
NITRITE, POC UA: NORMAL
PH, POC UA: 7
POC RESIDUAL URINE VOLUME: 20 ML (ref 0–100)
PROTEIN, POC: NORMAL
SPECIFIC GRAVITY, POC UA: 1.02
UROBILINOGEN, POC UA: 1

## 2021-03-31 PROCEDURE — 87088 URINE BACTERIA CULTURE: CPT | Performed by: NURSE PRACTITIONER

## 2021-03-31 PROCEDURE — 99214 PR OFFICE/OUTPT VISIT, EST, LEVL IV, 30-39 MIN: ICD-10-PCS | Mod: S$PBB,,, | Performed by: NURSE PRACTITIONER

## 2021-03-31 PROCEDURE — 81002 URINALYSIS NONAUTO W/O SCOPE: CPT | Mod: PBBFAC,PO | Performed by: NURSE PRACTITIONER

## 2021-03-31 PROCEDURE — 99999 PR PBB SHADOW E&M-EST. PATIENT-LVL IV: CPT | Mod: PBBFAC,,, | Performed by: NURSE PRACTITIONER

## 2021-03-31 PROCEDURE — 51798 US URINE CAPACITY MEASURE: CPT | Mod: PBBFAC,PO | Performed by: NURSE PRACTITIONER

## 2021-03-31 PROCEDURE — 87077 CULTURE AEROBIC IDENTIFY: CPT | Performed by: NURSE PRACTITIONER

## 2021-03-31 PROCEDURE — 99999 PR PBB SHADOW E&M-EST. PATIENT-LVL IV: ICD-10-PCS | Mod: PBBFAC,,, | Performed by: NURSE PRACTITIONER

## 2021-03-31 PROCEDURE — 87186 SC STD MICRODIL/AGAR DIL: CPT | Performed by: NURSE PRACTITIONER

## 2021-03-31 PROCEDURE — 99214 OFFICE O/P EST MOD 30 MIN: CPT | Mod: S$PBB,,, | Performed by: NURSE PRACTITIONER

## 2021-03-31 PROCEDURE — 87086 URINE CULTURE/COLONY COUNT: CPT | Performed by: NURSE PRACTITIONER

## 2021-03-31 PROCEDURE — 99214 OFFICE O/P EST MOD 30 MIN: CPT | Mod: PBBFAC,PO,25 | Performed by: NURSE PRACTITIONER

## 2021-03-31 RX ORDER — PHENAZOPYRIDINE HYDROCHLORIDE 200 MG/1
200 TABLET, FILM COATED ORAL 3 TIMES DAILY PRN
Qty: 9 TABLET | Refills: 0 | Status: SHIPPED | OUTPATIENT
Start: 2021-03-31 | End: 2021-04-03

## 2021-04-03 LAB — BACTERIA UR CULT: ABNORMAL

## 2021-04-05 ENCOUNTER — TELEPHONE (OUTPATIENT)
Dept: UROLOGY | Facility: CLINIC | Age: 41
End: 2021-04-05

## 2021-04-05 DIAGNOSIS — N30.00 ACUTE CYSTITIS WITHOUT HEMATURIA: Primary | ICD-10-CM

## 2021-04-05 RX ORDER — SULFAMETHOXAZOLE AND TRIMETHOPRIM 400; 80 MG/1; MG/1
1 TABLET ORAL 2 TIMES DAILY
Qty: 20 TABLET | Refills: 0 | Status: SHIPPED | OUTPATIENT
Start: 2021-04-05 | End: 2021-04-15

## 2021-04-19 ENCOUNTER — TELEPHONE (OUTPATIENT)
Dept: UROLOGY | Facility: CLINIC | Age: 41
End: 2021-04-19

## 2021-04-22 ENCOUNTER — TELEPHONE (OUTPATIENT)
Dept: UROLOGY | Facility: CLINIC | Age: 41
End: 2021-04-22

## 2021-04-22 DIAGNOSIS — G89.29 CHRONIC PELVIC PAIN IN FEMALE: ICD-10-CM

## 2021-04-22 DIAGNOSIS — Z01.818 PRE-OP TESTING: ICD-10-CM

## 2021-04-22 DIAGNOSIS — N30.10 INTERSTITIAL CYSTITIS: ICD-10-CM

## 2021-04-22 DIAGNOSIS — N30.00 ACUTE CYSTITIS WITHOUT HEMATURIA: Primary | ICD-10-CM

## 2021-04-22 DIAGNOSIS — N30.10 IC (INTERSTITIAL CYSTITIS): ICD-10-CM

## 2021-04-22 DIAGNOSIS — R10.2 CHRONIC PELVIC PAIN IN FEMALE: ICD-10-CM

## 2021-04-22 RX ORDER — SODIUM CHLORIDE 9 MG/ML
INJECTION, SOLUTION INTRAVENOUS CONTINUOUS
Status: CANCELLED | OUTPATIENT
Start: 2021-04-22

## 2021-04-26 ENCOUNTER — TELEPHONE (OUTPATIENT)
Dept: UROLOGY | Facility: CLINIC | Age: 41
End: 2021-04-26

## 2021-04-29 ENCOUNTER — OFFICE VISIT (OUTPATIENT)
Dept: URGENT CARE | Facility: CLINIC | Age: 41
End: 2021-04-29
Payer: MEDICARE

## 2021-04-29 VITALS
TEMPERATURE: 98 F | BODY MASS INDEX: 31.47 KG/M2 | HEIGHT: 62 IN | OXYGEN SATURATION: 98 % | RESPIRATION RATE: 16 BRPM | DIASTOLIC BLOOD PRESSURE: 85 MMHG | WEIGHT: 171 LBS | HEART RATE: 83 BPM | SYSTOLIC BLOOD PRESSURE: 120 MMHG

## 2021-04-29 DIAGNOSIS — L55.9 SUNBURN: ICD-10-CM

## 2021-04-29 DIAGNOSIS — L25.3 CONTACT DERMATITIS DUE TO OTHER CHEMICAL PRODUCT, UNSPECIFIED CONTACT DERMATITIS TYPE: Primary | ICD-10-CM

## 2021-04-29 PROCEDURE — 3008F BODY MASS INDEX DOCD: CPT | Mod: CPTII,S$GLB,, | Performed by: PHYSICIAN ASSISTANT

## 2021-04-29 PROCEDURE — 96372 PR INJECTION,THERAP/PROPH/DIAG2ST, IM OR SUBCUT: ICD-10-PCS | Mod: S$GLB,,, | Performed by: FAMILY MEDICINE

## 2021-04-29 PROCEDURE — 1126F AMNT PAIN NOTED NONE PRSNT: CPT | Mod: S$GLB,,, | Performed by: PHYSICIAN ASSISTANT

## 2021-04-29 PROCEDURE — 1126F PR PAIN SEVERITY QUANTIFIED, NO PAIN PRESENT: ICD-10-PCS | Mod: S$GLB,,, | Performed by: PHYSICIAN ASSISTANT

## 2021-04-29 PROCEDURE — 99214 OFFICE O/P EST MOD 30 MIN: CPT | Mod: 25,S$GLB,, | Performed by: PHYSICIAN ASSISTANT

## 2021-04-29 PROCEDURE — 96372 THER/PROPH/DIAG INJ SC/IM: CPT | Mod: S$GLB,,, | Performed by: FAMILY MEDICINE

## 2021-04-29 PROCEDURE — 3008F PR BODY MASS INDEX (BMI) DOCUMENTED: ICD-10-PCS | Mod: CPTII,S$GLB,, | Performed by: PHYSICIAN ASSISTANT

## 2021-04-29 PROCEDURE — 99214 PR OFFICE/OUTPT VISIT, EST, LEVL IV, 30-39 MIN: ICD-10-PCS | Mod: 25,S$GLB,, | Performed by: PHYSICIAN ASSISTANT

## 2021-04-29 RX ORDER — BETAMETHASONE SODIUM PHOSPHATE AND BETAMETHASONE ACETATE 3; 3 MG/ML; MG/ML
6 INJECTION, SUSPENSION INTRA-ARTICULAR; INTRALESIONAL; INTRAMUSCULAR; SOFT TISSUE
Status: COMPLETED | OUTPATIENT
Start: 2021-04-29 | End: 2021-04-29

## 2021-04-29 RX ORDER — MUPIROCIN 20 MG/G
OINTMENT TOPICAL 3 TIMES DAILY
Qty: 22 G | Refills: 0 | Status: SHIPPED | OUTPATIENT
Start: 2021-04-29 | End: 2021-05-04 | Stop reason: ALTCHOICE

## 2021-04-29 RX ADMIN — BETAMETHASONE SODIUM PHOSPHATE AND BETAMETHASONE ACETATE 6 MG: 3; 3 INJECTION, SUSPENSION INTRA-ARTICULAR; INTRALESIONAL; INTRAMUSCULAR; SOFT TISSUE at 03:04

## 2021-04-30 ENCOUNTER — TELEPHONE (OUTPATIENT)
Dept: UROLOGY | Facility: CLINIC | Age: 41
End: 2021-04-30

## 2021-05-03 ENCOUNTER — LAB VISIT (OUTPATIENT)
Dept: FAMILY MEDICINE | Facility: CLINIC | Age: 41
End: 2021-05-03
Payer: MEDICARE

## 2021-05-03 DIAGNOSIS — N30.10 IC (INTERSTITIAL CYSTITIS): ICD-10-CM

## 2021-05-03 DIAGNOSIS — Z01.818 PRE-OP TESTING: ICD-10-CM

## 2021-05-03 DIAGNOSIS — N30.00 ACUTE CYSTITIS WITHOUT HEMATURIA: ICD-10-CM

## 2021-05-03 DIAGNOSIS — G89.29 CHRONIC PELVIC PAIN IN FEMALE: ICD-10-CM

## 2021-05-03 DIAGNOSIS — R10.2 CHRONIC PELVIC PAIN IN FEMALE: ICD-10-CM

## 2021-05-03 PROCEDURE — U0003 INFECTIOUS AGENT DETECTION BY NUCLEIC ACID (DNA OR RNA); SEVERE ACUTE RESPIRATORY SYNDROME CORONAVIRUS 2 (SARS-COV-2) (CORONAVIRUS DISEASE [COVID-19]), AMPLIFIED PROBE TECHNIQUE, MAKING USE OF HIGH THROUGHPUT TECHNOLOGIES AS DESCRIBED BY CMS-2020-01-R: HCPCS | Performed by: UROLOGY

## 2021-05-03 PROCEDURE — U0005 INFEC AGEN DETEC AMPLI PROBE: HCPCS | Performed by: UROLOGY

## 2021-05-04 LAB — SARS-COV-2 RNA RESP QL NAA+PROBE: NOT DETECTED

## 2021-05-07 ENCOUNTER — TELEPHONE (OUTPATIENT)
Dept: FAMILY MEDICINE | Facility: CLINIC | Age: 41
End: 2021-05-07

## 2021-05-10 ENCOUNTER — TELEPHONE (OUTPATIENT)
Dept: FAMILY MEDICINE | Facility: CLINIC | Age: 41
End: 2021-05-10

## 2021-05-10 ENCOUNTER — OFFICE VISIT (OUTPATIENT)
Dept: FAMILY MEDICINE | Facility: CLINIC | Age: 41
End: 2021-05-10
Payer: MEDICARE

## 2021-05-10 VITALS
SYSTOLIC BLOOD PRESSURE: 130 MMHG | DIASTOLIC BLOOD PRESSURE: 86 MMHG | OXYGEN SATURATION: 97 % | BODY MASS INDEX: 33.17 KG/M2 | WEIGHT: 181.31 LBS | HEART RATE: 79 BPM | TEMPERATURE: 98 F

## 2021-05-10 DIAGNOSIS — F41.9 ANXIETY: ICD-10-CM

## 2021-05-10 DIAGNOSIS — R10.13 EPIGASTRIC PAIN: ICD-10-CM

## 2021-05-10 DIAGNOSIS — Z00.00 ANNUAL PHYSICAL EXAM: ICD-10-CM

## 2021-05-10 DIAGNOSIS — R63.5 WEIGHT GAIN: ICD-10-CM

## 2021-05-10 DIAGNOSIS — F51.01 PRIMARY INSOMNIA: Primary | ICD-10-CM

## 2021-05-10 PROCEDURE — 3008F PR BODY MASS INDEX (BMI) DOCUMENTED: ICD-10-PCS | Mod: CPTII,S$GLB,, | Performed by: INTERNAL MEDICINE

## 2021-05-10 PROCEDURE — 1125F AMNT PAIN NOTED PAIN PRSNT: CPT | Mod: S$GLB,,, | Performed by: INTERNAL MEDICINE

## 2021-05-10 PROCEDURE — 1125F PR PAIN SEVERITY QUANTIFIED, PAIN PRESENT: ICD-10-PCS | Mod: S$GLB,,, | Performed by: INTERNAL MEDICINE

## 2021-05-10 PROCEDURE — 99214 OFFICE O/P EST MOD 30 MIN: CPT | Mod: S$GLB,,, | Performed by: INTERNAL MEDICINE

## 2021-05-10 PROCEDURE — 99999 PR PBB SHADOW E&M-EST. PATIENT-LVL III: CPT | Mod: PBBFAC,,, | Performed by: INTERNAL MEDICINE

## 2021-05-10 PROCEDURE — 99214 PR OFFICE/OUTPT VISIT, EST, LEVL IV, 30-39 MIN: ICD-10-PCS | Mod: S$GLB,,, | Performed by: INTERNAL MEDICINE

## 2021-05-10 PROCEDURE — 3008F BODY MASS INDEX DOCD: CPT | Mod: CPTII,S$GLB,, | Performed by: INTERNAL MEDICINE

## 2021-05-10 PROCEDURE — 99999 PR PBB SHADOW E&M-EST. PATIENT-LVL III: ICD-10-PCS | Mod: PBBFAC,,, | Performed by: INTERNAL MEDICINE

## 2021-05-10 RX ORDER — ZOLPIDEM TARTRATE 10 MG/1
10 TABLET ORAL NIGHTLY PRN
Qty: 30 TABLET | Refills: 5 | Status: SHIPPED | OUTPATIENT
Start: 2021-05-10 | End: 2021-05-17 | Stop reason: SDUPTHER

## 2021-05-10 RX ORDER — ZOLPIDEM TARTRATE 10 MG/1
10 TABLET ORAL NIGHTLY PRN
Qty: 30 TABLET | Refills: 5 | Status: SHIPPED | OUTPATIENT
Start: 2021-05-10 | End: 2021-05-10

## 2021-05-17 ENCOUNTER — TELEPHONE (OUTPATIENT)
Dept: FAMILY MEDICINE | Facility: CLINIC | Age: 41
End: 2021-05-17

## 2021-05-17 DIAGNOSIS — F51.01 PRIMARY INSOMNIA: ICD-10-CM

## 2021-05-17 RX ORDER — ZOLPIDEM TARTRATE 10 MG/1
10 TABLET ORAL NIGHTLY PRN
Qty: 10 TABLET | Refills: 0 | Status: SHIPPED | OUTPATIENT
Start: 2021-05-17 | End: 2021-10-29 | Stop reason: SDUPTHER

## 2021-05-20 ENCOUNTER — PES CALL (OUTPATIENT)
Dept: ADMINISTRATIVE | Facility: CLINIC | Age: 41
End: 2021-05-20

## 2021-05-21 ENCOUNTER — TELEPHONE (OUTPATIENT)
Dept: FAMILY MEDICINE | Facility: CLINIC | Age: 41
End: 2021-05-21

## 2021-05-21 DIAGNOSIS — N30.00 ACUTE CYSTITIS WITHOUT HEMATURIA: Primary | ICD-10-CM

## 2021-05-21 RX ORDER — PHENAZOPYRIDINE HYDROCHLORIDE 200 MG/1
200 TABLET, FILM COATED ORAL 3 TIMES DAILY PRN
Qty: 6 TABLET | Refills: 0 | Status: SHIPPED | OUTPATIENT
Start: 2021-05-21 | End: 2021-05-31

## 2021-05-21 RX ORDER — CIPROFLOXACIN 500 MG/1
500 TABLET ORAL EVERY 12 HOURS
Qty: 6 TABLET | Refills: 0 | Status: SHIPPED | OUTPATIENT
Start: 2021-05-21 | End: 2021-06-21

## 2021-05-25 ENCOUNTER — TELEPHONE (OUTPATIENT)
Dept: FAMILY MEDICINE | Facility: CLINIC | Age: 41
End: 2021-05-25

## 2021-05-25 DIAGNOSIS — R13.10 DYSPHAGIA, UNSPECIFIED TYPE: Primary | ICD-10-CM

## 2021-06-21 ENCOUNTER — OFFICE VISIT (OUTPATIENT)
Dept: GASTROENTEROLOGY | Facility: CLINIC | Age: 41
End: 2021-06-21
Payer: MEDICARE

## 2021-06-21 VITALS
BODY MASS INDEX: 33.87 KG/M2 | WEIGHT: 185.19 LBS | SYSTOLIC BLOOD PRESSURE: 128 MMHG | DIASTOLIC BLOOD PRESSURE: 80 MMHG | HEART RATE: 72 BPM

## 2021-06-21 DIAGNOSIS — K62.5 RECTAL BLEEDING: ICD-10-CM

## 2021-06-21 DIAGNOSIS — K21.9 GASTROESOPHAGEAL REFLUX DISEASE, UNSPECIFIED WHETHER ESOPHAGITIS PRESENT: ICD-10-CM

## 2021-06-21 DIAGNOSIS — R13.10 DYSPHAGIA, UNSPECIFIED TYPE: ICD-10-CM

## 2021-06-21 DIAGNOSIS — R10.11 RUQ PAIN: ICD-10-CM

## 2021-06-21 DIAGNOSIS — R10.32 LLQ PAIN: Primary | ICD-10-CM

## 2021-06-21 PROCEDURE — 99999 PR PBB SHADOW E&M-EST. PATIENT-LVL IV: ICD-10-PCS | Mod: PBBFAC,,, | Performed by: INTERNAL MEDICINE

## 2021-06-21 PROCEDURE — 99214 PR OFFICE/OUTPT VISIT, EST, LEVL IV, 30-39 MIN: ICD-10-PCS | Mod: S$GLB,,, | Performed by: INTERNAL MEDICINE

## 2021-06-21 PROCEDURE — 3008F BODY MASS INDEX DOCD: CPT | Mod: CPTII,S$GLB,, | Performed by: INTERNAL MEDICINE

## 2021-06-21 PROCEDURE — 1125F AMNT PAIN NOTED PAIN PRSNT: CPT | Mod: S$GLB,,, | Performed by: INTERNAL MEDICINE

## 2021-06-21 PROCEDURE — 99214 OFFICE O/P EST MOD 30 MIN: CPT | Mod: S$GLB,,, | Performed by: INTERNAL MEDICINE

## 2021-06-21 PROCEDURE — 3008F PR BODY MASS INDEX (BMI) DOCUMENTED: ICD-10-PCS | Mod: CPTII,S$GLB,, | Performed by: INTERNAL MEDICINE

## 2021-06-21 PROCEDURE — 1125F PR PAIN SEVERITY QUANTIFIED, PAIN PRESENT: ICD-10-PCS | Mod: S$GLB,,, | Performed by: INTERNAL MEDICINE

## 2021-06-21 PROCEDURE — 99999 PR PBB SHADOW E&M-EST. PATIENT-LVL IV: CPT | Mod: PBBFAC,,, | Performed by: INTERNAL MEDICINE

## 2021-06-21 RX ORDER — DICYCLOMINE HYDROCHLORIDE 20 MG/1
20 TABLET ORAL 3 TIMES DAILY PRN
Qty: 60 TABLET | Refills: 2 | Status: SHIPPED | OUTPATIENT
Start: 2021-06-21 | End: 2021-10-06

## 2021-06-21 RX ORDER — PANTOPRAZOLE SODIUM 40 MG/1
40 TABLET, DELAYED RELEASE ORAL DAILY
Qty: 90 TABLET | Refills: 3 | Status: SHIPPED | OUTPATIENT
Start: 2021-06-21 | End: 2021-10-06

## 2021-06-21 RX ORDER — TIZANIDINE 4 MG/1
4 TABLET ORAL NIGHTLY
COMMUNITY
Start: 2021-04-16

## 2021-06-23 ENCOUNTER — TELEPHONE (OUTPATIENT)
Dept: UROLOGY | Facility: CLINIC | Age: 41
End: 2021-06-23

## 2021-06-23 DIAGNOSIS — N30.00 ACUTE CYSTITIS WITHOUT HEMATURIA: Primary | ICD-10-CM

## 2021-06-23 DIAGNOSIS — N30.10 IC (INTERSTITIAL CYSTITIS): ICD-10-CM

## 2021-06-25 ENCOUNTER — TELEPHONE (OUTPATIENT)
Dept: UROLOGY | Facility: CLINIC | Age: 41
End: 2021-06-25

## 2021-06-25 ENCOUNTER — HOSPITAL ENCOUNTER (OUTPATIENT)
Dept: RADIOLOGY | Facility: HOSPITAL | Age: 41
Discharge: HOME | End: 2021-06-25
Attending: INTERNAL MEDICINE
Payer: MEDICARE

## 2021-06-25 DIAGNOSIS — R10.32 LLQ PAIN: ICD-10-CM

## 2021-06-25 PROCEDURE — 25500020 PHARM REV CODE 255: Performed by: INTERNAL MEDICINE

## 2021-06-25 PROCEDURE — A9698 NON-RAD CONTRAST MATERIALNOC: HCPCS | Performed by: INTERNAL MEDICINE

## 2021-06-25 PROCEDURE — 74177 CT ABD & PELVIS W/CONTRAST: CPT | Mod: 26,,, | Performed by: RADIOLOGY

## 2021-06-25 PROCEDURE — 74177 CT ENTEROGRAPHY ABD_PELVIS WITH CONTRAST: ICD-10-PCS | Mod: 26,,, | Performed by: RADIOLOGY

## 2021-06-25 PROCEDURE — 74177 CT ABD & PELVIS W/CONTRAST: CPT | Mod: TC

## 2021-06-25 RX ADMIN — IOHEXOL 130 ML: 350 INJECTION, SOLUTION INTRAVENOUS at 11:06

## 2021-06-25 RX ADMIN — BARIUM SULFATE 1350 ML: 1 SUSPENSION ORAL at 10:06

## 2021-06-26 ENCOUNTER — NURSE TRIAGE (OUTPATIENT)
Dept: ADMINISTRATIVE | Facility: CLINIC | Age: 41
End: 2021-06-26

## 2021-06-27 ENCOUNTER — NURSE TRIAGE (OUTPATIENT)
Dept: ADMINISTRATIVE | Facility: CLINIC | Age: 41
End: 2021-06-27

## 2021-06-27 ENCOUNTER — PATIENT MESSAGE (OUTPATIENT)
Dept: UROLOGY | Facility: CLINIC | Age: 41
End: 2021-06-27

## 2021-06-28 ENCOUNTER — PATIENT MESSAGE (OUTPATIENT)
Dept: FAMILY MEDICINE | Facility: CLINIC | Age: 41
End: 2021-06-28

## 2021-06-28 ENCOUNTER — TELEPHONE (OUTPATIENT)
Dept: FAMILY MEDICINE | Facility: CLINIC | Age: 41
End: 2021-06-28

## 2021-06-28 ENCOUNTER — OFFICE VISIT (OUTPATIENT)
Dept: FAMILY MEDICINE | Facility: CLINIC | Age: 41
End: 2021-06-28
Payer: MEDICARE

## 2021-06-28 ENCOUNTER — OFFICE VISIT (OUTPATIENT)
Dept: PSYCHIATRY | Facility: CLINIC | Age: 41
End: 2021-06-28
Payer: COMMERCIAL

## 2021-06-28 VITALS
SYSTOLIC BLOOD PRESSURE: 136 MMHG | DIASTOLIC BLOOD PRESSURE: 82 MMHG | TEMPERATURE: 98 F | OXYGEN SATURATION: 96 % | WEIGHT: 184 LBS | BODY MASS INDEX: 33.65 KG/M2 | HEART RATE: 59 BPM

## 2021-06-28 DIAGNOSIS — F32.A DEPRESSION, UNSPECIFIED DEPRESSION TYPE: ICD-10-CM

## 2021-06-28 DIAGNOSIS — K59.00 CONSTIPATION, UNSPECIFIED CONSTIPATION TYPE: ICD-10-CM

## 2021-06-28 DIAGNOSIS — R10.2 CHRONIC PELVIC PAIN IN FEMALE: ICD-10-CM

## 2021-06-28 DIAGNOSIS — G89.29 CHRONIC PELVIC PAIN IN FEMALE: ICD-10-CM

## 2021-06-28 DIAGNOSIS — M54.9 DORSALGIA, UNSPECIFIED: ICD-10-CM

## 2021-06-28 DIAGNOSIS — N30.10 CHRONIC INTERSTITIAL CYSTITIS: ICD-10-CM

## 2021-06-28 DIAGNOSIS — M54.6 ACUTE LEFT-SIDED THORACIC BACK PAIN: ICD-10-CM

## 2021-06-28 DIAGNOSIS — N30.10 IC (INTERSTITIAL CYSTITIS): Primary | ICD-10-CM

## 2021-06-28 PROCEDURE — 99214 PR OFFICE/OUTPT VISIT, EST, LEVL IV, 30-39 MIN: ICD-10-PCS | Mod: S$GLB,,, | Performed by: INTERNAL MEDICINE

## 2021-06-28 PROCEDURE — 99999 PR PBB SHADOW E&M-EST. PATIENT-LVL IV: CPT | Mod: PBBFAC,,, | Performed by: INTERNAL MEDICINE

## 2021-06-28 PROCEDURE — 99212 OFFICE O/P EST SF 10 MIN: CPT | Mod: PBBFAC,PN | Performed by: CASE MANAGER/CARE COORDINATOR

## 2021-06-28 PROCEDURE — 1125F PR PAIN SEVERITY QUANTIFIED, PAIN PRESENT: ICD-10-PCS | Mod: S$GLB,,, | Performed by: INTERNAL MEDICINE

## 2021-06-28 PROCEDURE — 99999 PR PBB SHADOW E&M-EST. PATIENT-LVL II: ICD-10-PCS | Mod: PBBFAC,,, | Performed by: CASE MANAGER/CARE COORDINATOR

## 2021-06-28 PROCEDURE — 99999 PR PBB SHADOW E&M-EST. PATIENT-LVL II: CPT | Mod: PBBFAC,,, | Performed by: CASE MANAGER/CARE COORDINATOR

## 2021-06-28 PROCEDURE — 99999 PR PBB SHADOW E&M-EST. PATIENT-LVL IV: ICD-10-PCS | Mod: PBBFAC,,, | Performed by: INTERNAL MEDICINE

## 2021-06-28 PROCEDURE — 99214 OFFICE O/P EST MOD 30 MIN: CPT | Mod: S$GLB,,, | Performed by: INTERNAL MEDICINE

## 2021-06-28 PROCEDURE — 1125F AMNT PAIN NOTED PAIN PRSNT: CPT | Mod: S$GLB,,, | Performed by: INTERNAL MEDICINE

## 2021-06-28 PROCEDURE — 3008F PR BODY MASS INDEX (BMI) DOCUMENTED: ICD-10-PCS | Mod: CPTII,S$GLB,, | Performed by: INTERNAL MEDICINE

## 2021-06-28 PROCEDURE — 3008F BODY MASS INDEX DOCD: CPT | Mod: CPTII,S$GLB,, | Performed by: INTERNAL MEDICINE

## 2021-06-28 RX ORDER — MIRABEGRON 25 MG/1
25 TABLET, FILM COATED, EXTENDED RELEASE ORAL DAILY
Qty: 30 TABLET | Refills: 11 | Status: SHIPPED | OUTPATIENT
Start: 2021-06-28 | End: 2021-10-06

## 2021-06-30 ENCOUNTER — OFFICE VISIT (OUTPATIENT)
Dept: NEUROSURGERY | Facility: CLINIC | Age: 41
End: 2021-06-30
Payer: MEDICARE

## 2021-06-30 VITALS — HEIGHT: 62 IN | BODY MASS INDEX: 33.95 KG/M2 | WEIGHT: 184.5 LBS

## 2021-06-30 DIAGNOSIS — M54.42 CHRONIC BILATERAL LOW BACK PAIN WITH BILATERAL SCIATICA: ICD-10-CM

## 2021-06-30 DIAGNOSIS — M54.6 ACUTE LEFT-SIDED THORACIC BACK PAIN: Primary | ICD-10-CM

## 2021-06-30 DIAGNOSIS — G89.29 CHRONIC BILATERAL LOW BACK PAIN WITH BILATERAL SCIATICA: ICD-10-CM

## 2021-06-30 DIAGNOSIS — M54.41 CHRONIC BILATERAL LOW BACK PAIN WITH BILATERAL SCIATICA: ICD-10-CM

## 2021-06-30 DIAGNOSIS — Z98.1 S/P FUSION OF THORACIC SPINE: ICD-10-CM

## 2021-06-30 PROCEDURE — 1125F AMNT PAIN NOTED PAIN PRSNT: CPT | Mod: S$GLB,,, | Performed by: PHYSICIAN ASSISTANT

## 2021-06-30 PROCEDURE — 3008F PR BODY MASS INDEX (BMI) DOCUMENTED: ICD-10-PCS | Mod: CPTII,S$GLB,, | Performed by: PHYSICIAN ASSISTANT

## 2021-06-30 PROCEDURE — 99999 PR PBB SHADOW E&M-EST. PATIENT-LVL III: ICD-10-PCS | Mod: PBBFAC,,, | Performed by: PHYSICIAN ASSISTANT

## 2021-06-30 PROCEDURE — 99999 PR PBB SHADOW E&M-EST. PATIENT-LVL III: CPT | Mod: PBBFAC,,, | Performed by: PHYSICIAN ASSISTANT

## 2021-06-30 PROCEDURE — 99204 OFFICE O/P NEW MOD 45 MIN: CPT | Mod: S$GLB,,, | Performed by: PHYSICIAN ASSISTANT

## 2021-06-30 PROCEDURE — 1125F PR PAIN SEVERITY QUANTIFIED, PAIN PRESENT: ICD-10-PCS | Mod: S$GLB,,, | Performed by: PHYSICIAN ASSISTANT

## 2021-06-30 PROCEDURE — 99204 PR OFFICE/OUTPT VISIT, NEW, LEVL IV, 45-59 MIN: ICD-10-PCS | Mod: S$GLB,,, | Performed by: PHYSICIAN ASSISTANT

## 2021-06-30 PROCEDURE — 3008F BODY MASS INDEX DOCD: CPT | Mod: CPTII,S$GLB,, | Performed by: PHYSICIAN ASSISTANT

## 2021-06-30 RX ORDER — DIAZEPAM 5 MG/1
TABLET ORAL
Qty: 5 TABLET | Refills: 0 | Status: SHIPPED | OUTPATIENT
Start: 2021-06-30 | End: 2021-07-04

## 2021-07-01 ENCOUNTER — HOSPITAL ENCOUNTER (OUTPATIENT)
Dept: RADIOLOGY | Facility: HOSPITAL | Age: 41
Discharge: HOME | End: 2021-07-01
Attending: PHYSICIAN ASSISTANT
Payer: MEDICARE

## 2021-07-01 DIAGNOSIS — G89.29 CHRONIC BILATERAL LOW BACK PAIN WITH BILATERAL SCIATICA: ICD-10-CM

## 2021-07-01 DIAGNOSIS — M54.6 ACUTE LEFT-SIDED THORACIC BACK PAIN: ICD-10-CM

## 2021-07-01 DIAGNOSIS — M54.41 CHRONIC BILATERAL LOW BACK PAIN WITH BILATERAL SCIATICA: ICD-10-CM

## 2021-07-01 DIAGNOSIS — M54.42 CHRONIC BILATERAL LOW BACK PAIN WITH BILATERAL SCIATICA: ICD-10-CM

## 2021-07-01 DIAGNOSIS — Z98.1 S/P FUSION OF THORACIC SPINE: ICD-10-CM

## 2021-07-01 PROCEDURE — 72148 MRI LUMBAR SPINE W/O DYE: CPT | Mod: 26,,, | Performed by: RADIOLOGY

## 2021-07-01 PROCEDURE — 72148 MRI LUMBAR SPINE WITHOUT CONTRAST: ICD-10-PCS | Mod: 26,,, | Performed by: RADIOLOGY

## 2021-07-01 PROCEDURE — 72146 MRI THORACIC SPINE WITHOUT CONTRAST: ICD-10-PCS | Mod: 26,,, | Performed by: RADIOLOGY

## 2021-07-01 PROCEDURE — 72146 MRI CHEST SPINE W/O DYE: CPT | Mod: TC

## 2021-07-01 PROCEDURE — 72148 MRI LUMBAR SPINE W/O DYE: CPT | Mod: TC

## 2021-07-01 PROCEDURE — 72146 MRI CHEST SPINE W/O DYE: CPT | Mod: 26,,, | Performed by: RADIOLOGY

## 2021-07-02 ENCOUNTER — TELEPHONE (OUTPATIENT)
Dept: NEUROSURGERY | Facility: CLINIC | Age: 41
End: 2021-07-02

## 2021-07-02 ENCOUNTER — TELEPHONE (OUTPATIENT)
Dept: GASTROENTEROLOGY | Facility: CLINIC | Age: 41
End: 2021-07-02

## 2021-07-06 ENCOUNTER — OFFICE VISIT (OUTPATIENT)
Dept: NEUROSURGERY | Facility: CLINIC | Age: 41
End: 2021-07-06
Payer: MEDICARE

## 2021-07-06 ENCOUNTER — TELEPHONE (OUTPATIENT)
Dept: GASTROENTEROLOGY | Facility: CLINIC | Age: 41
End: 2021-07-06

## 2021-07-06 ENCOUNTER — TELEPHONE (OUTPATIENT)
Dept: NEUROSURGERY | Facility: CLINIC | Age: 41
End: 2021-07-06

## 2021-07-06 VITALS
RESPIRATION RATE: 15 BRPM | HEART RATE: 77 BPM | BODY MASS INDEX: 32.57 KG/M2 | HEIGHT: 62 IN | WEIGHT: 177 LBS | DIASTOLIC BLOOD PRESSURE: 86 MMHG | TEMPERATURE: 98 F | SYSTOLIC BLOOD PRESSURE: 127 MMHG

## 2021-07-06 DIAGNOSIS — S22.000A THORACIC COMPRESSION FRACTURE, CLOSED, INITIAL ENCOUNTER: Primary | ICD-10-CM

## 2021-07-06 PROCEDURE — 99214 OFFICE O/P EST MOD 30 MIN: CPT | Mod: PBBFAC,PN | Performed by: NEUROLOGICAL SURGERY

## 2021-07-06 PROCEDURE — 99214 PR OFFICE/OUTPT VISIT, EST, LEVL IV, 30-39 MIN: ICD-10-PCS | Mod: S$PBB,,, | Performed by: NEUROLOGICAL SURGERY

## 2021-07-06 PROCEDURE — 99999 PR PBB SHADOW E&M-EST. PATIENT-LVL IV: CPT | Mod: PBBFAC,,, | Performed by: NEUROLOGICAL SURGERY

## 2021-07-06 PROCEDURE — 99999 PR PBB SHADOW E&M-EST. PATIENT-LVL IV: ICD-10-PCS | Mod: PBBFAC,,, | Performed by: NEUROLOGICAL SURGERY

## 2021-07-06 PROCEDURE — 99214 OFFICE O/P EST MOD 30 MIN: CPT | Mod: S$PBB,,, | Performed by: NEUROLOGICAL SURGERY

## 2021-07-06 RX ORDER — DIAZEPAM 5 MG/1
5 TABLET ORAL EVERY 6 HOURS PRN
Qty: 2 TABLET | Refills: 0 | Status: SHIPPED | OUTPATIENT
Start: 2021-07-06 | End: 2021-10-06

## 2021-07-07 ENCOUNTER — TELEPHONE (OUTPATIENT)
Dept: GASTROENTEROLOGY | Facility: CLINIC | Age: 41
End: 2021-07-07

## 2021-08-06 ENCOUNTER — TELEPHONE (OUTPATIENT)
Dept: GASTROENTEROLOGY | Facility: CLINIC | Age: 41
End: 2021-08-06

## 2021-08-10 ENCOUNTER — PATIENT OUTREACH (OUTPATIENT)
Dept: ADMINISTRATIVE | Facility: OTHER | Age: 41
End: 2021-08-10

## 2021-08-16 DIAGNOSIS — E03.9 ACQUIRED HYPOTHYROIDISM: ICD-10-CM

## 2021-08-16 RX ORDER — LEVOTHYROXINE SODIUM 50 UG/1
50 TABLET ORAL DAILY
Qty: 90 TABLET | Refills: 1 | Status: SHIPPED | OUTPATIENT
Start: 2021-08-16 | End: 2022-04-25 | Stop reason: SDUPTHER

## 2021-08-17 ENCOUNTER — TELEPHONE (OUTPATIENT)
Dept: ORTHOPEDICS | Facility: CLINIC | Age: 41
End: 2021-08-17

## 2021-08-17 ENCOUNTER — PATIENT MESSAGE (OUTPATIENT)
Dept: ORTHOPEDICS | Facility: CLINIC | Age: 41
End: 2021-08-17

## 2021-08-17 DIAGNOSIS — M51.34 DDD (DEGENERATIVE DISC DISEASE), THORACIC: Primary | ICD-10-CM

## 2021-10-28 ENCOUNTER — TELEPHONE (OUTPATIENT)
Dept: FAMILY MEDICINE | Facility: CLINIC | Age: 41
End: 2021-10-28
Payer: MEDICARE

## 2021-10-29 DIAGNOSIS — F51.01 PRIMARY INSOMNIA: ICD-10-CM

## 2021-10-29 DIAGNOSIS — G43.909 MIGRAINE WITHOUT STATUS MIGRAINOSUS, NOT INTRACTABLE, UNSPECIFIED MIGRAINE TYPE: ICD-10-CM

## 2021-10-29 RX ORDER — PROMETHAZINE HYDROCHLORIDE 12.5 MG/1
12.5 TABLET ORAL 4 TIMES DAILY
Qty: 30 TABLET | Refills: 5 | Status: SHIPPED | OUTPATIENT
Start: 2021-10-29 | End: 2022-10-03 | Stop reason: SDUPTHER

## 2021-10-29 RX ORDER — ZOLPIDEM TARTRATE 10 MG/1
10 TABLET ORAL NIGHTLY PRN
Qty: 10 TABLET | Refills: 0 | Status: SHIPPED | OUTPATIENT
Start: 2021-10-29 | End: 2021-11-02 | Stop reason: SDUPTHER

## 2021-11-02 ENCOUNTER — PATIENT MESSAGE (OUTPATIENT)
Dept: FAMILY MEDICINE | Facility: CLINIC | Age: 41
End: 2021-11-02
Payer: MEDICARE

## 2021-11-02 DIAGNOSIS — F51.01 PRIMARY INSOMNIA: ICD-10-CM

## 2021-11-02 RX ORDER — ZOLPIDEM TARTRATE 10 MG/1
10 TABLET ORAL NIGHTLY PRN
Qty: 30 TABLET | Refills: 5 | Status: SHIPPED | OUTPATIENT
Start: 2021-11-02 | End: 2021-12-14

## 2021-11-17 ENCOUNTER — TELEPHONE (OUTPATIENT)
Dept: FAMILY MEDICINE | Facility: CLINIC | Age: 41
End: 2021-11-17
Payer: MEDICARE

## 2021-11-18 ENCOUNTER — OFFICE VISIT (OUTPATIENT)
Dept: FAMILY MEDICINE | Facility: CLINIC | Age: 41
End: 2021-11-18
Payer: MEDICARE

## 2021-11-18 VITALS
HEIGHT: 62 IN | BODY MASS INDEX: 33.36 KG/M2 | TEMPERATURE: 98 F | OXYGEN SATURATION: 99 % | WEIGHT: 181.31 LBS | SYSTOLIC BLOOD PRESSURE: 118 MMHG | DIASTOLIC BLOOD PRESSURE: 76 MMHG | HEART RATE: 83 BPM

## 2021-11-18 DIAGNOSIS — J01.40 ACUTE NON-RECURRENT PANSINUSITIS: Primary | ICD-10-CM

## 2021-11-18 DIAGNOSIS — R05.9 COUGH: ICD-10-CM

## 2021-11-18 DIAGNOSIS — J02.9 SORE THROAT: ICD-10-CM

## 2021-11-18 DIAGNOSIS — R06.02 SHORTNESS OF BREATH: ICD-10-CM

## 2021-11-18 LAB
CTP QC/QA: YES
CTP QC/QA: YES
FLUAV AG NPH QL: NEGATIVE
FLUBV AG NPH QL: NEGATIVE
SARS-COV-2 RDRP RESP QL NAA+PROBE: NEGATIVE

## 2021-11-18 PROCEDURE — 99214 PR OFFICE/OUTPT VISIT, EST, LEVL IV, 30-39 MIN: ICD-10-PCS | Mod: 25,S$GLB,, | Performed by: STUDENT IN AN ORGANIZED HEALTH CARE EDUCATION/TRAINING PROGRAM

## 2021-11-18 PROCEDURE — 99999 PR PBB SHADOW E&M-EST. PATIENT-LVL III: ICD-10-PCS | Mod: PBBFAC,,, | Performed by: STUDENT IN AN ORGANIZED HEALTH CARE EDUCATION/TRAINING PROGRAM

## 2021-11-18 PROCEDURE — 99999 PR PBB SHADOW E&M-EST. PATIENT-LVL III: CPT | Mod: PBBFAC,,, | Performed by: STUDENT IN AN ORGANIZED HEALTH CARE EDUCATION/TRAINING PROGRAM

## 2021-11-18 PROCEDURE — 1160F RVW MEDS BY RX/DR IN RCRD: CPT | Mod: CPTII,S$GLB,, | Performed by: STUDENT IN AN ORGANIZED HEALTH CARE EDUCATION/TRAINING PROGRAM

## 2021-11-18 PROCEDURE — 1159F PR MEDICATION LIST DOCUMENTED IN MEDICAL RECORD: ICD-10-PCS | Mod: CPTII,S$GLB,, | Performed by: STUDENT IN AN ORGANIZED HEALTH CARE EDUCATION/TRAINING PROGRAM

## 2021-11-18 PROCEDURE — 1160F PR REVIEW ALL MEDS BY PRESCRIBER/CLIN PHARMACIST DOCUMENTED: ICD-10-PCS | Mod: CPTII,S$GLB,, | Performed by: STUDENT IN AN ORGANIZED HEALTH CARE EDUCATION/TRAINING PROGRAM

## 2021-11-18 PROCEDURE — 1159F MED LIST DOCD IN RCRD: CPT | Mod: CPTII,S$GLB,, | Performed by: STUDENT IN AN ORGANIZED HEALTH CARE EDUCATION/TRAINING PROGRAM

## 2021-11-18 PROCEDURE — U0002: ICD-10-PCS | Mod: QW,S$GLB,, | Performed by: STUDENT IN AN ORGANIZED HEALTH CARE EDUCATION/TRAINING PROGRAM

## 2021-11-18 PROCEDURE — 3008F PR BODY MASS INDEX (BMI) DOCUMENTED: ICD-10-PCS | Mod: CPTII,S$GLB,, | Performed by: STUDENT IN AN ORGANIZED HEALTH CARE EDUCATION/TRAINING PROGRAM

## 2021-11-18 PROCEDURE — 3078F DIAST BP <80 MM HG: CPT | Mod: CPTII,S$GLB,, | Performed by: STUDENT IN AN ORGANIZED HEALTH CARE EDUCATION/TRAINING PROGRAM

## 2021-11-18 PROCEDURE — 87081 CULTURE SCREEN ONLY: CPT | Performed by: STUDENT IN AN ORGANIZED HEALTH CARE EDUCATION/TRAINING PROGRAM

## 2021-11-18 PROCEDURE — 87804 POCT INFLUENZA A/B: ICD-10-PCS | Mod: 59,QW,S$GLB, | Performed by: STUDENT IN AN ORGANIZED HEALTH CARE EDUCATION/TRAINING PROGRAM

## 2021-11-18 PROCEDURE — 87147 CULTURE TYPE IMMUNOLOGIC: CPT | Mod: 59 | Performed by: STUDENT IN AN ORGANIZED HEALTH CARE EDUCATION/TRAINING PROGRAM

## 2021-11-18 PROCEDURE — 3074F SYST BP LT 130 MM HG: CPT | Mod: CPTII,S$GLB,, | Performed by: STUDENT IN AN ORGANIZED HEALTH CARE EDUCATION/TRAINING PROGRAM

## 2021-11-18 PROCEDURE — U0002 COVID-19 LAB TEST NON-CDC: HCPCS | Mod: QW,S$GLB,, | Performed by: STUDENT IN AN ORGANIZED HEALTH CARE EDUCATION/TRAINING PROGRAM

## 2021-11-18 PROCEDURE — 99214 OFFICE O/P EST MOD 30 MIN: CPT | Mod: 25,S$GLB,, | Performed by: STUDENT IN AN ORGANIZED HEALTH CARE EDUCATION/TRAINING PROGRAM

## 2021-11-18 PROCEDURE — 87804 INFLUENZA ASSAY W/OPTIC: CPT | Mod: QW,S$GLB,, | Performed by: STUDENT IN AN ORGANIZED HEALTH CARE EDUCATION/TRAINING PROGRAM

## 2021-11-18 PROCEDURE — 3078F PR MOST RECENT DIASTOLIC BLOOD PRESSURE < 80 MM HG: ICD-10-PCS | Mod: CPTII,S$GLB,, | Performed by: STUDENT IN AN ORGANIZED HEALTH CARE EDUCATION/TRAINING PROGRAM

## 2021-11-18 PROCEDURE — 3008F BODY MASS INDEX DOCD: CPT | Mod: CPTII,S$GLB,, | Performed by: STUDENT IN AN ORGANIZED HEALTH CARE EDUCATION/TRAINING PROGRAM

## 2021-11-18 PROCEDURE — 3074F PR MOST RECENT SYSTOLIC BLOOD PRESSURE < 130 MM HG: ICD-10-PCS | Mod: CPTII,S$GLB,, | Performed by: STUDENT IN AN ORGANIZED HEALTH CARE EDUCATION/TRAINING PROGRAM

## 2021-11-18 RX ORDER — AMOXICILLIN AND CLAVULANATE POTASSIUM 875; 125 MG/1; MG/1
1 TABLET, FILM COATED ORAL EVERY 12 HOURS
Qty: 20 TABLET | Refills: 0 | Status: SHIPPED | OUTPATIENT
Start: 2021-11-18 | End: 2021-11-28

## 2021-11-18 RX ORDER — LEVOCETIRIZINE DIHYDROCHLORIDE 5 MG/1
5 TABLET, FILM COATED ORAL NIGHTLY
Qty: 90 TABLET | Refills: 3 | Status: SHIPPED | OUTPATIENT
Start: 2021-11-18 | End: 2022-02-17 | Stop reason: CLARIF

## 2021-11-18 RX ORDER — FLUTICASONE PROPIONATE 50 MCG
1 SPRAY, SUSPENSION (ML) NASAL 2 TIMES DAILY
Qty: 16 G | Refills: 0 | Status: SHIPPED | OUTPATIENT
Start: 2021-11-18 | End: 2022-02-17 | Stop reason: CLARIF

## 2021-11-20 LAB — BACTERIA THROAT CULT: NORMAL

## 2021-12-13 ENCOUNTER — PATIENT MESSAGE (OUTPATIENT)
Dept: FAMILY MEDICINE | Facility: CLINIC | Age: 41
End: 2021-12-13
Payer: MEDICARE

## 2021-12-14 ENCOUNTER — PATIENT MESSAGE (OUTPATIENT)
Dept: FAMILY MEDICINE | Facility: CLINIC | Age: 41
End: 2021-12-14
Payer: MEDICARE

## 2021-12-14 RX ORDER — ZOLPIDEM TARTRATE 12.5 MG/1
12.5 TABLET, FILM COATED, EXTENDED RELEASE ORAL NIGHTLY PRN
Qty: 30 TABLET | Refills: 5 | Status: SHIPPED | OUTPATIENT
Start: 2021-12-14 | End: 2022-02-17

## 2021-12-15 ENCOUNTER — TELEPHONE (OUTPATIENT)
Dept: INTERNAL MEDICINE | Facility: CLINIC | Age: 41
End: 2021-12-15
Payer: MEDICARE

## 2021-12-15 RX ORDER — RAMELTEON 8 MG/1
8 TABLET ORAL NIGHTLY
Qty: 30 TABLET | Refills: 5 | Status: SHIPPED | OUTPATIENT
Start: 2021-12-15 | End: 2022-02-17 | Stop reason: CLARIF

## 2022-01-19 ENCOUNTER — PATIENT OUTREACH (OUTPATIENT)
Dept: ADMINISTRATIVE | Facility: OTHER | Age: 42
End: 2022-01-19
Payer: MEDICARE

## 2022-01-20 ENCOUNTER — OFFICE VISIT (OUTPATIENT)
Dept: OBSTETRICS AND GYNECOLOGY | Facility: CLINIC | Age: 42
End: 2022-01-20
Payer: MEDICARE

## 2022-01-20 VITALS
WEIGHT: 171.94 LBS | BODY MASS INDEX: 31.64 KG/M2 | DIASTOLIC BLOOD PRESSURE: 89 MMHG | HEIGHT: 62 IN | SYSTOLIC BLOOD PRESSURE: 132 MMHG

## 2022-01-20 DIAGNOSIS — R10.32 LLQ PAIN: ICD-10-CM

## 2022-01-20 DIAGNOSIS — R10.2 PELVIC PAIN IN FEMALE: Primary | ICD-10-CM

## 2022-01-20 DIAGNOSIS — M62.838 LEVATOR SPASM: ICD-10-CM

## 2022-01-20 PROBLEM — N30.10 IC (INTERSTITIAL CYSTITIS): Status: RESOLVED | Noted: 2020-12-07 | Resolved: 2022-01-20

## 2022-01-20 PROBLEM — K62.89 RECTAL PAIN: Status: RESOLVED | Noted: 2020-11-02 | Resolved: 2022-01-20

## 2022-01-20 PROBLEM — K62.5 RECTAL BLEEDING: Status: RESOLVED | Noted: 2021-06-21 | Resolved: 2022-01-20

## 2022-01-20 PROCEDURE — 1160F PR REVIEW ALL MEDS BY PRESCRIBER/CLIN PHARMACIST DOCUMENTED: ICD-10-PCS | Mod: CPTII,S$GLB,, | Performed by: OBSTETRICS & GYNECOLOGY

## 2022-01-20 PROCEDURE — 99999 PR PBB SHADOW E&M-EST. PATIENT-LVL III: ICD-10-PCS | Mod: PBBFAC,,, | Performed by: OBSTETRICS & GYNECOLOGY

## 2022-01-20 PROCEDURE — 3008F PR BODY MASS INDEX (BMI) DOCUMENTED: ICD-10-PCS | Mod: CPTII,S$GLB,, | Performed by: OBSTETRICS & GYNECOLOGY

## 2022-01-20 PROCEDURE — 1159F PR MEDICATION LIST DOCUMENTED IN MEDICAL RECORD: ICD-10-PCS | Mod: CPTII,S$GLB,, | Performed by: OBSTETRICS & GYNECOLOGY

## 2022-01-20 PROCEDURE — 3008F BODY MASS INDEX DOCD: CPT | Mod: CPTII,S$GLB,, | Performed by: OBSTETRICS & GYNECOLOGY

## 2022-01-20 PROCEDURE — 3075F PR MOST RECENT SYSTOLIC BLOOD PRESS GE 130-139MM HG: ICD-10-PCS | Mod: CPTII,S$GLB,, | Performed by: OBSTETRICS & GYNECOLOGY

## 2022-01-20 PROCEDURE — 99999 PR PBB SHADOW E&M-EST. PATIENT-LVL III: CPT | Mod: PBBFAC,,, | Performed by: OBSTETRICS & GYNECOLOGY

## 2022-01-20 PROCEDURE — 1160F RVW MEDS BY RX/DR IN RCRD: CPT | Mod: CPTII,S$GLB,, | Performed by: OBSTETRICS & GYNECOLOGY

## 2022-01-20 PROCEDURE — 3079F PR MOST RECENT DIASTOLIC BLOOD PRESSURE 80-89 MM HG: ICD-10-PCS | Mod: CPTII,S$GLB,, | Performed by: OBSTETRICS & GYNECOLOGY

## 2022-01-20 PROCEDURE — 99215 PR OFFICE/OUTPT VISIT, EST, LEVL V, 40-54 MIN: ICD-10-PCS | Mod: 57,S$GLB,, | Performed by: OBSTETRICS & GYNECOLOGY

## 2022-01-20 PROCEDURE — 3079F DIAST BP 80-89 MM HG: CPT | Mod: CPTII,S$GLB,, | Performed by: OBSTETRICS & GYNECOLOGY

## 2022-01-20 PROCEDURE — 1159F MED LIST DOCD IN RCRD: CPT | Mod: CPTII,S$GLB,, | Performed by: OBSTETRICS & GYNECOLOGY

## 2022-01-20 PROCEDURE — 99215 OFFICE O/P EST HI 40 MIN: CPT | Mod: 57,S$GLB,, | Performed by: OBSTETRICS & GYNECOLOGY

## 2022-01-20 PROCEDURE — 3075F SYST BP GE 130 - 139MM HG: CPT | Mod: CPTII,S$GLB,, | Performed by: OBSTETRICS & GYNECOLOGY

## 2022-01-20 NOTE — PROGRESS NOTES
Subjective:       Patient ID: Jaycee Gamboa is a 41 y.o. female.    Chief Complaint:  Follow-up      History of Present Illness  HPI  Pt is 41 y.o. here for evaluation of LLQ pain.  Patient's history is complicated as she has had a total hysterectomy with BSO in the past.  Patient was having left lower quadrant pain in  at which time diagnostic laparoscopy was performed by us.  Patient states that her pain was totally resolved after the surgery.  Some scar tissue and a surgical clip was seen at the time of surgery but no significant endometriosis.    She was doing well until recently.  She started meeting Phenergan for nausea.  And her pain on her left side is returned.  She is here today to talk about reoperation.    GYN & OB History  Patient's last menstrual period was 2009 (exact date).   Date of Last Pap: No result found    OB History    Para Term  AB Living   1 0 0 0 1     SAB IAB Ectopic Multiple Live Births   1 0 0          # Outcome Date GA Lbr Darren/2nd Weight Sex Delivery Anes PTL Lv   1 SAB               Birth Comments: at age 18       Review of Systems  Review of Systems   Constitutional: Negative for activity change, appetite change and fatigue.   HENT: Negative.  Negative for tinnitus.    Eyes: Negative.    Respiratory: Negative for cough and shortness of breath.    Cardiovascular: Negative for chest pain and palpitations.   Gastrointestinal: Positive for abdominal pain. Negative for blood in stool, constipation, diarrhea and nausea.   Endocrine: Negative.  Negative for hot flashes.   Genitourinary: Positive for pelvic pain. Negative for dysmenorrhea, dyspareunia, dysuria, frequency, menstrual problem, vaginal discharge, urinary incontinence and postcoital bleeding.   Musculoskeletal: Negative for back pain and joint swelling.   Integumentary:  Negative for rash, breast mass, nipple discharge and breast skin changes.   Neurological: Negative.  Negative for headaches.    Hematological: Negative.  Does not bruise/bleed easily.   Psychiatric/Behavioral: The patient is not nervous/anxious.    Breast: negative.  Negative for mass, nipple discharge and skin changes          Objective:    Physical Exam:   Constitutional: She is oriented to person, place, and time. She appears well-developed and well-nourished. No distress.    HENT:   Head: Normocephalic and atraumatic.    Eyes: Pupils are equal, round, and reactive to light. Conjunctivae and lids are normal.     Cardiovascular: Normal rate and regular rhythm.  Exam reveals no edema.     Pulmonary/Chest: Effort normal and breath sounds normal. She has no wheezes.        Abdominal: Soft. Bowel sounds are normal. She exhibits no distension. There is no abdominal tenderness. There is no rebound and no guarding.     Genitourinary:    Inguinal canal normal.   The external female genitalia was normal.   Labial bartholins normal.There is no rash or tenderness on the right labia. There is no rash or tenderness on the left labia. Right adnexum displays no fullness. Left adnexum displays tenderness. Left adnexum displays no fullness. There is tenderness in the vagina. No  no vaginal discharge, bleeding, rectocele or cystocele in the vagina. Cervix is absent.Uterus is absent. Normal urethral meatus.Urethra findings: no urethral massBladder findings: no bladder distention   Genitourinary Comments: Significant guarding of L>R levator ani complex.               Musculoskeletal: Normal range of motion and moves all extremeties.       Neurological: She is alert and oriented to person, place, and time. She has normal strength.    Skin: Skin is warm and dry.    Psychiatric: She has a normal mood and affect. Her speech is normal and behavior is normal. Thought content normal. Her mood appears not anxious. She does not exhibit a depressed mood. She expresses no suicidal plans and no homicidal plans.          Assessment:        1. Pelvic pain in female    2.  LLQ pain    3. Levator spasm                Plan:      Jaycee was seen today for follow-up.    Diagnoses and all orders for this visit:    Pelvic pain in female  -     Ambulatory referral/consult to Physical/Occupational Therapy; Future  Had a long and erika discussion with the patient and her friend regarding the management of left lower quadrant pain.  They are aware that we may not find the source of pain at the time of laparoscopy.  She is also aware that surgery can create more scar tissue which creates a vicious cycle.  And finally that there is no guarantee that surgical management will result in pain resolution.  If her pain persists beyond surgery, she would need to seek outside consultation for additional source of pain which could include inflammatory, neuropathic, or musculoskeletal.    Given her exam today, encourage the patient to seek re-consultation with pelvic floor physical therapy.  Patient states that she had seen a therapist in the past to maximized a 10s unit with no improvement.  Given her exam today, she does have significant levator spasm which should be amenable to some physical therapy.  We will coordinate surgery.  All questions were answered.    I spent a total of 40 minutes on the day of the visit.  This includes face to face time and non-face to face time preparing to see the patient (eg, review of tests), obtaining and/or reviewing separately obtained history, documenting clinical information in the electronic or other health record, independently interpreting results and communicating results to the patient/family/caregiver, or care coordinator.    LLQ pain    Levator spasm  -     Ambulatory referral/consult to Physical/Occupational Therapy; Future

## 2022-01-24 ENCOUNTER — TELEPHONE (OUTPATIENT)
Dept: OBSTETRICS AND GYNECOLOGY | Facility: CLINIC | Age: 42
End: 2022-01-24
Payer: MEDICARE

## 2022-01-24 DIAGNOSIS — Z01.818 PREOP TESTING: ICD-10-CM

## 2022-01-24 DIAGNOSIS — R10.32 LLQ PAIN: Primary | ICD-10-CM

## 2022-01-24 NOTE — TELEPHONE ENCOUNTER
----- Message from Marisa Louise sent at 1/24/2022 12:09 PM CST -----  Contact: [phone]/ Self  Type: Requesting to speak with nurse    Who Called: Pt  Regarding: update on scheduling surgery    Would the patient rather a call back or a response via [facility]? Call back  Best Call Back Number: [phone]  Additional Information: n/a

## 2022-01-24 NOTE — TELEPHONE ENCOUNTER
Patient was scheduled for surgery on 2/21/22 and pre-op and pre-admit appointments on 2/17 and 2/18.

## 2022-01-26 ENCOUNTER — TELEPHONE (OUTPATIENT)
Dept: OBSTETRICS AND GYNECOLOGY | Facility: CLINIC | Age: 42
End: 2022-01-26
Payer: MEDICARE

## 2022-01-26 NOTE — TELEPHONE ENCOUNTER
----- Message from Mari Frye sent at 1/26/2022  1:39 PM CST -----  Contact: [phone]/ self  Who Called: pt   Regarding: wants to know if she can have her pre op on 02/18  Would the patient rather a call back or a response via [facility]? Call back  Best Call Back Number: [phone]  Additional Information:

## 2022-02-05 ENCOUNTER — PATIENT MESSAGE (OUTPATIENT)
Dept: OBSTETRICS AND GYNECOLOGY | Facility: CLINIC | Age: 42
End: 2022-02-05
Payer: MEDICARE

## 2022-02-07 ENCOUNTER — PATIENT MESSAGE (OUTPATIENT)
Dept: OBSTETRICS AND GYNECOLOGY | Facility: CLINIC | Age: 42
End: 2022-02-07
Payer: MEDICARE

## 2022-02-17 ENCOUNTER — HOSPITAL ENCOUNTER (OUTPATIENT)
Dept: PREADMISSION TESTING | Facility: OTHER | Age: 42
Discharge: HOME | End: 2022-02-17
Attending: OBSTETRICS & GYNECOLOGY
Payer: MEDICARE

## 2022-02-17 ENCOUNTER — OFFICE VISIT (OUTPATIENT)
Dept: OBSTETRICS AND GYNECOLOGY | Facility: CLINIC | Age: 42
End: 2022-02-17
Payer: MEDICARE

## 2022-02-17 ENCOUNTER — ANESTHESIA EVENT (OUTPATIENT)
Dept: SURGERY | Facility: OTHER | Age: 42
End: 2022-02-17
Payer: MEDICARE

## 2022-02-17 VITALS
DIASTOLIC BLOOD PRESSURE: 73 MMHG | OXYGEN SATURATION: 99 % | SYSTOLIC BLOOD PRESSURE: 121 MMHG | BODY MASS INDEX: 28.52 KG/M2 | WEIGHT: 155 LBS | HEART RATE: 61 BPM | HEIGHT: 62 IN | TEMPERATURE: 98 F

## 2022-02-17 VITALS
DIASTOLIC BLOOD PRESSURE: 84 MMHG | BODY MASS INDEX: 31.2 KG/M2 | WEIGHT: 169.56 LBS | HEIGHT: 62 IN | SYSTOLIC BLOOD PRESSURE: 132 MMHG

## 2022-02-17 DIAGNOSIS — G60.0 CHARCOT MARIE TOOTH MUSCULAR ATROPHY: ICD-10-CM

## 2022-02-17 DIAGNOSIS — R10.2 PELVIC PAIN IN FEMALE: ICD-10-CM

## 2022-02-17 DIAGNOSIS — R10.32 LLQ PAIN: Primary | ICD-10-CM

## 2022-02-17 PROCEDURE — 3075F PR MOST RECENT SYSTOLIC BLOOD PRESS GE 130-139MM HG: ICD-10-PCS | Mod: CPTII,S$GLB,, | Performed by: OBSTETRICS & GYNECOLOGY

## 2022-02-17 PROCEDURE — 99499 NO LOS: ICD-10-PCS | Mod: S$GLB,,, | Performed by: OBSTETRICS & GYNECOLOGY

## 2022-02-17 PROCEDURE — 1160F RVW MEDS BY RX/DR IN RCRD: CPT | Mod: CPTII,S$GLB,, | Performed by: OBSTETRICS & GYNECOLOGY

## 2022-02-17 PROCEDURE — 99999 PR PBB SHADOW E&M-EST. PATIENT-LVL III: CPT | Mod: PBBFAC,,, | Performed by: OBSTETRICS & GYNECOLOGY

## 2022-02-17 PROCEDURE — 3079F DIAST BP 80-89 MM HG: CPT | Mod: CPTII,S$GLB,, | Performed by: OBSTETRICS & GYNECOLOGY

## 2022-02-17 PROCEDURE — 1159F PR MEDICATION LIST DOCUMENTED IN MEDICAL RECORD: ICD-10-PCS | Mod: CPTII,S$GLB,, | Performed by: OBSTETRICS & GYNECOLOGY

## 2022-02-17 PROCEDURE — 1159F MED LIST DOCD IN RCRD: CPT | Mod: CPTII,S$GLB,, | Performed by: OBSTETRICS & GYNECOLOGY

## 2022-02-17 PROCEDURE — 3079F PR MOST RECENT DIASTOLIC BLOOD PRESSURE 80-89 MM HG: ICD-10-PCS | Mod: CPTII,S$GLB,, | Performed by: OBSTETRICS & GYNECOLOGY

## 2022-02-17 PROCEDURE — 3008F PR BODY MASS INDEX (BMI) DOCUMENTED: ICD-10-PCS | Mod: CPTII,S$GLB,, | Performed by: OBSTETRICS & GYNECOLOGY

## 2022-02-17 PROCEDURE — 99999 PR PBB SHADOW E&M-EST. PATIENT-LVL III: ICD-10-PCS | Mod: PBBFAC,,, | Performed by: OBSTETRICS & GYNECOLOGY

## 2022-02-17 PROCEDURE — 1160F PR REVIEW ALL MEDS BY PRESCRIBER/CLIN PHARMACIST DOCUMENTED: ICD-10-PCS | Mod: CPTII,S$GLB,, | Performed by: OBSTETRICS & GYNECOLOGY

## 2022-02-17 PROCEDURE — 99499 UNLISTED E&M SERVICE: CPT | Mod: S$GLB,,, | Performed by: OBSTETRICS & GYNECOLOGY

## 2022-02-17 PROCEDURE — 3075F SYST BP GE 130 - 139MM HG: CPT | Mod: CPTII,S$GLB,, | Performed by: OBSTETRICS & GYNECOLOGY

## 2022-02-17 PROCEDURE — 3008F BODY MASS INDEX DOCD: CPT | Mod: CPTII,S$GLB,, | Performed by: OBSTETRICS & GYNECOLOGY

## 2022-02-17 RX ORDER — SODIUM CHLORIDE 9 MG/ML
INJECTION, SOLUTION INTRAVENOUS CONTINUOUS
Status: CANCELLED | OUTPATIENT
Start: 2022-02-17

## 2022-02-17 RX ORDER — LIDOCAINE HYDROCHLORIDE 10 MG/ML
0.5 INJECTION, SOLUTION EPIDURAL; INFILTRATION; INTRACAUDAL; PERINEURAL ONCE
Status: CANCELLED | OUTPATIENT
Start: 2022-02-17 | End: 2022-02-17

## 2022-02-17 RX ORDER — ZOLPIDEM TARTRATE 10 MG/1
10 TABLET ORAL NIGHTLY PRN
COMMUNITY
Start: 2022-02-03 | End: 2022-04-25 | Stop reason: SDUPTHER

## 2022-02-17 RX ORDER — MUPIROCIN 20 MG/G
OINTMENT TOPICAL
Status: CANCELLED | OUTPATIENT
Start: 2022-02-17

## 2022-02-17 RX ORDER — SODIUM CHLORIDE, SODIUM LACTATE, POTASSIUM CHLORIDE, CALCIUM CHLORIDE 600; 310; 30; 20 MG/100ML; MG/100ML; MG/100ML; MG/100ML
INJECTION, SOLUTION INTRAVENOUS CONTINUOUS
Status: CANCELLED | OUTPATIENT
Start: 2022-02-17

## 2022-02-17 NOTE — H&P (VIEW-ONLY)
Subjective:       Patient ID: Jaycee Gamboa is a 41 y.o. female.    Chief Complaint:  Pre-op Exam      History of Present Illness  HPI  Pt is 41 y.o. with hx of hysterectomy here for preop.  Scheduled for Dx LSC to r/o source of pain (scar tissue?).      GYN & OB History  Patient's last menstrual period was 2009 (exact date).   Date of Last Pap: No result found    OB History    Para Term  AB Living   1 0 0 0 1     SAB IAB Ectopic Multiple Live Births   1 0 0          # Outcome Date GA Lbr Darren/2nd Weight Sex Delivery Anes PTL Lv   1 SAB               Birth Comments: at age 18       Review of Systems  Review of Systems   Constitutional: Negative for activity change, appetite change and fatigue.   HENT: Negative.  Negative for tinnitus.    Eyes: Negative.    Respiratory: Negative for cough and shortness of breath.    Cardiovascular: Negative for chest pain and palpitations.   Gastrointestinal: Positive for abdominal pain. Negative for blood in stool, constipation, diarrhea and nausea.   Endocrine: Negative.  Negative for hot flashes.   Genitourinary: Negative for dysmenorrhea, dyspareunia, dysuria, frequency, menstrual problem, pelvic pain, vaginal discharge, urinary incontinence and postcoital bleeding.   Musculoskeletal: Negative for back pain and joint swelling.   Integumentary:  Negative for rash, breast mass, nipple discharge and breast skin changes.   Neurological: Negative.  Negative for headaches.   Hematological: Negative.  Does not bruise/bleed easily.   Psychiatric/Behavioral: The patient is not nervous/anxious.    Breast: negative.  Negative for mass, nipple discharge and skin changes          Objective:    Physical Exam:   Constitutional: She is oriented to person, place, and time. She appears well-developed and well-nourished. No distress.    HENT:   Head: Normocephalic and atraumatic.    Eyes: Pupils are equal, round, and reactive to light. Conjunctivae and lids are normal.      Cardiovascular: Normal rate and regular rhythm.  Exam reveals no edema.     Pulmonary/Chest: Effort normal and breath sounds normal. She has no wheezes.        Abdominal: Soft. Bowel sounds are normal. She exhibits no distension. There is no abdominal tenderness. There is no rebound and no guarding.             Musculoskeletal: Normal range of motion and moves all extremeties.       Neurological: She is alert and oriented to person, place, and time. She has normal strength.    Skin: Skin is warm and dry.    Psychiatric: She has a normal mood and affect. Her speech is normal and behavior is normal. Thought content normal. Her mood appears not anxious. She does not exhibit a depressed mood. She expresses no suicidal plans and no homicidal plans.          Assessment:        1. LLQ pain    2. Charcot Amanda Tooth muscular atrophy    3. Pelvic pain in female                Plan:      Jaycee was seen today for pre-op exam.    Diagnoses and all orders for this visit:    LLQ pain    Charcot Amanda Tooth muscular atrophy    Pelvic pain in female    Other orders  The following orders have not been finalized:  -     0.9%  NaCl infusion  -     mupirocin 2 % ointment      Factual information regarding the medical condition and the need for Dx LSC,  was discussed with the patient, who verbalized understanding. The therapeutic rationale, benefits, risks and potential complications were discussed, including the possibility of pain, bleeding, infection, loss of function, disfigurement, death or other unforeseen complications. Alternatives to the procedure were discussed (including potential risks, complications and benefits of each). No guarantee was expressed or implied as to the results of the procedure. Informed consent was given, as well as a request to proceed.    Will take pictures for pt.

## 2022-02-17 NOTE — DISCHARGE INSTRUCTIONS
Information to Prepare you for your Surgery    PRE-ADMIT TESTING -  [phone]    [address] [facility]          Your surgery has been scheduled at [facility]. We are pleased to have the opportunity to serve you. For Further Information please call [phone].    On the day of surgery please report to the Information Desk on the 1st floor.    · CONTACT YOUR PHYSICIAN'S OFFICE THE DAY PRIOR TO YOUR SURGERY TO OBTAIN YOUR ARRIVAL TIME.     · The evening before surgery do not eat anything after 9 p.m. ( this includes hard candy, chewing gum and mints).  You may only have GATORADE, POWERADE AND WATER  from 9 p.m. until you leave your home.   DO NOT DRINK ANY LIQUIDS ON THE WAY TO THE HOSPITAL.      Why does your anesthesiologist allow you to drink Gatorade/Powerade before surgery?  Gatorade/Powerade helps to increase your comfort before surgery and to decrease your nausea after surgery. The carbohydrates in Gatorade/Powerade help reduce your body's stress response to surgery.  If you are a diabetic-drink only water prior to surgery.      Current Visitor policy(12/27/2021) - Patients may have 1 adult visitor pre and post procedure. Only 1 visitor will be allowed in the Surgical building with the patient.     SPECIAL MEDICATION INSTRUCTIONS: TAKE medications checked off by the Anesthesiologist on your Medication List.    Angiogram Patients: Take medications as instructed by your physician, including aspirin.     Surgery Patients:    If you take ASPIRIN - Your PHYSICIAN/SURGEON will need to inform you IF/OR when you need to stop taking aspirin prior to your surgery.     Do Not take any medications containing IBUPROFEN.    Do Not Wear any make-up (especially eye make-up) to surgery. Please remove any false eyelashes or eyelash extensions. If you arrive the day of surgery with makeup/eyelashes on you will be required to remove prior to surgery. (There is a risk of  corneal abrasions if eye makeup/eyelash extensions are not removed)      Leave all valuables at home.   Do Not wear any jewelry or watches, including any metal in body piercings. Jewelry must be removed prior to coming to the hospital.  There is a possibility that rings that are unable to be removed may be cut off if they are on the surgical extremity.    Please remove all hair extensions, wigs, clips and any other metal accessories/ ornaments from your hair.  These items may pose a flammable/fire risk in Surgery and must be removed.    Do not shave your surgical area at least 5 days prior to your surgery. The surgical prep will be performed at the hospital according to Infection Control regulations.    Contact Lens must be removed before surgery. Either do not wear the contact lens or bring a case and solution for storage.  Please bring a container for eyeglasses or dentures as required.  Bring any paperwork your physician has provided, such as consent forms,  history and physicals, doctor's orders, etc.   Bring comfortable clothes that are loose fitting to wear upon discharge. Take into consideration the type of surgery being performed.  Maintain your diet as advised per your physician the day prior to surgery.      Adequate rest the night before surgery is advised.   Park in the Parking lot behind the hospital or in the [facility] across the street from the parking lot. Parking is complimentary.  If you will be discharged the same day as your procedure, please arrange for a responsible adult to drive you home or to accompany you if traveling by taxi.   YOU WILL NOT BE PERMITTED TO DRIVE OR TO LEAVE THE HOSPITAL ALONE AFTER SURGERY.   If you are being discharged the same day, it is strongly recommended that you arrange for someone to remain with you for the first 24 hrs following your surgery.    The Surgeon will speak to your family/visitor after your surgery regarding the outcome of your surgery and  post op care.  The Surgeon may speak to you after your surgery, but there is a possibility you may not remember the details.  Please check with your family members regarding the conversation with the Surgeon.    We strongly recommend whoever is bringing you home be present for discharge instructions.  This will ensure a thorough understanding for your post op home care.    ALL CHILDREN MUST ALWAYS BE ACCOMPANIED BY AN ADULT.    Visitors-Refer to current Visitor policy handouts.    Thank you for your cooperation.  The Staff of [facility].                Bathing Instructions with Hibiclens     Shower the evening before and morning of your procedure with Hibiclens:   Wash your face with water and your regular face wash/soap   Apply Hibiclens directly on your skin or on a wet washcloth and wash gently. When showering: Move away from the shower stream when applying Hibiclens to avoid rinsing off too soon.   Rinse thoroughly with warm water   Do not dilute Hibiclens         Dry off as usual, do not use any deodorant, powder, body lotions, perfume, after shave or cologne.

## 2022-02-17 NOTE — ANESTHESIA PREPROCEDURE EVALUATION
02/17/2022  Jaycee Gamboa is a 41 y.o., female.    Anesthesia Evaluation    I have reviewed the Patient Summary Reports.    I have reviewed the Nursing Notes. I have reviewed the NPO Status.   I have reviewed the Medications.     Review of Systems  Anesthesia Hx:  History of prior surgery of interest to airway management or planning: Previous anesthesia: General Mult GA  with general anesthesia.  Personal Hx of Anesthesia complications, Post-Operative Nausea/Vomiting, in the past, but not with recent anesthetics / prophylaxis   Social:  Former Smoker, No Alcohol Use    Renal/:   Int cystitis   Hepatic/GI:   GERD    Musculoskeletal:   Charcot Amanda Tooth  Muscular Dystrophy   Neurological:   Headaches Seizures    Endocrine:   Hypothyroidism    Psych:   Psychiatric History anxiety depression PTSD         Physical Exam  General:  Obesity    Airway/Jaw/Neck:  Airway Findings: Mouth Opening: Normal Tongue: Normal  General Airway Assessment: Adult  Mallampati: II      Dental:  Dental Findings: Upper partial dentures        Mental Status:  Mental Status Findings:  Cooperative, Anxious         Anesthesia Plan  Type of Anesthesia, risks & benefits discussed:  Anesthesia Type:  general    Patient's Preference:   Plan Factors:          Intra-op Monitoring Plan: standard ASA monitors  Intra-op Monitoring Plan Comments:   Post Op Pain Control Plan: per primary service following discharge from PACU and multimodal analgesia  Post Op Pain Control Plan Comments:     Induction:   IV  Beta Blocker:         Informed Consent: Patient understands risks and agrees with Anesthesia plan.  Questions answered. Anesthesia consent signed with patient.  ASA Score: 2     Day of Surgery Review of History & Physical:    H&P update referred to the surgeon.     Anesthesia Plan Notes: CBC am of surg,prefers Versed just before entering  OR,do not strap down before asleep due to PTSD          Ready For Surgery From Anesthesia Perspective.

## 2022-02-18 ENCOUNTER — LAB VISIT (OUTPATIENT)
Dept: FAMILY MEDICINE | Facility: CLINIC | Age: 42
End: 2022-02-18
Attending: OBSTETRICS & GYNECOLOGY
Payer: MEDICARE

## 2022-02-18 ENCOUNTER — TELEPHONE (OUTPATIENT)
Dept: OBSTETRICS AND GYNECOLOGY | Facility: CLINIC | Age: 42
End: 2022-02-18
Payer: MEDICARE

## 2022-02-18 DIAGNOSIS — Z01.818 PREOP TESTING: ICD-10-CM

## 2022-02-18 PROCEDURE — U0003 INFECTIOUS AGENT DETECTION BY NUCLEIC ACID (DNA OR RNA); SEVERE ACUTE RESPIRATORY SYNDROME CORONAVIRUS 2 (SARS-COV-2) (CORONAVIRUS DISEASE [COVID-19]), AMPLIFIED PROBE TECHNIQUE, MAKING USE OF HIGH THROUGHPUT TECHNOLOGIES AS DESCRIBED BY CMS-2020-01-R: HCPCS | Performed by: OBSTETRICS & GYNECOLOGY

## 2022-02-18 NOTE — TELEPHONE ENCOUNTER
----- Message from Porsha Bernard sent at 2/18/2022  1:36 PM CST -----  Name of Who is Calling: SARA WEBB          What is the request in detail: The patient is calling to receive her arrival time for her procedure. Please advise          Can the clinic reply by [facility]: Yes         What Number to Call Back if not in [facility]: [phone]

## 2022-02-21 ENCOUNTER — ANESTHESIA (OUTPATIENT)
Dept: SURGERY | Facility: OTHER | Age: 42
End: 2022-02-21
Payer: MEDICARE

## 2022-02-21 ENCOUNTER — HOSPITAL ENCOUNTER (OUTPATIENT)
Facility: OTHER | Age: 42
Discharge: HOME | End: 2022-02-21
Attending: OBSTETRICS & GYNECOLOGY | Admitting: OBSTETRICS & GYNECOLOGY
Payer: MEDICARE

## 2022-02-21 DIAGNOSIS — Z01.818 PREOP TESTING: Primary | ICD-10-CM

## 2022-02-21 DIAGNOSIS — R10.32 LLQ PAIN: ICD-10-CM

## 2022-02-21 DIAGNOSIS — R10.2 PELVIC PAIN IN FEMALE: ICD-10-CM

## 2022-02-21 LAB
BASOPHILS # BLD AUTO: 0.02 K/UL (ref 0–0.2)
BASOPHILS NFR BLD: 0.3 % (ref 0–1.9)
DIFFERENTIAL METHOD: NORMAL
EOSINOPHIL # BLD AUTO: 0.2 K/UL (ref 0–0.5)
EOSINOPHIL NFR BLD: 2.5 % (ref 0–8)
ERYTHROCYTE [DISTWIDTH] IN BLOOD BY AUTOMATED COUNT: 13.9 % (ref 11.5–14.5)
HCT VFR BLD AUTO: 38.1 % (ref 37–48.5)
HGB BLD-MCNC: 12.9 G/DL (ref 12–16)
IMM GRANULOCYTES # BLD AUTO: 0.01 K/UL (ref 0–0.04)
IMM GRANULOCYTES NFR BLD AUTO: 0.2 % (ref 0–0.5)
LYMPHOCYTES # BLD AUTO: 2.2 K/UL (ref 1–4.8)
LYMPHOCYTES NFR BLD: 33 % (ref 18–48)
MCH RBC QN AUTO: 30.4 PG (ref 27–31)
MCHC RBC AUTO-ENTMCNC: 33.9 G/DL (ref 32–36)
MCV RBC AUTO: 90 FL (ref 82–98)
MONOCYTES # BLD AUTO: 0.4 K/UL (ref 0.3–1)
MONOCYTES NFR BLD: 6.7 % (ref 4–15)
NEUTROPHILS # BLD AUTO: 3.7 K/UL (ref 1.8–7.7)
NEUTROPHILS NFR BLD: 57.3 % (ref 38–73)
NRBC BLD-RTO: 0 /100 WBC
PLATELET # BLD AUTO: 190 K/UL (ref 150–450)
PMV BLD AUTO: 10.5 FL (ref 9.2–12.9)
RBC # BLD AUTO: 4.24 M/UL (ref 4–5.4)
SARS-COV-2 RNA RESP QL NAA+PROBE: NOT DETECTED
WBC # BLD AUTO: 6.52 K/UL (ref 3.9–12.7)

## 2022-02-21 PROCEDURE — 37000009 HC ANESTHESIA EA ADD 15 MINS: Performed by: OBSTETRICS & GYNECOLOGY

## 2022-02-21 PROCEDURE — 85025 COMPLETE CBC W/AUTO DIFF WBC: CPT | Performed by: OBSTETRICS & GYNECOLOGY

## 2022-02-21 PROCEDURE — 49321 LAPAROSCOPY BIOPSY: CPT | Mod: 51,,, | Performed by: OBSTETRICS & GYNECOLOGY

## 2022-02-21 PROCEDURE — 88305 TISSUE EXAM BY PATHOLOGIST: CPT | Performed by: STUDENT IN AN ORGANIZED HEALTH CARE EDUCATION/TRAINING PROGRAM

## 2022-02-21 PROCEDURE — 53899 PR LYSIS OF RETROPERITONEAL FIBROSIS: ICD-10-PCS | Mod: ,,, | Performed by: OBSTETRICS & GYNECOLOGY

## 2022-02-21 PROCEDURE — 49321 PR LAP,DX SURGICAL ABD W/BIOPSY: ICD-10-PCS | Mod: 51,,, | Performed by: OBSTETRICS & GYNECOLOGY

## 2022-02-21 PROCEDURE — 63600175 PHARM REV CODE 636 W HCPCS: Performed by: ANESTHESIOLOGY

## 2022-02-21 PROCEDURE — 25000003 PHARM REV CODE 250: Performed by: OBSTETRICS & GYNECOLOGY

## 2022-02-21 PROCEDURE — 36000709 HC OR TIME LEV III EA ADD 15 MIN: Performed by: OBSTETRICS & GYNECOLOGY

## 2022-02-21 PROCEDURE — 88305 TISSUE EXAM BY PATHOLOGIST: ICD-10-PCS | Mod: 26,,, | Performed by: STUDENT IN AN ORGANIZED HEALTH CARE EDUCATION/TRAINING PROGRAM

## 2022-02-21 PROCEDURE — 36000708 HC OR TIME LEV III 1ST 15 MIN: Performed by: OBSTETRICS & GYNECOLOGY

## 2022-02-21 PROCEDURE — 36415 COLL VENOUS BLD VENIPUNCTURE: CPT | Performed by: OBSTETRICS & GYNECOLOGY

## 2022-02-21 PROCEDURE — 53899 UNLISTED PX URINARY SYSTEM: CPT | Mod: ,,, | Performed by: OBSTETRICS & GYNECOLOGY

## 2022-02-21 PROCEDURE — 25000003 PHARM REV CODE 250: Performed by: ANESTHESIOLOGY

## 2022-02-21 PROCEDURE — P9045 ALBUMIN (HUMAN), 5%, 250 ML: HCPCS | Mod: JG | Performed by: NURSE ANESTHETIST, CERTIFIED REGISTERED

## 2022-02-21 PROCEDURE — 27201423 OPTIME MED/SURG SUP & DEVICES STERILE SUPPLY: Performed by: OBSTETRICS & GYNECOLOGY

## 2022-02-21 PROCEDURE — 88305 TISSUE EXAM BY PATHOLOGIST: CPT | Mod: 26,,, | Performed by: STUDENT IN AN ORGANIZED HEALTH CARE EDUCATION/TRAINING PROGRAM

## 2022-02-21 PROCEDURE — 63600175 PHARM REV CODE 636 W HCPCS: Mod: JG | Performed by: NURSE ANESTHETIST, CERTIFIED REGISTERED

## 2022-02-21 PROCEDURE — 71000033 HC RECOVERY, INTIAL HOUR: Performed by: OBSTETRICS & GYNECOLOGY

## 2022-02-21 PROCEDURE — 25000003 PHARM REV CODE 250: Performed by: NURSE ANESTHETIST, CERTIFIED REGISTERED

## 2022-02-21 PROCEDURE — 37000008 HC ANESTHESIA 1ST 15 MINUTES: Performed by: OBSTETRICS & GYNECOLOGY

## 2022-02-21 PROCEDURE — 58662 PR LAP,FULGURATE/EXCISE LESIONS: ICD-10-PCS | Mod: 22,,, | Performed by: OBSTETRICS & GYNECOLOGY

## 2022-02-21 PROCEDURE — 71000039 HC RECOVERY, EACH ADD'L HOUR: Performed by: OBSTETRICS & GYNECOLOGY

## 2022-02-21 PROCEDURE — 71000015 HC POSTOP RECOV 1ST HR: Performed by: OBSTETRICS & GYNECOLOGY

## 2022-02-21 PROCEDURE — 58662 LAPAROSCOPY EXCISE LESIONS: CPT | Mod: 22,,, | Performed by: OBSTETRICS & GYNECOLOGY

## 2022-02-21 RX ORDER — MUPIROCIN 20 MG/G
OINTMENT TOPICAL
Status: DISCONTINUED | OUTPATIENT
Start: 2022-02-21 | End: 2022-02-21 | Stop reason: HOSPADM

## 2022-02-21 RX ORDER — FENTANYL CITRATE 50 UG/ML
INJECTION, SOLUTION INTRAMUSCULAR; INTRAVENOUS
Status: DISCONTINUED | OUTPATIENT
Start: 2022-02-21 | End: 2022-02-21

## 2022-02-21 RX ORDER — LIDOCAINE HYDROCHLORIDE 20 MG/ML
INJECTION INTRAVENOUS
Status: DISCONTINUED | OUTPATIENT
Start: 2022-02-21 | End: 2022-02-21

## 2022-02-21 RX ORDER — DEXAMETHASONE SODIUM PHOSPHATE 4 MG/ML
INJECTION, SOLUTION INTRA-ARTICULAR; INTRALESIONAL; INTRAMUSCULAR; INTRAVENOUS; SOFT TISSUE
Status: DISCONTINUED | OUTPATIENT
Start: 2022-02-21 | End: 2022-02-21

## 2022-02-21 RX ORDER — ROCURONIUM BROMIDE 10 MG/ML
INJECTION, SOLUTION INTRAVENOUS
Status: DISCONTINUED | OUTPATIENT
Start: 2022-02-21 | End: 2022-02-21

## 2022-02-21 RX ORDER — LIDOCAINE HYDROCHLORIDE 10 MG/ML
0.5 INJECTION, SOLUTION EPIDURAL; INFILTRATION; INTRACAUDAL; PERINEURAL ONCE
Status: DISCONTINUED | OUTPATIENT
Start: 2022-02-21 | End: 2022-02-21 | Stop reason: HOSPADM

## 2022-02-21 RX ORDER — SODIUM CHLORIDE, SODIUM LACTATE, POTASSIUM CHLORIDE, CALCIUM CHLORIDE 600; 310; 30; 20 MG/100ML; MG/100ML; MG/100ML; MG/100ML
INJECTION, SOLUTION INTRAVENOUS CONTINUOUS
Status: DISCONTINUED | OUTPATIENT
Start: 2022-02-21 | End: 2022-02-21 | Stop reason: HOSPADM

## 2022-02-21 RX ORDER — ALBUMIN HUMAN 50 G/1000ML
SOLUTION INTRAVENOUS CONTINUOUS PRN
Status: DISCONTINUED | OUTPATIENT
Start: 2022-02-21 | End: 2022-02-21

## 2022-02-21 RX ORDER — ONDANSETRON 2 MG/ML
INJECTION INTRAMUSCULAR; INTRAVENOUS
Status: DISCONTINUED | OUTPATIENT
Start: 2022-02-21 | End: 2022-02-21

## 2022-02-21 RX ORDER — SODIUM CHLORIDE 9 MG/ML
INJECTION, SOLUTION INTRAVENOUS CONTINUOUS
Status: DISCONTINUED | OUTPATIENT
Start: 2022-02-21 | End: 2022-02-21 | Stop reason: HOSPADM

## 2022-02-21 RX ORDER — MIDAZOLAM HYDROCHLORIDE 1 MG/ML
INJECTION INTRAMUSCULAR; INTRAVENOUS
Status: DISCONTINUED | OUTPATIENT
Start: 2022-02-21 | End: 2022-02-21

## 2022-02-21 RX ORDER — PROPOFOL 10 MG/ML
VIAL (ML) INTRAVENOUS
Status: DISCONTINUED | OUTPATIENT
Start: 2022-02-21 | End: 2022-02-21

## 2022-02-21 RX ORDER — KETAMINE HCL IN 0.9 % NACL 50 MG/5 ML
SYRINGE (ML) INTRAVENOUS
Status: DISCONTINUED | OUTPATIENT
Start: 2022-02-21 | End: 2022-02-21

## 2022-02-21 RX ORDER — MEPERIDINE HYDROCHLORIDE 25 MG/ML
12.5 INJECTION INTRAMUSCULAR; INTRAVENOUS; SUBCUTANEOUS ONCE AS NEEDED
Status: COMPLETED | OUTPATIENT
Start: 2022-02-21 | End: 2022-02-21

## 2022-02-21 RX ORDER — PHENYLEPHRINE HYDROCHLORIDE 10 MG/ML
INJECTION INTRAVENOUS
Status: DISCONTINUED | OUTPATIENT
Start: 2022-02-21 | End: 2022-02-21

## 2022-02-21 RX ORDER — PROCHLORPERAZINE EDISYLATE 5 MG/ML
5 INJECTION INTRAMUSCULAR; INTRAVENOUS EVERY 30 MIN PRN
Status: DISCONTINUED | OUTPATIENT
Start: 2022-02-21 | End: 2022-02-21 | Stop reason: HOSPADM

## 2022-02-21 RX ORDER — SODIUM CHLORIDE 0.9 % (FLUSH) 0.9 %
3 SYRINGE (ML) INJECTION
Status: DISCONTINUED | OUTPATIENT
Start: 2022-02-21 | End: 2022-02-21 | Stop reason: HOSPADM

## 2022-02-21 RX ORDER — OXYCODONE HYDROCHLORIDE 5 MG/1
5 TABLET ORAL
Status: DISCONTINUED | OUTPATIENT
Start: 2022-02-21 | End: 2022-02-21 | Stop reason: HOSPADM

## 2022-02-21 RX ORDER — HYDROMORPHONE HYDROCHLORIDE 2 MG/ML
0.4 INJECTION, SOLUTION INTRAMUSCULAR; INTRAVENOUS; SUBCUTANEOUS EVERY 5 MIN PRN
Status: DISCONTINUED | OUTPATIENT
Start: 2022-02-21 | End: 2022-02-21 | Stop reason: HOSPADM

## 2022-02-21 RX ADMIN — ALBUMIN (HUMAN): 12.5 SOLUTION INTRAVENOUS at 01:02

## 2022-02-21 RX ADMIN — PHENYLEPHRINE HYDROCHLORIDE 200 MCG: 10 INJECTION INTRAVENOUS at 01:02

## 2022-02-21 RX ADMIN — Medication 50 MG: at 01:02

## 2022-02-21 RX ADMIN — FENTANYL CITRATE 100 MCG: 50 INJECTION, SOLUTION INTRAMUSCULAR; INTRAVENOUS at 12:02

## 2022-02-21 RX ADMIN — HYDROMORPHONE HYDROCHLORIDE 0.4 MG: 2 INJECTION INTRAMUSCULAR; INTRAVENOUS; SUBCUTANEOUS at 03:02

## 2022-02-21 RX ADMIN — MEPERIDINE HYDROCHLORIDE 12.5 MG: 25 INJECTION INTRAMUSCULAR; INTRAVENOUS; SUBCUTANEOUS at 02:02

## 2022-02-21 RX ADMIN — FENTANYL CITRATE 100 MCG: 50 INJECTION, SOLUTION INTRAMUSCULAR; INTRAVENOUS at 01:02

## 2022-02-21 RX ADMIN — SUGAMMADEX 308 MG: 100 INJECTION, SOLUTION INTRAVENOUS at 02:02

## 2022-02-21 RX ADMIN — MIDAZOLAM HYDROCHLORIDE 2 MG: 1 INJECTION, SOLUTION INTRAMUSCULAR; INTRAVENOUS at 12:02

## 2022-02-21 RX ADMIN — MIDAZOLAM HYDROCHLORIDE 2 MG: 1 INJECTION, SOLUTION INTRAMUSCULAR; INTRAVENOUS at 01:02

## 2022-02-21 RX ADMIN — OXYCODONE 5 MG: 5 TABLET ORAL at 02:02

## 2022-02-21 RX ADMIN — LIDOCAINE HYDROCHLORIDE 100 MG: 20 INJECTION, SOLUTION INTRAVENOUS at 01:02

## 2022-02-21 RX ADMIN — GLYCOPYRROLATE 0.4 MG: 0.2 INJECTION, SOLUTION INTRAMUSCULAR; INTRAVITREAL at 01:02

## 2022-02-21 RX ADMIN — ROCURONIUM BROMIDE 10 MG: 10 INJECTION, SOLUTION INTRAVENOUS at 01:02

## 2022-02-21 RX ADMIN — FENTANYL CITRATE 50 MCG: 50 INJECTION, SOLUTION INTRAMUSCULAR; INTRAVENOUS at 02:02

## 2022-02-21 RX ADMIN — ROCURONIUM BROMIDE 40 MG: 10 INJECTION, SOLUTION INTRAVENOUS at 01:02

## 2022-02-21 RX ADMIN — DEXAMETHASONE SODIUM PHOSPHATE 8 MG: 4 INJECTION, SOLUTION INTRAMUSCULAR; INTRAVENOUS at 01:02

## 2022-02-21 RX ADMIN — ONDANSETRON HYDROCHLORIDE 4 MG: 2 INJECTION INTRAMUSCULAR; INTRAVENOUS at 02:02

## 2022-02-21 RX ADMIN — PROPOFOL 200 MG: 10 INJECTION, EMULSION INTRAVENOUS at 01:02

## 2022-02-21 RX ADMIN — SODIUM CHLORIDE, SODIUM LACTATE, POTASSIUM CHLORIDE, AND CALCIUM CHLORIDE: 600; 310; 30; 20 INJECTION, SOLUTION INTRAVENOUS at 12:02

## 2022-02-21 RX ADMIN — MUPIROCIN: 20 OINTMENT TOPICAL at 12:02

## 2022-02-21 NOTE — ANESTHESIA PROCEDURE NOTES
Intubation    Date/Time: 2/21/2022 1:36 PM  Performed by: Gunnar Ying CRNA  Authorized by: José Miguel Chowdhury MD     Intubation:     Induction:  Intravenous    Intubated:  Postinduction    Mask Ventilation:  Moderately difficult with oral airway    Attempts:  1    Attempted By:  CRNA    Method of Intubation:  Video laryngoscopy    Blade:  Elfego 3    Laryngeal View Grade: Grade I - full view of cords      Difficult Airway Encountered?: No      Complications:  None    Airway Device:  Oral endotracheal tube    Airway Device Size:  7.5    Style/Cuff Inflation:  Cuffed    Inflation Amount (mL):  4    Tube secured:  21    Secured at:  The lips    Placement Verified By:  Capnometry    Complicating Factors:  None    Findings Post-Intubation:  BS equal bilateral

## 2022-02-21 NOTE — DISCHARGE SUMMARY
[facility])  Obstetrics & Gynecology  Discharge Summary    Patient Name: Jaycee Gamboa  MRN: [id number]  Admission Date: 2/21/2022  Hospital Length of Stay: 0 days  Discharge Date and Time: 2/21/2022  Attending Physician: No att. providers found   Discharging Provider: Santy Elaine MD  Primary Care Provider: Emi Lechuga MD    HPI: Pt was admitted for Dx LSC due to pain.      Hospital Course: Pt had Dx lsc with excision of atypical areas that may be the source of her pain.  Was discharged home once she met criteria from PACU    Procedure(s) (LRB):  LAPAROSCOPY, DIAGNOSTIC (N/A)  DESTRUCTION, ENDOMETRIOSIS (N/A)     Consults:     Significant Diagnostic Studies:     Pending Diagnostic Studies:     Procedure Component Value Units Date/Time    Specimen to Pathology, Surgery Gynecology and Obstetrics [[id number]] Collected: 02/21/22 1422    Order Status: Sent Lab Status: In process Updated: 02/21/22 1614        There are no hospital problems to display for this patient.       Discharged Condition: good    Disposition: Home or Self Care    Follow Up:    Patient Instructions:      Pelvic Rest     Notify your health care provider if you experience any of the following:  temperature >100.4     Notify your health care provider if you experience any of the following:  persistent nausea and vomiting or diarrhea     Notify your health care provider if you experience any of the following:  severe uncontrolled pain     Notify your health care provider if you experience any of the following:  redness, tenderness, or signs of infection (pain, swelling, redness, odor or green/yellow discharge around incision site)     Notify your health care provider if you experience any of the following:  difficulty breathing or increased cough     Notify your health care provider if you experience any of the following:  severe persistent headache     Notify your health care provider if you experience any of the following:   persistent dizziness, light-headedness, or visual disturbances     Notify your health care provider if you experience any of the following:  increased confusion or weakness     Notify your health care provider if you experience any of the following:   Order Comments: Heavy vaginal bleeding >1 pad/hour for greater than 2 hours     Activity as tolerated     Medications:  Reconciled Home Medications:      Medication List      CONTINUE taking these medications    HYDROcodone-acetaminophen  mg per tablet  Commonly known as: NORCO  Take 1 tablet by mouth 3 (three) times daily.     levothyroxine 50 MCG tablet  Commonly known as: SYNTHROID  Take 1 tablet (50 mcg total) by mouth once daily.     promethazine 12.5 MG Tab  Commonly known as: PHENERGAN  Take 1 tablet (12.5 mg total) by mouth 4 (four) times daily.     tiZANidine 4 MG tablet  Commonly known as: ZANAFLEX  Take 4 mg by mouth every evening.     zolpidem 10 mg Tab  Commonly known as: AMBIEN  Take 10 mg by mouth nightly as needed.            Santy Elaine MD  Obstetrics & Gynecology  [facility])

## 2022-02-21 NOTE — TRANSFER OF CARE
Anesthesia Transfer of Care Note    Patient: Jaycee Gamboa    Procedure(s) Performed: Procedure(s) (LRB):  LAPAROSCOPY, DIAGNOSTIC (N/A)  DESTRUCTION, ENDOMETRIOSIS (N/A)    Patient location: PACU    Anesthesia Type: general    Transport from OR: Transported from OR on 2-3 L/min O2 by NC with adequate spontaneous ventilation    Post pain: adequate analgesia    Post assessment: no apparent anesthetic complications    Post vital signs: stable    Level of consciousness: awake    Nausea/Vomiting: no nausea/vomiting    Complications: none    Transfer of care protocol was followed      Last vitals:   Visit Vitals  BP (!) 143/70   Pulse 61   Temp 36.7 °C (98 °F) (Oral)   Resp 18   Wt 76.9 kg (169 lb 8.5 oz)   LMP 04/26/2009 (Exact Date)   SpO2 100%   Breastfeeding No   BMI 31.01 kg/m²

## 2022-02-21 NOTE — PLAN OF CARE
Jaycee Gamboa has met all discharge criteria from Phase II. Vital Signs are stable, ambulating  without difficulty. Discharge instructions given, patient verbalized understanding. Discharged from facility via wheelchair in stable condition.

## 2022-02-21 NOTE — OP NOTE
OPERATIVE REPORT FOR LAPAROSCOPIC EXCISION OF ENDOMETRIOSIS/SCAR TISSUE                                                                                                                                 Date of Procedure: 02/21/2022                                                                               SURGEON:  Santy Elaine M.D.                                                                                                                    ASSISTANT:  Missy Carrizales MD (RES)                                                                                               PREOPERATIVE DIAGNOSIS:              1. LLQ pain    2. Pelvic pain in female                                                                                   POSTOPERATIVE DIAGNOSIS:           1. LLQ pain    2. Pelvic pain in female                                                                                   PROCEDURE:  Diagnostic laparoscopy, excision of endometriosis/scar tissue implants, lysis of adhesions lasting for 45 min (mod 22), ureterolysis of retroperitoneal fibrosis                                                                                                                 ANESTHESIA:  GETA    BLOOD LOSS:  10  mL.                                                                                                                                      URINE OUTPUT:  150 mL.                                                                                       IV FLUIDS:  600 cc    COMPLICATIONS:  None    SPECIMINES:  Peritoneal biopsies    DRAINS:  None    FINDINGS:  1) absent uterine cavity  2) Normal upper abdomen  3) Absent ovaries bilaterally  4) Scar tissue at top of vaginal cuff (towards left uterosacral).  ? endometriosis    STATEMENT OF MEDICAL NECESSITY:  Pt is a 41 y.o. year old female who has persistent pain unresponsive to medical treatment.  After discussing risks/benefits/alternatives/and indications, pt wished to  proceed with the above stated case.                                                                               PROCEDURE IN DETAIL:  After appropriate consents had been obtained and time out performed, the patient was taken to the procedure room at which time general anesthesia was administered.  The patient was then placed in the lithotomy position, prepped in the appropriate sterile fashion.  A sterile speculum was put in place and a vaginal manipulator was inserted after decompression of the bladder..  Attention was then taken to the umbilicus at which time a Veress needle was inserted.  After confirming an opening pressure of 3 mmHg, the abdomen was insufflated to a maximum pressure of 15 mmHg.  A 5mm skin incision was then made and the trocar was inserted under direct visualization.  [facility] and 2 5mm trocars were placed in the bilateral lower quadrants under direct visualization to avoid injury to the underlying structures.  A thorough inspection of the abdomen and pelvis was performed with the above findings.  Attention was first taken to the left side at which time scar tissue over the left ureter was found.  Attention was then taken to the cul-de-sac at which time scar tissue was seen on top of vaginal cuff was found.  Attention was then taken to the right side at which time scar tissue over right ureter was found.  And finally, a thorough upper abdominal survey was performed and no disease was found.  Bilateral uteterolysis of retroperitoneal fibroisis was performed to avoid injury.  All endometriosis was excised using monopolar cautery made hemostatic after tissue removal.  After confirming hemostasis, surgicel powder was  used.  The pneumoperitoneum was released and the trocars were removed under visualization.  The skin was closed using a 4-0 monocryl suture.  The patient was then extubated and taken to the recovery area in stable condition.  The vaginal manipulator was  removed.       All counts were correct x 2 at the end of the procedure.  Antibiotics were not indicated for this case.    I was present and scrubbed for the entire procedure.  Thanks,     Santy Elaine MD

## 2022-02-21 NOTE — OR NURSING
"Pt states abdominal pain is 7/10 but "it's not as bad as before", states it's tolerable and just "wants another pill before she goes home"  "

## 2022-02-21 NOTE — ANESTHESIA POSTPROCEDURE EVALUATION
Anesthesia Post Evaluation    Patient: Jaycee Gamboa    Procedure(s) Performed: Procedure(s) (LRB):  LAPAROSCOPY, DIAGNOSTIC (N/A)  DESTRUCTION, ENDOMETRIOSIS (N/A)    Final Anesthesia Type: general      Patient location during evaluation: PACU  Patient participation: Yes- Able to Participate  Level of consciousness: awake and alert  Post-procedure vital signs: reviewed and stable  Pain management: adequate  Airway patency: patent    PONV status at discharge: No PONV  Anesthetic complications: no      Cardiovascular status: blood pressure returned to baseline  Respiratory status: spontaneous ventilation  Hydration status: euvolemic  Follow-up not needed.          Vitals Value Taken Time   /91 02/21/22 1458   Temp 36.4 °C (97.5 °F) 02/21/22 1441   Pulse 90 02/21/22 1502   Resp 16 02/21/22 1456   SpO2 100 % 02/21/22 1502   Vitals shown include unvalidated device data.      No case tracking events are documented in the log.      Pain/Shena Score: Pain Rating Prior to Med Admin: 7 (2/21/2022  2:56 PM)  Shena Score: 8 (2/21/2022  2:56 PM)

## 2022-02-22 VITALS
SYSTOLIC BLOOD PRESSURE: 140 MMHG | HEART RATE: 77 BPM | WEIGHT: 169.56 LBS | BODY MASS INDEX: 31.01 KG/M2 | OXYGEN SATURATION: 100 % | TEMPERATURE: 98 F | DIASTOLIC BLOOD PRESSURE: 78 MMHG | RESPIRATION RATE: 18 BRPM

## 2022-02-23 ENCOUNTER — TELEPHONE (OUTPATIENT)
Dept: OBSTETRICS AND GYNECOLOGY | Facility: CLINIC | Age: 42
End: 2022-02-23
Payer: MEDICARE

## 2022-02-23 NOTE — TELEPHONE ENCOUNTER
Patient 2 days s/p Dx Lap and denies any complaints at this time, patient reports that pain is managed with Norco 10-325mg that was prescribed by her pain management provider ,patient was told by her (pain management) doctor that RX should have been filled by Dr. Elaine as well.Patient is aware that I will speak with Dr. Elaine in the morning and call her back with recommendations.

## 2022-02-24 LAB
FINAL PATHOLOGIC DIAGNOSIS: NORMAL
GROSS: NORMAL
Lab: NORMAL

## 2022-02-24 RX ORDER — HYDROCODONE BITARTRATE AND ACETAMINOPHEN 10; 325 MG/1; MG/1
1 TABLET ORAL EVERY 8 HOURS PRN
Qty: 15 TABLET | Refills: 0 | Status: SHIPPED | OUTPATIENT
Start: 2022-02-24 | End: 2022-02-25 | Stop reason: SDUPTHER

## 2022-02-25 ENCOUNTER — TELEPHONE (OUTPATIENT)
Dept: OBSTETRICS AND GYNECOLOGY | Facility: CLINIC | Age: 42
End: 2022-02-25
Payer: MEDICARE

## 2022-02-25 RX ORDER — HYDROCODONE BITARTRATE AND ACETAMINOPHEN 10; 325 MG/1; MG/1
1 TABLET ORAL EVERY 4 HOURS PRN
Qty: 24 TABLET | Refills: 0 | Status: SHIPPED | OUTPATIENT
Start: 2022-02-25 | End: 2022-03-31 | Stop reason: ALTCHOICE

## 2022-02-25 NOTE — TELEPHONE ENCOUNTER
----- Message from Santo Mendoza sent at 2/24/2022  4:39 PM CST -----  Regarding: Medication  Contact: SARA WEBB [[id number]]  Type:  Patient Returning Call    Who Called: SARA WEBB [[id number]]    Who Left Message for Patient: Марина    Does the patient know what this is regarding?: yes    Would the patient rather a call back or a response via [facility]? call    Best Call Back Number: [phone]    Additional Information: Prescription for the 15 pills cannot be refilled due to being written as the one she has already. States she was requesting a prescription for every 4 hrs. Please advise

## 2022-02-25 NOTE — TELEPHONE ENCOUNTER
Patient was informed that Dr. Eliane will update script to every 4 hours,verbilazed understanding.

## 2022-03-01 ENCOUNTER — PATIENT MESSAGE (OUTPATIENT)
Dept: INTERNAL MEDICINE | Facility: CLINIC | Age: 42
End: 2022-03-01
Payer: MEDICARE

## 2022-03-01 ENCOUNTER — PATIENT MESSAGE (OUTPATIENT)
Dept: OBSTETRICS AND GYNECOLOGY | Facility: CLINIC | Age: 42
End: 2022-03-01
Payer: MEDICARE

## 2022-03-02 RX ORDER — FLUTICASONE PROPIONATE 50 MCG
1 SPRAY, SUSPENSION (ML) NASAL DAILY
Qty: 16 G | Refills: 3 | Status: SHIPPED | OUTPATIENT
Start: 2022-03-02 | End: 2022-10-03 | Stop reason: SDUPTHER

## 2022-03-03 ENCOUNTER — TELEPHONE (OUTPATIENT)
Dept: OBSTETRICS AND GYNECOLOGY | Facility: CLINIC | Age: 42
End: 2022-03-03
Payer: MEDICARE

## 2022-03-03 NOTE — TELEPHONE ENCOUNTER
Spoke with patient in regards to medication notify patient that Dr. Elaine did fill medication but the pharmacy would not allow refill until the 15th of this month patient is aware.

## 2022-03-09 ENCOUNTER — TELEPHONE (OUTPATIENT)
Dept: OBSTETRICS AND GYNECOLOGY | Facility: HOSPITAL | Age: 42
End: 2022-03-09
Payer: MEDICARE

## 2022-03-09 ENCOUNTER — TELEPHONE (OUTPATIENT)
Dept: OBSTETRICS AND GYNECOLOGY | Facility: CLINIC | Age: 42
End: 2022-03-09
Payer: MEDICARE

## 2022-03-09 DIAGNOSIS — G89.18 POSTOPERATIVE PAIN: Primary | ICD-10-CM

## 2022-03-09 RX ORDER — KETOROLAC TROMETHAMINE 10 MG/1
10 TABLET, FILM COATED ORAL EVERY 6 HOURS
Qty: 20 TABLET | Refills: 0 | Status: SHIPPED | OUTPATIENT
Start: 2022-03-09 | End: 2022-03-14

## 2022-03-09 NOTE — TELEPHONE ENCOUNTER
----- Message from Santy Elaine MD sent at 3/9/2022 11:31 AM CST -----  Only thing we will be able to do is try a different anti-inflammatory medication (mobic) that should help with the burning and take the edge off of her symptoms.  If it is pain/burning at the incision site, ice packs will help as well.    Burning stomach may be settled with reflux medication (like pepcid AC).      Finally, she can speak to her pain doctor to see if they have another medication they would recommend that she is allowed to take.      ----- Message -----  From: Paris Llanos MA  Sent: 3/9/2022  10:35 AM CST  To: Santy Elaine MD    What would you like me to do I spoke with the pharmacy they still stated that she cant get medication until the 15 th. & I called her she stated that she needs the medication know I told het I will see what we can do. Everything looks okay just pain &burning.   ----- Message -----  From: Mary Ann Peña  Sent: 3/9/2022   9:24 AM CST  To: [place] [facility] Staff    Pt is calling in regards to pain, she is having pain on her right side but inside stomach saying its like a fire feeling inside . Pt states she has a low grade fever, also throwing up. Pt is running out of medicine and nervous about the pain. Pt states incision look fine just the right side hurts. Medication can not be filled until 3/16     Pt can be contacted at [phone]             
Spoke with patient she would like to try the anit medcaion but she will speak with her pain doctor as well.    
Dizziness

## 2022-03-14 ENCOUNTER — TELEPHONE (OUTPATIENT)
Dept: INTERNAL MEDICINE | Facility: CLINIC | Age: 42
End: 2022-03-14
Payer: MEDICARE

## 2022-03-14 NOTE — TELEPHONE ENCOUNTER
----- Message from Belia Rosen sent at 3/14/2022  1:21 PM CDT -----  Contact: [phone]  Type:  Needs Medical Advice    Who Called:Pt   Reason for call: regarding pt was rushed to the Hospital on Friday and her Potassium was low at 203 and her Kidneys were shutting down and would like to speak with the Dr regarding this. Pt is Feeling Better and was able to start eating normal food on yesterday  Pharmacy name and phone #:  N/A  Would the patient rather a call back or a response via [facility]?  Call back  Best Call Back Number:[phone]

## 2022-03-31 ENCOUNTER — HOSPITAL ENCOUNTER (EMERGENCY)
Facility: HOSPITAL | Age: 42
Discharge: HOME | End: 2022-04-01
Attending: EMERGENCY MEDICINE
Payer: MEDICARE

## 2022-03-31 VITALS
RESPIRATION RATE: 20 BRPM | SYSTOLIC BLOOD PRESSURE: 135 MMHG | OXYGEN SATURATION: 97 % | DIASTOLIC BLOOD PRESSURE: 81 MMHG | TEMPERATURE: 98 F | HEART RATE: 87 BPM

## 2022-03-31 DIAGNOSIS — S42.402A CLOSED FRACTURE OF DISTAL END OF LEFT HUMERUS, UNSPECIFIED FRACTURE MORPHOLOGY, INITIAL ENCOUNTER: Primary | ICD-10-CM

## 2022-03-31 DIAGNOSIS — S42.402A CLOSED FRACTURE OF LEFT ELBOW, INITIAL ENCOUNTER: Primary | ICD-10-CM

## 2022-03-31 LAB — POCT GLUCOSE: 89 MG/DL (ref 70–110)

## 2022-03-31 PROCEDURE — 99284 EMERGENCY DEPT VISIT MOD MDM: CPT | Mod: ,,, | Performed by: EMERGENCY MEDICINE

## 2022-03-31 PROCEDURE — 29105 APPLICATION LONG ARM SPLINT: CPT | Mod: LT

## 2022-03-31 PROCEDURE — 63600175 PHARM REV CODE 636 W HCPCS: Performed by: EMERGENCY MEDICINE

## 2022-03-31 PROCEDURE — 99284 PR EMERGENCY DEPT VISIT,LEVEL IV: ICD-10-PCS | Mod: ,,, | Performed by: EMERGENCY MEDICINE

## 2022-03-31 PROCEDURE — 96374 THER/PROPH/DIAG INJ IV PUSH: CPT | Mod: 59

## 2022-03-31 PROCEDURE — 99285 EMERGENCY DEPT VISIT HI MDM: CPT | Mod: 25

## 2022-03-31 PROCEDURE — 63600175 PHARM REV CODE 636 W HCPCS: Performed by: STUDENT IN AN ORGANIZED HEALTH CARE EDUCATION/TRAINING PROGRAM

## 2022-03-31 PROCEDURE — 96376 TX/PRO/DX INJ SAME DRUG ADON: CPT

## 2022-03-31 RX ORDER — HYDROCODONE BITARTRATE AND ACETAMINOPHEN 5; 325 MG/1; MG/1
1 TABLET ORAL EVERY 4 HOURS PRN
Qty: 16 TABLET | Refills: 0 | Status: ON HOLD | OUTPATIENT
Start: 2022-03-31 | End: 2022-04-12 | Stop reason: HOSPADM

## 2022-03-31 RX ORDER — HYDROMORPHONE HYDROCHLORIDE 1 MG/ML
1 INJECTION, SOLUTION INTRAMUSCULAR; INTRAVENOUS; SUBCUTANEOUS
Status: COMPLETED | OUTPATIENT
Start: 2022-03-31 | End: 2022-03-31

## 2022-03-31 RX ORDER — HYDROMORPHONE HYDROCHLORIDE 1 MG/ML
0.5 INJECTION, SOLUTION INTRAMUSCULAR; INTRAVENOUS; SUBCUTANEOUS
Status: COMPLETED | OUTPATIENT
Start: 2022-03-31 | End: 2022-03-31

## 2022-03-31 RX ADMIN — HYDROMORPHONE HYDROCHLORIDE 0.5 MG: 1 INJECTION, SOLUTION INTRAMUSCULAR; INTRAVENOUS; SUBCUTANEOUS at 11:03

## 2022-03-31 RX ADMIN — HYDROMORPHONE HYDROCHLORIDE 1 MG: 1 INJECTION, SOLUTION INTRAMUSCULAR; INTRAVENOUS; SUBCUTANEOUS at 09:03

## 2022-03-31 RX ADMIN — HYDROMORPHONE HYDROCHLORIDE 1 MG: 1 INJECTION, SOLUTION INTRAMUSCULAR; INTRAVENOUS; SUBCUTANEOUS at 10:03

## 2022-03-31 RX ADMIN — HYDROMORPHONE HYDROCHLORIDE 0.5 MG: 1 INJECTION, SOLUTION INTRAMUSCULAR; INTRAVENOUS; SUBCUTANEOUS at 08:03

## 2022-04-01 ENCOUNTER — TELEPHONE (OUTPATIENT)
Dept: ORTHOPEDICS | Facility: CLINIC | Age: 42
End: 2022-04-01
Payer: MEDICARE

## 2022-04-01 NOTE — ED PROVIDER NOTES
Encounter Date: 3/31/2022       History     Chief Complaint   Patient presents with    Elbow Pain     Pt transfer from [place] for elbow fracture     41-year-old female past medical history of seizure disorder presenting as transfer from [place] for elbow fracture.  Patient injured her arm earlier today after a sofa folded up on her arm.  Denies numbness/tingling or weakness, no AC use. Pain worse with movement.         Review of patient's allergies indicates:   Allergen Reactions    Benadryl decongestant Shortness Of Breath    Carrot Hives and Shortness Of Breath    Diphenhydramine Shortness Of Breath    Sumatriptan     Sumatriptan succinate Other (See Comments)     paralysis    Tramadol Other (See Comments)     Faces swells. Tolerates percocet  Pt states she  can take Dilaudid.     Venom-honey bee Anaphylaxis    Wasp sting [allergen ext-venom-honey bee] Anaphylaxis    Bupropion      Other reaction(s): Unknown    Bupropion hcl     Gabapentin Other (See Comments)     seizures     Past Medical History:   Diagnosis Date    Anxiety     Breast abscess     Charcot-Amanda-Tooth disease     mild LE weakness    Chronic interstitial cystitis without hematuria     CMT (Charcot-Amanda-Tooth disease)     Depression     reports she is no longer depressed    Endometriosis     Endometriosis of uterus     Headache(784.0)     Herpes     History of psychiatric care     History of psychiatric hospitalization     Muscular dystrophy     PONV (postoperative nausea and vomiting)     PTSD (post-traumatic stress disorder)     S/P cholecystectomy     Seizures     last seizure 14-15 yrs ago    Self-injurious behavior     Thyroid disease      Past Surgical History:   Procedure Laterality Date    APPENDECTOMY      arthroscopy right shoulder      BACK SURGERY  07/01/2017    BACK SURGERY  02/21/2018    screw removal    BREAST SURGERY      reduction    CHOLECYSTECTOMY  03/2017    CYSTOSCOPY WITH  HYDRODISTENSION OF BLADDER N/A 7/1/2019    Procedure: CYSTOSCOPY, WITH BLADDER HYDRODISTENSION;  Surgeon: Jay Shelby MD;  [facility];  Service: Urology;  Laterality: N/A;    CYSTOSCOPY WITH HYDRODISTENSION OF BLADDER N/A 12/7/2020    Procedure: CYSTOSCOPY, WITH BLADDER HYDRODISTENSION;  Surgeon: Jay Shelby MD;  [facility];  Service: Urology;  Laterality: N/A;    CYSTOSCOPY WITH HYDRODISTENSION OF BLADDER N/A 5/6/2021    Procedure: CYSTOSCOPY, WITH BLADDER HYDRODISTENSION;  Surgeon: Jay Shelby MD;  [facility]: [facility];  Service: Urology;  Laterality: N/A;    CYSTOSCOPY WITH URETHRAL DILATION N/A 10/8/2021    Procedure: CYSTOSCOPY, WITH URETHRAL DILATION;  Surgeon: Tone Khanna MD;  Location[facility] OR;  Service: Urology;  Laterality: N/A;    DIAGNOSTIC LAPAROSCOPY Bilateral 9/21/2020    Procedure: LAPAROSCOPY, DIAGNOSTIC;  Surgeon: Santy Hinton MD;  Location: [facility];  Service: OB/GYN;  Laterality: Bilateral;  Plan to use robot in room 12 with Dr. Kemal LOGAN    DIAGNOSTIC LAPAROSCOPY N/A 2/21/2022    Procedure: LAPAROSCOPY, DIAGNOSTIC;  Surgeon: Santy Hinton MD;  Location: [facility];  Service: OB/GYN;  Laterality: N/A;  PER DR HINTON HE WOULD ONLY NEED 60 MINUTES    ENDOSCOPIC ULTRASOUND OF UPPER GASTROINTESTINAL TRACT N/A 8/14/2018    Procedure: ULTRASOUND, ENDOSCOPIC, UPPER GI TRACT;  Surgeon: Marko Pereyra MD;  Location: [facility];  Service: Endoscopy;  Laterality: N/A;    ESOPHAGOGASTRODUODENOSCOPY  08/14/2018    ESOPHAGOGASTRODUODENOSCOPY N/A 8/14/2018    Procedure: ESOPHAGOGASTRODUODENOSCOPY (EGD);  Surgeon: Marko Pereyra MD;  Location: [facility];  Service: Endoscopy;  Laterality: N/A;    ESOPHAGOGASTRODUODENOSCOPY N/A 7/23/2021    Procedure: EGD (ESOPHAGOGASTRODUODENOSCOPY);  Surgeon: Sulema Lopez MD;  Location[facility];  Service: Endoscopy;  Laterality: N/A;    EXAMINATION UNDER ANESTHESIA N/A 11/2/2020    Procedure: EXAM UNDER ANESTHESIA;  Surgeon:  Jenifer Aragon MD;  [facility];  Service: Endoscopy;  Laterality: N/A;    EXCISIONAL HEMORRHOIDECTOMY N/A 10/1/2019    Procedure: HEMORRHOIDECTOMY;  Surgeon: Jenifer Aragon MD;  [facility];  Service: Colon and Rectal;  Laterality: N/A;    HYSTERECTOMY      TLH vs LAVH with BSO    KNEE SURGERY Bilateral     OOPHORECTOMY      OPEN REDUCTION AND INTERNAL FIXATION (ORIF) OF FRACTURE OF DISTAL HUMERUS Left 2022    Procedure: ORIF, FRACTURE, HUMERUS, DISTAL - LEFT, Synthes;  Surgeon: Elliot King MD;  Location: [facility] OR 2ND FLR;  Service: Orthopedics;  Laterality: Left;    SPINE SURGERY      SURGICAL REMOVAL OF ENDOMETRIOSIS N/A 2020    Procedure: DESTRUCTION, ENDOMETRIOSIS;  Surgeon: Santy Elaine MD;  Location: [facility] OR;  Service: OB/GYN;  Laterality: N/A;    SURGICAL REMOVAL OF ENDOMETRIOSIS N/A 2022    Procedure: DESTRUCTION, ENDOMETRIOSIS;  Surgeon: Santy Elaine MD;  Location: [facility] OR;  Service: OB/GYN;  Laterality: N/A;    Upper EUS  2018     Family History   Problem Relation Age of Onset    Cancer Maternal Grandfather         colon    Colon cancer Maternal Grandfather     No Known Problems Mother     No Known Problems Father     Bladder Cancer Maternal Grandmother     Breast cancer Paternal Aunt     Heart disease Neg Hx      Social History     Tobacco Use    Smoking status: Former Smoker     Packs/day: 0.50     Years: 3.00     Pack years: 1.50     Types: Cigarettes     Quit date:      Years since quittin.3    Smokeless tobacco: Never Used    Tobacco comment: rare   Substance Use Topics    Alcohol use: No    Drug use: No     Review of Systems   Musculoskeletal:        Elbow injury   Allergic/Immunologic: Negative for immunocompromised state.   Neurological: Negative for weakness and numbness.   Hematological: Does not bruise/bleed easily.       Physical Exam     Initial Vitals [22 1851]   BP Pulse Resp Temp  SpO2   (!) 156/90 87 18 97.8 °F (36.6 °C) 97 %      MAP       --         Physical Exam    Nursing note and vitals reviewed.  Constitutional: She appears well-developed and well-nourished. She is not diaphoretic. She appears distressed.   HENT:   Head: Normocephalic and atraumatic.   Nose: Nose normal.   Eyes: EOM are normal. Pupils are equal, round, and reactive to light.   Neck:   Normal range of motion.  Musculoskeletal:      Cervical back: Normal range of motion.      Comments: Left elbow splinted, CSM intact distally     Neurological: She is alert and oriented to person, place, and time. She has normal strength. No sensory deficit.   Skin: Skin is warm and dry. Capillary refill takes less than 2 seconds. No pallor.   Psychiatric: Her behavior is normal. Judgment and thought content normal.   tearful         ED Course   Procedures  Labs Reviewed   POCT GLUCOSE          Imaging Results          CT Arm Elbow Without Contrast Left (Final result)  Result time 04/01/22 00:31:29    Final result by Martir Armendariz MD (04/01/22 00:31:29)                 Impression:      Acute comminuted, mildly displaced intra-articular fracture involving both columns/medial and lateral epicondyles of the distal left humerus.  Please see above for additional details.      Electronically signed by: Martir Armednariz MD  Date:    04/01/2022  Time:    00:31             Narrative:    EXAMINATION:  CT ARM ELBOW WITHOUT CONTRAST LEFT; CT 3D RECON WITH INDEPENDENT WS    CLINICAL HISTORY:  Fracture, elbow;; Fracture, elbow;fx;    TECHNIQUE:  0.625 mm axial images were acquired of the left elbow without IV contrast.  Sagittal and coronal reformatted images were reviewed.  Additionally, 3D reconstructions were performed at a separate independent workstation and reviewed.    COMPARISON:  Left elbow radiograph series 03/31/2022    FINDINGS:  There is an acute, moderately comminuted, mildly displaced intra-articular fracture of the distal left  humerus.  There is involvement of the medial and lateral epicondyles which are slightly displaced posteriorly relative to the distal humeral diaphysis.  There is involvement of the trochlea.  There is multifocal intra-articular involvement.  Ulnohumeral and radiocapitellar alignment appear appropriately maintained.  The proximal radius and ulna/olecranon appear intact.  There is moderate soft tissue edema about the left elbow.  No evidence of subcutaneous emphysema or retained radiopaque foreign body.                               CT 3D RECON WITH INDEPENDENT WS (Final result)  Result time 04/01/22 00:31:29    Final result by Martir Armendariz MD (04/01/22 00:31:29)                 Impression:      Acute comminuted, mildly displaced intra-articular fracture involving both columns/medial and lateral epicondyles of the distal left humerus.  Please see above for additional details.      Electronically signed by: Martir Armendariz MD  Date:    04/01/2022  Time:    00:31             Narrative:    EXAMINATION:  CT ARM ELBOW WITHOUT CONTRAST LEFT; CT 3D RECON WITH INDEPENDENT WS    CLINICAL HISTORY:  Fracture, elbow;; Fracture, elbow;fx;    TECHNIQUE:  0.625 mm axial images were acquired of the left elbow without IV contrast.  Sagittal and coronal reformatted images were reviewed.  Additionally, 3D reconstructions were performed at a separate independent workstation and reviewed.    COMPARISON:  Left elbow radiograph series 03/31/2022    FINDINGS:  There is an acute, moderately comminuted, mildly displaced intra-articular fracture of the distal left humerus.  There is involvement of the medial and lateral epicondyles which are slightly displaced posteriorly relative to the distal humeral diaphysis.  There is involvement of the trochlea.  There is multifocal intra-articular involvement.  Ulnohumeral and radiocapitellar alignment appear appropriately maintained.  The proximal radius and ulna/olecranon appear intact.  There is  moderate soft tissue edema about the left elbow.  No evidence of subcutaneous emphysema or retained radiopaque foreign body.                               X-Ray Elbow Complete Left (Final result)  Result time 03/31/22 22:14:22    Final result by Charles Andino MD (03/31/22 22:14:22)                 Impression:      Post traction appearance of distal humerus fracture discussed above.      Electronically signed by: Charles Andino MD  Date:    03/31/2022  Time:    22:14             Narrative:    EXAMINATION:  XR ELBOW COMPLETE 3 VIEW LEFT    CLINICAL HISTORY:  traction view;    TECHNIQUE:  Frontal and lateral views of the left elbow were performed.    COMPARISON:  03/31/2022.    FINDINGS:  There is a persistent comminuted and displaced fracture involving the distal humerus with mildly improved alignment on the lateral radiograph post traction.  No new acute fracture.  No dislocation.  Persistent soft tissue edema about the elbow and distal humerus.  Joint effusion is suspected.  No unexpected radiopaque foreign body.                                 Medications   HYDROmorphone injection 0.5 mg (0.5 mg Intravenous Given 3/31/22 2019)   HYDROmorphone injection 1 mg (1 mg Intravenous Given 3/31/22 2134)   HYDROmorphone injection 1 mg (1 mg Intravenous Given 3/31/22 2210)   HYDROmorphone injection 0.5 mg (0.5 mg Intravenous Given 3/31/22 2343)     Medical Decision Making:   Initial Assessment:   Patient is in pain distress, crying, CSM intact distally.  Per review of records patient tolerates Dilaudid and will give small dose to facilitate imaging.  Differential Diagnosis:   Elbow fracture, elbow dislocation  Clinical Tests:   Radiological Study: Ordered and Reviewed  ED Management:  Discussed with Orthopedics, they note that they will splint patient and consent her for outpatient surgery.  They request CT imaging.  Updated patient.     Other:   I have discussed this case with another health care provider.       <>  Summary of the Discussion: See above                      Clinical Impression:   Final diagnoses:  [S44.402A] Closed fracture of left elbow, initial encounter (Primary)          ED Disposition Condition    Discharge Stable        ED Prescriptions     Medication Sig Dispense Start Date End Date Auth. Provider    HYDROcodone-acetaminophen (NORCO) 5-325 mg per tablet () Take 1 tablet by mouth every 4 (four) hours as needed for Pain. 16 tablet 3/31/2022 2022 Day Virk MD        Follow-up Information     Follow up With Specialties Details Why Contact Info Additional Information    [facility] Schedule an appointment as soon as possible for a visit   [address]  [facility] [address] [phone] [facility] - Main Building, 5th Floor Please park in [facility] and take [address] elevator           Day Virk MD  22 7029

## 2022-04-01 NOTE — TELEPHONE ENCOUNTER
Spoke with pt.  States she is taking Norco 10 mg and it is not controlling the pain.  Advised pt that I have Norco 5 mg listed in her chart as prescribed by the ED.  Pt reports that is correct but she sees Dr Octavio Zuniga for Pain Management and he has her on Norco 10 mg TID at baseline.   Advised pt that she will need to contact Dr Zuniga and inform him of her injury and upcoming surgery so that he can implement a plan for proper pain control.    Pt verbalized understanding.       Dr Octavio Zuniga  [phone]

## 2022-04-01 NOTE — SUBJECTIVE & OBJECTIVE
Past Medical History:   Diagnosis Date    Anxiety     Breast abscess     Charcot-Amanda-Tooth disease     mild LE weakness    Chronic interstitial cystitis without hematuria     CMT (Charcot-Amanda-Tooth disease)     Depression     reports she is no longer depressed    Endometriosis     Endometriosis of uterus     Headache(784.0)     Herpes     History of psychiatric care     History of psychiatric hospitalization     Muscular dystrophy     PONV (postoperative nausea and vomiting)     PTSD (post-traumatic stress disorder)     S/P cholecystectomy     Seizures     last seizure 14-15 yrs ago    Self-injurious behavior     Thyroid disease        Past Surgical History:   Procedure Laterality Date    APPENDECTOMY      arthroscopy right shoulder      BACK SURGERY  07/01/2017    BACK SURGERY  02/21/2018    screw removal    BREAST SURGERY      reduction    CHOLECYSTECTOMY  03/2017    CYSTOSCOPY WITH HYDRODISTENSION OF BLADDER N/A 7/1/2019    Procedure: CYSTOSCOPY, WITH BLADDER HYDRODISTENSION;  Surgeon: Jay Shelby MD;  Location: [facility] OR;  Service: Urology;  Laterality: N/A;    CYSTOSCOPY WITH HYDRODISTENSION OF BLADDER N/A 12/7/2020    Procedure: CYSTOSCOPY, WITH BLADDER HYDRODISTENSION;  Surgeon: Jay Shelby MD;  Location: [facility] OR;  Service: Urology;  Laterality: N/A;    CYSTOSCOPY WITH HYDRODISTENSION OF BLADDER N/A 5/6/2021    Procedure: CYSTOSCOPY, WITH BLADDER HYDRODISTENSION;  Surgeon: Jay Shelby MD;  Location: [facility] OR;  Service: Urology;  Laterality: N/A;    CYSTOSCOPY WITH URETHRAL DILATION N/A 10/8/2021    Procedure: CYSTOSCOPY, WITH URETHRAL DILATION;  Surgeon: Tone Khanna MD;  Location: [facility] OR;  Service: Urology;  Laterality: N/A;    DIAGNOSTIC LAPAROSCOPY Bilateral 9/21/2020    Procedure: LAPAROSCOPY, DIAGNOSTIC;  Surgeon: Santy Elaine MD;  Location: [facility] OR;  Service: OB/GYN;  Laterality: Bilateral;  Plan to use robot in room 12 with Dr. Kemal LOGAN    DIAGNOSTIC LAPAROSCOPY N/A 2/21/2022     Procedure: LAPAROSCOPY, DIAGNOSTIC;  Surgeon: Santy Hinton MD;  Location: [facility];  Service: OB/GYN;  Laterality: N/A;  PER DR HINTON HE WOULD ONLY NEED 60 MINUTES    ENDOSCOPIC ULTRASOUND OF UPPER GASTROINTESTINAL TRACT N/A 8/14/2018    Procedure: ULTRASOUND, ENDOSCOPIC, UPPER GI TRACT;  Surgeon: Marko Pereyra MD;  Location: [facility];  Service: Endoscopy;  Laterality: N/A;    ESOPHAGOGASTRODUODENOSCOPY  08/14/2018    ESOPHAGOGASTRODUODENOSCOPY N/A 8/14/2018    Procedure: ESOPHAGOGASTRODUODENOSCOPY (EGD);  Surgeon: Marko Pereyra MD;  Location: [facility];  Service: Endoscopy;  Laterality: N/A;    ESOPHAGOGASTRODUODENOSCOPY N/A 7/23/2021    Procedure: EGD (ESOPHAGOGASTRODUODENOSCOPY);  Surgeon: Sulema Lopez MD;  Location: [facility];  Service: Endoscopy;  Laterality: N/A;    EXAMINATION UNDER ANESTHESIA N/A 11/2/2020    Procedure: EXAM UNDER ANESTHESIA;  Surgeon: Jenifer Aragon MD;  Location: [facility] OR 2ND FLR;  Service: Endoscopy;  Laterality: N/A;    EXCISIONAL HEMORRHOIDECTOMY N/A 10/1/2019    Procedure: HEMORRHOIDECTOMY;  Surgeon: Jenifer Aragon MD;  Location: [facility] OR 2ND FLR;  Service: Colon and Rectal;  Laterality: N/A;    HYSTERECTOMY      TLH vs LAVH with BSO    KNEE SURGERY Bilateral     OOPHORECTOMY      SPINE SURGERY      SURGICAL REMOVAL OF ENDOMETRIOSIS N/A 9/21/2020    Procedure: DESTRUCTION, ENDOMETRIOSIS;  Surgeon: Santy Hinton MD;  Location: [facility];  Service: OB/GYN;  Laterality: N/A;    SURGICAL REMOVAL OF ENDOMETRIOSIS N/A 2/21/2022    Procedure: DESTRUCTION, ENDOMETRIOSIS;  Surgeon: Santy Hinton MD;  Location: [facility];  Service: OB/GYN;  Laterality: N/A;    Upper EUS  08/14/2018       Review of patient's allergies indicates:   Allergen Reactions    Benadryl decongestant Shortness Of Breath    Carrot Hives and Shortness Of Breath    Sumatriptan     Sumatriptan succinate Other (See Comments)     paralysis    Tramadol Other (See Comments)     Faces swells. Tolerates  percocet  Pt states she  can take Dilaudid.     Venom-honey bee Anaphylaxis    Wasp sting [allergen ext-venom-honey bee] Anaphylaxis    Bupropion hcl        No current facility-administered medications for this encounter.     Current Outpatient Medications   Medication Sig    fluticasone propionate (FLONASE) 50 mcg/actuation nasal spray 1 spray (50 mcg total) by Each Nostril route once daily.    HYDROcodone-acetaminophen (NORCO)  mg per tablet Take 1 tablet by mouth every 4 (four) hours as needed for Pain (postop pain).    levothyroxine (SYNTHROID) 50 MCG tablet Take 1 tablet (50 mcg total) by mouth once daily.    promethazine (PHENERGAN) 12.5 MG Tab Take 1 tablet (12.5 mg total) by mouth 4 (four) times daily.    tiZANidine (ZANAFLEX) 4 MG tablet Take 4 mg by mouth every evening.     zolpidem (AMBIEN) 10 mg Tab Take 10 mg by mouth nightly as needed.     Family History       Problem Relation (Age of Onset)    Bladder Cancer Maternal Grandmother    Breast cancer Paternal Aunt    Cancer Maternal Grandfather    Colon cancer Maternal Grandfather    No Known Problems Mother, Father          Tobacco Use    Smoking status: Former Smoker     Packs/day: 0.50     Years: 3.00     Pack years: 1.50     Types: Cigarettes     Quit date:      Years since quittin.2    Smokeless tobacco: Never Used    Tobacco comment: rare   Substance and Sexual Activity    Alcohol use: No    Drug use: No    Sexual activity: Not Currently     Partners: Male     Birth control/protection: None     ROS  Constitutional: negative for fevers  Eyes: negative visual changes  ENT: negative for hearing loss  Respiratory: negative for dyspnea  Cardiovascular: negative for chest pain  Gastrointestinal: negative for abdominal pain  Genitourinary: negative for dysuria  Neurological: negative for headaches  Behavioral/Psych: negative for hallucinations  Endocrine: negative for temperature intolerance      Objective:     Vital Signs (Most  Recent):  Temp: 97.8 °F (36.6 °C) (03/31/22 1851)  Pulse: 87 (03/31/22 2100)  Resp: 18 (03/31/22 2210)  BP: 135/81 (03/31/22 2100)  SpO2: 97 % (03/31/22 2100) Vital Signs (24h Range):  Temp:  [97.8 °F (36.6 °C)] 97.8 °F (36.6 °C)  Pulse:  [] 87  Resp:  [17-22] 18  SpO2:  [97 %-100 %] 97 %  BP: (119-156)/(79-90) 135/81        Ortho/SPM Exam    LUE:  Deformity present at elbow  Skin intact throughout  Moderate swelling around elbow  Significant TTP of distal aspects of humerus  No TTP of proximal or middle aspects of radius or ulna  No TTP of hand  Compartments soft  Painless ROM shoulder and wrist  SILT M/U/R  Motor intact AIN/PIN/M/U/R  Wrist extension intact   2+ radial  Brisk capillary refill     RUE:  Skin intact throughout  No swelling around wrist  No tenderness to palpation of proximal, middle, or distal aspects of humerus  No tenderness to palpation of proximal or middle aspects of radius or ulna  No tenderness to palpation of hand  Compartments soft  Painless ROM shoulder and elbow  SILT M/U/R  Motor intact AIN/PIN/M/U/R  2+ radial  Brisk capillary refill       Significant Labs:   All pertinent labs within the past 24 hours have been reviewed.    Significant Imaging: I have reviewed all pertinent imaging results/findings.  Left distal humerus fracture with comminution and intra-articular extension.

## 2022-04-01 NOTE — CONSULTS
[facility] - Emergency Dept  Orthopedics  Consult Note    Patient Name: Jaycee Gamboa  MRN: [id number]  Admission Date: 3/31/2022  Hospital Length of Stay: 0 days  Attending Provider: Day Virk MD  Primary Care Provider: Emi Lechuga MD      Inpatient consult to Orthopedic Surgery  Consult performed by: Jimbo Elizalde MD  Consult ordered by: Day Virk MD        Subjective:     Principal Problem:Closed fracture of left distal humerus    Chief Complaint:   Chief Complaint   Patient presents with    Elbow Pain     Pt transfer from [place] for elbow fracture        HPI: Jaycee Gamboa is a 41 y.o. female with PMH of Charcot-Amanda-Tooth, PTSD, anxiety, and thyroid disease presenting with left distal humerus fracture and elbow pain after mechanical fall from standing on 3/31. Patient tripped and fell landing on an outstretched left arm and landed with her body on her arm. She reports a history of frequent falls stemming from mild LE weakness 2/2 her CMT. She had immediate pain and presented to OSH for initial evaluation. Transferred to [facility] for higher level of care. Denies head injury or LOC. Does not take blood thinners. Denies other MSK pains. Denies numbness, tingling, or paresthesias to extremity. Recent orthopedic history of left foot fracture managed conservative with casting. History of right rotator cuff repair s/p arthroscopic repair several years ago.       Past Medical History:   Diagnosis Date    Anxiety     Breast abscess     Charcot-Amanda-Tooth disease     mild LE weakness    Chronic interstitial cystitis without hematuria     CMT (Charcot-Amanda-Tooth disease)     Depression     reports she is no longer depressed    Endometriosis     Endometriosis of uterus     Headache(784.0)     Herpes     History of psychiatric care     History of psychiatric hospitalization     Muscular dystrophy     PONV (postoperative nausea and vomiting)     PTSD (post-traumatic stress  disorder)     S/P cholecystectomy     Seizures     last seizure 14-15 yrs ago    Self-injurious behavior     Thyroid disease        Past Surgical History:   Procedure Laterality Date    APPENDECTOMY      arthroscopy right shoulder      BACK SURGERY  07/01/2017    BACK SURGERY  02/21/2018    screw removal    BREAST SURGERY      reduction    CHOLECYSTECTOMY  03/2017    CYSTOSCOPY WITH HYDRODISTENSION OF BLADDER N/A 7/1/2019    Procedure: CYSTOSCOPY, WITH BLADDER HYDRODISTENSION;  Surgeon: Jay Shelby MD;  Location: [facility] OR;  Service: Urology;  Laterality: N/A;    CYSTOSCOPY WITH HYDRODISTENSION OF BLADDER N/A 12/7/2020    Procedure: CYSTOSCOPY, WITH BLADDER HYDRODISTENSION;  Surgeon: Jay Shelby MD;  Location: [facility] OR;  Service: Urology;  Laterality: N/A;    CYSTOSCOPY WITH HYDRODISTENSION OF BLADDER N/A 5/6/2021    Procedure: CYSTOSCOPY, WITH BLADDER HYDRODISTENSION;  Surgeon: Jay Shelby MD;  Location: [facility] OR;  Service: Urology;  Laterality: N/A;    CYSTOSCOPY WITH URETHRAL DILATION N/A 10/8/2021    Procedure: CYSTOSCOPY, WITH URETHRAL DILATION;  Surgeon: Tone Khanna MD;  Location: [facility] OR;  Service: Urology;  Laterality: N/A;    DIAGNOSTIC LAPAROSCOPY Bilateral 9/21/2020    Procedure: LAPAROSCOPY, DIAGNOSTIC;  Surgeon: Santy Hinton MD;  Location: [facility];  Service: OB/GYN;  Laterality: Bilateral;  Plan to use robot in room 12 with Dr. Kemal LOGAN    DIAGNOSTIC LAPAROSCOPY N/A 2/21/2022    Procedure: LAPAROSCOPY, DIAGNOSTIC;  Surgeon: Santy Hinton MD;  Location: [facility];  Service: OB/GYN;  Laterality: N/A;  PER DR HINTON HE WOULD ONLY NEED 60 MINUTES    ENDOSCOPIC ULTRASOUND OF UPPER GASTROINTESTINAL TRACT N/A 8/14/2018    Procedure: ULTRASOUND, ENDOSCOPIC, UPPER GI TRACT;  Surgeon: Marko Pereyra MD;  Location: [facility];  Service: Endoscopy;  Laterality: N/A;    ESOPHAGOGASTRODUODENOSCOPY  08/14/2018    ESOPHAGOGASTRODUODENOSCOPY N/A 8/14/2018    Procedure:  ESOPHAGOGASTRODUODENOSCOPY (EGD);  Surgeon: Marko Pereyra MD;  Location: [facility] ENDO;  Service: Endoscopy;  Laterality: N/A;    ESOPHAGOGASTRODUODENOSCOPY N/A 7/23/2021    Procedure: EGD (ESOPHAGOGASTRODUODENOSCOPY);  Surgeon: Sulema Lopez MD;  Location: [facility] ENDO;  Service: Endoscopy;  Laterality: N/A;    EXAMINATION UNDER ANESTHESIA N/A 11/2/2020    Procedure: EXAM UNDER ANESTHESIA;  Surgeon: Jenifer Aragon MD;  Location: [facility] OR [facility] FLR;  Service: Endoscopy;  Laterality: N/A;    EXCISIONAL HEMORRHOIDECTOMY N/A 10/1/2019    Procedure: HEMORRHOIDECTOMY;  Surgeon: Jenifer Aragon MD;  [facility];  Service: Colon and Rectal;  Laterality: N/A;    HYSTERECTOMY      TLH vs LAVH with BSO    KNEE SURGERY Bilateral     OOPHORECTOMY      SPINE SURGERY      SURGICAL REMOVAL OF ENDOMETRIOSIS N/A 9/21/2020    Procedure: DESTRUCTION, ENDOMETRIOSIS;  Surgeon: Santy Elaine MD;  Location: [facility] OR;  Service: OB/GYN;  Laterality: N/A;    SURGICAL REMOVAL OF ENDOMETRIOSIS N/A 2/21/2022    Procedure: DESTRUCTION, ENDOMETRIOSIS;  Surgeon: Santy Elaine MD;  Location: [facility] OR;  Service: OB/GYN;  Laterality: N/A;    Upper EUS  08/14/2018       Review of patient's allergies indicates:   Allergen Reactions    Benadryl decongestant Shortness Of Breath    Carrot Hives and Shortness Of Breath    Sumatriptan     Sumatriptan succinate Other (See Comments)     paralysis    Tramadol Other (See Comments)     Faces swells. Tolerates percocet  Pt states she  can take Dilaudid.     Venom-honey bee Anaphylaxis    Wasp sting [allergen ext-venom-honey bee] Anaphylaxis    Bupropion hcl        No current facility-administered medications for this encounter.     Current Outpatient Medications   Medication Sig    fluticasone propionate (FLONASE) 50 mcg/actuation nasal spray 1 spray (50 mcg total) by Each Nostril route once daily.    HYDROcodone-acetaminophen (NORCO)  mg per tablet Take 1 tablet  by mouth every 4 (four) hours as needed for Pain (postop pain).    levothyroxine (SYNTHROID) 50 MCG tablet Take 1 tablet (50 mcg total) by mouth once daily.    promethazine (PHENERGAN) 12.5 MG Tab Take 1 tablet (12.5 mg total) by mouth 4 (four) times daily.    tiZANidine (ZANAFLEX) 4 MG tablet Take 4 mg by mouth every evening.     zolpidem (AMBIEN) 10 mg Tab Take 10 mg by mouth nightly as needed.     Family History       Problem Relation (Age of Onset)    Bladder Cancer Maternal Grandmother    Breast cancer Paternal Aunt    Cancer Maternal Grandfather    Colon cancer Maternal Grandfather    No Known Problems Mother, Father          Tobacco Use    Smoking status: Former Smoker     Packs/day: 0.50     Years: 3.00     Pack years: 1.50     Types: Cigarettes     Quit date:      Years since quittin.2    Smokeless tobacco: Never Used    Tobacco comment: rare   Substance and Sexual Activity    Alcohol use: No    Drug use: No    Sexual activity: Not Currently     Partners: Male     Birth control/protection: None     ROS  Constitutional: negative for fevers  Eyes: negative visual changes  ENT: negative for hearing loss  Respiratory: negative for dyspnea  Cardiovascular: negative for chest pain  Gastrointestinal: negative for abdominal pain  Genitourinary: negative for dysuria  Neurological: negative for headaches  Behavioral/Psych: negative for hallucinations  Endocrine: negative for temperature intolerance      Objective:     Vital Signs (Most Recent):  Temp: 97.8 °F (36.6 °C) (22 185)  Pulse: 87 (22)  Resp: 18 (22)  BP: 135/81 (22)  SpO2: 97 % (22) Vital Signs (24h Range):  Temp:  [97.8 °F (36.6 °C)] 97.8 °F (36.6 °C)  Pulse:  [] 87  Resp:  [17-22] 18  SpO2:  [97 %-100 %] 97 %  BP: (119-156)/(79-90) 135/81        Ortho/SPM Exam    LUE:  Deformity present at elbow  Skin intact throughout  Moderate swelling around elbow  Significant TTP of distal  aspects of humerus  No TTP of proximal or middle aspects of radius or ulna  No TTP of hand  Compartments soft  Painless ROM shoulder and wrist  SILT M/U/R  Motor intact AIN/PIN/M/U/R  Wrist extension intact   2+ radial  Brisk capillary refill     RUE:  Skin intact throughout  No swelling around wrist  No tenderness to palpation of proximal, middle, or distal aspects of humerus  No tenderness to palpation of proximal or middle aspects of radius or ulna  No tenderness to palpation of hand  Compartments soft  Painless ROM shoulder and elbow  SILT M/U/R  Motor intact AIN/PIN/M/U/R  2+ radial  Brisk capillary refill       Significant Labs:   All pertinent labs within the past 24 hours have been reviewed.    Significant Imaging: I have reviewed all pertinent imaging results/findings.  Left distal humerus fracture with comminution and intra-articular extension.     Assessment/Plan:     * Closed fracture of left distal humerus  Jaycee Gamboa is a 41 y.o. female with left distal humerus fracture, closed NVI. On exam able to demonstrate active wrist extension. Skin closed with only mild to moderate swelling about the elbow. Traction views performed in ED and CT elbow obtained for pre-operative planning.     - Splinted in ED after traction view   - NWB to LUE, pt encouraged to keep extremity iced and elevated at all times  - Pt will need operative fixation of this fracture (booked/consented); I have explained the risks, benefits, and alternatives of the procedure in detail. The patient voices understanding and all questions have been answered. The patient agrees to proceed as planned. Pre-op workup and consents signed in ED.  - F/u in ortho trauma clinic next week for skin check with plans for ORIF to follow. Patient will be provided with appointment details.   - Patient to be discharged on multimodal pain regimen including: acetaminophen, celebrex, robaxin, oxycodone prn               Jimbo Elizalde MD  Orthopedics  [facility] - Emergency Dept

## 2022-04-01 NOTE — ASSESSMENT & PLAN NOTE
Jaycee Gamboa is a 41 y.o. female with left distal humerus fracture, closed NVI. On exam able to demonstrate active wrist extension. Skin closed with only mild to moderate swelling about the elbow. Traction views performed in ED and CT elbow obtained for pre-operative planning.     - Splinted in ED after traction view   - NWB to WADE, pt encouraged to keep extremity iced and elevated at all times  - Pt will need operative fixation of this fracture (booked/consented); I have explained the risks, benefits, and alternatives of the procedure in detail. The patient voices understanding and all questions have been answered. The patient agrees to proceed as planned. Pre-op workup and consents signed in ED.  - F/u in ortho trauma clinic next week for skin check with plans for ORIF to follow. Patient will be provided with appointment details.   - Patient to be discharged on multimodal pain regimen including: acetaminophen, celebrex, robaxin, oxycodone prn

## 2022-04-01 NOTE — HPI
Jaycee Gamboa is a 41 y.o. female with PMH of Charcot-Amanda-Tooth, PTSD, anxiety, and thyroid disease presenting with left distal humerus fracture and elbow pain after mechanical fall from standing on 3/31. Patient tripped and fell landing on an outstretched left arm and landed with her body on her arm. She reports a history of frequent falls stemming from mild LE weakness 2/2 her CMT. She had immediate pain and presented to OSH for initial evaluation. Transferred to [facility] for higher level of care. Denies head injury or LOC. Does not take blood thinners. Denies other MSK pains. Denies numbness, tingling, or paresthesias to extremity. Recent orthopedic history of left foot fracture managed conservative with casting. History of right rotator cuff repair s/p arthroscopic repair several years ago.

## 2022-04-01 NOTE — TELEPHONE ENCOUNTER
----- Message from Tracee Harris sent at 4/1/2022 10:35 AM CDT -----  Contact: Pt  Pt would like a call back regarding pain meds..    Confirmed contact info below:  Contact Name: Jaycee Gamboa  Phone Number: [phone]

## 2022-04-01 NOTE — DISCHARGE INSTRUCTIONS
Follow up with Orthopedics for your elbow fracture.    Take Tylenol or ibuprofen as indicated for pain relief. For severe pain you may take Norco. Do not drink alcohol, drive, operate machinery while taking Norco as it can make you drowsy. Do not mix Norco with Tylenol or other sedating medications. Return to the ED for any concerns.

## 2022-04-04 ENCOUNTER — NURSE TRIAGE (OUTPATIENT)
Dept: ADMINISTRATIVE | Facility: CLINIC | Age: 42
End: 2022-04-04
Payer: MEDICARE

## 2022-04-04 NOTE — TELEPHONE ENCOUNTER
[place]    PCP:  Dr. Emi Lechuga    S/P fracture of Lt humerus.  Partial cast is in place on entire arm; was placed on Thursday.  C/O fingers swelling and pain rated > 10/10 on pain scale.  Symptoms began over the weekend.  Denies fingers being blue/slow cap refill, fever, and difficulty breathing.  Per protocol, care advised is go to [facility]/ED now.  Instructed to call for questions/concerns.  VU.    Reason for Disposition   SEVERE pain (e.g., excruciating, pain scale 8-10) under cast and not improved after pain medicines and elevation    Additional Information   Negative: Chest pain   Negative: Difficulty breathing    Protocols used: CAST SYMPTOMS AND [id number]

## 2022-04-05 ENCOUNTER — OFFICE VISIT (OUTPATIENT)
Dept: ORTHOPEDICS | Facility: CLINIC | Age: 42
End: 2022-04-05
Payer: MEDICARE

## 2022-04-05 VITALS
WEIGHT: 170.19 LBS | HEIGHT: 62 IN | DIASTOLIC BLOOD PRESSURE: 90 MMHG | SYSTOLIC BLOOD PRESSURE: 139 MMHG | BODY MASS INDEX: 31.32 KG/M2 | HEART RATE: 80 BPM

## 2022-04-05 DIAGNOSIS — S42.402A CLOSED FRACTURE OF LEFT ELBOW, INITIAL ENCOUNTER: ICD-10-CM

## 2022-04-05 DIAGNOSIS — M25.522 LEFT ELBOW PAIN: Primary | ICD-10-CM

## 2022-04-05 DIAGNOSIS — S42.402A CLOSED FRACTURE OF DISTAL END OF LEFT HUMERUS, UNSPECIFIED FRACTURE MORPHOLOGY, INITIAL ENCOUNTER: Primary | ICD-10-CM

## 2022-04-05 DIAGNOSIS — Z01.818 PRE-OP TESTING: ICD-10-CM

## 2022-04-05 PROCEDURE — 99213 OFFICE O/P EST LOW 20 MIN: CPT | Mod: S$GLB,,, | Performed by: ORTHOPAEDIC SURGERY

## 2022-04-05 PROCEDURE — 3080F DIAST BP >= 90 MM HG: CPT | Mod: CPTII,S$GLB,, | Performed by: ORTHOPAEDIC SURGERY

## 2022-04-05 PROCEDURE — 3080F PR MOST RECENT DIASTOLIC BLOOD PRESSURE >= 90 MM HG: ICD-10-PCS | Mod: CPTII,S$GLB,, | Performed by: ORTHOPAEDIC SURGERY

## 2022-04-05 PROCEDURE — 99213 PR OFFICE/OUTPT VISIT, EST, LEVL III, 20-29 MIN: ICD-10-PCS | Mod: S$GLB,,, | Performed by: ORTHOPAEDIC SURGERY

## 2022-04-05 PROCEDURE — 3008F BODY MASS INDEX DOCD: CPT | Mod: CPTII,S$GLB,, | Performed by: ORTHOPAEDIC SURGERY

## 2022-04-05 PROCEDURE — 3075F PR MOST RECENT SYSTOLIC BLOOD PRESS GE 130-139MM HG: ICD-10-PCS | Mod: CPTII,S$GLB,, | Performed by: ORTHOPAEDIC SURGERY

## 2022-04-05 PROCEDURE — 3075F SYST BP GE 130 - 139MM HG: CPT | Mod: CPTII,S$GLB,, | Performed by: ORTHOPAEDIC SURGERY

## 2022-04-05 PROCEDURE — 99999 PR PBB SHADOW E&M-EST. PATIENT-LVL III: ICD-10-PCS | Mod: PBBFAC,,, | Performed by: ORTHOPAEDIC SURGERY

## 2022-04-05 PROCEDURE — 3008F PR BODY MASS INDEX (BMI) DOCUMENTED: ICD-10-PCS | Mod: CPTII,S$GLB,, | Performed by: ORTHOPAEDIC SURGERY

## 2022-04-05 PROCEDURE — 99999 PR PBB SHADOW E&M-EST. PATIENT-LVL III: CPT | Mod: PBBFAC,,, | Performed by: ORTHOPAEDIC SURGERY

## 2022-04-05 NOTE — PROGRESS NOTES
Patient ID:   Jaycee Gamboa is a 41 y.o. female.    Chief Complaint:   Left elbow fracture    HPI:   The patient is a 41 year old female who recently injured the left elbow. She sustained an intra-articular distal humerus fracture. She was placed into a long arm splint and surgery was scheduled with Dr. King. She came to see me today because I treated her a long time ago for a knee issue. She also states that her hometown [facility] is closer to [place] than [facility]. She is reporting swelling and pain in her extremity. She denies numbness or paresthesias.     Medications:    Current Outpatient Medications:     fluticasone propionate (FLONASE) 50 mcg/actuation nasal spray, 1 spray (50 mcg total) by Each Nostril route once daily., Disp: 16 g, Rfl: 3    HYDROcodone-acetaminophen (NORCO) 5-325 mg per tablet, Take 1 tablet by mouth every 4 (four) hours as needed for Pain., Disp: 16 tablet, Rfl: 0    levothyroxine (SYNTHROID) 50 MCG tablet, Take 1 tablet (50 mcg total) by mouth once daily., Disp: 90 tablet, Rfl: 1    promethazine (PHENERGAN) 12.5 MG Tab, Take 1 tablet (12.5 mg total) by mouth 4 (four) times daily., Disp: 30 tablet, Rfl: 5    tiZANidine (ZANAFLEX) 4 MG tablet, Take 4 mg by mouth every evening. , Disp: , Rfl:     zolpidem (AMBIEN) 10 mg Tab, Take 10 mg by mouth nightly as needed., Disp: , Rfl:     Allergies:  Review of patient's allergies indicates:   Allergen Reactions    Benadryl decongestant Shortness Of Breath    Carrot Hives and Shortness Of Breath    Diphenhydramine Shortness Of Breath    Sumatriptan     Sumatriptan succinate Other (See Comments)     paralysis    Tramadol Other (See Comments)     Faces swells. Tolerates percocet  Pt states she  can take Dilaudid.     Venom-honey bee Anaphylaxis    Wasp sting [allergen ext-venom-honey bee] Anaphylaxis    Bupropion      Other reaction(s): Unknown    Bupropion hcl        Past Medical History:  Past Medical History:   Diagnosis  Date    Anxiety     Breast abscess     Charcot-Amanda-Tooth disease     mild LE weakness    Chronic interstitial cystitis without hematuria     CMT (Charcot-Amanda-Tooth disease)     Depression     reports she is no longer depressed    Endometriosis     Endometriosis of uterus     Headache(784.0)     Herpes     History of psychiatric care     History of psychiatric hospitalization     Muscular dystrophy     PONV (postoperative nausea and vomiting)     PTSD (post-traumatic stress disorder)     S/P cholecystectomy     Seizures     last seizure 14-15 yrs ago    Self-injurious behavior     Thyroid disease         Past Surgical History:  Past Surgical History:   Procedure Laterality Date    APPENDECTOMY      arthroscopy right shoulder      BACK SURGERY  07/01/2017    BACK SURGERY  02/21/2018    screw removal    BREAST SURGERY      reduction    CHOLECYSTECTOMY  03/2017    CYSTOSCOPY WITH HYDRODISTENSION OF BLADDER N/A 7/1/2019    Procedure: CYSTOSCOPY, WITH BLADDER HYDRODISTENSION;  Surgeon: Jay Shelby MD;  Location: [facility] OR;  Service: Urology;  Laterality: N/A;    CYSTOSCOPY WITH HYDRODISTENSION OF BLADDER N/A 12/7/2020    Procedure: CYSTOSCOPY, WITH BLADDER HYDRODISTENSION;  Surgeon: Jay Shelby MD;  Location: [facility] OR;  Service: Urology;  Laterality: N/A;    CYSTOSCOPY WITH HYDRODISTENSION OF BLADDER N/A 5/6/2021    Procedure: CYSTOSCOPY, WITH BLADDER HYDRODISTENSION;  Surgeon: Jay Shelby MD;  Location: [facility] OR;  Service: Urology;  Laterality: N/A;    CYSTOSCOPY WITH URETHRAL DILATION N/A 10/8/2021    Procedure: CYSTOSCOPY, WITH URETHRAL DILATION;  Surgeon: Tone Khanna MD;  Location: [facility] OR;  Service: Urology;  Laterality: N/A;    DIAGNOSTIC LAPAROSCOPY Bilateral 9/21/2020    Procedure: LAPAROSCOPY, DIAGNOSTIC;  Surgeon: Santy Elaine MD;  Location: [facility] OR;  Service: OB/GYN;  Laterality: Bilateral;  Plan to use robot in room 12 with Dr. Kemal PA  LAPAROSCOPY N/A 2/21/2022    Procedure: LAPAROSCOPY, DIAGNOSTIC;  Surgeon: Santy Hinton MD;  Location: [facility];  Service: OB/GYN;  Laterality: N/A;  PER DR HINTON HE WOULD ONLY NEED 60 MINUTES    ENDOSCOPIC ULTRASOUND OF UPPER GASTROINTESTINAL TRACT N/A 8/14/2018    Procedure: ULTRASOUND, ENDOSCOPIC, UPPER GI TRACT;  Surgeon: Marko Pereyra MD;  Location: [facility];  Service: Endoscopy;  Laterality: N/A;    ESOPHAGOGASTRODUODENOSCOPY  08/14/2018    ESOPHAGOGASTRODUODENOSCOPY N/A 8/14/2018    Procedure: ESOPHAGOGASTRODUODENOSCOPY (EGD);  Surgeon: Marko Pereyra MD;  Location: [facility];  Service: Endoscopy;  Laterality: N/A;    ESOPHAGOGASTRODUODENOSCOPY N/A 7/23/2021    Procedure: EGD (ESOPHAGOGASTRODUODENOSCOPY);  Surgeon: Sulema Lopez MD;  Location: [facility];  Service: Endoscopy;  Laterality: N/A;    EXAMINATION UNDER ANESTHESIA N/A 11/2/2020    Procedure: EXAM UNDER ANESTHESIA;  Surgeon: Jenifer Aragon MD;  Location: [facility] OR 2ND FLR;  Service: Endoscopy;  Laterality: N/A;    EXCISIONAL HEMORRHOIDECTOMY N/A 10/1/2019    Procedure: HEMORRHOIDECTOMY;  Surgeon: Jenifer Aragon MD;  Location: [facility] OR 2ND FLR;  Service: Colon and Rectal;  Laterality: N/A;    HYSTERECTOMY      TLH vs LAVH with BSO    KNEE SURGERY Bilateral     OOPHORECTOMY      SPINE SURGERY      SURGICAL REMOVAL OF ENDOMETRIOSIS N/A 9/21/2020    Procedure: DESTRUCTION, ENDOMETRIOSIS;  Surgeon: Santy Hinton MD;  Location: [facility];  Service: OB/GYN;  Laterality: N/A;    SURGICAL REMOVAL OF ENDOMETRIOSIS N/A 2/21/2022    Procedure: DESTRUCTION, ENDOMETRIOSIS;  Surgeon: Santy Hinton MD;  Location: [facility] OR;  Service: OB/GYN;  Laterality: N/A;    Upper EUS  08/14/2018       Social History:  Social History     Occupational History    Not on file   Tobacco Use    Smoking status: Former Smoker     Packs/day: 0.50     Years: 3.00     Pack years: 1.50     Types: Cigarettes     Quit date: 2015     Years since quitting:  "7.2    Smokeless tobacco: Never Used    Tobacco comment: rare   Substance and Sexual Activity    Alcohol use: No    Drug use: No    Sexual activity: Not Currently     Partners: Male     Birth control/protection: None       Family History:  Family History   Problem Relation Age of Onset    Cancer Maternal Grandfather         colon    Colon cancer Maternal Grandfather     No Known Problems Mother     No Known Problems Father     Bladder Cancer Maternal Grandmother     Breast cancer Paternal Aunt     Heart disease Neg Hx         ROS:  Review of Systems   Musculoskeletal: Positive for joint pain, joint swelling and myalgias.   Neurological: Negative for numbness and paresthesias.   All other systems reviewed and are negative.      Vitals:  BP (!) 139/90 (BP Location: Left arm, Patient Position: Sitting, BP Method: Medium (Automatic))   Pulse 80   Ht 5' 2" (1.575 m)   Wt 77.2 kg (170 lb 3.1 oz)   LMP 04/26/2009 (Exact Date)   BMI 31.13 kg/m²     Physical Examination:  Comprehensive Orthopaedic Musculoskeletal Exam    General        Constitutional: appears stated age, mildly obese, well-developed and well-nourished    Scleral icterus: no    Labored breathing: no    Psychiatric: normal mood and affect and no acute distress    Neurological: alert and oriented x3    Skin: intact    Lymphadenopathy: none     Ortho Exam   Left elbow exam:  Swelling present. Skin intact.   Tenderness about the elbow.   ROM not assessed.  Distal neurovascular exam intact.     Imaging:    I have independently reviewed the following imaging studies performed at [facility]:    CT 3D RECON WITH INDEPENDENT WS  Narrative: EXAMINATION:  CT ARM ELBOW WITHOUT CONTRAST LEFT; CT 3D RECON WITH INDEPENDENT WS    CLINICAL HISTORY:  Fracture, elbow;; Fracture, elbow;fx;    TECHNIQUE:  0.625 mm axial images were acquired of the left elbow without IV contrast.  Sagittal and coronal reformatted images were reviewed.  Additionally, 3D " reconstructions were performed at a separate independent workstation and reviewed.    COMPARISON:  Left elbow radiograph series 03/31/2022    FINDINGS:  There is an acute, moderately comminuted, mildly displaced intra-articular fracture of the distal left humerus.  There is involvement of the medial and lateral epicondyles which are slightly displaced posteriorly relative to the distal humeral diaphysis.  There is involvement of the trochlea.  There is multifocal intra-articular involvement.  Ulnohumeral and radiocapitellar alignment appear appropriately maintained.  The proximal radius and ulna/olecranon appear intact.  There is moderate soft tissue edema about the left elbow.  No evidence of subcutaneous emphysema or retained radiopaque foreign body.  Impression: Acute comminuted, mildly displaced intra-articular fracture involving both columns/medial and lateral epicondyles of the distal left humerus.  Please see above for additional details.    Electronically signed by: Martir Armendariz MD  Date:    04/01/2022  Time:    00:31  CT Arm Elbow Without Contrast Left  Narrative: EXAMINATION:  CT ARM ELBOW WITHOUT CONTRAST LEFT; CT 3D RECON WITH INDEPENDENT WS    CLINICAL HISTORY:  Fracture, elbow;; Fracture, elbow;fx;    TECHNIQUE:  0.625 mm axial images were acquired of the left elbow without IV contrast.  Sagittal and coronal reformatted images were reviewed.  Additionally, 3D reconstructions were performed at a separate independent workstation and reviewed.    COMPARISON:  Left elbow radiograph series 03/31/2022    FINDINGS:  There is an acute, moderately comminuted, mildly displaced intra-articular fracture of the distal left humerus.  There is involvement of the medial and lateral epicondyles which are slightly displaced posteriorly relative to the distal humeral diaphysis.  There is involvement of the trochlea.  There is multifocal intra-articular involvement.  Ulnohumeral and radiocapitellar alignment appear  appropriately maintained.  The proximal radius and ulna/olecranon appear intact.  There is moderate soft tissue edema about the left elbow.  No evidence of subcutaneous emphysema or retained radiopaque foreign body.  Impression: Acute comminuted, mildly displaced intra-articular fracture involving both columns/medial and lateral epicondyles of the distal left humerus.  Please see above for additional details.    Electronically signed by: Martir Armendariz MD  Date:    04/01/2022  Time:    00:31      Assessment:  1. Closed fracture of distal end of left humerus, unspecified fracture morphology, initial encounter    2. Closed fracture of left elbow, initial encounter      Plan:  I have reviewed the findings in detail with the patient. I have recommended ORIF of the distal humerus. I explained to her that I do not commonly treat aurora-articular distal humerus fractures. I have recommended that she continue with treatment under Dr. King. She will be re-splinted today.     No follow-ups on file.

## 2022-04-08 ENCOUNTER — LAB VISIT (OUTPATIENT)
Dept: FAMILY MEDICINE | Facility: CLINIC | Age: 42
End: 2022-04-08
Payer: MEDICARE

## 2022-04-08 ENCOUNTER — ANESTHESIA EVENT (OUTPATIENT)
Dept: SURGERY | Facility: HOSPITAL | Age: 42
End: 2022-04-08
Payer: MEDICARE

## 2022-04-08 ENCOUNTER — TELEPHONE (OUTPATIENT)
Dept: ORTHOPEDICS | Facility: CLINIC | Age: 42
End: 2022-04-08
Payer: MEDICARE

## 2022-04-08 DIAGNOSIS — Z01.818 PRE-OP TESTING: ICD-10-CM

## 2022-04-08 LAB
SARS-COV-2 RNA RESP QL NAA+PROBE: NOT DETECTED
SARS-COV-2- CYCLE NUMBER: NORMAL

## 2022-04-08 PROCEDURE — U0005 INFEC AGEN DETEC AMPLI PROBE: HCPCS | Performed by: ORTHOPAEDIC SURGERY

## 2022-04-08 PROCEDURE — U0003 INFECTIOUS AGENT DETECTION BY NUCLEIC ACID (DNA OR RNA); SEVERE ACUTE RESPIRATORY SYNDROME CORONAVIRUS 2 (SARS-COV-2) (CORONAVIRUS DISEASE [COVID-19]), AMPLIFIED PROBE TECHNIQUE, MAKING USE OF HIGH THROUGHPUT TECHNOLOGIES AS DESCRIBED BY CMS-2020-01-R: HCPCS | Performed by: ORTHOPAEDIC SURGERY

## 2022-04-08 NOTE — TELEPHONE ENCOUNTER
Spoke with pt in regards to surgery on Monday 4/11/2022. Stated to pt no eating/drinking after midnight on Sunday, arrival time is 7am and she's coming to main [address]. Pt verbalize understands.

## 2022-04-08 NOTE — ANESTHESIA PREPROCEDURE EVALUATION
04/08/2022  Jaycee Gamboa is a 41 y.o., female. Scheduled for distal humerus ORIF. History of seizures, hypotyhyroidism      Pre-op Assessment    I have reviewed the Patient Summary Reports.     I have reviewed the Nursing Notes. I have reviewed the NPO Status.   I have reviewed the Medications.     Review of Systems  Anesthesia Hx:  Personal Hx of Anesthesia complications, Post-Operative Nausea/Vomiting, in the past, but not with recent anesthetics / prophylaxis   Cardiovascular:   Denies Hypertension.  Denies MI.  Denies CAD.    Denies Dysrhythmias.     Pulmonary:   Denies COPD.  Denies Asthma.  Denies Shortness of breath.  Denies Sleep Apnea.    Renal/:   Denies Chronic Renal Disease.     Hepatic/GI:   GERD Denies Liver Disease.    Neurological:   Denies TIA. Denies CVA. Seizures    Endocrine:   Denies Diabetes. Hypothyroidism Denies Hyperthyroidism.        Physical Exam  General: Well nourished, Cooperative, Alert and Oriented    Airway:  Mallampati: II / I  Mouth Opening: Normal  TM Distance: Normal  Tongue: Normal  Neck ROM: Normal ROM    Dental:  Intact        Anesthesia Plan  Type of Anesthesia, risks & benefits discussed:    Anesthesia Type: Gen ETT, Regional  Intra-op Monitoring Plan: Standard ASA Monitors  Post Op Pain Control Plan: multimodal analgesia  Induction:  IV  Airway Plan: Direct  Informed Consent: Informed consent signed with the Patient and all parties understand the risks and agree with anesthesia plan.  All questions answered.   ASA Score: 2    Ready For Surgery From Anesthesia Perspective.     .

## 2022-04-08 NOTE — PRE-PROCEDURE INSTRUCTIONS
PREOP INSTRUCTIONS:No food,milk or milk products for 8 hours before surgery.  Clear liquids like water,gatorade,apple juice are allowed up until 2 hours before surgery.  Instructed to follow the surgeon's instructions if they differ from these.  Shower instructions as well as directions to the Surgery Center were given.  Encouraged to wear loose fitting,comfortable clothing.  Medication instructions for pm prior to and am of procedure reviewed.  Instructed to avoid taking vitamins,supplements,aspirin and ibuprofen the morning of surgery.  Patient reported that she has a couple of piercings that are not removable and has had them taped for previous surgeries without a problem    Patient denies any side effects or issues with anesthesia or sedation other than PONV    Patient does not know arrival time.Explained that this information comes from the surgeon's office and if they haven't heard from them by 2 or 3 pm to call the office.Patient stated an understanding.

## 2022-04-11 ENCOUNTER — HOSPITAL ENCOUNTER (OUTPATIENT)
Facility: HOSPITAL | Age: 42
Discharge: HOME | End: 2022-04-12
Attending: ORTHOPAEDIC SURGERY | Admitting: ORTHOPAEDIC SURGERY
Payer: MEDICARE

## 2022-04-11 ENCOUNTER — ANESTHESIA (OUTPATIENT)
Dept: SURGERY | Facility: HOSPITAL | Age: 42
End: 2022-04-11
Payer: MEDICARE

## 2022-04-11 DIAGNOSIS — S42.402A CLOSED FRACTURE OF LEFT DISTAL HUMERUS: Primary | ICD-10-CM

## 2022-04-11 PROCEDURE — 36000710: Performed by: ORTHOPAEDIC SURGERY

## 2022-04-11 PROCEDURE — 25000003 PHARM REV CODE 250: Performed by: STUDENT IN AN ORGANIZED HEALTH CARE EDUCATION/TRAINING PROGRAM

## 2022-04-11 PROCEDURE — 63600175 PHARM REV CODE 636 W HCPCS: Performed by: NURSE ANESTHETIST, CERTIFIED REGISTERED

## 2022-04-11 PROCEDURE — 63600175 PHARM REV CODE 636 W HCPCS: Performed by: ORTHOPAEDIC SURGERY

## 2022-04-11 PROCEDURE — D9220A PRA ANESTHESIA: Mod: ANES,,, | Performed by: SURGERY

## 2022-04-11 PROCEDURE — 64416 NJX AA&/STRD BRCH PL NFS IMG: CPT | Mod: 59,LT,, | Performed by: ANESTHESIOLOGY

## 2022-04-11 PROCEDURE — 63600175 PHARM REV CODE 636 W HCPCS: Performed by: STUDENT IN AN ORGANIZED HEALTH CARE EDUCATION/TRAINING PROGRAM

## 2022-04-11 PROCEDURE — D9220A PRA ANESTHESIA: Mod: CRNA,,, | Performed by: NURSE ANESTHETIST, CERTIFIED REGISTERED

## 2022-04-11 PROCEDURE — D9220A PRA ANESTHESIA: ICD-10-PCS | Mod: ANES,,, | Performed by: SURGERY

## 2022-04-11 PROCEDURE — 25000003 PHARM REV CODE 250

## 2022-04-11 PROCEDURE — C1769 GUIDE WIRE: HCPCS | Performed by: ORTHOPAEDIC SURGERY

## 2022-04-11 PROCEDURE — 71000033 HC RECOVERY, INTIAL HOUR: Performed by: ORTHOPAEDIC SURGERY

## 2022-04-11 PROCEDURE — 25000003 PHARM REV CODE 250: Performed by: NURSE ANESTHETIST, CERTIFIED REGISTERED

## 2022-04-11 PROCEDURE — 94761 N-INVAS EAR/PLS OXIMETRY MLT: CPT

## 2022-04-11 PROCEDURE — 37000009 HC ANESTHESIA EA ADD 15 MINS: Performed by: ORTHOPAEDIC SURGERY

## 2022-04-11 PROCEDURE — 71000015 HC POSTOP RECOV 1ST HR: Performed by: ORTHOPAEDIC SURGERY

## 2022-04-11 PROCEDURE — 71000016 HC POSTOP RECOV ADDL HR: Performed by: ORTHOPAEDIC SURGERY

## 2022-04-11 PROCEDURE — 24587: ICD-10-PCS | Mod: 22,LT,, | Performed by: ORTHOPAEDIC SURGERY

## 2022-04-11 PROCEDURE — 24587 OPTX PARTCLR FX&/DSLC ELBW W: CPT | Mod: 22,LT,, | Performed by: ORTHOPAEDIC SURGERY

## 2022-04-11 PROCEDURE — 36000711: Performed by: ORTHOPAEDIC SURGERY

## 2022-04-11 PROCEDURE — 63600175 PHARM REV CODE 636 W HCPCS: Performed by: SURGERY

## 2022-04-11 PROCEDURE — 25000003 PHARM REV CODE 250: Performed by: ORTHOPAEDIC SURGERY

## 2022-04-11 PROCEDURE — 76942 INFRACLAVICULAR CATHETER: ICD-10-PCS | Mod: 26,,, | Performed by: ANESTHESIOLOGY

## 2022-04-11 PROCEDURE — 25000003 PHARM REV CODE 250: Performed by: SURGERY

## 2022-04-11 PROCEDURE — 37000008 HC ANESTHESIA 1ST 15 MINUTES: Performed by: ORTHOPAEDIC SURGERY

## 2022-04-11 PROCEDURE — 64416 INFRACLAVICULAR CATHETER: ICD-10-PCS | Mod: 59,LT,, | Performed by: ANESTHESIOLOGY

## 2022-04-11 PROCEDURE — D9220A PRA ANESTHESIA: ICD-10-PCS | Mod: CRNA,,, | Performed by: NURSE ANESTHETIST, CERTIFIED REGISTERED

## 2022-04-11 PROCEDURE — 27201423 OPTIME MED/SURG SUP & DEVICES STERILE SUPPLY: Performed by: ORTHOPAEDIC SURGERY

## 2022-04-11 PROCEDURE — 76942 ECHO GUIDE FOR BIOPSY: CPT | Performed by: STUDENT IN AN ORGANIZED HEALTH CARE EDUCATION/TRAINING PROGRAM

## 2022-04-11 PROCEDURE — 76942 ECHO GUIDE FOR BIOPSY: CPT | Mod: 26,,, | Performed by: ANESTHESIOLOGY

## 2022-04-11 PROCEDURE — C1713 ANCHOR/SCREW BN/BN,TIS/BN: HCPCS | Performed by: ORTHOPAEDIC SURGERY

## 2022-04-11 DEVICE — SCREW BONE NON LOCK 3.5X34MM: Type: IMPLANTABLE DEVICE | Site: ARM | Status: FUNCTIONAL

## 2022-04-11 DEVICE — IMPLANTABLE DEVICE: Type: IMPLANTABLE DEVICE | Site: ARM | Status: FUNCTIONAL

## 2022-04-11 DEVICE — SCREW BONE NON LOCK 3.5X18MM: Type: IMPLANTABLE DEVICE | Site: ARM | Status: FUNCTIONAL

## 2022-04-11 DEVICE — SCREW VARIAX LOK FT 2.7X16MM: Type: IMPLANTABLE DEVICE | Site: ARM | Status: FUNCTIONAL

## 2022-04-11 DEVICE — SCREW BONE T10 3.5X24MM: Type: IMPLANTABLE DEVICE | Site: ARM | Status: FUNCTIONAL

## 2022-04-11 DEVICE — SCREW BONE LOCK T10 3.5X14MM: Type: IMPLANTABLE DEVICE | Site: ARM | Status: FUNCTIONAL

## 2022-04-11 DEVICE — SCREW BONE NON LOCK 3.5X22MM: Type: IMPLANTABLE DEVICE | Site: ARM | Status: FUNCTIONAL

## 2022-04-11 DEVICE — SCREW BONE T10 3.5X32MM: Type: IMPLANTABLE DEVICE | Site: ARM | Status: FUNCTIONAL

## 2022-04-11 DEVICE — SCREW BONE NON LOCK 3.5X55MM: Type: IMPLANTABLE DEVICE | Site: ARM | Status: FUNCTIONAL

## 2022-04-11 DEVICE — SCREW BONE T10 3.5X26MM: Type: IMPLANTABLE DEVICE | Site: ARM | Status: FUNCTIONAL

## 2022-04-11 RX ORDER — HYDROMORPHONE HYDROCHLORIDE 2 MG/ML
INJECTION, SOLUTION INTRAMUSCULAR; INTRAVENOUS; SUBCUTANEOUS
Status: DISCONTINUED | OUTPATIENT
Start: 2022-04-11 | End: 2022-04-11

## 2022-04-11 RX ORDER — ACETAMINOPHEN 500 MG
1000 TABLET ORAL EVERY 8 HOURS
Status: DISCONTINUED | OUTPATIENT
Start: 2022-04-11 | End: 2022-04-12 | Stop reason: HOSPADM

## 2022-04-11 RX ORDER — SODIUM CHLORIDE 0.9 % (FLUSH) 0.9 %
10 SYRINGE (ML) INJECTION
Status: DISCONTINUED | OUTPATIENT
Start: 2022-04-11 | End: 2022-04-12 | Stop reason: HOSPADM

## 2022-04-11 RX ORDER — DEXMEDETOMIDINE HYDROCHLORIDE 100 UG/ML
INJECTION, SOLUTION INTRAVENOUS
Status: DISCONTINUED | OUTPATIENT
Start: 2022-04-11 | End: 2022-04-11

## 2022-04-11 RX ORDER — OXYCODONE HYDROCHLORIDE 10 MG/1
10 TABLET ORAL
Status: DISCONTINUED | OUTPATIENT
Start: 2022-04-11 | End: 2022-04-12 | Stop reason: HOSPADM

## 2022-04-11 RX ORDER — SCOLOPAMINE TRANSDERMAL SYSTEM 1 MG/1
1 PATCH, EXTENDED RELEASE TRANSDERMAL ONCE
Status: DISCONTINUED | OUTPATIENT
Start: 2022-04-11 | End: 2022-04-12 | Stop reason: HOSPADM

## 2022-04-11 RX ORDER — LIDOCAINE HYDROCHLORIDE 10 MG/ML
1 INJECTION, SOLUTION EPIDURAL; INFILTRATION; INTRACAUDAL; PERINEURAL ONCE
Status: DISCONTINUED | OUTPATIENT
Start: 2022-04-11 | End: 2022-04-12 | Stop reason: HOSPADM

## 2022-04-11 RX ORDER — ONDANSETRON 8 MG/1
8 TABLET, ORALLY DISINTEGRATING ORAL EVERY 8 HOURS PRN
Status: DISCONTINUED | OUTPATIENT
Start: 2022-04-11 | End: 2022-04-12 | Stop reason: HOSPADM

## 2022-04-11 RX ORDER — OXYCODONE HYDROCHLORIDE 5 MG/1
5 TABLET ORAL
Status: DISCONTINUED | OUTPATIENT
Start: 2022-04-11 | End: 2022-04-12 | Stop reason: HOSPADM

## 2022-04-11 RX ORDER — LEVOTHYROXINE SODIUM 50 UG/1
50 TABLET ORAL DAILY
Status: DISCONTINUED | OUTPATIENT
Start: 2022-04-12 | End: 2022-04-12 | Stop reason: HOSPADM

## 2022-04-11 RX ORDER — CEFAZOLIN SODIUM/D5W 2 G/100 ML
2 PLASTIC BAG, INJECTION (ML) INTRAVENOUS
Status: DISCONTINUED | OUTPATIENT
Start: 2022-04-11 | End: 2022-04-12 | Stop reason: HOSPADM

## 2022-04-11 RX ORDER — SODIUM CHLORIDE 0.9 % (FLUSH) 0.9 %
10 SYRINGE (ML) INJECTION
Status: DISCONTINUED | OUTPATIENT
Start: 2022-04-11 | End: 2022-04-11 | Stop reason: HOSPADM

## 2022-04-11 RX ORDER — TRANEXAMIC ACID 100 MG/ML
INJECTION, SOLUTION INTRAVENOUS
Status: DISCONTINUED | OUTPATIENT
Start: 2022-04-11 | End: 2022-04-11

## 2022-04-11 RX ORDER — GABAPENTIN 300 MG/1
300 CAPSULE ORAL 3 TIMES DAILY
Status: DISCONTINUED | OUTPATIENT
Start: 2022-04-11 | End: 2022-04-11

## 2022-04-11 RX ORDER — CELECOXIB 200 MG/1
400 CAPSULE ORAL ONCE
Status: COMPLETED | OUTPATIENT
Start: 2022-04-11 | End: 2022-04-11

## 2022-04-11 RX ORDER — DEXAMETHASONE SODIUM PHOSPHATE 4 MG/ML
INJECTION, SOLUTION INTRA-ARTICULAR; INTRALESIONAL; INTRAMUSCULAR; INTRAVENOUS; SOFT TISSUE
Status: DISCONTINUED | OUTPATIENT
Start: 2022-04-11 | End: 2022-04-11

## 2022-04-11 RX ORDER — CELECOXIB 200 MG/1
200 CAPSULE ORAL DAILY
Status: DISCONTINUED | OUTPATIENT
Start: 2022-04-12 | End: 2022-04-12 | Stop reason: HOSPADM

## 2022-04-11 RX ORDER — VANCOMYCIN HCL IN 5 % DEXTROSE 1G/250ML
1000 PLASTIC BAG, INJECTION (ML) INTRAVENOUS ONCE
Status: COMPLETED | OUTPATIENT
Start: 2022-04-11 | End: 2022-04-11

## 2022-04-11 RX ORDER — FENTANYL CITRATE 50 UG/ML
INJECTION, SOLUTION INTRAMUSCULAR; INTRAVENOUS
Status: DISCONTINUED | OUTPATIENT
Start: 2022-04-11 | End: 2022-04-11

## 2022-04-11 RX ORDER — PROPOFOL 10 MG/ML
VIAL (ML) INTRAVENOUS
Status: DISCONTINUED | OUTPATIENT
Start: 2022-04-11 | End: 2022-04-11

## 2022-04-11 RX ORDER — ASPIRIN 81 MG/1
81 TABLET ORAL 2 TIMES DAILY
Status: DISCONTINUED | OUTPATIENT
Start: 2022-04-11 | End: 2022-04-12 | Stop reason: HOSPADM

## 2022-04-11 RX ORDER — MIDAZOLAM HYDROCHLORIDE 1 MG/ML
.5-4 INJECTION INTRAMUSCULAR; INTRAVENOUS
Status: DISCONTINUED | OUTPATIENT
Start: 2022-04-11 | End: 2022-04-11 | Stop reason: HOSPADM

## 2022-04-11 RX ORDER — PROCHLORPERAZINE EDISYLATE 5 MG/ML
5 INJECTION INTRAMUSCULAR; INTRAVENOUS ONCE AS NEEDED
Status: DISCONTINUED | OUTPATIENT
Start: 2022-04-11 | End: 2022-04-11 | Stop reason: HOSPADM

## 2022-04-11 RX ORDER — FENTANYL CITRATE 50 UG/ML
25-200 INJECTION, SOLUTION INTRAMUSCULAR; INTRAVENOUS EVERY 5 MIN PRN
Status: COMPLETED | OUTPATIENT
Start: 2022-04-11 | End: 2022-04-11

## 2022-04-11 RX ORDER — LIDOCAINE HYDROCHLORIDE 20 MG/ML
INJECTION, SOLUTION EPIDURAL; INFILTRATION; INTRACAUDAL; PERINEURAL
Status: DISCONTINUED | OUTPATIENT
Start: 2022-04-11 | End: 2022-04-11

## 2022-04-11 RX ORDER — METHOCARBAMOL 500 MG/1
500 TABLET, FILM COATED ORAL 3 TIMES DAILY
Status: DISCONTINUED | OUTPATIENT
Start: 2022-04-11 | End: 2022-04-12 | Stop reason: HOSPADM

## 2022-04-11 RX ORDER — MIDAZOLAM HYDROCHLORIDE 1 MG/ML
INJECTION, SOLUTION INTRAMUSCULAR; INTRAVENOUS
Status: DISCONTINUED | OUTPATIENT
Start: 2022-04-11 | End: 2022-04-11

## 2022-04-11 RX ORDER — OXYCODONE AND ACETAMINOPHEN 5; 325 MG/1; MG/1
1 TABLET ORAL
Status: DISCONTINUED | OUTPATIENT
Start: 2022-04-11 | End: 2022-04-11 | Stop reason: HOSPADM

## 2022-04-11 RX ORDER — ONDANSETRON 2 MG/ML
INJECTION INTRAMUSCULAR; INTRAVENOUS
Status: DISCONTINUED | OUTPATIENT
Start: 2022-04-11 | End: 2022-04-11

## 2022-04-11 RX ORDER — ACETAMINOPHEN 10 MG/ML
INJECTION, SOLUTION INTRAVENOUS
Status: DISCONTINUED | OUTPATIENT
Start: 2022-04-11 | End: 2022-04-11

## 2022-04-11 RX ORDER — TALC
6 POWDER (GRAM) TOPICAL NIGHTLY PRN
Status: DISCONTINUED | OUTPATIENT
Start: 2022-04-11 | End: 2022-04-12 | Stop reason: HOSPADM

## 2022-04-11 RX ORDER — CEFAZOLIN SODIUM/WATER 2 G/20 ML
2 SYRINGE (ML) INTRAVENOUS
Status: COMPLETED | OUTPATIENT
Start: 2022-04-11 | End: 2022-04-11

## 2022-04-11 RX ORDER — PHENYLEPHRINE HCL IN 0.9% NACL 1 MG/10 ML
SYRINGE (ML) INTRAVENOUS
Status: DISCONTINUED | OUTPATIENT
Start: 2022-04-11 | End: 2022-04-11

## 2022-04-11 RX ORDER — ROCURONIUM BROMIDE 10 MG/ML
INJECTION, SOLUTION INTRAVENOUS
Status: DISCONTINUED | OUTPATIENT
Start: 2022-04-11 | End: 2022-04-11

## 2022-04-11 RX ORDER — HYDROMORPHONE HYDROCHLORIDE 1 MG/ML
0.2 INJECTION, SOLUTION INTRAMUSCULAR; INTRAVENOUS; SUBCUTANEOUS EVERY 5 MIN PRN
Status: DISCONTINUED | OUTPATIENT
Start: 2022-04-11 | End: 2022-04-11 | Stop reason: HOSPADM

## 2022-04-11 RX ORDER — ONDANSETRON 2 MG/ML
4 INJECTION INTRAMUSCULAR; INTRAVENOUS DAILY PRN
Status: DISCONTINUED | OUTPATIENT
Start: 2022-04-11 | End: 2022-04-11 | Stop reason: HOSPADM

## 2022-04-11 RX ORDER — ACETAMINOPHEN 325 MG/1
650 TABLET ORAL ONCE
Status: COMPLETED | OUTPATIENT
Start: 2022-04-11 | End: 2022-04-11

## 2022-04-11 RX ORDER — VANCOMYCIN HYDROCHLORIDE 1 G/20ML
INJECTION, POWDER, LYOPHILIZED, FOR SOLUTION INTRAVENOUS
Status: DISCONTINUED | OUTPATIENT
Start: 2022-04-11 | End: 2022-04-11 | Stop reason: HOSPADM

## 2022-04-11 RX ORDER — MORPHINE SULFATE 2 MG/ML
2 INJECTION, SOLUTION INTRAMUSCULAR; INTRAVENOUS
Status: DISCONTINUED | OUTPATIENT
Start: 2022-04-11 | End: 2022-04-12

## 2022-04-11 RX ORDER — FENTANYL CITRATE 50 UG/ML
25 INJECTION, SOLUTION INTRAMUSCULAR; INTRAVENOUS EVERY 5 MIN PRN
Status: COMPLETED | OUTPATIENT
Start: 2022-04-11 | End: 2022-04-11

## 2022-04-11 RX ORDER — ROPIVACAINE HYDROCHLORIDE 2 MG/ML
INJECTION, SOLUTION EPIDURAL; INFILTRATION; PERINEURAL CONTINUOUS
Status: DISCONTINUED | OUTPATIENT
Start: 2022-04-11 | End: 2022-04-12

## 2022-04-11 RX ORDER — KETAMINE HCL IN 0.9 % NACL 50 MG/5 ML
SYRINGE (ML) INTRAVENOUS
Status: DISCONTINUED | OUTPATIENT
Start: 2022-04-11 | End: 2022-04-11

## 2022-04-11 RX ADMIN — FENTANYL CITRATE 25 MCG: 50 INJECTION INTRAMUSCULAR; INTRAVENOUS at 01:04

## 2022-04-11 RX ADMIN — Medication 10 MG: at 10:04

## 2022-04-11 RX ADMIN — SODIUM CHLORIDE: 0.9 INJECTION, SOLUTION INTRAVENOUS at 06:04

## 2022-04-11 RX ADMIN — PROPOFOL 150 MG: 10 INJECTION, EMULSION INTRAVENOUS at 08:04

## 2022-04-11 RX ADMIN — ROCURONIUM BROMIDE 50 MG: 10 INJECTION, SOLUTION INTRAVENOUS at 08:04

## 2022-04-11 RX ADMIN — ASPIRIN 81 MG: 81 TABLET, COATED ORAL at 08:04

## 2022-04-11 RX ADMIN — FENTANYL CITRATE 25 MCG: 50 INJECTION INTRAMUSCULAR; INTRAVENOUS at 03:04

## 2022-04-11 RX ADMIN — VANCOMYCIN HYDROCHLORIDE 1000 MG: 1 INJECTION, POWDER, LYOPHILIZED, FOR SOLUTION INTRAVENOUS at 07:04

## 2022-04-11 RX ADMIN — DEXAMETHASONE SODIUM PHOSPHATE 8 MG: 4 INJECTION INTRA-ARTICULAR; INTRALESIONAL; INTRAMUSCULAR; INTRAVENOUS; SOFT TISSUE at 08:04

## 2022-04-11 RX ADMIN — MORPHINE SULFATE 2 MG: 2 INJECTION, SOLUTION INTRAMUSCULAR; INTRAVENOUS at 05:04

## 2022-04-11 RX ADMIN — HYDROMORPHONE HYDROCHLORIDE 0.4 MG: 2 INJECTION INTRAMUSCULAR; INTRAVENOUS; SUBCUTANEOUS at 02:04

## 2022-04-11 RX ADMIN — FENTANYL CITRATE 100 MCG: 50 INJECTION INTRAMUSCULAR; INTRAVENOUS at 08:04

## 2022-04-11 RX ADMIN — SUGAMMADEX 225 MG: 100 INJECTION, SOLUTION INTRAVENOUS at 02:04

## 2022-04-11 RX ADMIN — CELECOXIB 400 MG: 200 CAPSULE ORAL at 03:04

## 2022-04-11 RX ADMIN — Medication 200 MCG: at 08:04

## 2022-04-11 RX ADMIN — HYDROMORPHONE HYDROCHLORIDE 0.2 MG: 1 INJECTION, SOLUTION INTRAMUSCULAR; INTRAVENOUS; SUBCUTANEOUS at 04:04

## 2022-04-11 RX ADMIN — DEXMEDETOMIDINE HYDROCHLORIDE 8 MCG: 100 INJECTION, SOLUTION INTRAVENOUS at 01:04

## 2022-04-11 RX ADMIN — HYDROMORPHONE HYDROCHLORIDE 0.2 MG: 2 INJECTION INTRAMUSCULAR; INTRAVENOUS; SUBCUTANEOUS at 02:04

## 2022-04-11 RX ADMIN — ONDANSETRON 4 MG: 2 INJECTION INTRAMUSCULAR; INTRAVENOUS at 02:04

## 2022-04-11 RX ADMIN — TRANEXAMIC ACID 1000 MG: 100 INJECTION, SOLUTION INTRAVENOUS at 12:04

## 2022-04-11 RX ADMIN — Medication 10 MG: at 09:04

## 2022-04-11 RX ADMIN — FENTANYL CITRATE 50 MCG: 50 INJECTION INTRAMUSCULAR; INTRAVENOUS at 08:04

## 2022-04-11 RX ADMIN — Medication 10 MG: at 11:04

## 2022-04-11 RX ADMIN — ROCURONIUM BROMIDE 10 MG: 10 INJECTION, SOLUTION INTRAVENOUS at 10:04

## 2022-04-11 RX ADMIN — ACETAMINOPHEN 1000 MG: 500 TABLET ORAL at 06:04

## 2022-04-11 RX ADMIN — ROPIVACAINE HYDROCHLORIDE 200 ML: 2 INJECTION, SOLUTION EPIDURAL; INFILTRATION at 04:04

## 2022-04-11 RX ADMIN — SODIUM CHLORIDE, SODIUM GLUCONATE, SODIUM ACETATE, POTASSIUM CHLORIDE, MAGNESIUM CHLORIDE, SODIUM PHOSPHATE, DIBASIC, AND POTASSIUM PHOSPHATE: .53; .5; .37; .037; .03; .012; .00082 INJECTION, SOLUTION INTRAVENOUS at 10:04

## 2022-04-11 RX ADMIN — FENTANYL CITRATE 50 MCG: 50 INJECTION INTRAMUSCULAR; INTRAVENOUS at 09:04

## 2022-04-11 RX ADMIN — METHOCARBAMOL TABLETS 500 MG: 500 TABLET, COATED ORAL at 03:04

## 2022-04-11 RX ADMIN — Medication 100 MCG: at 12:04

## 2022-04-11 RX ADMIN — ACETAMINOPHEN 650 MG: 325 TABLET ORAL at 07:04

## 2022-04-11 RX ADMIN — LIDOCAINE HYDROCHLORIDE 100 MG: 20 INJECTION, SOLUTION EPIDURAL; INFILTRATION; INTRACAUDAL; PERINEURAL at 08:04

## 2022-04-11 RX ADMIN — Medication 2 G: at 01:04

## 2022-04-11 RX ADMIN — OXYCODONE HYDROCHLORIDE 10 MG: 10 TABLET ORAL at 03:04

## 2022-04-11 RX ADMIN — Medication 2 G: at 09:04

## 2022-04-11 RX ADMIN — MIDAZOLAM 2 MG: 1 INJECTION INTRAMUSCULAR; INTRAVENOUS at 08:04

## 2022-04-11 RX ADMIN — METHOCARBAMOL TABLETS 500 MG: 500 TABLET, COATED ORAL at 08:04

## 2022-04-11 RX ADMIN — ACETAMINOPHEN 1000 MG: 10 INJECTION INTRAVENOUS at 01:04

## 2022-04-11 RX ADMIN — SCOPALAMINE 1 PATCH: 1 PATCH, EXTENDED RELEASE TRANSDERMAL at 07:04

## 2022-04-11 RX ADMIN — OXYCODONE HYDROCHLORIDE 10 MG: 10 TABLET ORAL at 08:04

## 2022-04-11 RX ADMIN — MIDAZOLAM 2 MG: 1 INJECTION INTRAMUSCULAR; INTRAVENOUS at 07:04

## 2022-04-11 RX ADMIN — Medication 20 MG: at 08:04

## 2022-04-11 RX ADMIN — FENTANYL CITRATE 50 MCG: 50 INJECTION INTRAMUSCULAR; INTRAVENOUS at 07:04

## 2022-04-11 NOTE — TRANSFER OF CARE
"Anesthesia Transfer of Care Note    Patient: Jaycee Gamboa    Procedure(s) Performed: Procedure(s) (LRB):  ORIF, FRACTURE, HUMERUS, DISTAL - LEFT, Synthes (Left)    Patient location: PACU    Anesthesia Type: general    Transport from OR: Transported from OR on 6-10 L/min O2 by face mask with adequate spontaneous ventilation    Post pain: adequate analgesia    Post assessment: no apparent anesthetic complications    Post vital signs: stable    Level of consciousness: responds to stimulation and sedated    Nausea/Vomiting: no nausea/vomiting    Complications: none    Transfer of care protocol was followed      Last vitals:   Visit Vitals  BP (!) 143/70 (BP Location: Right arm, Patient Position: Lying)   Pulse 69   Temp 36.4 °C (97.5 °F) (Temporal)   Resp 20   Ht 5' 2" (1.575 m)   Wt 77.1 kg (170 lb)   LMP 04/26/2009 (Exact Date)   SpO2 100%   Breastfeeding No   BMI 31.09 kg/m²     "

## 2022-04-11 NOTE — CARE UPDATE
Patient seen in PACU 05 after completion of surgery. Orthopedic surgery team completed a neuro evaluation of the patient and gave the OK to bolus the patient's left sided infraclavicular PNC. 5 mL of 1.5% lidocaine and 20 mL of 0.25% ropivacaine with epinephrine was bolused through the catheter. PACU RN to begin continuous ropivacaine infusion.    Baljit Davis MD  [facility], [facility]

## 2022-04-11 NOTE — PROGRESS NOTES
Pt states it's ok to leave & tape nose ring and right ear piercing per anesthesia. Other piercing removed and placed in belongings bag.

## 2022-04-11 NOTE — ANESTHESIA PROCEDURE NOTES
Intubation    Date/Time: 4/11/2022 8:38 AM  Performed by: Lynda Cortez CRNA  Authorized by: Jose Aviles MD     Intubation:     Induction:  Intravenous    Mask Ventilation:  Easy mask    Attempts:  1    Attempted By:  CRNA    Blade:  Don 2    Laryngeal View Grade: Grade I - full view of cords      Difficult Airway Encountered?: No      Complications:  None    Airway Device Size:  7.5    Style/Cuff Inflation:  Cuffed    Inflation Amount (mL):  4    Tube secured:  21    Secured at:  The teeth    Placement Verified By:  Capnometry    Complicating Factors:  Obesity    Findings Post-Intubation:  BS equal bilateral
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
## 2022-04-11 NOTE — NURSING
"Dr Hameed notified patient c/o of burning sensation to left elbow . Post-op 0 for dr marcelino .Patient received pain medication.earlier . Dr Hameed stated, " he will come up and see her."  "

## 2022-04-11 NOTE — OP NOTE
OPERATIVE NOTE    DATE OF PROCEDURE:  04/11/2022    PREOPERATIVE DIAGNOSIS:   Left intra-articular distal humerus fracture, closed, displaced, initial encounter  Fall from standing    POSTOPERATIVE DIAGNOSIS:   Left intra-articular distal humerus fracture, closed, displaced, initial encounter  Fall from standing    PROCEDURE:   Open reduction internal fixation left intra-articular distal humerus fracture with plates and screws  Left olecranon osteotomy    Complex procedure due to the comminuted intra-articular fracture requiring an olecranon osteotomy with separate hardware construct, longer incision, approach, increased intraoperative time, risk, effort    SURGEON:   Elliot King MD    ASSISTANT:    Tyree Clark MD    ANESTHESIA:   General    EBL:    150mL    COMPLICATIONS:  none    IMPLANTS:   Yemi  Humerus  2.7 mm cortical lag screw  Posterolateral distal humerus plate  2.7 mm locking screw, x2  3.5 mm locking screw, x1  3.5 mm cortical screw, x3  Medical distal humerus plate  2.7 mm cortical screw, x2  2.7 mm locking screw, x1  3.5 mm cortical screw, x3  Olecranon  Olecranon plate  3.5 mm cortical screw, x2  3.5 mm cortical screw, x2    Vancomycin 1g    SPECIMENS:   none    INDICATIONS FOR PROCEDURE:  41-year-old female, right-hand dominant, 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  Left intra-articular distal humerus fracture.  Seen outside hospital, transferred to our facility for further care.  At that time was seen by the orthopedic resident on-call team placed into a long-arm splint.  She presents today for operative intervention     Currently complains of isolated pain left elbow, 5/10, sharp, stabbing, improved the splint.  Worse with motion.  No numbness no tingling     At baseline has bilateral lower extremity weakness due to her CMT  Does not use gait aids, but instead uses so flows, walls and other things for support.     Disabled  RHD  Lives at home with  family  Household ambulator  Nonsmoker  Denies diabetes  Denies history of heart attack, stroke, blood clot, cancer  Reports history of prior MRSA infections    Counseled patient on diagnosis and treatment options.  Discussed non operative operative management options.  Non operative management the setting of an intra-articular displaced fracture, likely result in persistent malalignment, stiffness, poor range of motion, pain, poor function.  Discussed operative intervention in the form of open reduction internal fixation.  Hopefully this will improve her alignment, function, overall long-term outcome.  Discussed that due to the intra-articular nature of fracture, we may required to perform an olecranon osteotomy to increase our exposure and allow appropriate visualization and fracture reduction.  This would require separate plating to put this bone back together.  Also we would assess the ulnar nerve to determine if that transposition was required at the end of the case.     The risks, benefits, and alternatives to surgery were discussed with the patient and/or family.    Specific risks discussed included, but were not limited to:  Ulnar/radial nerve injury, stiffness, infection, need for repeat operative procedures, damage to nearby structures, including neurovascular structures leading to loss of function or loss of limb, bleeding, need for blood transfusion, pain, stiffness, scarring, numbness, tingling, weakness, compartment syndrome, malunion/nonunion, hardware failure, hardware prominence, infection, need for multiple staged procedures, prolonged antibiotics, iatrogenic fracture, heterotopic ossification, arthritis, a variety of medical complications including but not limited to heart attack, stroke, deep venous thrombosis, pulmonary embolism, prolonged hospitalization, prolonged intubation, and death.   Patient and/or family expressed an understanding and desires to proceed with surgery.   All questions were  answered.  No guarantees were implied or stated.  Informed consent was obtained.       OPERATIVE PROCEDURE:  Patient met in the preoperative hold area and the correct site and side of surgery being the left upper extremity were marked and verified.  Patient brought back to the operative suite.  General anesthesia smoothly induced.  Patient transferred over to operative table.  Placed in a lateral position on a beanbag.  Axillary roll placed.  Down arm placed on plexiglass.  Operative arm placed on radiolucent arm solomon.  All bony prominences were appropriately padded.  Preoperative fluoroscopy was assessed to ensure adequate imaging could be obtained.  Patient received 2 g Ancef and 1 g vancomycin due to prior MRSA infection for preoperative antibiotics.  The left upper extremity was then prepped and draped in normal sterile fashion.    Time-out was performed verifying the correct patient, site/side of surgery, surgical consent, radiographs as applicable, preop antibiotics, necessary equipment, anticipated blood loss, length of procedure, postoperative disposition.    We marked our surgical incision, posterior approach to the arm, and picked out medial and lateral distal humerus placed, and a planned olecranon plate for osteotomy p.r.n.    HemaClear tourniquet was utilized.  It was up for approximately 130 minutes during the case.    Posterior approach to the distal humerus and elbow was utilized.  Skin incised with a scalpel.  Hemostasis achieved as needed with electrocautery.  We curved radially at the olecranon, extended to the ulnar shaft.  We developed full-thickness skin flaps both medially and laterally.  We then started laterally.  We dissected through the fascia sharply to the capitellum.  We then both sharply and bluntly dissected through the lateral intermuscular septum, with radial nerve was identified, protected.  We then identified the distal aspect of the humerus fracture.  We worked our way  proximally removing callus as needed.  We then slid a Sosa along the lateral and posterior aspect of the humerus to free up space for later plate.  We entered the joint.  Next we turned our attention to the medial side.  The epicondyle was identified.  We identified the ulnar nerve.  We performed ulnar nerve decompression, release as needed proximally and distally to allow later plate placement.  We released up to the medial intermuscular septum proximally and the FCU fascia distally.  We then further workup will way along the medial epicondyle, long fracture.  We removed soft tissue and periosteum as needed to free up the fracture edges.  We used a Sosa to allow space for later medial plate placement.    We then booked open the fracture utilizing both the medial and lateral wounds.  Small pieces of comminuted bone were discarded that were nonsalvageable.  Removed callus with a rongeur.  This was then thoroughly irrigated.    We attempted to utilize point reduction clamps, manual reduction maneuver to reduce the articular surface, however it was felt that the fracture had increased complexity, and we were unable to reduce the fracture.    We then turned our attention to performing an olecranon osteotomy in order to improve articular visualization.  We used a olecranon plate, placed it through a split in the distal triceps insertion to allow appropriate seating on bone, drilled a single proximal screw, and a shaft wire that would allow later fixation of the osteotomy.   Planned a chevron osteotomy. This was marked with a Bovie.  We used a microsagittal saw.  We finished the osteotomy with an osteotome.  The fascia and soft tissue were further released as needed to allow proximal retraction of the triceps and olecranon.  This greatly improved articular visualization.    We then use multiple point reduction clamps and manual reduction maneuvers to anatomically reduce the distal articular split.  We passed a wire from  lateral to medial across the distal spool to control our reduction.  We then used further point reduction clamps to connect articular surface to the shaft.  Once we were satisfied with the medial lateral reduction we passed a wire from distal to proximal, both medially, and laterally, with care to protect neurovascular structures, in order to hold our reduction.    We then planned for both lateral and medial plating.  The lateral plate did not seem this is well on her anatomy so a posterior lateral plate was utilized.  Since for use in a posterior lateral plate, the distal spool wire, was exchanged out for a 2.7 mm cortical lag screw to control our distal articular block.  We then chose the appropriate sized posterior lateral plate.  It was wired in place both proximally and distally.  We then placed a distal locking screw, x2 to capture the distal aspect of the articular block.    Next we turned to the medial plate.  Appropriate size medial plate was chosen.  It was pinned in place distally, and proximally.  We then placed a cortical screw across the distal spool, through the plate, engaging both the medial and lateral aspect of the fracture.  This sucked the plate down nicely.    We then returned back to the lateral plate.  We used a point reduction clamp to compress the metaphyseal region.  A proximal cortical screws placed in compression mode, further compressing this area.    We then returned to the medial plate.  We compressed the medial metaphyseal region with a point reduction clamp.  A shaft screw was then placed in compression mode to further compress the zone.    Tourniquet was let down at approximately 130 minutes.  Hemostasis achieved as needed electrocautery and direct pressure.    Next we placed further screws 2.7 mm, in the medial plate distally, 1 was a cortical screw, the other a locking screw, both engaging the plate, the medial, and the lateral fracture segments.  Laterally we placed a 2.7 mm  locking screw distally.  1 of the previously placed 2.7 mm screws was exchanged out for 3.5 mm locking screw for better purchase.  2 further screws were placed in the lateral shaft both bicortical, with good purchase.  2 further cortical screws were placed in the medial shaft, both bicortical with good purchase.    We assessed our fracture reduction and plate placement both visually and on fluoroscopic imaging and were satisfied.    We then turned our attention to fixation of the olecranon osteotomy.  Fracture was reduced with a point reduction clamp x2.  Previously plate was fashioned overlying our prior drill holes.  Wires were placed to center the plate appropriately.  We then placed a proximal cortical screw through the previously drilled hole, suck the plate down.  Next we placed a home-run type screw from the proximal segment, into the shaft.  This provided nice compression.  Next a screw was placed in the compression hole along the shaft for further compression of our fracture.  Lastly a cortical shaft screw was placed given his further fixation along the shaft.    Fluoroscopic imaging indicated appropriate hardware placement, fracture reduction.  There was no intra-articular hardware.  There is no crepitus.  Range of motion elbow 0-130 flexion, 80 pronation, 80 supination.  The ulnar nerve sat nicely in its groove after tacking it down with some of the fascial attachments and 0 Vicryl.    Wounds irrigated with saline.  Hemostasis achieved as needed with electrocautery.  1 g vancomycin placed deep.  Fascia closed with 0 Vicryl.  Deep tissue closed with 0 Vicryl.  Subcutaneous tissue closed with 2-0 and 3-0 Vicryl.  Skin closed with 3-0 Monocryl.  Prineo/Dermabond, Aquacel, gauze, Tegaderm, posterior slab long-arm plaster splint applied with the arm at approximately 80° flexion and neutral rotation.  Sling applied.    At the conclusion of procedure the patient had soft and compressible compartments, brisk cap  refill, palpable radial pulse in the operative extremity.    Prior to final closure all counts were confirmed to be correct.  Patient tolerated the procedure well without any complications, was awoken from anesthesia, transferred PACU for further recovery.    POSTOPERATIVE PLAN:  41F, CMT, fall 3.31.22, L intraarticular distal humerus fx    4.11.22 - ORIF L distal humerus + olecranon osteotomy    abx x 24 hrs  ASA 81mg BID x 6 wks for DVT prophylaxis    NWB LUE x 8 wks postop  F/u this Friday for transition to hinged elbow brace, ROMAT at that time    Start OT 1 wk postop    X-rays at subsequent followups:  L elbow starting 2 wks postop    Follow-up postop 4 days, 2 weeks, 6 weeks, 3 months, 6 months, 1 year    =====================  Elliot King MD  Orthopaedic Surgery

## 2022-04-11 NOTE — ANESTHESIA PROCEDURE NOTES
Infraclavicular catheter    Patient location during procedure: pre-op   Block not for primary anesthetic.  Reason for block: at surgeon's request and post-op pain management   Post-op Pain Location: Left distal humerus   Start time: 4/11/2022 7:55 AM  Timeout: 4/11/2022 7:54 AM   End time: 4/11/2022 8:20 AM    Staffing  Authorizing Provider: Federica Gandhi MD  Performing Provider: Baljit Davis MD    Preanesthetic Checklist  Completed: patient identified, IV checked, site marked, risks and benefits discussed, surgical consent, monitors and equipment checked, pre-op evaluation and timeout performed  Peripheral Block  Patient position: supine  Prep: ChloraPrep and site prepped and draped  Patient monitoring: heart rate, cardiac monitor, continuous pulse ox, continuous capnometry and frequent blood pressure checks  Block type: infraclavicular  Laterality: left  Injection technique: continuous  Needle  Needle type: Tuohy   Needle gauge: 17 G  Needle length: 3.5 in  Needle localization: anatomical landmarks and ultrasound guidance  Catheter type: spring wound  Catheter size: 19 G   -ultrasound image captured on disc.  Assessment  Injection assessment: negative aspiration, negative parasthesia and local visualized surrounding nerve  Paresthesia pain: none  Heart rate change: no  Slow fractionated injection: yes  Pain Tolerance: comfortable throughout block and no complaints      Additional Notes  Time out conducted. Site elvia confirmed with team and patient. Allergies reviewed.   Vital signs stable throughout block. RN monitoring vitals throughout.   Needle advanced under continuous ultrasound guidance.  Local injected incrementally after confirming negative aspiration. No signs or symptoms of intravascular or intraneural injection noted.   No persistent paresthesias elicited or expressed. Patient tolerated procedure well.  No local anesthetic used for the dry catheter pre-op; saline was used throughout for  hydrodissection.

## 2022-04-11 NOTE — PROGRESS NOTES
"MD King at bedside to see patient> Per MD King" okay for catheter to be bolused by the pain team". Pain team notified of above findings.  "

## 2022-04-12 VITALS
HEART RATE: 74 BPM | BODY MASS INDEX: 31.7 KG/M2 | HEIGHT: 62 IN | DIASTOLIC BLOOD PRESSURE: 72 MMHG | RESPIRATION RATE: 17 BRPM | OXYGEN SATURATION: 99 % | WEIGHT: 172.25 LBS | SYSTOLIC BLOOD PRESSURE: 142 MMHG | TEMPERATURE: 98 F

## 2022-04-12 LAB
ANION GAP SERPL CALC-SCNC: 10 MMOL/L (ref 8–16)
BASOPHILS # BLD AUTO: 0.03 K/UL (ref 0–0.2)
BASOPHILS NFR BLD: 0.3 % (ref 0–1.9)
BUN SERPL-MCNC: 13 MG/DL (ref 6–20)
CALCIUM SERPL-MCNC: 8.5 MG/DL (ref 8.7–10.5)
CHLORIDE SERPL-SCNC: 107 MMOL/L (ref 95–110)
CO2 SERPL-SCNC: 26 MMOL/L (ref 23–29)
CREAT SERPL-MCNC: 0.7 MG/DL (ref 0.5–1.4)
DIFFERENTIAL METHOD: ABNORMAL
EOSINOPHIL # BLD AUTO: 0 K/UL (ref 0–0.5)
EOSINOPHIL NFR BLD: 0.1 % (ref 0–8)
ERYTHROCYTE [DISTWIDTH] IN BLOOD BY AUTOMATED COUNT: 14.2 % (ref 11.5–14.5)
EST. GFR  (AFRICAN AMERICAN): >60 ML/MIN/1.73 M^2
EST. GFR  (NON AFRICAN AMERICAN): >60 ML/MIN/1.73 M^2
GLUCOSE SERPL-MCNC: 114 MG/DL (ref 70–110)
HCT VFR BLD AUTO: 29.2 % (ref 37–48.5)
HGB BLD-MCNC: 9.7 G/DL (ref 12–16)
IMM GRANULOCYTES # BLD AUTO: 0.02 K/UL (ref 0–0.04)
IMM GRANULOCYTES NFR BLD AUTO: 0.2 % (ref 0–0.5)
LYMPHOCYTES # BLD AUTO: 2.3 K/UL (ref 1–4.8)
LYMPHOCYTES NFR BLD: 24.6 % (ref 18–48)
MCH RBC QN AUTO: 29.4 PG (ref 27–31)
MCHC RBC AUTO-ENTMCNC: 33.2 G/DL (ref 32–36)
MCV RBC AUTO: 89 FL (ref 82–98)
MONOCYTES # BLD AUTO: 0.9 K/UL (ref 0.3–1)
MONOCYTES NFR BLD: 10 % (ref 4–15)
NEUTROPHILS # BLD AUTO: 6 K/UL (ref 1.8–7.7)
NEUTROPHILS NFR BLD: 64.8 % (ref 38–73)
NRBC BLD-RTO: 0 /100 WBC
PLATELET # BLD AUTO: 288 K/UL (ref 150–450)
PMV BLD AUTO: 10.2 FL (ref 9.2–12.9)
POTASSIUM SERPL-SCNC: 3 MMOL/L (ref 3.5–5.1)
RBC # BLD AUTO: 3.3 M/UL (ref 4–5.4)
SODIUM SERPL-SCNC: 143 MMOL/L (ref 136–145)
WBC # BLD AUTO: 9.32 K/UL (ref 3.9–12.7)

## 2022-04-12 PROCEDURE — 25000003 PHARM REV CODE 250: Performed by: STUDENT IN AN ORGANIZED HEALTH CARE EDUCATION/TRAINING PROGRAM

## 2022-04-12 PROCEDURE — 80048 BASIC METABOLIC PNL TOTAL CA: CPT

## 2022-04-12 PROCEDURE — 94761 N-INVAS EAR/PLS OXIMETRY MLT: CPT

## 2022-04-12 PROCEDURE — 99203 OFFICE O/P NEW LOW 30 MIN: CPT | Mod: ,,, | Performed by: ANESTHESIOLOGY

## 2022-04-12 PROCEDURE — 85025 COMPLETE CBC W/AUTO DIFF WBC: CPT

## 2022-04-12 PROCEDURE — 36415 COLL VENOUS BLD VENIPUNCTURE: CPT

## 2022-04-12 PROCEDURE — 63600175 PHARM REV CODE 636 W HCPCS: Performed by: STUDENT IN AN ORGANIZED HEALTH CARE EDUCATION/TRAINING PROGRAM

## 2022-04-12 PROCEDURE — 99900035 HC TECH TIME PER 15 MIN (STAT)

## 2022-04-12 PROCEDURE — 99203 PR OFFICE/OUTPT VISIT, NEW, LEVL III, 30-44 MIN: ICD-10-PCS | Mod: ,,, | Performed by: ANESTHESIOLOGY

## 2022-04-12 PROCEDURE — 25000003 PHARM REV CODE 250

## 2022-04-12 RX ORDER — ASPIRIN 81 MG/1
81 TABLET ORAL 2 TIMES DAILY
Qty: 60 TABLET | Refills: 0 | Status: SHIPPED | OUTPATIENT
Start: 2022-04-12 | End: 2023-03-06

## 2022-04-12 RX ORDER — HYDROCODONE BITARTRATE AND ACETAMINOPHEN 5; 325 MG/1; MG/1
1 TABLET ORAL EVERY 6 HOURS PRN
Qty: 25 TABLET | Refills: 0 | Status: SHIPPED | OUTPATIENT
Start: 2022-04-12 | End: 2022-04-12 | Stop reason: SDUPTHER

## 2022-04-12 RX ORDER — IBUPROFEN 600 MG/1
600 TABLET ORAL EVERY 8 HOURS PRN
Qty: 30 TABLET | Refills: 0 | Status: SHIPPED | OUTPATIENT
Start: 2022-04-12 | End: 2022-05-03 | Stop reason: SDUPTHER

## 2022-04-12 RX ORDER — HYDROCODONE BITARTRATE AND ACETAMINOPHEN 5; 325 MG/1; MG/1
1 TABLET ORAL EVERY 6 HOURS PRN
Qty: 25 TABLET | Refills: 0 | Status: SHIPPED | OUTPATIENT
Start: 2022-04-12 | End: 2022-05-04

## 2022-04-12 RX ORDER — ACETAMINOPHEN 500 MG
1000 TABLET ORAL 2 TIMES DAILY PRN
Qty: 30 TABLET | Refills: 0 | Status: SHIPPED | OUTPATIENT
Start: 2022-04-12 | End: 2022-04-12 | Stop reason: HOSPADM

## 2022-04-12 RX ORDER — ROPIVACAINE HYDROCHLORIDE 2 MG/ML
INJECTION, SOLUTION EPIDURAL; INFILTRATION; PERINEURAL CONTINUOUS
Status: DISCONTINUED | OUTPATIENT
Start: 2022-04-12 | End: 2022-04-12 | Stop reason: HOSPADM

## 2022-04-12 RX ORDER — METHOCARBAMOL 750 MG/1
750 TABLET, FILM COATED ORAL 3 TIMES DAILY PRN
Qty: 30 TABLET | Refills: 0 | Status: SHIPPED | OUTPATIENT
Start: 2022-04-12 | End: 2022-04-22

## 2022-04-12 RX ADMIN — METHOCARBAMOL TABLETS 500 MG: 500 TABLET, COATED ORAL at 08:04

## 2022-04-12 RX ADMIN — ACETAMINOPHEN 1000 MG: 500 TABLET ORAL at 06:04

## 2022-04-12 RX ADMIN — LEVOTHYROXINE SODIUM 50 MCG: 50 TABLET ORAL at 08:04

## 2022-04-12 RX ADMIN — Medication 2 G: at 12:04

## 2022-04-12 RX ADMIN — Medication 2 G: at 09:04

## 2022-04-12 RX ADMIN — MORPHINE SULFATE 2 MG: 2 INJECTION, SOLUTION INTRAMUSCULAR; INTRAVENOUS at 01:04

## 2022-04-12 RX ADMIN — CELECOXIB 200 MG: 200 CAPSULE ORAL at 08:04

## 2022-04-12 RX ADMIN — MORPHINE SULFATE 2 MG: 2 INJECTION, SOLUTION INTRAMUSCULAR; INTRAVENOUS at 07:04

## 2022-04-12 RX ADMIN — OXYCODONE HYDROCHLORIDE 10 MG: 10 TABLET ORAL at 06:04

## 2022-04-12 RX ADMIN — OXYCODONE HYDROCHLORIDE 10 MG: 10 TABLET ORAL at 09:04

## 2022-04-12 RX ADMIN — OXYCODONE HYDROCHLORIDE 10 MG: 10 TABLET ORAL at 12:04

## 2022-04-12 RX ADMIN — ASPIRIN 81 MG: 81 TABLET, COATED ORAL at 08:04

## 2022-04-12 RX ADMIN — OXYCODONE HYDROCHLORIDE 10 MG: 10 TABLET ORAL at 03:04

## 2022-04-12 RX ADMIN — MORPHINE SULFATE 2 MG: 2 INJECTION, SOLUTION INTRAMUSCULAR; INTRAVENOUS at 04:04

## 2022-04-12 NOTE — ANESTHESIA POSTPROCEDURE EVALUATION
Anesthesia Post Evaluation    Patient: Jaycee Gamboa    Procedure(s) Performed: Procedure(s) (LRB):  ORIF, FRACTURE, HUMERUS, DISTAL - LEFT, Synthes (Left)    Final Anesthesia Type: general      Patient location during evaluation: PACU  Patient participation: Yes- Able to Participate  Level of consciousness: awake and alert  Post-procedure vital signs: reviewed and stable  Pain management: adequate  Airway patency: patent    PONV status at discharge: No PONV  Anesthetic complications: no      Cardiovascular status: blood pressure returned to baseline  Respiratory status: unassisted and spontaneous ventilation  Hydration status: euvolemic  Follow-up not needed.          Vitals Value Taken Time   /98 04/11/22 1700   Temp 36.6 °C (97.8 °F) 04/11/22 1700   Pulse 72 04/11/22 1900   Resp 11 04/11/22 1900   SpO2 98 % 04/11/22 1900   Vitals shown include unvalidated device data.      Event Time   Out of Recovery 15:30:00         Pain/Shena Score: Pain Rating Prior to Med Admin: 8 (4/11/2022  6:47 PM)  Shena Score: 10 (4/11/2022  5:00 PM)

## 2022-04-12 NOTE — ANESTHESIA POST-OP PAIN MANAGEMENT
Acute Pain Service Progress Note    Jaycee Gamboa is a 41 y.o., female, [id number].    Surgery:  ORIF, FRACTURE, HUMERUS, DISTAL - LEFT, Synthes (Left Arm)    Post Op Day #: 1    Catheter type: perineural  infraclavicular    Infusion type: Ropivacaine 0.2%  10ml/ 3 hours intermittent bolus    Problem List:    Active Hospital Problems    Diagnosis  POA    *Closed fracture of left distal humerus [S42.402A]  Yes      Resolved Hospital Problems   No resolved problems to display.       Subjective:     General appearance of alert, oriented, no complaints   Pain with rest: 8    Faces   Pain with movement: 9    Faces   Side Effects    1. Pruritis No    2. Nausea No    3. Motor Blockade No    4. Sedation No, 1=awake and alert    Objective:        Catheter site clean, dry, intact    Abd: Soft, non-tender         Vitals   Vitals:    04/12/22 0701   BP:    Pulse:    Resp: 16   Temp:         Labs    Admission on 04/11/2022   Component Date Value Ref Range Status    Sodium 04/12/2022 143  136 - 145 mmol/L Final    Potassium 04/12/2022 3.0 (A) 3.5 - 5.1 mmol/L Final    Chloride 04/12/2022 107  95 - 110 mmol/L Final    CO2 04/12/2022 26  23 - 29 mmol/L Final    Glucose 04/12/2022 114 (A) 70 - 110 mg/dL Final    BUN 04/12/2022 13  6 - 20 mg/dL Final    Creatinine 04/12/2022 0.7  0.5 - 1.4 mg/dL Final    Calcium 04/12/2022 8.5 (A) 8.7 - 10.5 mg/dL Final    Anion Gap 04/12/2022 10  8 - 16 mmol/L Final    eGFR if African American 04/12/2022 >60.0  >60 mL/min/1.73 m^2 Final    eGFR if non African American 04/12/2022 >60.0  >60 mL/min/1.73 m^2 Final    WBC 04/12/2022 9.32  3.90 - 12.70 K/uL Final    RBC 04/12/2022 3.30 (A) 4.00 - 5.40 M/uL Final    Hemoglobin 04/12/2022 9.7 (A) 12.0 - 16.0 g/dL Final    Hematocrit 04/12/2022 29.2 (A) 37.0 - 48.5 % Final    MCV 04/12/2022 89  82 - 98 fL Final    MCH 04/12/2022 29.4  27.0 - 31.0 pg Final    MCHC 04/12/2022 33.2  32.0 - 36.0 g/dL Final    RDW 04/12/2022 14.2  11.5 -  14.5 % Final    Platelets 04/12/2022 288  150 - 450 K/uL Final    MPV 04/12/2022 10.2  9.2 - 12.9 fL Final    Immature Granulocytes 04/12/2022 0.2  0.0 - 0.5 % Final    Gran # (ANC) 04/12/2022 6.0  1.8 - 7.7 K/uL Final    Immature Grans (Abs) 04/12/2022 0.02  0.00 - 0.04 K/uL Final    Lymph # 04/12/2022 2.3  1.0 - 4.8 K/uL Final    Mono # 04/12/2022 0.9  0.3 - 1.0 K/uL Final    Eos # 04/12/2022 0.0  0.0 - 0.5 K/uL Final    Baso # 04/12/2022 0.03  0.00 - 0.20 K/uL Final    nRBC 04/12/2022 0  0 /100 WBC Final    Gran % 04/12/2022 64.8  38.0 - 73.0 % Final    Lymph % 04/12/2022 24.6  18.0 - 48.0 % Final    Mono % 04/12/2022 10.0  4.0 - 15.0 % Final    Eosinophil % 04/12/2022 0.1  0.0 - 8.0 % Final    Basophil % 04/12/2022 0.3  0.0 - 1.9 % Final    Differential Method 04/12/2022 Automated   Final        Meds   Current Facility-Administered Medications   Medication Dose Route Frequency Provider Last Rate Last Admin    acetaminophen tablet 1,000 mg  1,000 mg Oral Q8H Freddy Renteria MD   1,000 mg at 04/12/22 0605    aspirin EC tablet 81 mg  81 mg Oral BID Tyree Clark MD   81 mg at 04/11/22 2026    ceFAZolin 2 gram in dextrose 5% 100 mL IVPB (premix)  2 g Intravenous Q8H Tyree Clark MD   Stopped at 04/12/22 0031    celecoxib capsule 200 mg  200 mg Oral Daily Freddy Renteria MD        levothyroxine tablet 50 mcg  50 mcg Oral Daily Tyree Clark MD        LIDOcaine (PF) 10 mg/ml (1%) injection 10 mg  1 mL Other Once Tyree Clark MD        melatonin tablet 6 mg  6 mg Oral Nightly PRN Tyree Clark MD        methocarbamoL tablet 500 mg  500 mg Oral TID Freddy Renteria MD   500 mg at 04/11/22 2025    oxyCODONE immediate release tablet 5 mg  5 mg Oral Q3H PRN Freddy Renteria MD        And    oxyCODONE immediate release tablet Tab 10 mg  10 mg Oral Q3H PRN Freddy Renteria MD   10 mg at 04/12/22 0606    And    morphine injection 2 mg  2 mg Intravenous Q1H PRN  Freddy Renteria MD   2 mg at 04/12/22 0701    ondansetron disintegrating tablet 8 mg  8 mg Oral Q8H PRN Tyree Clark MD        ROPIvacaine (PF) 2 mg/ml (0.2%) solution   Perineural Continuous Esha Giron MD   200 mL at 04/11/22 1620    scopolamine 1.3-1.5 mg (1 mg over 3 days) 1 patch  1 patch Transdermal Once Jose Aviles MD   1 patch at 04/11/22 0702    sodium chloride 0.9% flush 10 mL  10 mL Intravenous PRN Tyree Clark MD                Assessment:     Pain control inadequate. Patient states that her pain has been consistently an 8-9/10. She has required 5 doses of oxy 10 prn's and 3 doses of IV morphine in last 24 hours. She was not started on gabapentin due to seizure concern. She did feel that there was improvement in her pain with the additional bolus she received yesterday. Patient likely to DC today with Billy.     Plan:     - Continue multimodal pain regimen (tylenol, celecoxib, robaxin). Will give additional bolus from PNC.     -  Discharge home with billy Grey DO  PGY-1  [facility]             I have personally performed a face to face diagnostic evaluation on this patient. I have reviewed the ACP note and agree with the history, exam and plan of care, except as noted.

## 2022-04-12 NOTE — DISCHARGE SUMMARY
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  Left intra-articular distal humerus fracture.  Seen outside hospital, transferred to our facility for further care.  At that time was seen by the orthopedic resident on-call team placed into a long-arm splint.  She presents today for operative intervention     Currently complains of isolated pain left elbow, 5/10, sharp, stabbing, improved the splint.  Worse with motion.  No numbness no tingling     At baseline has bilateral lower extremity weakness due to her CMT  Does not use gait aids, but instead uses so flows, walls and other things for support.     Disabled  RHD  Lives at home with family  Household ambulator  Nonsmoker  Denies diabetes  Denies history of heart attack, stroke, blood clot, cancer  Reports history of prior MRSA infections    Procedure(s) (LRB):  ORIF, FRACTURE, HUMERUS, DISTAL - LEFT, Synthes (Left)      Hospital Course:  On 04/11/22, the patient arrived to the [facility] for proper pre-operative management.  Upon completion of pre-operative preparation, the patient was taken back to the operative theatre. The above procedure was performed without complication and the patient was transported to the post anesthesia care unit in stable condition.  After appropriate recovery from the anaesthetic agents used during the surgery, the patient was then transported to the hospital inpatient floor.  The interim of the hospital stay from arrival on the floor up to discharge has been uncomplicated. The patient has tolerated regular diet.   The patient's pain has been controlled using a multimodal approach.  The patient has been voiding without difficulty.  Currently, the patient's progress is sufficient to allow the them to be discharged home safely.  The patient agrees with this assessment and desires a discharge today.      Goals of Care Treatment Preferences:  Code Status: Full Code          Significant Diagnostic Studies: No pertinent studies.    Pending Diagnostic Studies:     Procedure Component Value Units Date/Time    Basic metabolic panel [[id number]] Collected: 04/12/22 0350    Order Status: Sent Lab Status: In process Updated: 04/12/22 0513    Specimen: Blood         Final Active Diagnoses:    Diagnosis Date Noted POA    PRINCIPAL PROBLEM:  Closed fracture of left distal humerus [S42.402A] 03/31/2022 Yes      Problems Resolved During this Admission:      Discharged Condition: good    Disposition: Home or Self Care    Follow Up: On 04/15/22 in clinic    Patient Instructions:   No discharge procedures on file.  Medications:  Reconciled Home Medications:      Medication List      START taking these medications    acetaminophen 500 MG tablet  Commonly known as: TYLENOL  Take 2 tablets (1,000 mg total) by mouth 2 (two) times daily as needed for Pain.     aspirin 81 MG EC tablet  Commonly known as: ECOTRIN  Take 1 tablet (81 mg total) by mouth 2 (two) times a day.     ibuprofen 600 MG tablet  Commonly known as: ADVIL,MOTRIN  Take 1 tablet (600 mg total) by mouth every 8 (eight) hours as needed for Pain.     methocarbamoL 750 MG Tab  Commonly known as: ROBAXIN  Take 1 tablet (750 mg total) by mouth 3 (three) times daily as needed (muscle spasms).        CONTINUE taking these medications    fluticasone propionate 50 mcg/actuation nasal spray  Commonly known as: FLONASE  1 spray (50 mcg total) by Each Nostril route once daily.     levothyroxine 50 MCG tablet  Commonly known as: SYNTHROID  Take 1 tablet (50 mcg total) by mouth once daily.      promethazine 12.5 MG Tab  Commonly known as: PHENERGAN  Take 1 tablet (12.5 mg total) by mouth 4 (four) times daily.     tiZANidine 4 MG tablet  Commonly known as: ZANAFLEX  Take 4 mg by mouth every evening.     zolpidem 10 mg Tab  Commonly known as: AMBIEN  Take 10 mg by mouth nightly as needed.        STOP taking these medications    HYDROcodone-acetaminophen 5-325 mg per tablet  Commonly known as: ALEE Clark MD  Orthopedics  [facility] - Surgery

## 2022-04-12 NOTE — NURSING
Plan of care reviewed with pt. Pt AAOx4, VS as charted. Patient on RA. Purposeful rounding for pt care and safety. No reports of NV. Pain managed with PRN meds ATC. No falls or injuries reported during this shift. Skin integrity as charted, dressings CDI. PIV intact, free of irritation. Safety precautions maintained during shift- bed in low position, call light in reach, SR up x2, personal belongings in reach.

## 2022-04-12 NOTE — SUBJECTIVE & OBJECTIVE
"Principal Problem:Closed fracture of left distal humerus    Principal Orthopedic Problem: same    Interval History: Patient experienced some burning pain in her elbow overnight, but has had adequate pain control this morning. She denies numbness, tingling, and weakness this morning.    Review of patient's allergies indicates:   Allergen Reactions    Benadryl decongestant Shortness Of Breath    Carrot Hives and Shortness Of Breath    Diphenhydramine Shortness Of Breath    Sumatriptan     Sumatriptan succinate Other (See Comments)     paralysis    Tramadol Other (See Comments)     Faces swells. Tolerates percocet  Pt states she  can take Dilaudid.     Venom-honey bee Anaphylaxis    Wasp sting [allergen ext-venom-honey bee] Anaphylaxis    Bupropion      Other reaction(s): Unknown    Bupropion hcl     Gabapentin Other (See Comments)     seizures       Current Facility-Administered Medications   Medication    acetaminophen tablet 1,000 mg    aspirin EC tablet 81 mg    ceFAZolin 2 gram in dextrose 5% 100 mL IVPB (premix)    celecoxib capsule 200 mg    levothyroxine tablet 50 mcg    LIDOcaine (PF) 10 mg/ml (1%) injection 10 mg    melatonin tablet 6 mg    methocarbamoL tablet 500 mg    oxyCODONE immediate release tablet 5 mg    And    oxyCODONE immediate release tablet Tab 10 mg    And    morphine injection 2 mg    ondansetron disintegrating tablet 8 mg    ROPIvacaine (PF) 2 mg/ml (0.2%) solution    scopolamine 1.3-1.5 mg (1 mg over 3 days) 1 patch    sodium chloride 0.9% flush 10 mL     Objective:     Vital Signs (Most Recent):  Temp: 98.3 °F (36.8 °C) (04/12/22 0522)  Pulse: 64 (04/12/22 0522)  Resp: 16 (04/12/22 0522)  BP: 132/73 (04/12/22 0522)  SpO2: (!) 92 % (04/12/22 0522)   Vital Signs (24h Range):  Temp:  [97.5 °F (36.4 °C)-98.4 °F (36.9 °C)] 98.3 °F (36.8 °C)  Pulse:  [63-94] 64  Resp:  [10-20] 16  SpO2:  [92 %-100 %] 92 %  BP: (132-186)/(70-98) 132/73     Weight: 78.1 kg (172 lb 3.6 oz)  Height: 5' 2" (157.5 " cm)  Body mass index is 31.5 kg/m².      Intake/Output Summary (Last 24 hours) at 4/12/2022 0540  Last data filed at 4/11/2022 1422  Gross per 24 hour   Intake 1650 ml   Output 150 ml   Net 1500 ml       Ortho/SPM Exam  Gen: NAD, sitting comfortably in bed  CV: regular rate  Resp: non-labored respirations  LUE:  - arm in sling  - splint clean, dry, and intact  - SILT M/U/R  - motor intact AIN/PIN/M/U/R   - capillary refill < 2 sec    Significant Labs: All pertinent labs within the past 24 hours have been reviewed.    Significant Imaging: I have reviewed all pertinent imaging results/findings.

## 2022-04-12 NOTE — NURSING
"Patient left via wheelchair assisted by Mother . Mother stated, " she did n ot need help getting her in car ."  "

## 2022-04-12 NOTE — DISCHARGE INSTRUCTIONS
Medication Instructions:    1. Take 600mg Ibuprofen every 8 hours as needed for pain.    2. Take 1000mg Tylenol up to twice a day as needed for pain. Do not take if taking Percocet.    3. Take 1 Norco 5-325 every 4 hours as needed for pain.     4. Take 750 mg of methocarbamol up to three times a day as needed for muscle spasms.    5. Take 81 mg of aspirin twice a day for 30 days to reduce the risk of developing a blood clot.

## 2022-04-12 NOTE — PLAN OF CARE
[facility] - Surgery  Initial Discharge Assessment       Primary Care Provider: Emi Lechuga MD    Admission Diagnosis: Closed fracture of distal end of left humerus, unspecified fracture morphology, initial encounter [S42.402A]  Closed fracture of left distal humerus [S42.402A]    Admission Date: 4/11/2022  Expected Discharge Date: 4/12/2022         Payor: [employer] MEDICARE / Plan: [place] [employer] / Product Type: Medicare Advantage /     Extended Emergency Contact Information  Primary Emergency Contact: Jody Gamboa  Mobile Phone: [phone]  Relation: Mother  Secondary Emergency Contact: Micah Pleitez   [place]  Mobile Phone: [phone]  Relation: Sister    Discharge Plan A: Home with family  Discharge Plan B: Home with family      [id number] - [place], [place] - [address]  [id number]  [place] [address]  Phone: [phone] Fax: [phone]      Initial Assessment (most recent)     Adult Discharge Assessment - 04/12/22 1150        Discharge Assessment    Assessment Type Discharge Planning Assessment     Confirmed/corrected address, phone number and insurance Yes     Confirmed Demographics Correct on Facesheet     Source of Information patient     Does patient/caregiver understand observation status Yes     Communicated OLIVER with patient/caregiver Yes     Lives With sibling(s)     Do you expect to return to your current living situation? Yes     Do you have help at home or someone to help you manage your care at home? Yes     Who are your caregiver(s) and their phone number(s)? Jody Gamboa (Mother) [phone]     Prior to hospitilization cognitive status: Alert/Oriented     Current cognitive status: Alert/Oriented     Walking or Climbing Stairs Difficulty none     Dressing/Bathing Difficulty none     Home Layout Able to live on 1st floor     Equipment Currently Used at Home rollator;cane, straight     Readmission within 30 days? No     Patient currently being followed  by outpatient case management? No     Do you currently have service(s) that help you manage your care at home? No     Is the pt/caregiver preference to resume services with current agency Yes     Do you take prescription medications? Yes     Do you have prescription coverage? Yes     Do you have any problems affording any of your prescribed medications? No     Is the patient taking medications as prescribed? yes     Who is going to help you get home at discharge? Sister, Brother-in-law and mother     How do you get to doctors appointments? family or friend will provide     Are you on dialysis? No     Do you take coumadin? No     Discharge Plan A Home with family     Discharge Plan B Home with family     DME Needed Upon Discharge  none               Spoke with patient and her mother at bedside to complete d/c planning assessment. Patient lives with her sister and brother-in-law in a single story home with one step to enter. Independent with ADL'S. Patient has a rollator and Cane in home to use as needed. Verified PCP, Pharmacy and Health insurance. D/C order placed pending pain control. Patient will have a ride home from her mother and help at home from mother, sister and brother-in-law as needed. Will continue to follow.

## 2022-04-12 NOTE — NURSING
Pt received bedside medication  prescriptions and copy of discharged papers instructions. Pt denies pain at this time. Pt educated on signs and symptoms of when to call the doctor. All belongings with the patient . Pt verbalized understanding. Patient received nimbus pump per [facility] . Patient waiting for mother to bring her down to car .

## 2022-04-12 NOTE — ADDENDUM NOTE
Addendum  created 04/12/22 0910 by Emily Tomlinson RN    LDA removed, Order list changed, Order sets accessed, Pharmacy for encounter modified

## 2022-04-12 NOTE — ASSESSMENT & PLAN NOTE
Jaycee Gamboa is a 41 y.o. female with PMHx of Charcot Amanda Tooth, hypothyroidism, and recent left humerus fracture now s/p ORIF left distal humerus on 4/11/22 with Dr. King. Patient has adequate pain control this morning and is neurovascularly intact on exam. She is stable for discharge at this point. She will follow up on Friday (04/15/22) for splint removal and transition to a hinged elbow brace.    Plan:  Pain control: multimodal per APS  PT/OT: GINGER OLVERA; no need to be evaluated in house by therapists  DVT PPx: ASA 81 mg BID, SCDs at all times when not ambulating  Abx: postop Ancef x 24 hours    Dispo: discharge home today

## 2022-04-12 NOTE — PROGRESS NOTES
[facility] - [facility]  Orthopedics  Progress Note    Patient Name: Jaycee Gamboa  MRN: [id number]  Admission Date: 4/11/2022  Hospital Length of Stay: 0 days  Attending Provider: Elliot King,*  Primary Care Provider: Emi Lechuga MD  Follow-up For: Procedure(s) (LRB):  ORIF, FRACTURE, HUMERUS, DISTAL - LEFT, Synthes (Left)    Post-Operative Day: 1 Day Post-Op  Subjective:     Principal Problem:Closed fracture of left distal humerus    Principal Orthopedic Problem: same    Interval History: Patient experienced some burning pain in her elbow overnight, but has had adequate pain control this morning. She denies numbness, tingling, and weakness this morning.    Review of patient's allergies indicates:   Allergen Reactions    Benadryl decongestant Shortness Of Breath    Carrot Hives and Shortness Of Breath    Diphenhydramine Shortness Of Breath    Sumatriptan     Sumatriptan succinate Other (See Comments)     paralysis    Tramadol Other (See Comments)     Faces swells. Tolerates percocet  Pt states she  can take Dilaudid.     Venom-honey bee Anaphylaxis    Wasp sting [allergen ext-venom-honey bee] Anaphylaxis    Bupropion      Other reaction(s): Unknown    Bupropion hcl     Gabapentin Other (See Comments)     seizures       Current Facility-Administered Medications   Medication    acetaminophen tablet 1,000 mg    aspirin EC tablet 81 mg    ceFAZolin 2 gram in dextrose 5% 100 mL IVPB (premix)    celecoxib capsule 200 mg    levothyroxine tablet 50 mcg    LIDOcaine (PF) 10 mg/ml (1%) injection 10 mg    melatonin tablet 6 mg    methocarbamoL tablet 500 mg    oxyCODONE immediate release tablet 5 mg    And    oxyCODONE immediate release tablet Tab 10 mg    And    morphine injection 2 mg    ondansetron disintegrating tablet 8 mg    ROPIvacaine (PF) 2 mg/ml (0.2%) solution    scopolamine 1.3-1.5 mg (1 mg over 3 days) 1 patch    sodium chloride 0.9% flush 10 mL     Objective:     Vital  "Signs (Most Recent):  Temp: 98.3 °F (36.8 °C) (04/12/22 0522)  Pulse: 64 (04/12/22 0522)  Resp: 16 (04/12/22 0522)  BP: 132/73 (04/12/22 0522)  SpO2: (!) 92 % (04/12/22 0522)   Vital Signs (24h Range):  Temp:  [97.5 °F (36.4 °C)-98.4 °F (36.9 °C)] 98.3 °F (36.8 °C)  Pulse:  [63-94] 64  Resp:  [10-20] 16  SpO2:  [92 %-100 %] 92 %  BP: (132-186)/(70-98) 132/73     Weight: 78.1 kg (172 lb 3.6 oz)  Height: 5' 2" (157.5 cm)  Body mass index is 31.5 kg/m².      Intake/Output Summary (Last 24 hours) at 4/12/2022 0540  Last data filed at 4/11/2022 1422  Gross per 24 hour   Intake 1650 ml   Output 150 ml   Net 1500 ml       Ortho/SPM Exam  Gen: NAD, sitting comfortably in bed  CV: regular rate  Resp: non-labored respirations  LUE:  - arm in sling  - splint clean, dry, and intact  - SILT M/U/R  - motor intact AIN/PIN/M/U/R   - capillary refill < 2 sec    Significant Labs: All pertinent labs within the past 24 hours have been reviewed.    Significant Imaging: I have reviewed all pertinent imaging results/findings.    Assessment/Plan:     * Closed fracture of left distal humerus  Jaycee Gamboa is a 41 y.o. female with PMHx of Charcot Amanda Tooth, hypothyroidism, and recent left humerus fracture now s/p ORIF left distal humerus on 4/11/22 with Dr. King. Patient has adequate pain control this morning and is neurovascularly intact on exam. She is stable for discharge at this point. She will follow up on Friday (04/15/22) for splint removal and transition to a hinged elbow brace.    Plan:  Pain control: multimodal per APS  PT/OT: NWB LUE; no need to be evaluated in house by therapists  DVT PPx: ASA 81 mg BID, SCDs at all times when not ambulating  Abx: postop Ancef x 24 hours    Dispo: discharge home today            Tyree Clark MD  Orthopedics  [facility] - Surgery  "

## 2022-04-12 NOTE — PROGRESS NOTES
Pt and family present, consent to converting infraclavicular PNC infusion to Nimbus.  Site CDI.  Educated regarding continued pain management, fall risk, signs of complications, continued monitoring, as well as discontinuing catheter on 4/15.  Two numbers obtained for APS to follow up.  Understanding verbalized.

## 2022-04-12 NOTE — PLAN OF CARE
[facility] - Surgery  Discharge Final Note    Primary Care Provider: Emi Lechuga MD    Expected Discharge Date: 4/12/2022    Final Discharge Note (most recent)     Final Note - 04/12/22 1406        Final Note    Assessment Type Final Discharge Note     Anticipated Discharge Disposition Home or Self Care     What phone number can be called within the next 1-3 days to see how you are doing after discharge? --   [phone]    Hospital Resources/Appts/Education Provided Appointments scheduled and added to AVS                 Important Message from Medicare            Future Appointments   Date Time Provider Department Center   4/14/2022 10:30 AM Max Castillo NP [facility]     Patient discharged home to care of family on 4/12/22.

## 2022-04-13 NOTE — PROGRESS NOTES
Ms. Gamboa is here today for a post-operative visit after undergoing an ORIF for her left distal humerus and olecranon osteotomy by Dr. King on 4/11/2022.    Interval History:  She reports that she is doing ok.  Pain is controlled with prescribed Norco.  She is taking pain medication.  She still has her nerve block in and will be removing it once she gets home today.  She denies fever, chills, and sweats since the time of the surgery.     Physical exam:  Dressing taken down.  Left Prineo dressing in place.  There is mild swelling at the left hand.  ROM of fingers and wrist are stable.  ROM at the elbow is 0-20 degrees.  There is no redness around her incision site and there is no drainage.   She has tactile stimulation to their lower FA.  Radial pulse is 2+.     RADS: none done today    Assessment:  Post-op visit (1 weeks)    Plan:    ICD-10-CM ICD-9-CM   1. Closed fracture of left elbow with routine healing, subsequent encounter s/p ORIF 4/11/22  S42.402D V54.11   2. Post-operative state  Z98.890 V45.89     Current care, treatment plan, precautions, activity level/ modifications, limitations, rehabilitation exercises and proposed future treatment were discussed with the patient. We discussed the need to monitor for changes in symptoms and condition and report them to the physician.  Discussed importance of compliance with all appointments and follow up examinations.     WOUND CARE ORDERS  - Do not remove surgical dressing for 2 weeks post-op. This will be done only by MD/CACHORRO at initial post-op visit. If dressing is completely saturated, Call number below.   - Do not get dressings wet.   - Do not shower.   - If dressing continues to be saturated or there are signs of infection, please call [facility] Clinic [phone] for further instructions and to make appt to be seen.       PHYSICAL THERAPY:   - Will refer to OT with [facility] in [place]  - Weight bearing - NWB to LUE x 8 weeks from surgery  - Range of motion  as tolerated. Please use hinged elbow brace.     PAIN MEDICATION:   - Pain medication: refill was not needed - managed by pain management  - Pain medication refill policy provided to patient for review, yes.    - Patient was informed a multi-modal approach is used to treat their pain.     DVT PROPHYLAXIS:   - ASA 81 mg bid x 6 weeks from surgery     FOLLOW UP:   - Patient will follow up in the clinic in 1 weeks.  - X-ray of her left elbow is needed.  - Future Appointments:   Future Appointments   Date Time Provider Department Center   4/25/2022 10:00 AM Max Castillo NP [facility]           If there are any questions prior to scheduled follow up, the patient was instructed to contact the office

## 2022-04-14 ENCOUNTER — OFFICE VISIT (OUTPATIENT)
Dept: ORTHOPEDICS | Facility: CLINIC | Age: 42
End: 2022-04-14
Payer: MEDICARE

## 2022-04-14 VITALS — WEIGHT: 172.19 LBS | BODY MASS INDEX: 31.68 KG/M2 | HEIGHT: 62 IN

## 2022-04-14 DIAGNOSIS — Z98.890 POST-OPERATIVE STATE: ICD-10-CM

## 2022-04-14 DIAGNOSIS — S42.402D CLOSED FRACTURE OF LEFT ELBOW WITH ROUTINE HEALING, SUBSEQUENT ENCOUNTER: Primary | ICD-10-CM

## 2022-04-14 PROCEDURE — 99999 PR PBB SHADOW E&M-EST. PATIENT-LVL III: CPT | Mod: PBBFAC,,, | Performed by: NURSE PRACTITIONER

## 2022-04-14 PROCEDURE — 3008F BODY MASS INDEX DOCD: CPT | Mod: CPTII,S$GLB,, | Performed by: NURSE PRACTITIONER

## 2022-04-14 PROCEDURE — 1160F RVW MEDS BY RX/DR IN RCRD: CPT | Mod: CPTII,S$GLB,, | Performed by: NURSE PRACTITIONER

## 2022-04-14 PROCEDURE — 1159F MED LIST DOCD IN RCRD: CPT | Mod: CPTII,S$GLB,, | Performed by: NURSE PRACTITIONER

## 2022-04-14 PROCEDURE — 99999 PR PBB SHADOW E&M-EST. PATIENT-LVL III: ICD-10-PCS | Mod: PBBFAC,,, | Performed by: NURSE PRACTITIONER

## 2022-04-14 PROCEDURE — 3008F PR BODY MASS INDEX (BMI) DOCUMENTED: ICD-10-PCS | Mod: CPTII,S$GLB,, | Performed by: NURSE PRACTITIONER

## 2022-04-14 PROCEDURE — 1160F PR REVIEW ALL MEDS BY PRESCRIBER/CLIN PHARMACIST DOCUMENTED: ICD-10-PCS | Mod: CPTII,S$GLB,, | Performed by: NURSE PRACTITIONER

## 2022-04-14 PROCEDURE — 99024 POSTOP FOLLOW-UP VISIT: CPT | Mod: S$GLB,,, | Performed by: NURSE PRACTITIONER

## 2022-04-14 PROCEDURE — 1159F PR MEDICATION LIST DOCUMENTED IN MEDICAL RECORD: ICD-10-PCS | Mod: CPTII,S$GLB,, | Performed by: NURSE PRACTITIONER

## 2022-04-14 PROCEDURE — 99024 PR POST-OP FOLLOW-UP VISIT: ICD-10-PCS | Mod: S$GLB,,, | Performed by: NURSE PRACTITIONER

## 2022-04-14 NOTE — CARE UPDATE
Spoke to patient today regarding nimbus pump. She states that her pain is very well controlled and she will likely remove the catheter later today. I advised her to turn the nimbus off and leave it attached for a few hours to make sure her pain remained controlled. I asked her to call us back with any additional questions or concerns.    Freddy Renteria MD PGY-4  Anesthesiology  [facility], [facility]

## 2022-04-17 NOTE — ADDENDUM NOTE
Addendum  created 04/17/22 1350 by Tyshawn Matthews MD    Clinical Note Signed, Intraprocedure Event edited

## 2022-04-17 NOTE — ANESTHESIA POST-OP PAIN MANAGEMENT
Acute Pain Service Progress Note    04/17/2022    Called and spoke to Jaycee Gamboa about the Nimbus pump and PNC. PNC removed without difficulty on 4/16.  All questions answered.              Tyshawn Matthews MD   Fellow  Regional Anesthesiology and Acute Pain Medicine  [facility]

## 2022-04-19 ENCOUNTER — TELEPHONE (OUTPATIENT)
Dept: ORTHOPEDICS | Facility: CLINIC | Age: 42
End: 2022-04-19
Payer: MEDICARE

## 2022-04-19 NOTE — TELEPHONE ENCOUNTER
Called and rescheduled pt's appt with Max to 04/26/2022 @ 11 am as requested. Pt was satisfied with new appt date/time.

## 2022-04-22 ENCOUNTER — PATIENT MESSAGE (OUTPATIENT)
Dept: INTERNAL MEDICINE | Facility: CLINIC | Age: 42
End: 2022-04-22
Payer: MEDICARE

## 2022-04-22 DIAGNOSIS — D64.9 ANEMIA, UNSPECIFIED TYPE: Primary | ICD-10-CM

## 2022-04-22 DIAGNOSIS — E03.9 ACQUIRED HYPOTHYROIDISM: ICD-10-CM

## 2022-04-22 DIAGNOSIS — R73.01 IMPAIRED FASTING BLOOD SUGAR: ICD-10-CM

## 2022-04-25 ENCOUNTER — PATIENT MESSAGE (OUTPATIENT)
Dept: INTERNAL MEDICINE | Facility: CLINIC | Age: 42
End: 2022-04-25
Payer: MEDICARE

## 2022-04-25 RX ORDER — ZOLPIDEM TARTRATE 10 MG/1
10 TABLET ORAL NIGHTLY PRN
Qty: 30 TABLET | Refills: 5 | Status: SHIPPED | OUTPATIENT
Start: 2022-04-25 | End: 2022-07-19 | Stop reason: SDUPTHER

## 2022-04-25 RX ORDER — LEVOTHYROXINE SODIUM 50 UG/1
50 TABLET ORAL DAILY
Qty: 90 TABLET | Refills: 1 | Status: SHIPPED | OUTPATIENT
Start: 2022-04-25 | End: 2022-10-03 | Stop reason: SDUPTHER

## 2022-04-25 NOTE — TELEPHONE ENCOUNTER
Refills sent, reviewed labs.  New anemia, needs repeat CBC and iron labs to see if this is concerning or just related to blood loss from procedure. Sugar high, ordering A1C    Schedule labs

## 2022-04-26 ENCOUNTER — OFFICE VISIT (OUTPATIENT)
Dept: ORTHOPEDICS | Facility: CLINIC | Age: 42
End: 2022-04-26
Payer: MEDICARE

## 2022-04-26 ENCOUNTER — HOSPITAL ENCOUNTER (OUTPATIENT)
Dept: RADIOLOGY | Facility: HOSPITAL | Age: 42
Discharge: HOME | End: 2022-04-26
Attending: NURSE PRACTITIONER
Payer: MEDICARE

## 2022-04-26 VITALS — WEIGHT: 172.19 LBS | BODY MASS INDEX: 31.68 KG/M2 | HEIGHT: 62 IN

## 2022-04-26 DIAGNOSIS — Z98.890 POST-OPERATIVE STATE: ICD-10-CM

## 2022-04-26 DIAGNOSIS — S42.402D CLOSED FRACTURE OF LEFT ELBOW WITH ROUTINE HEALING, SUBSEQUENT ENCOUNTER: Primary | ICD-10-CM

## 2022-04-26 DIAGNOSIS — M25.522 LEFT ELBOW PAIN: ICD-10-CM

## 2022-04-26 PROCEDURE — 73080 X-RAY EXAM OF ELBOW: CPT | Mod: TC,LT

## 2022-04-26 PROCEDURE — 99999 PR PBB SHADOW E&M-EST. PATIENT-LVL III: CPT | Mod: PBBFAC,,, | Performed by: NURSE PRACTITIONER

## 2022-04-26 PROCEDURE — 99999 PR PBB SHADOW E&M-EST. PATIENT-LVL III: ICD-10-PCS | Mod: PBBFAC,,, | Performed by: NURSE PRACTITIONER

## 2022-04-26 PROCEDURE — 1159F PR MEDICATION LIST DOCUMENTED IN MEDICAL RECORD: ICD-10-PCS | Mod: CPTII,S$GLB,, | Performed by: NURSE PRACTITIONER

## 2022-04-26 PROCEDURE — 1160F RVW MEDS BY RX/DR IN RCRD: CPT | Mod: CPTII,S$GLB,, | Performed by: NURSE PRACTITIONER

## 2022-04-26 PROCEDURE — 1159F MED LIST DOCD IN RCRD: CPT | Mod: CPTII,S$GLB,, | Performed by: NURSE PRACTITIONER

## 2022-04-26 PROCEDURE — 99024 POSTOP FOLLOW-UP VISIT: CPT | Mod: S$GLB,,, | Performed by: NURSE PRACTITIONER

## 2022-04-26 PROCEDURE — 3008F BODY MASS INDEX DOCD: CPT | Mod: CPTII,S$GLB,, | Performed by: NURSE PRACTITIONER

## 2022-04-26 PROCEDURE — 73080 X-RAY EXAM OF ELBOW: CPT | Mod: 26,LT,, | Performed by: RADIOLOGY

## 2022-04-26 PROCEDURE — 73080 XR ELBOW COMPLETE 3 VIEW LEFT: ICD-10-PCS | Mod: 26,LT,, | Performed by: RADIOLOGY

## 2022-04-26 PROCEDURE — 99024 PR POST-OP FOLLOW-UP VISIT: ICD-10-PCS | Mod: S$GLB,,, | Performed by: NURSE PRACTITIONER

## 2022-04-26 PROCEDURE — 1160F PR REVIEW ALL MEDS BY PRESCRIBER/CLIN PHARMACIST DOCUMENTED: ICD-10-PCS | Mod: CPTII,S$GLB,, | Performed by: NURSE PRACTITIONER

## 2022-04-26 PROCEDURE — 3008F PR BODY MASS INDEX (BMI) DOCUMENTED: ICD-10-PCS | Mod: CPTII,S$GLB,, | Performed by: NURSE PRACTITIONER

## 2022-04-26 NOTE — PROGRESS NOTES
Ms. Gamboa is here today for a post-operative visit after undergoing an ORIF for her left distal humerus and olecranon osteotomy by Dr. King on 4/11/2022.    Interval History:  She reports that she is doing ok.  Pain is controlled with prescribed Norco.  She is taking pain medication.  She is going to outpatient therapy, states they are doing ROM activities and working with a pulley.  She reports intermittent swelling to her left arm.  Denies numbness or tingling sensation to her lower arm.  She reports she is speaking with her PCP regarding abnormal lab findings.  She denies fever, chills, and sweats since the time of the surgery.     Physical exam:  Dressing taken down.  Left Prineo dressing in place.  Swelling to left hand has improved.  ROM of fingers and wrist are stable.  ROM at the elbow is 0-70 degrees.  There is no redness around her incision site and there is no drainage.   She has tactile stimulation to their lower FA.  Radial pulse is 2+.     RADS: X-ray of the left elbow obtained and personally reviewed by me.  Hardware to olecranon and distal humerus appears to be intact, fracture appears stable, no new fractures seen.    Assessment:  Post-op visit (2 weeks)    Plan:    ICD-10-CM ICD-9-CM   1. Closed fracture of left elbow with routine healing, subsequent encounter s/p ORIF 4/11/22  S42.402D V54.11   2. Post-operative state  Z98.890 V45.89     Current care, treatment plan, precautions, activity level/ modifications, limitations, rehabilitation exercises and proposed future treatment were discussed with the patient. We discussed the need to monitor for changes in symptoms and condition and report them to the physician.  Discussed importance of compliance with all appointments and follow up examinations.     WOUND CARE ORDERS  -Jaycee was advised to keep the incision clean and dry for the next 24 hours after which she may wash the area with antibacterial soap in the shower.   -She not submerge  until the incision is completely healed  -Patient was advised to monitor wound closely and multiple times daily for any problems. Call clinic immediately or report to ED for immediate medical attention for any complications including reopening of wound, drainage, purulence, redness, streaking, odor, pain out of proportion, fever, chills, etc.   - If there are signs of infection, please call [facility] [phone] for further instructions and to make appt to be seen.   - Advised patient primo dressing should come off when showering.  Don't pull.  Ok to clip any pieces that dangle.      PHYSICAL THERAPY:   - Continue OT with [facility] in [place]  - Weight bearing - NWB to LUE x 8 weeks from surgery (6 more weeks)  - Range of motion as tolerated. Please use hinged elbow brace.     PAIN MEDICATION:   - Pain medication: refill was not needed - managed by pain management  - Pain medication refill policy provided to patient for review, yes.    - Patient was informed a multi-modal approach is used to treat their pain.     DVT PROPHYLAXIS:   - ASA 81 mg bid x 6 weeks from surgery     FOLLOW UP:   - Patient will follow up in the clinic in 4 weeks.  - X-ray of her left elbow is needed.  - Future Appointments:   Future Appointments   Date Time Provider Department Center   4/27/2022  2:30 PM HOSPITAL LAB, [facility]   5/26/2022 10:30 AM [facility]           If there are any questions prior to scheduled follow up, the patient was instructed to contact the office

## 2022-05-02 ENCOUNTER — NURSE TRIAGE (OUTPATIENT)
Dept: ADMINISTRATIVE | Facility: CLINIC | Age: 42
End: 2022-05-02
Payer: MEDICARE

## 2022-05-03 ENCOUNTER — TELEPHONE (OUTPATIENT)
Dept: ORTHOPEDICS | Facility: CLINIC | Age: 42
End: 2022-05-03
Payer: MEDICARE

## 2022-05-03 RX ORDER — IBUPROFEN 600 MG/1
600 TABLET ORAL EVERY 8 HOURS PRN
Qty: 30 TABLET | Refills: 0 | Status: SHIPPED | OUTPATIENT
Start: 2022-05-03 | End: 2023-03-06

## 2022-05-03 NOTE — TELEPHONE ENCOUNTER
Previously spoke with patient, already called in something for her.  Additionally, she is under the care of a pain specialist who is managing her Norco medication.    ----- Message from Marce Zeng MA sent at 5/3/2022  3:09 PM CDT -----  Regarding: FW: call back  Contact: Pt    ----- Message -----  From: Rea Badillo, Patient Care Assistant  Sent: 5/3/2022   3:03 PM CDT  To: Anna NAYAK Staff  Subject: call back                                        Pt is requesting a call back in regards to her medication. Pt states he is in pain and would like for someone to contact her back.      Pt @ [phone]

## 2022-05-03 NOTE — TELEPHONE ENCOUNTER
Patient states she is out of her Motrin would like a refill.  She is having pain at the elbow with intermittent hand swelling.  She thinks that the therapists is overworking her hand and would like to back down from 3 times a week to twice a week.  She is also taking Norco 5/325 mg for pain she is seen pain management which has on a 3 times a day regimen but when she was discharged from the hospital she was taken to 4 times a day which helped controlled her pain much better.  I advised patient talked to her pain management doctor to see if they would be willing to adjust her to 4 times a day p.r.n..  Additionally advised the patient to elevate her arm on a pillow when not doing activity.  She reports her hand swelling improved after she removed the elbow compression wrap and the therapist was sized hand.

## 2022-05-03 NOTE — TELEPHONE ENCOUNTER
[place]    PCP:  Dr. Emi Lechuga    S/P surgery on 4/11.  She reports that she had PT today and now her Lt hand is swollen.  Swelling began Sat night into Sun morning.  Denies Rt hand swelling, redness, infected, painful to touch, fever, medical hx, recurrent symptom, CP, pregnant, and difficulty breathing.  She thinks that the stocking caused swelling.  H/O hysterectomy.  She reports that the arm is swollen from the the top of the elbow to the top of the hand.  Per protocol, care advised is see PCP within 24 hrs.  [facility] is unable to schedule an appt with Specialist therefore routed to Ortho.  Instructed to call for questions/concerns.  VU.    Reason for Disposition   MODERATE arm swelling (e.g., puffiness or swollen feeling of entire arm)    Additional Information   Negative: SEVERE difficulty breathing (e.g., struggling for each breath, speaks in single words)   Negative: Sounds like a life-threatening emergency to the triager   Negative: SEVERE arm swelling (e.g., all of arm looks swollen)   Negative: [1] MODERATE arm swelling (e.g., puffiness or swollen feeling of entire arm) and [2] PICC Line   Negative: [1] MODERATE arm swelling and [2] central venous catheter (central line, internal jugular line)   Negative: Difficulty breathing at rest   Negative: Looks like a broken bone or dislocated joint (e.g., crooked or deformed)   Negative: Entire hand is cool or blue in comparison to other side   Negative: [1] Can't use arm or can barely use arm AND [2] new-onset   Negative: [1] Difficulty breathing with exertion (e.g., walking) AND [2] new-onset or worsening   Negative: [1] Red area or streak AND [2] fever   Negative: [1] Swelling is painful to touch AND [2] fever   Negative: [1] Cast on arm AND [2] now increased pain   Negative: Patient sounds very sick or weak to the triager   Negative: [1] Pregnant 20 or more weeks AND [2] face swelling   Negative: [1] Pregnant 20 or more weeks AND [2] new blurred  vision or vision changes   Negative: [1] Postpartum (from 0 to 6 weeks after delivery) AND [2] new blurred vision or vision changes   Negative: [1] Postpartum (from 0 to 6 weeks after delivery) AND [2] face swelling   Negative: [1] Red area or streak [2] large (> 2 in. or 5 cm)    Protocols used: ARM SWELLING AND EDEMA-A-AH

## 2022-05-04 ENCOUNTER — PATIENT MESSAGE (OUTPATIENT)
Dept: ORTHOPEDICS | Facility: CLINIC | Age: 42
End: 2022-05-04
Payer: MEDICARE

## 2022-05-04 DIAGNOSIS — M79.602 PAIN IN LEFT ARM: ICD-10-CM

## 2022-05-04 DIAGNOSIS — M79.602 PAIN IN LEFT ARM: Primary | ICD-10-CM

## 2022-05-04 DIAGNOSIS — S42.402D CLOSED FRACTURE OF LEFT ELBOW WITH ROUTINE HEALING, SUBSEQUENT ENCOUNTER: Primary | ICD-10-CM

## 2022-05-04 RX ORDER — HYDROCODONE BITARTRATE AND ACETAMINOPHEN 5; 325 MG/1; MG/1
1 TABLET ORAL EVERY 12 HOURS PRN
Qty: 14 TABLET | Refills: 0 | Status: SHIPPED | OUTPATIENT
Start: 2022-05-04 | End: 2024-01-26 | Stop reason: DRUGHIGH

## 2022-05-04 NOTE — PROGRESS NOTES
Spoke with patient, she continues to have pain and swelling and her operative arm on the left.  Will order an ultrasound to rule out a DVT.  She is currently on a baby aspirin.  Also spoke to her pain management doctor Dr. Zuniga, he reports he is treating her chronic pain with Norco 10/325 3 times a day.  He is okay with us adjusting her pain medicine to give her another pain control following her surgery and till she follows up with him.  She has an appointment with him on May 10, 2022 at 1:15 a.m. in the afternoon at which time he will take over in treating her pain issues.

## 2022-05-05 NOTE — TELEPHONE ENCOUNTER
Called [facility], spoke with pharmacist, Nay, who told me she refused to fill Norco 5/325 mg bid PRN given that patient had a current prescription for Norco 10/325 mg.  I explained to her that patient recently had elbow surgery and the Norco 5/325 mg was to be used as breakthrough pain regimen that I had cleared with her Pain management doctor, Dr. Zuniga.  I explained that I had confirmed that she has a follow up appointment with him on 5-10-22 at 1:15 pm.  Ms. Tee said she will need to confirm this with the pain specialist prior to filling this new script and will contact the patient after speaking with Dr. Zuniga.    I notified patient of same and verb understanding.  Additionally, I told her that the results of her ultrasound was negative for DVT.

## 2022-05-26 ENCOUNTER — HOSPITAL ENCOUNTER (OUTPATIENT)
Dept: RADIOLOGY | Facility: HOSPITAL | Age: 42
Discharge: HOME | End: 2022-05-26
Attending: NURSE PRACTITIONER
Payer: MEDICARE

## 2022-05-26 ENCOUNTER — OFFICE VISIT (OUTPATIENT)
Dept: ORTHOPEDICS | Facility: CLINIC | Age: 42
End: 2022-05-26
Payer: MEDICARE

## 2022-05-26 VITALS — WEIGHT: 172.19 LBS | HEIGHT: 62 IN | BODY MASS INDEX: 31.68 KG/M2

## 2022-05-26 DIAGNOSIS — Z98.890 POST-OPERATIVE STATE: ICD-10-CM

## 2022-05-26 DIAGNOSIS — S42.402D CLOSED FRACTURE OF LEFT ELBOW WITH ROUTINE HEALING, SUBSEQUENT ENCOUNTER: Primary | ICD-10-CM

## 2022-05-26 DIAGNOSIS — M25.522 LEFT ELBOW PAIN: Primary | ICD-10-CM

## 2022-05-26 DIAGNOSIS — M25.522 LEFT ELBOW PAIN: ICD-10-CM

## 2022-05-26 PROCEDURE — 1160F PR REVIEW ALL MEDS BY PRESCRIBER/CLIN PHARMACIST DOCUMENTED: ICD-10-PCS | Mod: CPTII,S$GLB,, | Performed by: NURSE PRACTITIONER

## 2022-05-26 PROCEDURE — 99999 PR PBB SHADOW E&M-EST. PATIENT-LVL III: CPT | Mod: PBBFAC,,, | Performed by: NURSE PRACTITIONER

## 2022-05-26 PROCEDURE — 73080 X-RAY EXAM OF ELBOW: CPT | Mod: 26,LT,, | Performed by: RADIOLOGY

## 2022-05-26 PROCEDURE — 1159F MED LIST DOCD IN RCRD: CPT | Mod: CPTII,S$GLB,, | Performed by: NURSE PRACTITIONER

## 2022-05-26 PROCEDURE — 3008F BODY MASS INDEX DOCD: CPT | Mod: CPTII,S$GLB,, | Performed by: NURSE PRACTITIONER

## 2022-05-26 PROCEDURE — 99999 PR PBB SHADOW E&M-EST. PATIENT-LVL III: ICD-10-PCS | Mod: PBBFAC,,, | Performed by: NURSE PRACTITIONER

## 2022-05-26 PROCEDURE — 1160F RVW MEDS BY RX/DR IN RCRD: CPT | Mod: CPTII,S$GLB,, | Performed by: NURSE PRACTITIONER

## 2022-05-26 PROCEDURE — 3044F HG A1C LEVEL LT 7.0%: CPT | Mod: CPTII,S$GLB,, | Performed by: NURSE PRACTITIONER

## 2022-05-26 PROCEDURE — 99024 PR POST-OP FOLLOW-UP VISIT: ICD-10-PCS | Mod: S$GLB,,, | Performed by: NURSE PRACTITIONER

## 2022-05-26 PROCEDURE — 99024 POSTOP FOLLOW-UP VISIT: CPT | Mod: S$GLB,,, | Performed by: NURSE PRACTITIONER

## 2022-05-26 PROCEDURE — 73080 X-RAY EXAM OF ELBOW: CPT | Mod: TC,LT

## 2022-05-26 PROCEDURE — 73080 XR ELBOW COMPLETE 3 VIEW LEFT: ICD-10-PCS | Mod: 26,LT,, | Performed by: RADIOLOGY

## 2022-05-26 PROCEDURE — 3044F PR MOST RECENT HEMOGLOBIN A1C LEVEL <7.0%: ICD-10-PCS | Mod: CPTII,S$GLB,, | Performed by: NURSE PRACTITIONER

## 2022-05-26 PROCEDURE — 3008F PR BODY MASS INDEX (BMI) DOCUMENTED: ICD-10-PCS | Mod: CPTII,S$GLB,, | Performed by: NURSE PRACTITIONER

## 2022-05-26 PROCEDURE — 1159F PR MEDICATION LIST DOCUMENTED IN MEDICAL RECORD: ICD-10-PCS | Mod: CPTII,S$GLB,, | Performed by: NURSE PRACTITIONER

## 2022-05-26 NOTE — PROGRESS NOTES
Ms. Gamboa is here today for a post-operative visit after undergoing an ORIF for her left distal humerus and olecranon osteotomy by Dr. King on 4/11/2022.    Interval History:  She reports that she is doing well.  Stopped going to therapy due to cost.  She has been doing HEP previously given to her by therapy.  Denies falls or injuries.  Currently has no pain.  She has been doing dishes at home without issues.  Denies arm numbness.  She is not taking anything for pain currently.  She reports posterior left arm itches and she has been using claritin.  She is not taking pain medication.  She is wearing her elbow brace when out of the home but takes it off at the house.  She denies fever, chills, and sweats since the time of the surgery.     Physical exam:   Incision is open air and healed.  There appears to be contact dermatitis to the posterior arm.  There is no signs of infection at the incision site.  Normal range of motion of the fingers and wrist.  Elbow range of motion is 10° to 100 and 60°.  Muscle strength to triceps and biceps is 4/5.  She has tactile stimulation to their lower FA.  Radial pulse is 2+.     RADS: X-ray of the left elbow obtained and personally reviewed by me.  Hardware to olecranon and distal humerus appears to be intact, fracture appears stable and still visable, no new fractures seen.    Assessment:  Post-op visit (6 weeks)    Plan:    ICD-10-CM ICD-9-CM   1. Closed fracture of left elbow with routine healing, subsequent encounter  S42.402D V54.11   2. Post-operative state  Z98.890 V45.89     Current care, treatment plan, precautions, activity level/ modifications, limitations, rehabilitation exercises and proposed future treatment were discussed with the patient. We discussed the need to monitor for changes in symptoms and condition and report them to the physician.  Discussed importance of compliance with all appointments and follow up examinations.     WOUND CARE ORDERS  -Jaycee samuel  advised to apply cortisone cream to the posterior on avoiding the incision itself.  She may also use Claritin 10 mg q.h.s. to assist with pruritis.      PHYSICAL THERAPY:   - Continue HEP  - Weight bearing - discussed x-ray findings with Dr. King.  Okay to come out of the elbow brace.  May start to weightbear to the left upper extremity at 2-4 lb for the next 4 weeks and increased by 2-4 lb every month thereafter.  Needs to avoid heavy lifting and torque maneuvers.  - Range of motion as tolerated.      PAIN MEDICATION:   - Pain medication: refill was not needed - managed by pain management  - Pain medication refill policy provided to patient for review, yes.    - Patient was informed a multi-modal approach is used to treat their pain.     DVT PROPHYLAXIS:   - ASA 81 mg bid x 6 weeks from surgery - completed therapy     FOLLOW UP:   - Patient will follow up in the clinic in 6 weeks.  - X-ray of her left elbow is needed.  - Future Appointments:   Future Appointments   Date Time Provider Department Center   7/7/2022 10:30 AM [facility] [place]           If there are any questions prior to scheduled follow up, the patient was instructed to contact the office

## 2022-06-27 ENCOUNTER — TELEPHONE (OUTPATIENT)
Dept: ORTHOPEDICS | Facility: CLINIC | Age: 42
End: 2022-06-27
Payer: MEDICARE

## 2022-06-27 NOTE — TELEPHONE ENCOUNTER
Called and spoke with pt in regards to her situation. Informed pt I spoke with Max and he says pt has to be in the state in order to do any audio/ virtual visit. Pt verbally understood and says she will be headed out in the next hour and would like a call back from Max.

## 2022-06-27 NOTE — TELEPHONE ENCOUNTER
Spoke with patient, she is having an issue with her step-father, she needs to leave town.  States cannot make it to her next schedule appointment.  She reports no pain or issues with her elbow.  Advised her that she can follow up in clinic upon her return.  Verb understanding.    ----- Message from Marce Zeng MA sent at 6/27/2022  4:09 PM CDT -----  Contact: pt  [place]ana, explain to pt she needs to be in [place] to do an audio or virtual appt with you. Pt says she is leaving in the next hour, but would like to talk to you as well  ----- Message -----  From: Tracee Harris  Sent: 6/27/2022   3:34 PM CDT  To: Anna [facility]AYAK Staff    Pt's requesting to have a virtual appt pt has to leave town as soon as possible.. Pt did not disclose why.    Confirmed contact info below:  Contact Name: Jaycee Gamboa  Phone Number: [phone]

## 2022-07-11 ENCOUNTER — TELEPHONE (OUTPATIENT)
Dept: INTERNAL MEDICINE | Facility: CLINIC | Age: 42
End: 2022-07-11
Payer: MEDICARE

## 2022-07-11 NOTE — TELEPHONE ENCOUNTER
Spoke with patient and she stated that she meant to call us next week but I did let patient know that she can call us back next around Wed to ask for a refill on her medication and send it to the new pharmacy of her choice.

## 2022-07-11 NOTE — TELEPHONE ENCOUNTER
----- Message from Lynda Londono, Patient Care Assistant sent at 7/11/2022  8:10 AM CDT -----  Type:  Needs Medical Advice    Who Called:  pt  Symptoms (please be specific):  Patient would like a call back regarding one of her medication   Would the patient rather a call back or a response via [facility]?  Please call  Best Call Back Number:  [phone]   Additional Information:

## 2022-07-11 NOTE — TELEPHONE ENCOUNTER
"Pt states she is "in hiding" and states her "stepdad is looking for her really bad" and cant go to [facility] in [place] to  the refills? Asking for Ambien to be called into [facility] [address], [phone]. Scheduled to see Dr Lechuga in August. She is aware if she doesn't make it to Aug appt, she cannot get anymore refills. Ok to send [facility] day supply to [facility]?       "

## 2022-07-19 RX ORDER — ZOLPIDEM TARTRATE 10 MG/1
10 TABLET ORAL NIGHTLY PRN
Qty: 30 TABLET | Refills: 5 | Status: SHIPPED | OUTPATIENT
Start: 2022-07-19 | End: 2022-08-26 | Stop reason: SDUPTHER

## 2022-07-19 NOTE — TELEPHONE ENCOUNTER
----- Message from Francy Campbell sent at 7/19/2022 11:52 AM CDT -----  Regarding: call back  Contact: [phone]  Type:  RX Refill Request    Who Called: PT   Refill or New Rx: Refills   RX Name and Strength: zolpidem (AMBIEN) 10 mg Tab 30 tablet   How is the patient currently taking it? (ex. 1XDay): 1 x a day   Is this a 30 day or 90 day RX: 30  Preferred Pharmacy with phone number: [facility] [phone]

## 2022-07-19 NOTE — TELEPHONE ENCOUNTER
No new care gaps identified.  [facility] Embedded Care Gaps. Reference number: [id number]. 7/19/2022   11:57:08 AM MATHEUST

## 2022-08-24 ENCOUNTER — TELEPHONE (OUTPATIENT)
Dept: INTERNAL MEDICINE | Facility: CLINIC | Age: 42
End: 2022-08-24
Payer: MEDICARE

## 2022-08-24 NOTE — TELEPHONE ENCOUNTER
----- Message from Belia Rosen sent at 8/24/2022 11:55 AM CDT -----  Contact: [phone]  Type:  Needs Medical Advice    Who Called: Pt  Reason for call; pt would like to change her appt on Friday to a Virtual visit, pt states it is really important she speaks with the Dr  Pharmacy name and phone #:  n/a  Would the patient rather a call back or a response via [facility]? Call back  Best Call Back Number: [phone]

## 2022-08-26 ENCOUNTER — OFFICE VISIT (OUTPATIENT)
Dept: INTERNAL MEDICINE | Facility: CLINIC | Age: 42
End: 2022-08-26
Payer: MEDICARE

## 2022-08-26 ENCOUNTER — PATIENT MESSAGE (OUTPATIENT)
Dept: INTERNAL MEDICINE | Facility: CLINIC | Age: 42
End: 2022-08-26

## 2022-08-26 DIAGNOSIS — F51.01 PRIMARY INSOMNIA: ICD-10-CM

## 2022-08-26 DIAGNOSIS — S42.495A OTHER CLOSED NONDISPLACED FRACTURE OF DISTAL END OF LEFT HUMERUS, INITIAL ENCOUNTER: ICD-10-CM

## 2022-08-26 DIAGNOSIS — E03.9 ACQUIRED HYPOTHYROIDISM: Primary | ICD-10-CM

## 2022-08-26 DIAGNOSIS — G43.809 OTHER MIGRAINE WITHOUT STATUS MIGRAINOSUS, NOT INTRACTABLE: ICD-10-CM

## 2022-08-26 DIAGNOSIS — F41.9 ANXIETY: ICD-10-CM

## 2022-08-26 PROBLEM — R10.11 RUQ PAIN: Status: RESOLVED | Noted: 2017-03-25 | Resolved: 2022-08-26

## 2022-08-26 PROBLEM — R53.1 WEAKNESS: Status: RESOLVED | Noted: 2021-01-29 | Resolved: 2022-08-26

## 2022-08-26 PROBLEM — S43.004A DISLOCATION OF RIGHT SHOULDER JOINT: Status: RESOLVED | Noted: 2021-01-25 | Resolved: 2022-08-26

## 2022-08-26 PROBLEM — M79.601 PAIN OF RIGHT UPPER EXTREMITY: Status: RESOLVED | Noted: 2021-01-29 | Resolved: 2022-08-26

## 2022-08-26 PROCEDURE — 3044F HG A1C LEVEL LT 7.0%: CPT | Mod: CPTII,95,, | Performed by: INTERNAL MEDICINE

## 2022-08-26 PROCEDURE — 99499 RISK ADDL DX/OHS AUDIT: ICD-10-PCS | Mod: 95,,, | Performed by: INTERNAL MEDICINE

## 2022-08-26 PROCEDURE — 1160F RVW MEDS BY RX/DR IN RCRD: CPT | Mod: CPTII,95,, | Performed by: INTERNAL MEDICINE

## 2022-08-26 PROCEDURE — 1159F MED LIST DOCD IN RCRD: CPT | Mod: CPTII,95,, | Performed by: INTERNAL MEDICINE

## 2022-08-26 PROCEDURE — 1160F PR REVIEW ALL MEDS BY PRESCRIBER/CLIN PHARMACIST DOCUMENTED: ICD-10-PCS | Mod: CPTII,95,, | Performed by: INTERNAL MEDICINE

## 2022-08-26 PROCEDURE — 3044F PR MOST RECENT HEMOGLOBIN A1C LEVEL <7.0%: ICD-10-PCS | Mod: CPTII,95,, | Performed by: INTERNAL MEDICINE

## 2022-08-26 PROCEDURE — 1159F PR MEDICATION LIST DOCUMENTED IN MEDICAL RECORD: ICD-10-PCS | Mod: CPTII,95,, | Performed by: INTERNAL MEDICINE

## 2022-08-26 PROCEDURE — 99214 OFFICE O/P EST MOD 30 MIN: CPT | Mod: 95,,, | Performed by: INTERNAL MEDICINE

## 2022-08-26 PROCEDURE — 99499 UNLISTED E&M SERVICE: CPT | Mod: 95,,, | Performed by: INTERNAL MEDICINE

## 2022-08-26 PROCEDURE — 99214 PR OFFICE/OUTPT VISIT, EST, LEVL IV, 30-39 MIN: ICD-10-PCS | Mod: 95,,, | Performed by: INTERNAL MEDICINE

## 2022-08-26 RX ORDER — BUTALBITAL, ACETAMINOPHEN AND CAFFEINE 50; 325; 40 MG/1; MG/1; MG/1
1 TABLET ORAL EVERY 4 HOURS PRN
Qty: 30 TABLET | Refills: 2 | Status: SHIPPED | OUTPATIENT
Start: 2022-08-26 | End: 2022-09-25

## 2022-08-26 RX ORDER — ZOLPIDEM TARTRATE 10 MG/1
10 TABLET ORAL NIGHTLY PRN
Qty: 30 TABLET | Refills: 5 | Status: SHIPPED | OUTPATIENT
Start: 2022-08-26 | End: 2023-02-27 | Stop reason: SDUPTHER

## 2022-08-26 NOTE — PROGRESS NOTES
The patient location is: home  The chief complaint leading to consultation is: follow up    Visit type: audiovisual    Face to Face time with patient: 15 minutes  15 minutes of total time spent on the encounter, which includes face to face time and non-face to face time preparing to see the patient (eg, review of tests), Obtaining and/or reviewing separately obtained history, Documenting clinical information in the electronic or other health record, Independently interpreting results (not separately reported) and communicating results to the patient/family/caregiver, or Care coordination (not separately reported).     Each patient to whom he or she provides medical services by telemedicine is:  (1) informed of the relationship between the physician and patient and the respective role of any other health care provider with respect to management of the patient; and (2) notified that he or she may decline to receive medical services by telemedicine and may withdraw from such care at any time.    [facility] Note    Chief Complaint      Chief Complaint   Patient presents with    Follow-up     History of Present Illness      Jaycee Gamboa is a 41 y.o. female who presents today for follow up.  Patient comes to appointment alone.    Problem List Items Addressed This Visit     Migraine headache    Current Assessment & Plan     Has been getting more migraines lately, has tried excedrin/tylenol/ibuprofen.           Acquired hypothyroidism - Primary    Relevant Orders    CBC Auto Differential    CBC Auto Differential    Lipid Panel    Comprehensive Metabolic Panel    TSH    T4, FREE    Anxiety    Current Assessment & Plan     Paulina lives in [place], step dad has been stalking her, anxiety has been ok despite this. Has a good support sytem.  Worries about her stepfather coming after her.  Had seizure with wellbutrin in past.  Lexapro made her suicidal.  Thinks she was on venlafaxine in past.  Gained a lot of  weight on seroquel.              Primary insomnia    Current Assessment & Plan     Stable on   Had bad reaction with Trazodone in past.  No relief with melatonin, benadryl PRN.            Closed fracture of left distal humerus    Current Assessment & Plan     ORIF for her left distal humerus and olecranon osteotomy by Dr. King on 4/11/2022.  Gets some pain in joints when it rains.                 Health Maintenance   Topic Date Due    TETANUS VACCINE  10/07/2029    Hepatitis C Screening  Completed    Lipid Panel  Completed       Past Medical History:   Diagnosis Date    Anxiety     Breast abscess     Charcot-Amanda-Tooth disease     mild LE weakness    Chronic interstitial cystitis without hematuria     CMT (Charcot-Amanda-Tooth disease)     Depression     reports she is no longer depressed    Endometriosis     Endometriosis of uterus     Headache(784.0)     Herpes     History of psychiatric care     History of psychiatric hospitalization     Muscular dystrophy     PONV (postoperative nausea and vomiting)     PTSD (post-traumatic stress disorder)     S/P cholecystectomy     Seizures     last seizure 14-15 yrs ago    Self-injurious behavior     Thyroid disease        Past Surgical History:   Procedure Laterality Date    APPENDECTOMY      arthroscopy right shoulder      BACK SURGERY  07/01/2017    BACK SURGERY  02/21/2018    screw removal    BREAST SURGERY      reduction    CHOLECYSTECTOMY  03/2017    CYSTOSCOPY WITH HYDRODISTENSION OF BLADDER N/A 7/1/2019    Procedure: CYSTOSCOPY, WITH BLADDER HYDRODISTENSION;  Surgeon: Jya Shelby MD;  [facility];  Service: Urology;  Laterality: N/A;    CYSTOSCOPY WITH HYDRODISTENSION OF BLADDER N/A 12/7/2020    Procedure: CYSTOSCOPY, WITH BLADDER HYDRODISTENSION;  Surgeon: Jay Shelby MD;  [facility];  Service: Urology;  Laterality: N/A;    CYSTOSCOPY WITH HYDRODISTENSION OF BLADDER N/A 5/6/2021    Procedure: CYSTOSCOPY, WITH  BLADDER HYDRODISTENSION;  Surgeon: Jay Shelby MD;  Location: [facility] OR;  Service: Urology;  Laterality: N/A;    CYSTOSCOPY WITH URETHRAL DILATION N/A 10/8/2021    Procedure: CYSTOSCOPY, WITH URETHRAL DILATION;  Surgeon: Tone Khanna MD;  [facility];  Service: Urology;  Laterality: N/A;    DIAGNOSTIC LAPAROSCOPY Bilateral 9/21/2020    Procedure: LAPAROSCOPY, DIAGNOSTIC;  Surgeon: Santy Hinton MD;  Location: [facility];  Service: OB/GYN;  Laterality: Bilateral;  Plan to use robot in room 12 with Dr. Kemal LOGAN    DIAGNOSTIC LAPAROSCOPY N/A 2/21/2022    Procedure: LAPAROSCOPY, DIAGNOSTIC;  Surgeon: Santy Hinton MD;  Location: [facility];  Service: OB/GYN;  Laterality: N/A;  PER DR HINTON HE WOULD ONLY NEED 60 MINUTES    ENDOSCOPIC ULTRASOUND OF UPPER GASTROINTESTINAL TRACT N/A 8/14/2018    Procedure: ULTRASOUND, ENDOSCOPIC, UPPER GI TRACT;  Surgeon: Marko Pereyra MD;  Location: [facility];  Service: Endoscopy;  Laterality: N/A;    ESOPHAGOGASTRODUODENOSCOPY  08/14/2018    ESOPHAGOGASTRODUODENOSCOPY N/A 8/14/2018    Procedure: ESOPHAGOGASTRODUODENOSCOPY (EGD);  Surgeon: Marko Pereyra MD;  Location: [facility];  Service: Endoscopy;  Laterality: N/A;    ESOPHAGOGASTRODUODENOSCOPY N/A 7/23/2021    Procedure: EGD (ESOPHAGOGASTRODUODENOSCOPY);  Surgeon: Sulema Lopez MD;  Location: [facility];  Service: Endoscopy;  Laterality: N/A;    EXAMINATION UNDER ANESTHESIA N/A 11/2/2020    Procedure: EXAM UNDER ANESTHESIA;  Surgeon: Jenifer Aragon MD;  Location: [facility] OR 2ND FLR;  Service: Endoscopy;  Laterality: N/A;    EXCISIONAL HEMORRHOIDECTOMY N/A 10/1/2019    Procedure: HEMORRHOIDECTOMY;  Surgeon: Jenifer Aragon MD;  Location: [facility] OR 2ND FLR;  Service: Colon and Rectal;  Laterality: N/A;    HYSTERECTOMY      TLH vs LAVH with BSO    KNEE SURGERY Bilateral     OOPHORECTOMY      OPEN REDUCTION AND INTERNAL FIXATION (ORIF) OF FRACTURE OF DISTAL HUMERUS Left 4/11/2022    Procedure: ORIF,  FRACTURE, HUMERUS, DISTAL - LEFT, Synthes;  Surgeon: Elliot King MD;  [facility];  Service: Orthopedics;  Laterality: Left;    SPINE SURGERY      SURGICAL REMOVAL OF ENDOMETRIOSIS N/A 2020    Procedure: DESTRUCTION, ENDOMETRIOSIS;  Surgeon: Santy Elaine MD;  Location: [facility] OR;  Service: OB/GYN;  Laterality: N/A;    SURGICAL REMOVAL OF ENDOMETRIOSIS N/A 2022    Procedure: DESTRUCTION, ENDOMETRIOSIS;  Surgeon: Santy Elaine MD;  Location: [facility] OR;  Service: OB/GYN;  Laterality: N/A;    Upper EUS  2018       family history includes Bladder Cancer in her maternal grandmother; Breast cancer in her paternal aunt; Cancer in her maternal grandfather; Colon cancer in her maternal grandfather; No Known Problems in her father and mother.    Social History     Tobacco Use    Smoking status: Former Smoker     Packs/day: 0.50     Years: 3.00     Pack years: 1.50     Types: Cigarettes     Quit date:      Years since quittin.6    Smokeless tobacco: Never Used    Tobacco comment: rare   Substance Use Topics    Alcohol use: No    Drug use: No       Review of Systems   Constitutional: Negative for chills and fever.   HENT: Negative for hearing loss and sore throat.    Eyes: Negative for discharge.   Respiratory: Negative for cough, shortness of breath and wheezing.    Cardiovascular: Negative for chest pain and palpitations.   Gastrointestinal: Negative for blood in stool, constipation, diarrhea, nausea and vomiting.   Genitourinary: Negative for dysuria and hematuria.   Musculoskeletal: Negative for falls and neck pain.   Neurological: Positive for headaches. Negative for weakness.   Endo/Heme/Allergies: Negative for polydipsia.        Outpatient Encounter Medications as of 2022   Medication Sig Note Dispense Refill    aspirin (ECOTRIN) 81 MG EC tablet Take 1 tablet (81 mg total) by mouth 2 (two) times a day.  60 tablet 0    butalbital-acetaminophen-caffeine  -40 mg (FIORICET, ESGIC) -40 mg per tablet Take 1 tablet by mouth every 4 (four) hours as needed for Pain or Headaches.  30 tablet 2    fluticasone propionate (FLONASE) 50 mcg/actuation nasal spray 1 spray (50 mcg total) by Each Nostril route once daily. 4/8/2022: Currently not taking 16 g 3    HYDROcodone-acetaminophen (NORCO) 5-325 mg per tablet Take 1 tablet by mouth every 12 (twelve) hours as needed for Pain.  14 tablet 0    ibuprofen (ADVIL,MOTRIN) 600 MG tablet Take 1 tablet (600 mg total) by mouth every 8 (eight) hours as needed for Pain.  30 tablet 0    levothyroxine (SYNTHROID) 50 MCG tablet Take 1 tablet (50 mcg total) by mouth once daily.  90 tablet 1    promethazine (PHENERGAN) 12.5 MG Tab Take 1 tablet (12.5 mg total) by mouth 4 (four) times daily. (Patient not taking: No sig reported) 4/8/2022: May take am of surgery 30 tablet 5    tiZANidine (ZANAFLEX) 4 MG tablet Take 4 mg by mouth every evening.  4/8/2022: May take pm before surgery      zolpidem (AMBIEN) 10 mg Tab Take 1 tablet (10 mg total) by mouth nightly as needed (INSOMNIA).  30 tablet 5    [DISCONTINUED] escitalopram (LEXAPRO) 10 MG tablet Take 1 tablet (10 mg total) by mouth once daily.  30 tablet 11    [DISCONTINUED] topiramate (TOPAMAX) 50 MG tablet Take 1 tablet (50 mg total) by mouth 2 (two) times daily.  60 tablet 0    [DISCONTINUED] zolpidem (AMBIEN) 10 mg Tab Take 1 tablet (10 mg total) by mouth nightly as needed (INSOMNIA).  30 tablet 5     No facility-administered encounter medications on file as of 8/26/2022.        Review of patient's allergies indicates:   Allergen Reactions    Benadryl decongestant Shortness Of Breath    Carrot Hives and Shortness Of Breath    Diphenhydramine Shortness Of Breath    Sumatriptan     Sumatriptan succinate Other (See Comments)     paralysis    Tramadol Other (See Comments)     Faces swells. Tolerates percocet  Pt states she  can take Dilaudid.     Venom-honey bee  Anaphylaxis    Wasp sting [allergen ext-venom-honey bee] Anaphylaxis    Bupropion      Other reaction(s): Unknown    Bupropion hcl     Gabapentin Other (See Comments)     seizures       Physical Exam       ]    Physical Exam  Constitutional:       General: She is not in acute distress.     Appearance: She is well-developed.   HENT:      Head: Normocephalic and atraumatic.   Pulmonary:      Effort: Pulmonary effort is normal. No respiratory distress.   Musculoskeletal:      Cervical back: Normal range of motion.   Skin:     Findings: No rash.   Neurological:      Mental Status: She is alert and oriented to person, place, and time.      Coordination: Coordination normal.   Psychiatric:         Behavior: Behavior normal.         Thought Content: Thought content normal.         Judgment: Judgment normal.          Laboratory:  CBC:  No results for input(s): WBC, RBC, HGB, HCT, PLT, MCV, MCH, MCHC in the last 2160 hours.  CMP:  No results for input(s): GLU, CALCIUM, ALBUMIN, PROT, NA, K, CO2, CL, BUN, ALKPHOS, ALT, AST, BILITOT in the last 2160 hours.    Invalid input(s): CREATININ  URINALYSIS:  No results for input(s): COLORU, CLARITYU, SPECGRAV, PHUR, PROTEINUA, GLUCOSEU, BILIRUBINCON, BLOODU, WBCU, RBCU, BACTERIA, MUCUS, NITRITE, LEUKOCYTESUR, UROBILINOGEN, HYALINECASTS in the last 2160 hours.   LIPIDS:  No results for input(s): TSH, HDL, CHOL, TRIG, LDLCALC, CHOLHDL, NONHDLCHOL, TOTALCHOLEST in the last 2160 hours.  TSH:  No results for input(s): TSH in the last 2160 hours.  A1C:  No results for input(s): HGBA1C in the last 2160 hours.    Radiology:  No results found in the last 30 days.     Assessment/Plan     Jaycee Gamboa is a 41 y.o.female with:    1. Anxiety    2. Other closed nondisplaced fracture of distal end of left humerus, initial encounter    3. Acquired hypothyroidism  - CBC Auto Differential; Future  - CBC Auto Differential; Future  - Lipid Panel; Future  - Comprehensive Metabolic Panel;  Future  - TSH; Future  - T4, FREE; Future    4. Other migraine without status migrainosus, not intractable    5. Primary insomnia    -labs ordered  -Continue current medications and maintain follow up with specialists.    -Follow up in about 6 months (around 2/26/2023) for follow up of medical problems.       Emi Lechuga MD  [facility]      Answers for HPI/ROS submitted by the patient on 8/26/2022  activity change: No  unexpected weight change: No  rhinorrhea: No  trouble swallowing: No  visual disturbance: No  chest tightness: No  polyuria: No  difficulty urinating: No  menstrual problem: No  joint swelling: No  arthralgias: No  confusion: No  dysphoric mood: No

## 2022-08-26 NOTE — ASSESSMENT & PLAN NOTE
ORIF for her left distal humerus and olecranon osteotomy by Dr. King on 4/11/2022.  Gets some pain in joints when it rains.

## 2022-08-26 NOTE — ASSESSMENT & PLAN NOTE
Paulina lives in [place], step dad has been stalking her, anxiety has been ok despite this. Has a good support sytem.  Worries about her stepfather coming after her.  Had seizure with wellbutrin in past.  Lexapro made her suicidal.  Thinks she was on venlafaxine in past.  Gained a lot of weight on seroquel.

## 2022-09-26 ENCOUNTER — TELEPHONE (OUTPATIENT)
Dept: INTERNAL MEDICINE | Facility: CLINIC | Age: 42
End: 2022-09-26
Payer: MEDICARE

## 2022-09-26 NOTE — TELEPHONE ENCOUNTER
Patient c/o fatigued states she is very weak. Patient states she thinks her potassium,and sugar levels are down again. Patient also c/o dizziness and kidney pain. Patient states she do not want to go to ed.

## 2022-09-26 NOTE — TELEPHONE ENCOUNTER
----- Message from Paulina Kuhn sent at 9/26/2022 12:50 PM CDT -----  Type:  Needs Medical Advice    Who Called: pt  Symptoms (please be specific): pt has questions and concerns about her potassium levels and sugar levels   Would the patient rather a call back or a response via [facility]? call  Best Call Back Number: [phone]  Additional Information:

## 2022-09-28 ENCOUNTER — LAB VISIT (OUTPATIENT)
Dept: LAB | Facility: HOSPITAL | Age: 42
End: 2022-09-28
Attending: INTERNAL MEDICINE
Payer: MEDICARE

## 2022-09-28 DIAGNOSIS — E03.9 ACQUIRED HYPOTHYROIDISM: ICD-10-CM

## 2022-09-28 LAB
ALBUMIN SERPL BCP-MCNC: 3.7 G/DL (ref 3.5–5.2)
ALP SERPL-CCNC: 104 U/L (ref 55–135)
ALT SERPL W/O P-5'-P-CCNC: 8 U/L (ref 10–44)
ANION GAP SERPL CALC-SCNC: 12 MMOL/L (ref 8–16)
AST SERPL-CCNC: 14 U/L (ref 10–40)
BASOPHILS # BLD AUTO: 0.04 K/UL (ref 0–0.2)
BASOPHILS NFR BLD: 0.8 % (ref 0–1.9)
BILIRUB SERPL-MCNC: 0.2 MG/DL (ref 0.1–1)
BUN SERPL-MCNC: 18 MG/DL (ref 6–20)
CALCIUM SERPL-MCNC: 9 MG/DL (ref 8.7–10.5)
CHLORIDE SERPL-SCNC: 112 MMOL/L (ref 95–110)
CHOLEST SERPL-MCNC: 166 MG/DL (ref 120–199)
CHOLEST/HDLC SERPL: 3.3 {RATIO} (ref 2–5)
CO2 SERPL-SCNC: 17 MMOL/L (ref 23–29)
CREAT SERPL-MCNC: 0.7 MG/DL (ref 0.5–1.4)
DIFFERENTIAL METHOD: ABNORMAL
EOSINOPHIL # BLD AUTO: 0.1 K/UL (ref 0–0.5)
EOSINOPHIL NFR BLD: 2.1 % (ref 0–8)
ERYTHROCYTE [DISTWIDTH] IN BLOOD BY AUTOMATED COUNT: 13.9 % (ref 11.5–14.5)
EST. GFR  (NO RACE VARIABLE): >60 ML/MIN/1.73 M^2
GLUCOSE SERPL-MCNC: 79 MG/DL (ref 70–110)
HCT VFR BLD AUTO: 36.7 % (ref 37–48.5)
HDLC SERPL-MCNC: 50 MG/DL (ref 40–75)
HDLC SERPL: 30.1 % (ref 20–50)
HGB BLD-MCNC: 11.9 G/DL (ref 12–16)
IMM GRANULOCYTES # BLD AUTO: 0.01 K/UL (ref 0–0.04)
IMM GRANULOCYTES NFR BLD AUTO: 0.2 % (ref 0–0.5)
LDLC SERPL CALC-MCNC: 100.4 MG/DL (ref 63–159)
LYMPHOCYTES # BLD AUTO: 2.7 K/UL (ref 1–4.8)
LYMPHOCYTES NFR BLD: 50.3 % (ref 18–48)
MCH RBC QN AUTO: 30.1 PG (ref 27–31)
MCHC RBC AUTO-ENTMCNC: 32.4 G/DL (ref 32–36)
MCV RBC AUTO: 93 FL (ref 82–98)
MONOCYTES # BLD AUTO: 0.3 K/UL (ref 0.3–1)
MONOCYTES NFR BLD: 5.6 % (ref 4–15)
NEUTROPHILS # BLD AUTO: 2.2 K/UL (ref 1.8–7.7)
NEUTROPHILS NFR BLD: 41 % (ref 38–73)
NONHDLC SERPL-MCNC: 116 MG/DL
NRBC BLD-RTO: 0 /100 WBC
PLATELET # BLD AUTO: 73 K/UL (ref 150–450)
PMV BLD AUTO: 11.8 FL (ref 9.2–12.9)
POTASSIUM SERPL-SCNC: 4.9 MMOL/L (ref 3.5–5.1)
PROT SERPL-MCNC: 7.1 G/DL (ref 6–8.4)
RBC # BLD AUTO: 3.96 M/UL (ref 4–5.4)
SODIUM SERPL-SCNC: 141 MMOL/L (ref 136–145)
T4 FREE SERPL-MCNC: 0.76 NG/DL (ref 0.71–1.51)
TRIGL SERPL-MCNC: 78 MG/DL (ref 30–150)
TSH SERPL DL<=0.005 MIU/L-ACNC: 0.31 UIU/ML (ref 0.4–4)
WBC # BLD AUTO: 5.31 K/UL (ref 3.9–12.7)

## 2022-09-28 PROCEDURE — 80061 LIPID PANEL: CPT | Performed by: INTERNAL MEDICINE

## 2022-09-28 PROCEDURE — 80053 COMPREHEN METABOLIC PANEL: CPT | Performed by: INTERNAL MEDICINE

## 2022-09-28 PROCEDURE — 85025 COMPLETE CBC W/AUTO DIFF WBC: CPT | Performed by: INTERNAL MEDICINE

## 2022-09-28 PROCEDURE — 84443 ASSAY THYROID STIM HORMONE: CPT | Performed by: INTERNAL MEDICINE

## 2022-09-28 PROCEDURE — 84439 ASSAY OF FREE THYROXINE: CPT | Performed by: INTERNAL MEDICINE

## 2022-09-28 PROCEDURE — 36415 COLL VENOUS BLD VENIPUNCTURE: CPT | Performed by: INTERNAL MEDICINE

## 2022-10-03 ENCOUNTER — PATIENT MESSAGE (OUTPATIENT)
Dept: INTERNAL MEDICINE | Facility: CLINIC | Age: 42
End: 2022-10-03
Payer: MEDICARE

## 2022-10-03 RX ORDER — DICLOFENAC SODIUM 75 MG/1
75 TABLET, DELAYED RELEASE ORAL 2 TIMES DAILY
Qty: 180 TABLET | Refills: 0 | Status: SHIPPED | OUTPATIENT
Start: 2022-10-03 | End: 2022-10-05 | Stop reason: SDUPTHER

## 2022-10-05 ENCOUNTER — PATIENT MESSAGE (OUTPATIENT)
Dept: INTERNAL MEDICINE | Facility: CLINIC | Age: 42
End: 2022-10-05
Payer: MEDICARE

## 2022-10-05 RX ORDER — DICLOFENAC SODIUM 75 MG/1
75 TABLET, DELAYED RELEASE ORAL 2 TIMES DAILY
Qty: 180 TABLET | Refills: 0 | Status: SHIPPED | OUTPATIENT
Start: 2022-10-05 | End: 2022-12-29

## 2022-10-05 NOTE — TELEPHONE ENCOUNTER
Called pt, asked which pharmacy she wants ant-inflammatory to be sent. Pended the order and changed the pharmacy to the [facility] in [place] for you

## 2022-11-02 ENCOUNTER — TELEPHONE (OUTPATIENT)
Dept: FAMILY MEDICINE | Facility: CLINIC | Age: 42
End: 2022-11-02
Payer: MEDICARE

## 2022-11-20 ENCOUNTER — PATIENT MESSAGE (OUTPATIENT)
Dept: INTERNAL MEDICINE | Facility: CLINIC | Age: 42
End: 2022-11-20
Payer: MEDICARE

## 2022-11-21 RX ORDER — BUTALBITAL, ACETAMINOPHEN AND CAFFEINE 50; 325; 40 MG/1; MG/1; MG/1
1 TABLET ORAL EVERY 4 HOURS PRN
Qty: 30 TABLET | Refills: 0 | Status: SHIPPED | OUTPATIENT
Start: 2022-11-21 | End: 2022-12-29 | Stop reason: SDUPTHER

## 2023-01-12 ENCOUNTER — HOSPITAL ENCOUNTER (EMERGENCY)
Facility: HOSPITAL | Age: 43
Discharge: HOME | End: 2023-01-12
Attending: EMERGENCY MEDICINE
Payer: MEDICARE

## 2023-01-12 VITALS
BODY MASS INDEX: 30.73 KG/M2 | DIASTOLIC BLOOD PRESSURE: 74 MMHG | SYSTOLIC BLOOD PRESSURE: 117 MMHG | TEMPERATURE: 98 F | RESPIRATION RATE: 18 BRPM | WEIGHT: 168 LBS | HEART RATE: 73 BPM | OXYGEN SATURATION: 100 %

## 2023-01-12 DIAGNOSIS — R11.2 NAUSEA AND VOMITING, UNSPECIFIED VOMITING TYPE: ICD-10-CM

## 2023-01-12 DIAGNOSIS — R10.9 BILATERAL FLANK PAIN: ICD-10-CM

## 2023-01-12 DIAGNOSIS — G89.4 CHRONIC PAIN SYNDROME: ICD-10-CM

## 2023-01-12 DIAGNOSIS — R10.30 LOWER ABDOMINAL PAIN: Primary | ICD-10-CM

## 2023-01-12 LAB
ALBUMIN SERPL BCP-MCNC: 4.4 G/DL (ref 3.5–5.2)
ALP SERPL-CCNC: 119 U/L (ref 55–135)
ALT SERPL W/O P-5'-P-CCNC: 20 U/L (ref 10–44)
ANION GAP SERPL CALC-SCNC: 11 MMOL/L (ref 8–16)
AST SERPL-CCNC: 20 U/L (ref 10–40)
BACTERIA #/AREA URNS HPF: ABNORMAL /HPF
BILIRUB SERPL-MCNC: 0.4 MG/DL (ref 0.1–1)
BILIRUB UR QL STRIP: NEGATIVE
BUN SERPL-MCNC: 23 MG/DL (ref 6–20)
CALCIUM SERPL-MCNC: 9.5 MG/DL (ref 8.7–10.5)
CHLORIDE SERPL-SCNC: 106 MMOL/L (ref 95–110)
CLARITY UR: ABNORMAL
CO2 SERPL-SCNC: 22 MMOL/L (ref 23–29)
COLOR UR: YELLOW
CREAT SERPL-MCNC: 0.8 MG/DL (ref 0.5–1.4)
ERYTHROCYTE [DISTWIDTH] IN BLOOD BY AUTOMATED COUNT: 13.2 % (ref 11.5–14.5)
EST. GFR  (NO RACE VARIABLE): >60 ML/MIN/1.73 M^2
GLUCOSE SERPL-MCNC: 102 MG/DL (ref 70–110)
GLUCOSE UR QL STRIP: NEGATIVE
HCT VFR BLD AUTO: 37.4 % (ref 37–48.5)
HGB BLD-MCNC: 12.9 G/DL (ref 12–16)
HGB UR QL STRIP: NEGATIVE
HYALINE CASTS #/AREA URNS LPF: 5 /LPF
KETONES UR QL STRIP: ABNORMAL
LACTATE SERPL-SCNC: 0.7 MMOL/L (ref 0.5–2.2)
LEUKOCYTE ESTERASE UR QL STRIP: NEGATIVE
LIPASE SERPL-CCNC: 28 U/L (ref 4–60)
MCH RBC QN AUTO: 30.9 PG (ref 27–31)
MCHC RBC AUTO-ENTMCNC: 34.5 G/DL (ref 32–36)
MCV RBC AUTO: 90 FL (ref 82–98)
MICROSCOPIC COMMENT: ABNORMAL
NITRITE UR QL STRIP: NEGATIVE
PH UR STRIP: 6 [PH] (ref 5–8)
PLATELET # BLD AUTO: 320 K/UL (ref 150–450)
PMV BLD AUTO: 9.2 FL (ref 9.2–12.9)
POTASSIUM SERPL-SCNC: 4 MMOL/L (ref 3.5–5.1)
PROT SERPL-MCNC: 7.9 G/DL (ref 6–8.4)
PROT UR QL STRIP: ABNORMAL
RBC # BLD AUTO: 4.18 M/UL (ref 4–5.4)
RBC #/AREA URNS HPF: 1 /HPF (ref 0–4)
SODIUM SERPL-SCNC: 139 MMOL/L (ref 136–145)
SP GR UR STRIP: >1.03 (ref 1–1.03)
SQUAMOUS #/AREA URNS HPF: 3 /HPF
URN SPEC COLLECT METH UR: ABNORMAL
UROBILINOGEN UR STRIP-ACNC: ABNORMAL EU/DL
WBC # BLD AUTO: 6.9 K/UL (ref 3.9–12.7)
WBC #/AREA URNS HPF: 5 /HPF (ref 0–5)

## 2023-01-12 PROCEDURE — 81000 URINALYSIS NONAUTO W/SCOPE: CPT | Performed by: EMERGENCY MEDICINE

## 2023-01-12 PROCEDURE — 85027 COMPLETE CBC AUTOMATED: CPT | Performed by: EMERGENCY MEDICINE

## 2023-01-12 PROCEDURE — 83605 ASSAY OF LACTIC ACID: CPT | Performed by: EMERGENCY MEDICINE

## 2023-01-12 PROCEDURE — 96365 THER/PROPH/DIAG IV INF INIT: CPT | Mod: 59

## 2023-01-12 PROCEDURE — 99285 EMERGENCY DEPT VISIT HI MDM: CPT | Mod: 25

## 2023-01-12 PROCEDURE — 96361 HYDRATE IV INFUSION ADD-ON: CPT

## 2023-01-12 PROCEDURE — 96375 TX/PRO/DX INJ NEW DRUG ADDON: CPT

## 2023-01-12 PROCEDURE — 25000003 PHARM REV CODE 250: Performed by: EMERGENCY MEDICINE

## 2023-01-12 PROCEDURE — 25500020 PHARM REV CODE 255: Performed by: EMERGENCY MEDICINE

## 2023-01-12 PROCEDURE — 83690 ASSAY OF LIPASE: CPT | Performed by: EMERGENCY MEDICINE

## 2023-01-12 PROCEDURE — 80053 COMPREHEN METABOLIC PANEL: CPT | Performed by: EMERGENCY MEDICINE

## 2023-01-12 PROCEDURE — 63600175 PHARM REV CODE 636 W HCPCS: Performed by: EMERGENCY MEDICINE

## 2023-01-12 RX ORDER — DOCUSATE SODIUM 100 MG/1
100 CAPSULE, LIQUID FILLED ORAL 2 TIMES DAILY
Qty: 30 CAPSULE | Refills: 0 | Status: SHIPPED | OUTPATIENT
Start: 2023-01-12 | End: 2023-01-22

## 2023-01-12 RX ORDER — MORPHINE SULFATE 4 MG/ML
4 INJECTION, SOLUTION INTRAMUSCULAR; INTRAVENOUS
Status: COMPLETED | OUTPATIENT
Start: 2023-01-12 | End: 2023-01-12

## 2023-01-12 RX ORDER — KETOROLAC TROMETHAMINE 30 MG/ML
15 INJECTION, SOLUTION INTRAMUSCULAR; INTRAVENOUS
Status: COMPLETED | OUTPATIENT
Start: 2023-01-12 | End: 2023-01-12

## 2023-01-12 RX ADMIN — KETOROLAC TROMETHAMINE 15 MG: 30 INJECTION, SOLUTION INTRAMUSCULAR; INTRAVENOUS at 10:01

## 2023-01-12 RX ADMIN — PROMETHAZINE HYDROCHLORIDE 25 MG: 25 INJECTION INTRAMUSCULAR; INTRAVENOUS at 09:01

## 2023-01-12 RX ADMIN — SODIUM CHLORIDE, SODIUM LACTATE, POTASSIUM CHLORIDE, AND CALCIUM CHLORIDE 1000 ML: .6; .31; .03; .02 INJECTION, SOLUTION INTRAVENOUS at 09:01

## 2023-01-12 RX ADMIN — IOHEXOL 75 ML: 350 INJECTION, SOLUTION INTRAVENOUS at 10:01

## 2023-01-12 RX ADMIN — MORPHINE SULFATE 4 MG: 4 INJECTION INTRAVENOUS at 09:01

## 2023-01-12 NOTE — DISCHARGE INSTRUCTIONS
Thank you for choosing [facility]!     Our goal in the Emergency Department is to always provide outstanding medical care. You may receive a survey by mail or e-mail in the next week regarding your experience today. We would greatly appreciate you completing and returning the survey. Your feedback provides us with a way to recognize our staff who provide very good care, and it helps us learn how to improve when your experience was below our aspiration of excellence.      It is important to remember that some problems are difficult to diagnose and may not be found during your first visit. Be sure to follow up with your primary care doctor and review any labs/imaging that was performed during your visit with them. If you do not have a primary care doctor, you may contact the one listed on your discharge paperwork, or you may also call the [facility] Appointment Desk at [phone] to schedule an appointment.     All medications may potentially have side effects and it is impossible to predict which medications may give you side effects. If you feel that you are having a negative effect of any medication you should immediately stop taking them and seek medical attention.  Do not drive or make any important decisions for 24 hours if you have received any pain medications, sedatives or mood altering drugs during your ER visit.    We appreciate you trusting us with your medical care. We will be happy to take care of you for all of your future medical needs. You may return to the ER at any time for any new/concerning symptoms, worsening condition, or failure to improve. We hope you feel better soon.     Sincerely,    Russell Gray Jr., MD  Board-Certified Emergency Medicine Physician  [facility]

## 2023-01-12 NOTE — ED NOTES
Pt arrived from home complains of bilateral flank pain , abdominal pain and lower back pain. Pt reports a recent bowel obstruction and is concerned that the pain may be affiliated. Pt further reports N/V x week. Pt reports that N/V has been reoccurring for month.   Pt has a history of [place]. Pt appears to be uncomfortable but is in no distress.

## 2023-01-12 NOTE — ED PROVIDER NOTES
"Encounter Date: 1/12/2023       History     Chief Complaint   Patient presents with    Flank Pain     Presents to ed c/o bilateral, flank pain x Weeks; 7/10 pain. Hx of blockage: "That could possibly rupture;" Bowel obstruction. Pt states pain from flanks radiates to umbilical region of abd. N/v+ and diarrhea.  X3, Bloody Bms. Dysuria+ with darker than usual urine.     Back Pain     Lower back pain x1 week; 5/10 pain. Pt states: "It's hard to control my bladder."      Jaycee Gamboa is a 42 y.o. female who  has a past medical history of Anxiety, Breast abscess, Charcot-Amanda-Tooth disease, Chronic interstitial cystitis without hematuria, CMT (Charcot-Amanda-Tooth disease), Depression, Endometriosis, Endometriosis of uterus, Headache(784.0), Herpes, History of psychiatric care, History of psychiatric hospitalization, Muscular dystrophy, PONV (postoperative nausea and vomiting), PTSD (post-traumatic stress disorder), S/P cholecystectomy, Seizures, Self-injurious behavior, and Thyroid disease.    The patient presents to the ED due to multiple complaints.   Patient reports generalized abdominal pain and bilateral back/flank pain. Symptoms initially started on New Year's Jemima. She was seen in a hospital in [place] and was told she possibly had a blockage in her stomach. However, she reports the pain has continued and worsened since she was discharged. She describes a pain "like Chin Gardiner stabbing me."   She states it feels like her stomach is about to explode. She is concerned she may have another blockage. She endorses N/V as well as pain with urination. No associated fever.  She has had multiple abdominals surgeries including appendectomy and hysterectomy.   She takes pain medication for chronic L arm pain due to a fracture. She also takes phenergan, but reports her home medications were not helping with her pain or vomiting prior to arrival.     Review of patient's allergies indicates:   Allergen Reactions "    Benadryl decongestant Shortness Of Breath    Carrot Hives and Shortness Of Breath    Diphenhydramine Shortness Of Breath    Sumatriptan     Sumatriptan succinate Other (See Comments)     paralysis    Tramadol Other (See Comments)     Faces swells. Tolerates percocet  Pt states she  can take Dilaudid.     Venom-honey bee Anaphylaxis    Wasp sting [allergen ext-venom-honey bee] Anaphylaxis    Bupropion      Other reaction(s): Unknown    Bupropion hcl     Gabapentin Other (See Comments)     seizures     Past Medical History:   Diagnosis Date    Anxiety     Breast abscess     Charcot-Amanda-Tooth disease     mild LE weakness    Chronic interstitial cystitis without hematuria     CMT (Charcot-Amanda-Tooth disease)     Depression     reports she is no longer depressed    Endometriosis     Endometriosis of uterus     Headache(784.0)     Herpes     History of psychiatric care     History of psychiatric hospitalization     Muscular dystrophy     PONV (postoperative nausea and vomiting)     PTSD (post-traumatic stress disorder)     S/P cholecystectomy     Seizures     last seizure 14-15 yrs ago    Self-injurious behavior     Thyroid disease      Past Surgical History:   Procedure Laterality Date    APPENDECTOMY      arthroscopy right shoulder      BACK SURGERY  07/01/2017    BACK SURGERY  02/21/2018    screw removal    BREAST SURGERY      reduction    CHOLECYSTECTOMY  03/2017    CYSTOSCOPY WITH HYDRODISTENSION OF BLADDER N/A 7/1/2019    Procedure: CYSTOSCOPY, WITH BLADDER HYDRODISTENSION;  Surgeon: Jay Shelby MD;  Location: [facility] OR;  Service: Urology;  Laterality: N/A;    CYSTOSCOPY WITH HYDRODISTENSION OF BLADDER N/A 12/7/2020    Procedure: CYSTOSCOPY, WITH BLADDER HYDRODISTENSION;  Surgeon: Jay Shelby MD;  Location: [facility] OR;  Service: Urology;  Laterality: N/A;    CYSTOSCOPY WITH HYDRODISTENSION OF BLADDER N/A 5/6/2021    Procedure: CYSTOSCOPY, WITH BLADDER HYDRODISTENSION;  Surgeon: Jay Shelby MD;   Location: [facility] OR;  Service: Urology;  Laterality: N/A;    CYSTOSCOPY WITH URETHRAL DILATION N/A 10/8/2021    Procedure: CYSTOSCOPY, WITH URETHRAL DILATION;  Surgeon: Tone Khanna MD;  Location: [facility] OR;  Service: Urology;  Laterality: N/A;    DIAGNOSTIC LAPAROSCOPY Bilateral 9/21/2020    Procedure: LAPAROSCOPY, DIAGNOSTIC;  Surgeon: Santy Hinton MD;  Location: [facility] OR;  Service: OB/GYN;  Laterality: Bilateral;  Plan to use robot in room 12 with Dr. Kemal LOGAN    DIAGNOSTIC LAPAROSCOPY N/A 2/21/2022    Procedure: LAPAROSCOPY, DIAGNOSTIC;  Surgeon: Santy Hinton MD;  Location: [facility] OR;  Service: OB/GYN;  Laterality: N/A;  PER DR HINTON HE WOULD ONLY NEED 60 MINUTES    ENDOSCOPIC ULTRASOUND OF UPPER GASTROINTESTINAL TRACT N/A 8/14/2018    Procedure: ULTRASOUND, ENDOSCOPIC, UPPER GI TRACT;  Surgeon: Marko Pereyra MD;  Location: [facility];  Service: Endoscopy;  Laterality: N/A;    ESOPHAGOGASTRODUODENOSCOPY  08/14/2018    ESOPHAGOGASTRODUODENOSCOPY N/A 8/14/2018    Procedure: ESOPHAGOGASTRODUODENOSCOPY (EGD);  Surgeon: Marko Pereyra MD;  Location: [facility];  Service: Endoscopy;  Laterality: N/A;    ESOPHAGOGASTRODUODENOSCOPY N/A 7/23/2021    Procedure: EGD (ESOPHAGOGASTRODUODENOSCOPY);  Surgeon: Sulema Lopez MD;  Location: [facility];  Service: Endoscopy;  Laterality: N/A;    EXAMINATION UNDER ANESTHESIA N/A 11/2/2020    Procedure: EXAM UNDER ANESTHESIA;  Surgeon: Jenifer Aragon MD;  Location: [facility] OR 2ND FLR;  Service: Endoscopy;  Laterality: N/A;    EXCISIONAL HEMORRHOIDECTOMY N/A 10/1/2019    Procedure: HEMORRHOIDECTOMY;  Surgeon: Jenifer Aragon MD;  Location: [facility] OR 2ND FLR;  Service: Colon and Rectal;  Laterality: N/A;    HYSTERECTOMY      TLH vs LAVH with BSO    KNEE SURGERY Bilateral     OOPHORECTOMY      OPEN REDUCTION AND INTERNAL FIXATION (ORIF) OF FRACTURE OF DISTAL HUMERUS Left 4/11/2022    Procedure: ORIF, FRACTURE, HUMERUS, DISTAL - LEFT, Synthes;  Surgeon: Elliot AVILA  MD Fernando;  Location: [facility] OR 2ND FLR;  Service: Orthopedics;  Laterality: Left;    SPINE SURGERY      SURGICAL REMOVAL OF ENDOMETRIOSIS N/A 2020    Procedure: DESTRUCTION, ENDOMETRIOSIS;  Surgeon: Santy Elaine MD;  Location: [facility] OR;  Service: OB/GYN;  Laterality: N/A;    SURGICAL REMOVAL OF ENDOMETRIOSIS N/A 2022    Procedure: DESTRUCTION, ENDOMETRIOSIS;  Surgeon: Santy Elaine MD;  Location: [facility] OR;  Service: OB/GYN;  Laterality: N/A;    Upper EUS  2018     Family History   Problem Relation Age of Onset    Cancer Maternal Grandfather         colon    Colon cancer Maternal Grandfather     No Known Problems Mother     No Known Problems Father     Bladder Cancer Maternal Grandmother     Breast cancer Paternal Aunt     Heart disease Neg Hx      Social History     Tobacco Use    Smoking status: Former     Packs/day: 0.50     Years: 3.00     Pack years: 1.50     Types: Cigarettes     Quit date:      Years since quittin.0    Smokeless tobacco: Never    Tobacco comments:     rare   Substance Use Topics    Alcohol use: No    Drug use: No     Review of Systems   Constitutional:  Negative for chills and fever.   HENT:  Negative for sore throat.    Respiratory:  Negative for cough and shortness of breath.    Cardiovascular:  Negative for chest pain.   Gastrointestinal:  Positive for abdominal pain, nausea and vomiting. Negative for constipation and diarrhea.   Genitourinary:  Negative for dysuria, frequency and urgency.   Musculoskeletal:  Negative for back pain.   Skin:  Negative for rash and wound.   Neurological:  Negative for syncope and weakness.   Hematological:  Does not bruise/bleed easily.   Psychiatric/Behavioral:  Negative for agitation, behavioral problems and confusion.      Physical Exam     Initial Vitals [23 0833]   BP Pulse Resp Temp SpO2   124/72 92 18 98 °F (36.7 °C) 98 %      MAP       --         Physical Exam    Nursing note and vitals reviewed.  Constitutional: She  appears well-developed and well-nourished. She is not diaphoretic. No distress.   HENT:   Head: Normocephalic and atraumatic.   Mouth/Throat: Oropharynx is clear and moist.   Eyes: EOM are normal. Pupils are equal, round, and reactive to light.   Neck: No tracheal deviation present.   Cardiovascular:  Normal rate, regular rhythm, normal heart sounds and intact distal pulses.           Pulmonary/Chest: Breath sounds normal. No stridor. No respiratory distress. She has no wheezes.   Abdominal: Abdomen is soft. Bowel sounds are normal. She exhibits no distension and no mass. There is generalized abdominal tenderness. There is no rebound and no guarding.   Musculoskeletal:         General: No edema. Normal range of motion.     Neurological: She is alert and oriented to person, place, and time. She has normal strength. No cranial nerve deficit or sensory deficit.   Skin: Skin is warm and dry. Capillary refill takes less than 2 seconds. No pallor.   Psychiatric: She has a normal mood and affect. Her behavior is normal. Thought content normal.       ED Course   Procedures  Labs Reviewed   COMPREHENSIVE METABOLIC PANEL - Abnormal; Notable for the following components:       Result Value    CO2 22 (*)     BUN 23 (*)     All other components within normal limits   URINALYSIS, REFLEX TO URINE CULTURE - Abnormal; Notable for the following components:    Appearance, UA Hazy (*)     Specific Gravity, UA >1.030 (*)     Protein, UA 1+ (*)     Ketones, UA Trace (*)     Urobilinogen, UA 2.0-3.0 (*)     All other components within normal limits    Narrative:     Specimen Source->Urine   URINALYSIS MICROSCOPIC - Abnormal; Notable for the following components:    Hyaline Casts, UA 5 (*)     All other components within normal limits    Narrative:     Specimen Source->Urine   CBC WITHOUT DIFFERENTIAL   LIPASE   LACTIC ACID, PLASMA          Imaging Results              CT Abdomen Pelvis With Contrast (Final result)  Result time 01/12/23  11:19:24      Final result by Yousif Soler MD (01/12/23 11:19:24)                   Impression:      No acute abdominopelvic abnormality.  Specifically, no evidence for bowel obstruction as clinically questioned.    Prior cholecystectomy, appendectomy, hysterectomy, and additional findings as above.      Electronically signed by: Yousif Soler  Date:    01/12/2023  Time:    11:19               Narrative:    EXAMINATION:  CT ABDOMEN PELVIS WITH CONTRAST    CLINICAL HISTORY:  Abdominal abscess/infection suspected;Bowel obstruction suspected;    TECHNIQUE:  Postcontrast images were obtained through the abdomen and pelvis per protocol.  Coronal and sagittal images were reviewed.    COMPARISON:  CT enterography dated 06/25/2021    FINDINGS:  Limited images through the lower chest are unremarkable.    Abdomen and pelvis:    Gallbladder is absent.  The liver, spleen, pancreas, adrenal glands, and kidneys appear normal.  Urinary bladder is mostly decompressed.  The uterus is absent.    The GI tract is normal in caliber.  Scattered stool present in the colon.  Appendix is absent.  No significant free fluid or adenopathy.  Intrahepatic vasculature, main portal vein, SMV, and splenic vein are patent.  Aorta is patent.    No aggressive osseous lesion.                                       Medications   lactated ringers bolus 1,000 mL (0 mLs Intravenous Stopped 1/12/23 1048)   promethazine (PHENERGAN) 25 mg in dextrose 5 % 50 mL IVPB (0 mg Intravenous Stopped 1/12/23 1003)   morphine injection 4 mg (4 mg Intravenous Given 1/12/23 0944)   iohexoL (OMNIPAQUE 350) injection 75 mL (75 mLs Intravenous Given 1/12/23 1013)   ketorolac injection 15 mg (15 mg Intravenous Given 1/12/23 1053)     Medical Decision Making:   History:   Old Medical Records: I decided to obtain old medical records.  Old Records Summarized: records from clinic visits and other records.       <> Summary of Records: History of chronic interstitial  cystitis, chronic pain from L arm fracture. Currently on Norco.  Initial Assessment:   41 yo F with generalized abdominal pain, back pain.   Vitals and exam reassuring.  Will obtain labs, UA, CT A/P, treat symptomatically, and reassess.   Differential Diagnosis:   Differential Diagnosis includes, but is not limited to:  AAA, aortic dissection, mesenteric ischemia, perforated viscous, MI/ACS, SBO/volvulus, incarcerated/strangulated hernia, intussusception, ileus, appendicitis, cholecystitis, cholangitis, diverticulitis, esophagitis, hepatitis, nephrolithiasis, pancreatitis, gastroenteritis, colitis, IBD/IBS, biliary colic, GERD, PUD, constipation, UTI/pyelonephritis,  disorder.      Clinical Tests:   Lab Tests: Reviewed and Ordered  Radiological Study: Ordered and Reviewed    On re-evaluation, the patient's status has improved.  After complete ED evaluation, clinical impression is most consistent with generalized abdominal pain, back pain.  PCP follow-up within 2-3 days was recommended.    After taking into careful account the patient's history, physical exam findings, as well as empirical and objective data obtained throughout ED workup, I feel no emergent medical condition has been identified. No further evaluation or admission was felt to be required, and the patient is stable for discharge from the ED. The patient and any additional family present were updated with test results, overall clinical impression, and recommended further plan of care, including discharge instructions as provided and outpatient follow-up for continued evaluation and management as needed. All questions were answered. The patient expressed understanding and agreed with current plan for discharge and follow-up plan of care. Strict ED return precautions were provided, including return/worsening of current symptoms, new symptoms, or any other concerns.               ED Course as of 01/12/23 1456   Thu Jan 12, 2023   1003 CBC Without  Differential  WNL [SS]   1003 Lipase  WNL [SS]   1003 Comprehensive metabolic panel(!)  Unremarkable, BUN mildly elevated [SS]   1033 Urinalysis, Reflex to Urine Culture Urine, Clean Catch(!)  Concentrated, possible dehydration [SS]   1034 Urinalysis Microscopic(!)  Inconsistent with UTI [SS]   1133 CT Abdomen Pelvis With Contrast  Independently interpreted, no free air or bowel obstruction.   Radiology read confirmed, no acute intra-abdominal process. [SS]   1134 Patient informed of findings and reassured. No emergent process evident on today's visit.   Recommend close PCP, GI, and OB-GYN f/u, return precautions given.  [SS]      ED Course User Index  [SS] Russell Gray MD                 Clinical Impression:   Final diagnoses:  [R10.30] Lower abdominal pain (Primary)  [R10.9] Bilateral flank pain  [R11.2] Nausea and vomiting, unspecified vomiting type  [G89.4] Chronic pain syndrome        ED Disposition Condition    Discharge Stable          ED Prescriptions       Medication Sig Dispense Start Date End Date Auth. Provider    docusate sodium (COLACE) 100 MG capsule Take 1 capsule (100 mg total) by mouth 2 (two) times daily. for 10 days 30 capsule 1/12/2023 1/22/2023 Russell Gray MD          Follow-up Information       Follow up With Specialties Details Why Contact Info Additional Information    Emi Lechuga MD Internal Medicine Schedule an appointment as soon as possible for a visit   [address] [facility] [facility] [address]  [address]  [facility] [address] [phone]       [facility] Gastroenterology Schedule an appointment as soon as possible for a visit   [address] [facility] [address]  [facility] [address] [phone] Please park in [address] or [address] and use [address]. Take Medical Office Bldg elevators.             Russell Gray MD  01/12/23 4445

## 2023-01-13 ENCOUNTER — TELEPHONE (OUTPATIENT)
Dept: ADMINISTRATIVE | Facility: OTHER | Age: 43
End: 2023-01-13
Payer: MEDICARE

## 2023-01-16 ENCOUNTER — PATIENT MESSAGE (OUTPATIENT)
Dept: PRIMARY CARE CLINIC | Facility: CLINIC | Age: 43
End: 2023-01-16
Payer: MEDICARE

## 2023-01-31 ENCOUNTER — TELEPHONE (OUTPATIENT)
Dept: FAMILY MEDICINE | Facility: CLINIC | Age: 43
End: 2023-01-31
Payer: MEDICARE

## 2023-02-23 ENCOUNTER — PATIENT MESSAGE (OUTPATIENT)
Dept: PRIMARY CARE CLINIC | Facility: CLINIC | Age: 43
End: 2023-02-23
Payer: MEDICARE

## 2023-02-23 DIAGNOSIS — G43.811 OTHER MIGRAINE WITH STATUS MIGRAINOSUS, INTRACTABLE: Primary | ICD-10-CM

## 2023-02-27 ENCOUNTER — PATIENT MESSAGE (OUTPATIENT)
Dept: PRIMARY CARE CLINIC | Facility: CLINIC | Age: 43
End: 2023-02-27
Payer: MEDICARE

## 2023-02-27 DIAGNOSIS — G60.0 CHARCOT MARIE TOOTH MUSCULAR ATROPHY: ICD-10-CM

## 2023-02-27 DIAGNOSIS — Z79.899 ENCOUNTER FOR LONG-TERM (CURRENT) USE OF MEDICATIONS: ICD-10-CM

## 2023-02-27 DIAGNOSIS — E03.9 ACQUIRED HYPOTHYROIDISM: Primary | ICD-10-CM

## 2023-02-28 ENCOUNTER — LAB VISIT (OUTPATIENT)
Dept: LAB | Facility: HOSPITAL | Age: 43
End: 2023-02-28
Attending: INTERNAL MEDICINE
Payer: MEDICARE

## 2023-02-28 DIAGNOSIS — E03.9 ACQUIRED HYPOTHYROIDISM: ICD-10-CM

## 2023-02-28 DIAGNOSIS — Z79.899 ENCOUNTER FOR LONG-TERM (CURRENT) USE OF MEDICATIONS: ICD-10-CM

## 2023-02-28 DIAGNOSIS — G60.0 CHARCOT MARIE TOOTH MUSCULAR ATROPHY: ICD-10-CM

## 2023-02-28 LAB
ALBUMIN SERPL BCP-MCNC: 4.2 G/DL (ref 3.5–5.2)
ALP SERPL-CCNC: 123 U/L (ref 55–135)
ALT SERPL W/O P-5'-P-CCNC: 24 U/L (ref 10–44)
ANION GAP SERPL CALC-SCNC: 7 MMOL/L (ref 8–16)
AST SERPL-CCNC: 20 U/L (ref 10–40)
BASOPHILS # BLD AUTO: 0.03 K/UL (ref 0–0.2)
BASOPHILS NFR BLD: 0.5 % (ref 0–1.9)
BILIRUB SERPL-MCNC: 0.2 MG/DL (ref 0.1–1)
BUN SERPL-MCNC: 13 MG/DL (ref 6–20)
CALCIUM SERPL-MCNC: 9.8 MG/DL (ref 8.7–10.5)
CHLORIDE SERPL-SCNC: 108 MMOL/L (ref 95–110)
CO2 SERPL-SCNC: 26 MMOL/L (ref 23–29)
CREAT SERPL-MCNC: 0.8 MG/DL (ref 0.5–1.4)
DIFFERENTIAL METHOD: ABNORMAL
EOSINOPHIL # BLD AUTO: 0.1 K/UL (ref 0–0.5)
EOSINOPHIL NFR BLD: 1.9 % (ref 0–8)
ERYTHROCYTE [DISTWIDTH] IN BLOOD BY AUTOMATED COUNT: 12.5 % (ref 11.5–14.5)
EST. GFR  (NO RACE VARIABLE): >60 ML/MIN/1.73 M^2
ESTIMATED AVG GLUCOSE: 94 MG/DL (ref 68–131)
GLUCOSE SERPL-MCNC: 87 MG/DL (ref 70–110)
HBA1C MFR BLD: 4.9 % (ref 4–5.6)
HCT VFR BLD AUTO: 36.7 % (ref 37–48.5)
HGB BLD-MCNC: 12.6 G/DL (ref 12–16)
IMM GRANULOCYTES # BLD AUTO: 0.02 K/UL (ref 0–0.04)
IMM GRANULOCYTES NFR BLD AUTO: 0.3 % (ref 0–0.5)
LYMPHOCYTES # BLD AUTO: 2.9 K/UL (ref 1–4.8)
LYMPHOCYTES NFR BLD: 45.1 % (ref 18–48)
MCH RBC QN AUTO: 30.4 PG (ref 27–31)
MCHC RBC AUTO-ENTMCNC: 34.3 G/DL (ref 32–36)
MCV RBC AUTO: 88 FL (ref 82–98)
MONOCYTES # BLD AUTO: 0.5 K/UL (ref 0.3–1)
MONOCYTES NFR BLD: 7 % (ref 4–15)
NEUTROPHILS # BLD AUTO: 2.9 K/UL (ref 1.8–7.7)
NEUTROPHILS NFR BLD: 45.2 % (ref 38–73)
NRBC BLD-RTO: 0 /100 WBC
PLATELET # BLD AUTO: 220 K/UL (ref 150–450)
PMV BLD AUTO: 9.9 FL (ref 9.2–12.9)
POTASSIUM SERPL-SCNC: 4.6 MMOL/L (ref 3.5–5.1)
PROT SERPL-MCNC: 7.9 G/DL (ref 6–8.4)
RBC # BLD AUTO: 4.15 M/UL (ref 4–5.4)
SODIUM SERPL-SCNC: 141 MMOL/L (ref 136–145)
T4 FREE SERPL-MCNC: 0.85 NG/DL (ref 0.71–1.51)
TSH SERPL DL<=0.005 MIU/L-ACNC: 3.86 UIU/ML (ref 0.4–4)
WBC # BLD AUTO: 6.41 K/UL (ref 3.9–12.7)

## 2023-02-28 PROCEDURE — 85025 COMPLETE CBC W/AUTO DIFF WBC: CPT | Performed by: INTERNAL MEDICINE

## 2023-02-28 PROCEDURE — 83036 HEMOGLOBIN GLYCOSYLATED A1C: CPT | Performed by: INTERNAL MEDICINE

## 2023-02-28 PROCEDURE — 84439 ASSAY OF FREE THYROXINE: CPT | Performed by: INTERNAL MEDICINE

## 2023-02-28 PROCEDURE — 80053 COMPREHEN METABOLIC PANEL: CPT | Performed by: INTERNAL MEDICINE

## 2023-02-28 PROCEDURE — 36415 COLL VENOUS BLD VENIPUNCTURE: CPT | Performed by: INTERNAL MEDICINE

## 2023-02-28 PROCEDURE — 84443 ASSAY THYROID STIM HORMONE: CPT | Performed by: INTERNAL MEDICINE

## 2023-03-02 ENCOUNTER — OFFICE VISIT (OUTPATIENT)
Dept: URGENT CARE | Facility: CLINIC | Age: 43
End: 2023-03-02
Payer: MEDICARE

## 2023-03-02 VITALS
RESPIRATION RATE: 19 BRPM | WEIGHT: 168 LBS | BODY MASS INDEX: 30.91 KG/M2 | SYSTOLIC BLOOD PRESSURE: 99 MMHG | DIASTOLIC BLOOD PRESSURE: 71 MMHG | OXYGEN SATURATION: 97 % | HEART RATE: 60 BPM | TEMPERATURE: 98 F | HEIGHT: 62 IN

## 2023-03-02 DIAGNOSIS — L73.9 FOLLICULITIS: Primary | ICD-10-CM

## 2023-03-02 PROCEDURE — 1159F MED LIST DOCD IN RCRD: CPT | Mod: CPTII,S$GLB,, | Performed by: FAMILY MEDICINE

## 2023-03-02 PROCEDURE — 3008F PR BODY MASS INDEX (BMI) DOCUMENTED: ICD-10-PCS | Mod: CPTII,S$GLB,, | Performed by: FAMILY MEDICINE

## 2023-03-02 PROCEDURE — 3074F PR MOST RECENT SYSTOLIC BLOOD PRESSURE < 130 MM HG: ICD-10-PCS | Mod: CPTII,S$GLB,, | Performed by: FAMILY MEDICINE

## 2023-03-02 PROCEDURE — 99213 PR OFFICE/OUTPT VISIT, EST, LEVL III, 20-29 MIN: ICD-10-PCS | Mod: S$GLB,,, | Performed by: FAMILY MEDICINE

## 2023-03-02 PROCEDURE — 3044F PR MOST RECENT HEMOGLOBIN A1C LEVEL <7.0%: ICD-10-PCS | Mod: CPTII,S$GLB,, | Performed by: FAMILY MEDICINE

## 2023-03-02 PROCEDURE — 3008F BODY MASS INDEX DOCD: CPT | Mod: CPTII,S$GLB,, | Performed by: FAMILY MEDICINE

## 2023-03-02 PROCEDURE — 3044F HG A1C LEVEL LT 7.0%: CPT | Mod: CPTII,S$GLB,, | Performed by: FAMILY MEDICINE

## 2023-03-02 PROCEDURE — 3078F PR MOST RECENT DIASTOLIC BLOOD PRESSURE < 80 MM HG: ICD-10-PCS | Mod: CPTII,S$GLB,, | Performed by: FAMILY MEDICINE

## 2023-03-02 PROCEDURE — 1159F PR MEDICATION LIST DOCUMENTED IN MEDICAL RECORD: ICD-10-PCS | Mod: CPTII,S$GLB,, | Performed by: FAMILY MEDICINE

## 2023-03-02 PROCEDURE — 3074F SYST BP LT 130 MM HG: CPT | Mod: CPTII,S$GLB,, | Performed by: FAMILY MEDICINE

## 2023-03-02 PROCEDURE — 99213 OFFICE O/P EST LOW 20 MIN: CPT | Mod: S$GLB,,, | Performed by: FAMILY MEDICINE

## 2023-03-02 PROCEDURE — 3078F DIAST BP <80 MM HG: CPT | Mod: CPTII,S$GLB,, | Performed by: FAMILY MEDICINE

## 2023-03-02 RX ORDER — MUPIROCIN 20 MG/G
OINTMENT TOPICAL
Qty: 22 G | Refills: 1 | Status: SHIPPED | OUTPATIENT
Start: 2023-03-02 | End: 2024-01-26

## 2023-03-02 RX ORDER — SULFAMETHOXAZOLE AND TRIMETHOPRIM 800; 160 MG/1; MG/1
1 TABLET ORAL 2 TIMES DAILY
Qty: 20 TABLET | Refills: 0 | Status: SHIPPED | OUTPATIENT
Start: 2023-03-02 | End: 2023-03-06

## 2023-03-02 RX ORDER — FLUTICASONE PROPIONATE 50 MCG
1 SPRAY, SUSPENSION (ML) NASAL DAILY
Qty: 18 G | Refills: 3 | Status: SHIPPED | OUTPATIENT
Start: 2023-03-02 | End: 2023-03-06

## 2023-03-02 NOTE — PROGRESS NOTES
"Subjective:       Patient ID: Jaycee Gamboa is a 42 y.o. female.    Vitals:  vitals were not taken for this visit.     Chief Complaint: Facial Swelling    42 year old patient presents facial/nose swelling. Patient stated blood came out her nose/ "white pus"/ also she is having a headache. Stated symptoms started 2 weeks ago and symptoms are worsening.  Feels like colored right sided nasal drainage    ROS    Objective:      Physical Exam   Constitutional: She is oriented to person, place, and time. She appears well-developed. She is cooperative.  Non-toxic appearance. She does not appear ill. No distress.   HENT:   Head: Normocephalic and atraumatic.   Ears:   Right Ear: Hearing, tympanic membrane, external ear and ear canal normal.   Left Ear: Hearing, tympanic membrane, external ear and ear canal normal.   Nose: Congestion present. No mucosal edema, rhinorrhea or nasal deformity. No epistaxis. Right sinus exhibits no maxillary sinus tenderness and no frontal sinus tenderness. Left sinus exhibits no maxillary sinus tenderness and no frontal sinus tenderness.      Comments: Right nostril with few papules small  Tender to touch    Mouth/Throat: Uvula is midline, oropharynx is clear and moist and mucous membranes are normal. Mucous membranes are moist. No trismus in the jaw. Normal dentition. No uvula swelling. No posterior oropharyngeal erythema. Oropharynx is clear.   Eyes: Conjunctivae and lids are normal. Right eye exhibits no discharge. Left eye exhibits no discharge. No scleral icterus.   Neck: Trachea normal and phonation normal. Neck supple.   Cardiovascular: Normal rate, regular rhythm, normal heart sounds and normal pulses.   Pulmonary/Chest: Effort normal and breath sounds normal. No respiratory distress.   Abdominal: Normal appearance and bowel sounds are normal. She exhibits no distension and no mass. Soft. There is no abdominal tenderness.   Musculoskeletal: Normal range of motion.         General: " No deformity. Normal range of motion.   Neurological: She is alert and oriented to person, place, and time. She exhibits normal muscle tone. Coordination normal.   Skin: Skin is warm, dry, intact, not diaphoretic and not pale.   Psychiatric: Her speech is normal and behavior is normal. Judgment and thought content normal.   Nursing note and vitals reviewed.      Assessment:       No diagnosis found.      Plan:         There are no diagnoses linked to this encounter.

## 2023-03-06 ENCOUNTER — OFFICE VISIT (OUTPATIENT)
Dept: PRIMARY CARE CLINIC | Facility: CLINIC | Age: 43
End: 2023-03-06
Payer: MEDICARE

## 2023-03-06 VITALS
SYSTOLIC BLOOD PRESSURE: 108 MMHG | DIASTOLIC BLOOD PRESSURE: 70 MMHG | HEIGHT: 62 IN | OXYGEN SATURATION: 98 % | BODY MASS INDEX: 34.8 KG/M2 | WEIGHT: 189.13 LBS | HEART RATE: 75 BPM

## 2023-03-06 DIAGNOSIS — Z12.31 ENCOUNTER FOR SCREENING MAMMOGRAM FOR MALIGNANT NEOPLASM OF BREAST: Primary | ICD-10-CM

## 2023-03-06 DIAGNOSIS — F51.01 PRIMARY INSOMNIA: ICD-10-CM

## 2023-03-06 DIAGNOSIS — G43.809 OTHER MIGRAINE WITHOUT STATUS MIGRAINOSUS, NOT INTRACTABLE: ICD-10-CM

## 2023-03-06 DIAGNOSIS — R11.0 NAUSEA: ICD-10-CM

## 2023-03-06 DIAGNOSIS — E03.9 ACQUIRED HYPOTHYROIDISM: ICD-10-CM

## 2023-03-06 DIAGNOSIS — F41.9 ANXIETY: ICD-10-CM

## 2023-03-06 PROBLEM — K21.9 GERD (GASTROESOPHAGEAL REFLUX DISEASE): Status: RESOLVED | Noted: 2021-06-21 | Resolved: 2023-03-06

## 2023-03-06 PROBLEM — S42.402A CLOSED FRACTURE OF LEFT DISTAL HUMERUS: Status: RESOLVED | Noted: 2022-03-31 | Resolved: 2023-03-06

## 2023-03-06 PROCEDURE — 3008F BODY MASS INDEX DOCD: CPT | Mod: CPTII,S$GLB,, | Performed by: INTERNAL MEDICINE

## 2023-03-06 PROCEDURE — 3008F PR BODY MASS INDEX (BMI) DOCUMENTED: ICD-10-PCS | Mod: CPTII,S$GLB,, | Performed by: INTERNAL MEDICINE

## 2023-03-06 PROCEDURE — 99499 UNLISTED E&M SERVICE: CPT | Mod: S$GLB,,, | Performed by: INTERNAL MEDICINE

## 2023-03-06 PROCEDURE — 3074F SYST BP LT 130 MM HG: CPT | Mod: CPTII,S$GLB,, | Performed by: INTERNAL MEDICINE

## 2023-03-06 PROCEDURE — 99214 OFFICE O/P EST MOD 30 MIN: CPT | Mod: S$GLB,,, | Performed by: INTERNAL MEDICINE

## 2023-03-06 PROCEDURE — 1159F MED LIST DOCD IN RCRD: CPT | Mod: CPTII,S$GLB,, | Performed by: INTERNAL MEDICINE

## 2023-03-06 PROCEDURE — 3078F DIAST BP <80 MM HG: CPT | Mod: CPTII,S$GLB,, | Performed by: INTERNAL MEDICINE

## 2023-03-06 PROCEDURE — 99214 PR OFFICE/OUTPT VISIT, EST, LEVL IV, 30-39 MIN: ICD-10-PCS | Mod: S$GLB,,, | Performed by: INTERNAL MEDICINE

## 2023-03-06 PROCEDURE — 3078F PR MOST RECENT DIASTOLIC BLOOD PRESSURE < 80 MM HG: ICD-10-PCS | Mod: CPTII,S$GLB,, | Performed by: INTERNAL MEDICINE

## 2023-03-06 PROCEDURE — 99999 PR PBB SHADOW E&M-EST. PATIENT-LVL III: CPT | Mod: PBBFAC,,, | Performed by: INTERNAL MEDICINE

## 2023-03-06 PROCEDURE — 1159F PR MEDICATION LIST DOCUMENTED IN MEDICAL RECORD: ICD-10-PCS | Mod: CPTII,S$GLB,, | Performed by: INTERNAL MEDICINE

## 2023-03-06 PROCEDURE — 99999 PR PBB SHADOW E&M-EST. PATIENT-LVL III: ICD-10-PCS | Mod: PBBFAC,,, | Performed by: INTERNAL MEDICINE

## 2023-03-06 PROCEDURE — 99499 RISK ADDL DX/OHS AUDIT: ICD-10-PCS | Mod: S$GLB,,, | Performed by: INTERNAL MEDICINE

## 2023-03-06 PROCEDURE — 3074F PR MOST RECENT SYSTOLIC BLOOD PRESSURE < 130 MM HG: ICD-10-PCS | Mod: CPTII,S$GLB,, | Performed by: INTERNAL MEDICINE

## 2023-03-06 PROCEDURE — 3044F PR MOST RECENT HEMOGLOBIN A1C LEVEL <7.0%: ICD-10-PCS | Mod: CPTII,S$GLB,, | Performed by: INTERNAL MEDICINE

## 2023-03-06 PROCEDURE — 3044F HG A1C LEVEL LT 7.0%: CPT | Mod: CPTII,S$GLB,, | Performed by: INTERNAL MEDICINE

## 2023-03-06 RX ORDER — CEPHALEXIN 500 MG/1
500 CAPSULE ORAL EVERY 12 HOURS
Qty: 14 CAPSULE | Refills: 0 | Status: SHIPPED | OUTPATIENT
Start: 2023-03-06 | End: 2023-06-02 | Stop reason: ALTCHOICE

## 2023-03-06 RX ORDER — ONDANSETRON 4 MG/1
4 TABLET, ORALLY DISINTEGRATING ORAL 2 TIMES DAILY
Qty: 30 TABLET | Refills: 5 | Status: SHIPPED | OUTPATIENT
Start: 2023-03-06

## 2023-03-06 RX ORDER — LEVOTHYROXINE SODIUM 75 UG/1
75 TABLET ORAL DAILY
Qty: 90 TABLET | Refills: 1 | Status: SHIPPED | OUTPATIENT
Start: 2023-03-06 | End: 2023-08-08

## 2023-03-06 RX ORDER — ZOLPIDEM TARTRATE 10 MG/1
10 TABLET ORAL NIGHTLY PRN
Qty: 30 TABLET | Refills: 5 | Status: SHIPPED | OUTPATIENT
Start: 2023-03-06 | End: 2023-10-02

## 2023-03-06 NOTE — PROGRESS NOTES
[facility] Note    Chief Complaint      Chief Complaint   Patient presents with    Annual Exam     History of Present Illness      Jaycee Gamboa is a 42 y.o. female who presents today for follow up of migraines.  Patient comes to appointment alone.  Pain: [place]    Put on bactrim for external swelling of nose, UC was concerned for cellulitis.  Given ointment to put in nose.    Hasn't been taking bactrim because it makes her nausea.    Problem List Items Addressed This Visit       Migraine headache    Current Assessment & Plan     Has been getting more migraines lately since ilana had an MI in 1/2023. has tried excedrin/tylenol/ibuprofen.  Now on fioricet.         Acquired hypothyroidism    Current Assessment & Plan     Stable on Synthroid 50 mcg daily, having weight gain and fatigue.  TSH trending upward, Free T4 on low end of normal.         Relevant Medications    levothyroxine (SYNTHROID) 75 MCG tablet    Other Relevant Orders    TSH    T4, FREE    Anxiety    Current Assessment & Plan     Paulina lives in [place], step dad has been stalking her, anxiety has been ok despite this. Has a good support sytem.  Worries about her stepfather coming after her.  Had seizure with wellbutrin in past.  Lexapro made her suicidal.  Thinks she was on venlafaxine in past.  Gained a lot of weight on seroquel.            Primary insomnia    Current Assessment & Plan     Stable on   Had bad reaction with Trazodone in past.  No relief with melatonin, benadryl PRN.           Other Visit Diagnoses       Encounter for screening mammogram for malignant neoplasm of breast    -  Primary    Relevant Orders    Mammo Digital Screening Bilat w/ Jesus    Nausea        Relevant Medications    ondansetron (ZOFRAN-ODT) 4 MG TbDL              Health Maintenance   Topic Date Due    Mammogram  Never done    TETANUS VACCINE  10/07/2029    Hepatitis C Screening  Completed    Lipid Panel  Completed       Past Medical History:    Diagnosis Date    Anxiety     Breast abscess     Charcot-Amanda-Tooth disease     mild LE weakness    Chronic interstitial cystitis without hematuria     CMT (Charcot-Amanda-Tooth disease)     Depression     reports she is no longer depressed    Endometriosis     Endometriosis of uterus     Headache(784.0)     Herpes     History of psychiatric care     History of psychiatric hospitalization     Muscular dystrophy     PONV (postoperative nausea and vomiting)     PTSD (post-traumatic stress disorder)     S/P cholecystectomy     Seizures     last seizure 14-15 yrs ago    Self-injurious behavior     Thyroid disease        Past Surgical History:   Procedure Laterality Date    APPENDECTOMY      arthroscopy right shoulder      BACK SURGERY  07/01/2017    BACK SURGERY  02/21/2018    screw removal    BREAST SURGERY      reduction    CHOLECYSTECTOMY  03/2017    CYSTOSCOPY WITH HYDRODISTENSION OF BLADDER N/A 7/1/2019    Procedure: CYSTOSCOPY, WITH BLADDER HYDRODISTENSION;  Surgeon: Jay Shelby MD;  Location: [facility] OR;  Service: Urology;  Laterality: N/A;    CYSTOSCOPY WITH HYDRODISTENSION OF BLADDER N/A 12/7/2020    Procedure: CYSTOSCOPY, WITH BLADDER HYDRODISTENSION;  Surgeon: Jay Shelby MD;  Location: [facility] OR;  Service: Urology;  Laterality: N/A;    CYSTOSCOPY WITH HYDRODISTENSION OF BLADDER N/A 5/6/2021    Procedure: CYSTOSCOPY, WITH BLADDER HYDRODISTENSION;  Surgeon: Jay Shelby MD;  Location: [facility] OR;  Service: Urology;  Laterality: N/A;    CYSTOSCOPY WITH URETHRAL DILATION N/A 10/8/2021    Procedure: CYSTOSCOPY, WITH URETHRAL DILATION;  Surgeon: Tone Khanna MD;  Location: [facility] OR;  Service: Urology;  Laterality: N/A;    DIAGNOSTIC LAPAROSCOPY Bilateral 9/21/2020    Procedure: LAPAROSCOPY, DIAGNOSTIC;  Surgeon: Santy Elaine MD;  Location: [facility] OR;  Service: OB/GYN;  Laterality: Bilateral;  Plan to use robot in room 12 with Dr. Kemal LOGAN    DIAGNOSTIC LAPAROSCOPY N/A  2/21/2022    Procedure: LAPAROSCOPY, DIAGNOSTIC;  Surgeon: Santy Hinton MD;  Location: [facility];  Service: OB/GYN;  Laterality: N/A;  PER DR HINTON HE WOULD ONLY NEED 60 MINUTES    ENDOSCOPIC ULTRASOUND OF UPPER GASTROINTESTINAL TRACT N/A 8/14/2018    Procedure: ULTRASOUND, ENDOSCOPIC, UPPER GI TRACT;  Surgeon: Marko Pereyra MD;  Location: [facility];  Service: Endoscopy;  Laterality: N/A;    ESOPHAGOGASTRODUODENOSCOPY  08/14/2018    ESOPHAGOGASTRODUODENOSCOPY N/A 8/14/2018    Procedure: ESOPHAGOGASTRODUODENOSCOPY (EGD);  Surgeon: Marko Pereyra MD;  Location: [facility];  Service: Endoscopy;  Laterality: N/A;    ESOPHAGOGASTRODUODENOSCOPY N/A 7/23/2021    Procedure: EGD (ESOPHAGOGASTRODUODENOSCOPY);  Surgeon: Sulema Lopez MD;  Location: [facility];  Service: Endoscopy;  Laterality: N/A;    EXAMINATION UNDER ANESTHESIA N/A 11/2/2020    Procedure: EXAM UNDER ANESTHESIA;  Surgeon: Jenifer Aragon MD;  Location: [facility] OR 2ND FLR;  Service: Endoscopy;  Laterality: N/A;    EXCISIONAL HEMORRHOIDECTOMY N/A 10/1/2019    Procedure: HEMORRHOIDECTOMY;  Surgeon: Jenifer Aragon MD;  Location: [facility] OR 2ND FLR;  Service: Colon and Rectal;  Laterality: N/A;    HYSTERECTOMY      TLH vs LAVH with BSO    KNEE SURGERY Bilateral     OOPHORECTOMY      OPEN REDUCTION AND INTERNAL FIXATION (ORIF) OF FRACTURE OF DISTAL HUMERUS Left 4/11/2022    Procedure: ORIF, FRACTURE, HUMERUS, DISTAL - LEFT, Synthes;  Surgeon: Elliot King MD;  Location: [facility] OR 2ND FLR;  Service: Orthopedics;  Laterality: Left;    SPINE SURGERY      SURGICAL REMOVAL OF ENDOMETRIOSIS N/A 9/21/2020    Procedure: DESTRUCTION, ENDOMETRIOSIS;  Surgeon: Santy Hinton MD;  Location: [facility];  Service: OB/GYN;  Laterality: N/A;    SURGICAL REMOVAL OF ENDOMETRIOSIS N/A 2/21/2022    Procedure: DESTRUCTION, ENDOMETRIOSIS;  Surgeon: Santy Hinton MD;  Location: [facility];  Service: OB/GYN;  Laterality: N/A;    Upper EUS  08/14/2018        family history includes Bladder Cancer in her maternal grandmother; Breast cancer in her paternal aunt; Cancer in her maternal grandfather; Colon cancer in her maternal grandfather; No Known Problems in her father and mother.    Social History     Tobacco Use    Smoking status: Former     Packs/day: 0.50     Years: 3.00     Pack years: 1.50     Types: Cigarettes     Quit date:      Years since quittin.1    Smokeless tobacco: Never    Tobacco comments:     rare   Substance Use Topics    Alcohol use: No    Drug use: No       Review of Systems   Constitutional:  Negative for chills and fever.   HENT:  Negative for hearing loss and sore throat.    Eyes:  Negative for discharge.   Respiratory:  Negative for cough, shortness of breath and wheezing.    Cardiovascular:  Negative for chest pain and palpitations.   Gastrointestinal:  Negative for blood in stool, constipation, diarrhea, nausea and vomiting.   Genitourinary:  Negative for dysuria and hematuria.   Musculoskeletal:  Negative for falls and neck pain.   Neurological:  Positive for headaches. Negative for weakness.   Endo/Heme/Allergies:  Negative for polydipsia.      Outpatient Encounter Medications as of 3/6/2023   Medication Sig Dispense Refill    butalbital-acetaminophen-caffeine -40 mg (FIORICET, ESGIC) -40 mg per tablet Take 1 tablet by mouth every 4 (four) hours as needed. for pain. 30 tablet 1    diclofenac (VOLTAREN) 75 MG EC tablet TAKE 1 TABLET(75 MG) BY MOUTH TWICE DAILY 180 tablet 0    HYDROcodone-acetaminophen (NORCO) 5-325 mg per tablet Take 1 tablet by mouth every 12 (twelve) hours as needed for Pain. 14 tablet 0    mupirocin (BACTROBAN) 2 % ointment Apply to affected area 3 times daily 22 g 1    promethazine (PHENERGAN) 12.5 MG Tab TAKE 1 TABLET(12.5 MG) BY MOUTH FOUR TIMES DAILY 30 tablet 5    tiZANidine (ZANAFLEX) 4 MG tablet Take 4 mg by mouth every evening.       [DISCONTINUED] levothyroxine (SYNTHROID) 50  MCG tablet Take 1 tablet (50 mcg total) by mouth once daily. 90 tablet 1    [DISCONTINUED] sulfamethoxazole-trimethoprim 800-160mg (BACTRIM DS) 800-160 mg Tab Take 1 tablet by mouth 2 (two) times daily. for 5 days 20 tablet 0    [DISCONTINUED] zolpidem (AMBIEN) 10 mg Tab Take 1 tablet (10 mg total) by mouth nightly as needed (INSOMNIA). 30 tablet 5    cephALEXin (KEFLEX) 500 MG capsule Take 1 capsule (500 mg total) by mouth every 12 (twelve) hours. 14 capsule 0    levothyroxine (SYNTHROID) 75 MCG tablet Take 1 tablet (75 mcg total) by mouth once daily. 90 tablet 1    ondansetron (ZOFRAN-ODT) 4 MG TbDL Take 1 tablet (4 mg total) by mouth 2 (two) times daily. 30 tablet 5    [] rimegepant 75 mg odt Take 1 tablet (75 mg total) by mouth once as needed for Migraine. Place ODT tablet on the tongue; alternatively the ODT tablet may be placed under the tongue 9 tablet 0    zolpidem (AMBIEN) 10 mg Tab Take 1 tablet (10 mg total) by mouth nightly as needed (INSOMNIA). 30 tablet 5    [DISCONTINUED] aspirin (ECOTRIN) 81 MG EC tablet Take 1 tablet (81 mg total) by mouth 2 (two) times a day. (Patient not taking: Reported on 3/6/2023) 60 tablet 0    [DISCONTINUED] butalbital-acetaminophen-caffeine -40 mg (FIORICET, ESGIC) -40 mg per tablet TAKE 1 TABLET BY MOUTH EVERY 4 HOURS AS NEEDED FOR PAIN 30 tablet 1    [DISCONTINUED] escitalopram (LEXAPRO) 10 MG tablet Take 1 tablet (10 mg total) by mouth once daily. 30 tablet 11    [DISCONTINUED] fluticasone propionate (FLONASE) 50 mcg/actuation nasal spray 1 spray (50 mcg total) by Each Nostril route once daily. (Patient not taking: Reported on 3/6/2023) 16 g 3    [DISCONTINUED] fluticasone propionate (FLONASE) 50 mcg/actuation nasal spray 1 spray (50 mcg total) by Each Nostril route once daily. (Patient not taking: Reported on 3/6/2023) 18 g 3    [DISCONTINUED] ibuprofen (ADVIL,MOTRIN) 600 MG tablet Take 1 tablet (600 mg total) by mouth every 8 (eight)  "hours as needed for Pain. (Patient not taking: Reported on 3/6/2023) 30 tablet 0    [DISCONTINUED] topiramate (TOPAMAX) 50 MG tablet Take 1 tablet (50 mg total) by mouth 2 (two) times daily. 60 tablet 0    [DISCONTINUED] zolpidem (AMBIEN) 10 mg Tab Take 1 tablet (10 mg total) by mouth nightly as needed (INSOMNIA). 30 tablet 5     No facility-administered encounter medications on file as of 3/6/2023.        Review of patient's allergies indicates:   Allergen Reactions    Benadryl decongestant Shortness Of Breath    Carrot Hives and Shortness Of Breath    Diphenhydramine Shortness Of Breath    Sumatriptan     Sumatriptan succinate Other (See Comments)     paralysis    Tramadol Other (See Comments)     Faces swells. Tolerates percocet  Pt states she  can take Dilaudid.     Venom-honey bee Anaphylaxis    Wasp sting [allergen ext-venom-honey bee] Anaphylaxis    Bupropion      Other reaction(s): Unknown    Bupropion hcl     Gabapentin Other (See Comments)     seizures       Physical Exam      Vital Signs  Pulse: 75  SpO2: 98 %  BP: 108/70  Pain Score:   5  Pain Loc: Leg  Height and Weight  Height: 5' 2" (157.5 cm)  Weight: 85.8 kg (189 lb 2.5 oz)  BSA (Calculated - sq m): 1.94 sq meters  BMI (Calculated): 34.6  Weight in (lb) to have BMI = 25: 136.4]    Physical Exam  Constitutional:       General: She is not in acute distress.     Appearance: She is well-developed.   HENT:      Head: Normocephalic and atraumatic.   Pulmonary:      Effort: Pulmonary effort is normal. No respiratory distress.   Musculoskeletal:      Cervical back: Normal range of motion.   Skin:     Findings: No rash.   Neurological:      Mental Status: She is alert and oriented to person, place, and time.      Coordination: Coordination normal.   Psychiatric:         Behavior: Behavior normal.         Thought Content: Thought content normal.         Judgment: Judgment normal.        Laboratory:  CBC:  Recent Labs   Lab Result Units " 01/12/23  0928 02/28/23  1138   WBC K/uL 6.90 6.41   RBC M/uL 4.18 4.15   Hemoglobin g/dL 12.9 12.6   Hematocrit % 37.4 36.7*   Platelets K/uL 320 220   MCV fL 90 88   MCH pg 30.9 30.4   MCHC g/dL 34.5 34.3     CMP:  Recent Labs   Lab Result Units 01/12/23  0928 02/28/23  1138   Glucose mg/dL 102 87   Calcium mg/dL 9.5 9.8   Albumin g/dL 4.4 4.2   Total Protein g/dL 7.9 7.9   Sodium mmol/L 139 141   Potassium mmol/L 4.0 4.6   CO2 mmol/L 22* 26   Chloride mmol/L 106 108   BUN mg/dL 23* 13   Alkaline Phosphatase U/L 119 123   ALT U/L 20 24   AST U/L 20 20   Total Bilirubin mg/dL 0.4 0.2     URINALYSIS:  Recent Labs   Lab Result Units 01/12/23  0954   Color, UA  Yellow   Specific Gravity, UA  >1.030*   pH, UA  6.0   Protein, UA  1+*   Bacteria /hpf Rare   Nitrite, UA  Negative   Leukocytes, UA  Negative   Urobilinogen, UA EU/dL 2.0-3.0*   Hyaline Casts, UA /lpf 5*      LIPIDS:  Recent Labs   Lab Result Units 02/28/23  1138   TSH uIU/mL 3.861     TSH:  Recent Labs   Lab Result Units 02/28/23  1138   TSH uIU/mL 3.861     A1C:  Recent Labs   Lab Result Units 02/28/23  1138   Hemoglobin A1C % 4.9       Radiology:  No results found in the last 30 days.     Assessment/Plan     Jaycee Gamboa is a 42 y.o.female with:    1. Acquired hypothyroidism  - levothyroxine (SYNTHROID) 75 MCG tablet; Take 1 tablet (75 mcg total) by mouth once daily.  Dispense: 90 tablet; Refill: 1  - TSH; Future  - T4, FREE; Future    2. Anxiety    3. Primary insomnia    4. Encounter for screening mammogram for malignant neoplasm of breast  - Mammo Digital Screening Bilat w/ Jesus; Future    5. Other migraine without status migrainosus, not intractable    6. Nausea  - ondansetron (ZOFRAN-ODT) 4 MG TbDL; Take 1 tablet (4 mg total) by mouth 2 (two) times daily.  Dispense: 30 tablet; Refill: 5      -swap to keflex  -[facility] ordered  -increase synthroid to 75 given symtpoms  -Continue current medications and maintain follow up with specialists.    -Follow  up in about 6 months (around 9/6/2023) for follow up of medical problems.       Emi Lechuga MD  [facility]

## 2023-03-06 NOTE — ASSESSMENT & PLAN NOTE
Has been getting more migraines lately since ilana had an MI in 1/2023. has tried excedrin/tylenol/ibuprofen.  Now on fioricet.

## 2023-03-06 NOTE — ASSESSMENT & PLAN NOTE
Stable on Synthroid 50 mcg daily, having weight gain and fatigue.  TSH trending upward, Free T4 on low end of normal.

## 2023-03-08 ENCOUNTER — TELEPHONE (OUTPATIENT)
Dept: UROLOGY | Facility: CLINIC | Age: 43
End: 2023-03-08
Payer: MEDICARE

## 2023-03-18 ENCOUNTER — PATIENT MESSAGE (OUTPATIENT)
Dept: PRIMARY CARE CLINIC | Facility: CLINIC | Age: 43
End: 2023-03-18
Payer: MEDICARE

## 2023-04-14 ENCOUNTER — OFFICE VISIT (OUTPATIENT)
Dept: URGENT CARE | Facility: CLINIC | Age: 43
End: 2023-04-14
Payer: MEDICARE

## 2023-04-14 VITALS
RESPIRATION RATE: 18 BRPM | HEART RATE: 97 BPM | OXYGEN SATURATION: 98 % | TEMPERATURE: 99 F | SYSTOLIC BLOOD PRESSURE: 128 MMHG | HEIGHT: 62 IN | DIASTOLIC BLOOD PRESSURE: 90 MMHG | BODY MASS INDEX: 34.78 KG/M2 | WEIGHT: 189 LBS

## 2023-04-14 DIAGNOSIS — J02.9 SORE THROAT: Primary | ICD-10-CM

## 2023-04-14 DIAGNOSIS — B96.89 ACUTE BACTERIAL SINUSITIS: ICD-10-CM

## 2023-04-14 DIAGNOSIS — J01.90 ACUTE BACTERIAL SINUSITIS: ICD-10-CM

## 2023-04-14 DIAGNOSIS — R11.0 NAUSEA: ICD-10-CM

## 2023-04-14 LAB
CTP QC/QA: YES
MOLECULAR STREP A: NEGATIVE

## 2023-04-14 PROCEDURE — 99214 PR OFFICE/OUTPT VISIT, EST, LEVL IV, 30-39 MIN: ICD-10-PCS | Mod: 25,S$GLB,, | Performed by: NURSE PRACTITIONER

## 2023-04-14 PROCEDURE — 96372 THER/PROPH/DIAG INJ SC/IM: CPT | Mod: S$GLB,,, | Performed by: NURSE PRACTITIONER

## 2023-04-14 PROCEDURE — 87651 POCT STREP A MOLECULAR: ICD-10-PCS | Mod: QW,S$GLB,, | Performed by: NURSE PRACTITIONER

## 2023-04-14 PROCEDURE — 99214 OFFICE O/P EST MOD 30 MIN: CPT | Mod: 25,S$GLB,, | Performed by: NURSE PRACTITIONER

## 2023-04-14 PROCEDURE — 96372 PR INJECTION,THERAP/PROPH/DIAG2ST, IM OR SUBCUT: ICD-10-PCS | Mod: S$GLB,,, | Performed by: NURSE PRACTITIONER

## 2023-04-14 PROCEDURE — 87651 STREP A DNA AMP PROBE: CPT | Mod: QW,S$GLB,, | Performed by: NURSE PRACTITIONER

## 2023-04-14 RX ORDER — PROMETHAZINE HYDROCHLORIDE AND DEXTROMETHORPHAN HYDROBROMIDE 6.25; 15 MG/5ML; MG/5ML
5 SYRUP ORAL EVERY 8 HOURS PRN
Qty: 120 ML | Refills: 0 | Status: SHIPPED | OUTPATIENT
Start: 2023-04-14 | End: 2023-04-24

## 2023-04-14 RX ORDER — AMOXICILLIN AND CLAVULANATE POTASSIUM 875; 125 MG/1; MG/1
1 TABLET, FILM COATED ORAL EVERY 12 HOURS
Qty: 20 TABLET | Refills: 0 | Status: SHIPPED | OUTPATIENT
Start: 2023-04-14 | End: 2023-04-24

## 2023-04-14 RX ORDER — LIDOCAINE HYDROCHLORIDE 20 MG/ML
SOLUTION OROPHARYNGEAL
Qty: 100 ML | Refills: 0 | Status: SHIPPED | OUTPATIENT
Start: 2023-04-14 | End: 2024-01-26

## 2023-04-14 RX ORDER — PROMETHAZINE HYDROCHLORIDE 25 MG/ML
25 INJECTION, SOLUTION INTRAMUSCULAR; INTRAVENOUS
Status: COMPLETED | OUTPATIENT
Start: 2023-04-14 | End: 2023-04-14

## 2023-04-14 RX ADMIN — PROMETHAZINE HYDROCHLORIDE 25 MG: 25 INJECTION, SOLUTION INTRAMUSCULAR; INTRAVENOUS at 10:04

## 2023-04-14 NOTE — PROGRESS NOTES
"Subjective:      Patient ID: Jaycee Gamboa is a 42 y.o. female.    Vitals:  height is 5' 2" (1.575 m) and weight is 85.7 kg (189 lb). Her temperature is 98.5 °F (36.9 °C). Her blood pressure is 128/90 (abnormal) and her pulse is 97. Her respiration is 18 and oxygen saturation is 98%.     Chief Complaint: Sore Throat    42 yr female present with Sore throat pt states that her throat  feels like it is on fire. It hurts to swallow  Nausea associated, states she can't eat due to pain and nausea. Symptoms started about two weeks ago. Treatments at home include Dayquil Nyquil tylenol  zyrtec with no relief  Offered covid 19 test, she refused.    Sore Throat   This is a new problem. The current episode started 1 to 4 weeks ago. The problem has been gradually worsening. There has been no fever. The pain is at a severity of 8/10. The pain is moderate. Associated symptoms include coughing, headaches, a hoarse voice, neck pain, swollen glands and trouble swallowing. Pertinent negatives include no abdominal pain, congestion, diarrhea, drooling, ear discharge, ear pain, plugged ear sensation, shortness of breath, stridor or vomiting. She has had no exposure to strep or mono. Treatments tried: Dayquil Nyquil tylenol  zyrtec. The treatment provided no relief.     Constitution: Negative for chills, fatigue and fever.   HENT:  Positive for postnasal drip, sinus pain, sore throat and trouble swallowing. Negative for ear pain, ear discharge, drooling, congestion, sinus pressure and voice change.    Neck: Positive for neck pain.   Respiratory:  Positive for cough. Negative for chest tightness, sputum production, shortness of breath and stridor.    Gastrointestinal:  Negative for abdominal pain, vomiting and diarrhea.   Neurological:  Positive for headaches.    Objective:     Physical Exam   Constitutional: She is oriented to person, place, and time. She appears well-developed. She is cooperative.  Non-toxic appearance. She does " not appear ill. No distress.   HENT:   Head: Normocephalic and atraumatic.   Ears:   Right Ear: Hearing, tympanic membrane, external ear and ear canal normal.   Left Ear: Hearing, tympanic membrane, external ear and ear canal normal.   Nose: Purulent discharge and sinus tenderness present. No mucosal edema, rhinorrhea or nasal deformity. No epistaxis. Right sinus exhibits maxillary sinus tenderness. Right sinus exhibits no frontal sinus tenderness. Left sinus exhibits no maxillary sinus tenderness and no frontal sinus tenderness.   Mouth/Throat: Uvula is midline, oropharynx is clear and moist and mucous membranes are normal. No trismus in the jaw. Normal dentition. No uvula swelling. No oropharyngeal exudate, posterior oropharyngeal edema, posterior oropharyngeal erythema or tonsillar abscesses. Tonsils are 1+ on the right. Tonsils are 1+ on the left. No tonsillar exudate.   Eyes: Conjunctivae and lids are normal. No scleral icterus.   Neck: Trachea normal and phonation normal. Neck supple. No edema present. No erythema present. No neck rigidity present.   Cardiovascular: Normal rate, regular rhythm, normal heart sounds and normal pulses.   Pulmonary/Chest: Effort normal and breath sounds normal. No respiratory distress. She has no decreased breath sounds. She has no rhonchi.   Abdominal: Normal appearance and bowel sounds are normal. She exhibits no distension and no mass. Soft. There is no abdominal tenderness.   Musculoskeletal: Normal range of motion.         General: No deformity. Normal range of motion.   Lymphadenopathy:        Head (right side): Tonsillar adenopathy present.        Head (left side): Tonsillar adenopathy present.     She has no cervical adenopathy.   Neurological: She is alert and oriented to person, place, and time. She has normal strength. She exhibits normal muscle tone. Coordination normal.   Skin: Skin is warm, dry, intact, not diaphoretic and not pale.   Psychiatric: Her speech is  normal and behavior is normal. Judgment and thought content normal.   Nursing note and vitals reviewed.    Results for orders placed or performed in visit on 04/14/23   POCT Strep A, Molecular   Result Value Ref Range    Molecular Strep A, POC Negative Negative     Acceptable Yes        Assessment:     1. Sore throat    2. Nausea    3. Acute bacterial sinusitis        Plan:   Strep negative.  Patient requesting phenergan for nausea.  Paulina is here to drive her home, she does not drive.      Sore throat  -     POCT Strep A, Molecular  -     LIDOcaine HCl 2% (LIDOCAINE VISCOUS) 2 % Soln; Swish and spit 10 ml every 6 hours PRN sore throat.  Dispense: 100 mL; Refill: 0    Nausea  -     promethazine-dextromethorphan (PROMETHAZINE-DM) 6.25-15 mg/5 mL Syrp; Take 5 mLs by mouth every 8 (eight) hours as needed (cough).  Dispense: 120 mL; Refill: 0  -     promethazine injection 25 mg    Acute bacterial sinusitis  -     amoxicillin-clavulanate 875-125mg (AUGMENTIN) 875-125 mg per tablet; Take 1 tablet by mouth every 12 (twelve) hours. for 10 days  Dispense: 20 tablet; Refill: 0      Patient Instructions   Consider adding over-the-counter PROBIOTICS to decrease some side-effects associated with antibiotics. When a person takes antibiotics, both the harmful bacteria and the beneficial bacteria are killed. A reduction of beneficial bacteria can lead to digestive problems, such as diarrhea, yeast infections and urinary tract infections.    Please drink plenty of fluids.  Please get plenty of rest.  Please return here or go to the Emergency Department for any concerns or worsening of condition.  If you were prescribed antibiotics, please take them to completion.  If you were prescribed a narcotic medication, do not drive or operate heavy equipment or machinery while taking these medications.  If you do not have Hypertension or any history of palpitations, it is ok to take over the counter Sudafed or Mucinex D or  Allegra-D or Claritin-D or Zyrtec-D.  If you do take one of the above, it is ok to combine that with plain over the counter Mucinex or Allegra or Claritin or Zyrtec.  If for example you are taking Zyrtec -D, you can combine that with Mucinex, but not Mucinex-D.  If you are taking Mucinex-D, you can combine that with plain Allegra or Claritin or Zyrtec.   If you do have Hypertension or palpitations, it is safe to take Coricidin HBP for relief of sinus symptoms.  We recommend you take over the counter Flonase (Fluticasone) or another nasally inhaled steroid unless you are already taking one.  Nasal irrigation with a saline spray or Netti Pot like device per their directions is also recommended.  If not allergic, please take over the counter Tylenol (Acetaminophen) and/or Motrin (Ibuprofen) as directed for control of pain and/or fever.  Please follow up with your primary care doctor or specialist as needed.    If you  smoke, please stop smoking.

## 2023-04-14 NOTE — PATIENT INSTRUCTIONS
Consider adding over-the-counter PROBIOTICS to decrease some side-effects associated with antibiotics. When a person takes antibiotics, both the harmful bacteria and the beneficial bacteria are killed. A reduction of beneficial bacteria can lead to digestive problems, such as diarrhea, yeast infections and urinary tract infections.    Please drink plenty of fluids.  Please get plenty of rest.  Please return here or go to the Emergency Department for any concerns or worsening of condition.  If you were prescribed antibiotics, please take them to completion.  If you were prescribed a narcotic medication, do not drive or operate heavy equipment or machinery while taking these medications.  If you do not have Hypertension or any history of palpitations, it is ok to take over the counter Sudafed or Mucinex D or Allegra-D or Claritin-D or Zyrtec-D.  If you do take one of the above, it is ok to combine that with plain over the counter Mucinex or Allegra or Claritin or Zyrtec.  If for example you are taking Zyrtec -D, you can combine that with Mucinex, but not Mucinex-D.  If you are taking Mucinex-D, you can combine that with plain Allegra or Claritin or Zyrtec.   If you do have Hypertension or palpitations, it is safe to take Coricidin HBP for relief of sinus symptoms.  We recommend you take over the counter Flonase (Fluticasone) or another nasally inhaled steroid unless you are already taking one.  Nasal irrigation with a saline spray or Netti Pot like device per their directions is also recommended.  If not allergic, please take over the counter Tylenol (Acetaminophen) and/or Motrin (Ibuprofen) as directed for control of pain and/or fever.  Please follow up with your primary care doctor or specialist as needed.    If you  smoke, please stop smoking.

## 2023-05-08 DIAGNOSIS — G43.909 MIGRAINE WITHOUT STATUS MIGRAINOSUS, NOT INTRACTABLE, UNSPECIFIED MIGRAINE TYPE: ICD-10-CM

## 2023-05-08 RX ORDER — PROMETHAZINE HYDROCHLORIDE 12.5 MG/1
12.5 TABLET ORAL EVERY 6 HOURS PRN
Qty: 30 TABLET | Refills: 5 | Status: SHIPPED | OUTPATIENT
Start: 2023-05-08

## 2023-05-08 NOTE — TELEPHONE ENCOUNTER
No care due was identified.  [facility] Embedded Care Due Messages. Reference number: [id number].   5/08/2023 10:25:40 AM CDT

## 2023-05-15 ENCOUNTER — HOSPITAL ENCOUNTER (EMERGENCY)
Facility: HOSPITAL | Age: 43
Discharge: HOME | End: 2023-05-15
Attending: EMERGENCY MEDICINE
Payer: MEDICARE

## 2023-05-15 VITALS
OXYGEN SATURATION: 100 % | HEART RATE: 73 BPM | DIASTOLIC BLOOD PRESSURE: 76 MMHG | SYSTOLIC BLOOD PRESSURE: 137 MMHG | BODY MASS INDEX: 32.01 KG/M2 | RESPIRATION RATE: 18 BRPM | WEIGHT: 175 LBS | TEMPERATURE: 99 F

## 2023-05-15 DIAGNOSIS — R10.84 GENERALIZED ABDOMINAL PAIN: Primary | ICD-10-CM

## 2023-05-15 LAB
ALBUMIN SERPL BCP-MCNC: 4.2 G/DL (ref 3.5–5.2)
ALLENS TEST: NORMAL
ALP SERPL-CCNC: 119 U/L (ref 55–135)
ALT SERPL W/O P-5'-P-CCNC: 15 U/L (ref 10–44)
ANION GAP SERPL CALC-SCNC: 10 MMOL/L (ref 8–16)
AST SERPL-CCNC: 17 U/L (ref 10–40)
BASOPHILS # BLD AUTO: 0.01 K/UL (ref 0–0.2)
BASOPHILS NFR BLD: 0.1 % (ref 0–1.9)
BILIRUB SERPL-MCNC: 0.3 MG/DL (ref 0.1–1)
BILIRUB UR QL STRIP: NEGATIVE
BUN SERPL-MCNC: 12 MG/DL (ref 6–20)
CALCIUM SERPL-MCNC: 9.7 MG/DL (ref 8.7–10.5)
CHLORIDE SERPL-SCNC: 110 MMOL/L (ref 95–110)
CLARITY UR: CLEAR
CO2 SERPL-SCNC: 22 MMOL/L (ref 23–29)
COLOR UR: COLORLESS
CREAT SERPL-MCNC: 0.6 MG/DL (ref 0.5–1.4)
CREAT SERPL-MCNC: 0.7 MG/DL (ref 0.5–1.4)
DELSYS: NORMAL
DIFFERENTIAL METHOD: NORMAL
EOSINOPHIL # BLD AUTO: 0.1 K/UL (ref 0–0.5)
EOSINOPHIL NFR BLD: 1.3 % (ref 0–8)
ERYTHROCYTE [DISTWIDTH] IN BLOOD BY AUTOMATED COUNT: 13.3 % (ref 11.5–14.5)
EST. GFR  (NO RACE VARIABLE): >60 ML/MIN/1.73 M^2
GLUCOSE SERPL-MCNC: 96 MG/DL (ref 70–110)
GLUCOSE UR QL STRIP: NEGATIVE
HCT VFR BLD AUTO: 38 % (ref 37–48.5)
HGB BLD-MCNC: 12.6 G/DL (ref 12–16)
HGB UR QL STRIP: NEGATIVE
IMM GRANULOCYTES # BLD AUTO: 0.02 K/UL (ref 0–0.04)
IMM GRANULOCYTES NFR BLD AUTO: 0.3 % (ref 0–0.5)
KETONES UR QL STRIP: NEGATIVE
LEUKOCYTE ESTERASE UR QL STRIP: NEGATIVE
LIPASE SERPL-CCNC: 77 U/L (ref 4–60)
LYMPHOCYTES # BLD AUTO: 2.5 K/UL (ref 1–4.8)
LYMPHOCYTES NFR BLD: 36.4 % (ref 18–48)
MCH RBC QN AUTO: 29.4 PG (ref 27–31)
MCHC RBC AUTO-ENTMCNC: 33.2 G/DL (ref 32–36)
MCV RBC AUTO: 89 FL (ref 82–98)
MODE: NORMAL
MONOCYTES # BLD AUTO: 0.4 K/UL (ref 0.3–1)
MONOCYTES NFR BLD: 6 % (ref 4–15)
NEUTROPHILS # BLD AUTO: 3.8 K/UL (ref 1.8–7.7)
NEUTROPHILS NFR BLD: 55.9 % (ref 38–73)
NITRITE UR QL STRIP: NEGATIVE
NRBC BLD-RTO: 0 /100 WBC
OB PNL STL: NEGATIVE
PH UR STRIP: 7 [PH] (ref 5–8)
PLATELET # BLD AUTO: 173 K/UL (ref 150–450)
PMV BLD AUTO: 10.3 FL (ref 9.2–12.9)
POTASSIUM SERPL-SCNC: 4.5 MMOL/L (ref 3.5–5.1)
PROT SERPL-MCNC: 7.8 G/DL (ref 6–8.4)
PROT UR QL STRIP: NEGATIVE
RBC # BLD AUTO: 4.28 M/UL (ref 4–5.4)
SAMPLE: NORMAL
SITE: NORMAL
SODIUM SERPL-SCNC: 142 MMOL/L (ref 136–145)
SP GR UR STRIP: 1.01 (ref 1–1.03)
URN SPEC COLLECT METH UR: ABNORMAL
UROBILINOGEN UR STRIP-ACNC: NEGATIVE EU/DL
WBC # BLD AUTO: 6.84 K/UL (ref 3.9–12.7)

## 2023-05-15 PROCEDURE — 25500020 PHARM REV CODE 255: Performed by: EMERGENCY MEDICINE

## 2023-05-15 PROCEDURE — 82272 OCCULT BLD FECES 1-3 TESTS: CPT | Performed by: NURSE PRACTITIONER

## 2023-05-15 PROCEDURE — 80053 COMPREHEN METABOLIC PANEL: CPT | Performed by: EMERGENCY MEDICINE

## 2023-05-15 PROCEDURE — 83690 ASSAY OF LIPASE: CPT | Performed by: EMERGENCY MEDICINE

## 2023-05-15 PROCEDURE — 63600175 PHARM REV CODE 636 W HCPCS: Performed by: EMERGENCY MEDICINE

## 2023-05-15 PROCEDURE — 96372 THER/PROPH/DIAG INJ SC/IM: CPT | Mod: 59 | Performed by: EMERGENCY MEDICINE

## 2023-05-15 PROCEDURE — 85025 COMPLETE CBC W/AUTO DIFF WBC: CPT | Performed by: NURSE PRACTITIONER

## 2023-05-15 PROCEDURE — 99285 EMERGENCY DEPT VISIT HI MDM: CPT | Mod: 25

## 2023-05-15 PROCEDURE — 81003 URINALYSIS AUTO W/O SCOPE: CPT | Performed by: NURSE PRACTITIONER

## 2023-05-15 PROCEDURE — 96375 TX/PRO/DX INJ NEW DRUG ADDON: CPT

## 2023-05-15 PROCEDURE — 96374 THER/PROPH/DIAG INJ IV PUSH: CPT

## 2023-05-15 RX ORDER — ONDANSETRON 2 MG/ML
4 INJECTION INTRAMUSCULAR; INTRAVENOUS
Status: COMPLETED | OUTPATIENT
Start: 2023-05-15 | End: 2023-05-15

## 2023-05-15 RX ORDER — KETOROLAC TROMETHAMINE 30 MG/ML
15 INJECTION, SOLUTION INTRAMUSCULAR; INTRAVENOUS
Status: COMPLETED | OUTPATIENT
Start: 2023-05-15 | End: 2023-05-15

## 2023-05-15 RX ORDER — PROMETHAZINE HYDROCHLORIDE 25 MG/ML
25 INJECTION, SOLUTION INTRAMUSCULAR; INTRAVENOUS
Status: COMPLETED | OUTPATIENT
Start: 2023-05-15 | End: 2023-05-15

## 2023-05-15 RX ADMIN — PROMETHAZINE HYDROCHLORIDE 25 MG: 25 INJECTION INTRAMUSCULAR; INTRAVENOUS at 04:05

## 2023-05-15 RX ADMIN — KETOROLAC TROMETHAMINE 15 MG: 30 INJECTION, SOLUTION INTRAMUSCULAR; INTRAVENOUS at 03:05

## 2023-05-15 RX ADMIN — ONDANSETRON 4 MG: 2 INJECTION INTRAMUSCULAR; INTRAVENOUS at 03:05

## 2023-05-15 RX ADMIN — IOHEXOL 75 ML: 350 INJECTION, SOLUTION INTRAVENOUS at 04:05

## 2023-05-15 NOTE — FIRST PROVIDER EVALUATION
Emergency Department TeleTriage Encounter Note      CHIEF COMPLAINT    Chief Complaint   Patient presents with    Abdominal Pain     LLQ abd pain that began last week increased  in intensity on fri, was dx with UTI on fri and vaginal bleeding reported        VITAL SIGNS   Initial Vitals [05/15/23 1155]   BP Pulse Resp Temp SpO2   137/76 73 18 98.8 °F (37.1 °C) 100 %      MAP       --            ALLERGIES    Review of patient's allergies indicates:   Allergen Reactions    Benadryl decongestant Shortness Of Breath    Carrot Hives and Shortness Of Breath    Diphenhydramine Shortness Of Breath    Sumatriptan     Sumatriptan succinate Other (See Comments)     paralysis    Tramadol Other (See Comments)     Faces swells. Tolerates percocet  Pt states she  can take Dilaudid.     Venom-honey bee Anaphylaxis    Wasp sting [allergen ext-venom-honey bee] Anaphylaxis    Bupropion      Other reaction(s): Unknown    Bupropion hcl     Gabapentin Other (See Comments)     seizures       PROVIDER TRIAGE NOTE  This is a teletriage evaluation of a 42 y.o. female presenting to the ED with c/o left lower abdominal pain that started approximately 4 days ago.  Also reports vaginal bleeding.  Last BM yesterday which was watery with BRB and vomiting blood. Currently on ABX for a UTI.  Limited physical exam via telehealth: The patient is awake, alert, answering questions appropriately and is not in respiratory distress.  As the Teletriage provider, I performed an initial assessment and ordered appropriate labs and imaging studies, if any, to facilitate the patient's care once placed in the ED. Once a room is available, care and a full evaluation will be completed by an alternate ED provider.  Any additional orders and the final disposition will be determined by that provider.  All imaging and labs will not be followed-up by the Teletriage Team, including myself.      ORDERS  Labs Reviewed   CBC W/ AUTO DIFFERENTIAL   COMPREHENSIVE METABOLIC  PANEL   LIPASE   URINALYSIS, REFLEX TO URINE CULTURE   OCCULT BLOOD X 1, STOOL   POCT URINE PREGNANCY       ED Orders (720h ago, onward)      Start Ordered     Status Ordering Provider    05/15/23 1201 05/15/23 1200  Vital signs  Every 2 hours         Ordered JOSHUA RENEE    05/15/23 1201 05/15/23 1200  Diet NPO  Diet effective now         Ordered JOSHUA RENEE    05/15/23 1201 05/15/23 1200  Insert peripheral IV  Once         Ordered JOSHUA RENEE    05/15/23 1201 05/15/23 1200  CBC W/ AUTO DIFFERENTIAL  STAT         Ordered JOSHUA RENEE    05/15/23 1201 05/15/23 1200  Comp. Metabolic Panel  STAT         Ordered JOSHUA RENEE    05/15/23 1201 05/15/23 1200  Lipase  STAT         Ordered JOSHUA RENEE    05/15/23 1201 05/15/23 1200  Urinalysis, Reflex to Urine Culture Urine, Clean Catch  STAT         Ordered JOSHUA RENEE    05/15/23 1201 05/15/23 1200  POCT urine pregnancy  Once         Ordered JOSHUA RENEE    05/15/23 1201 05/15/23 1201  Occult blood x 1, stool  Once         Ordered JOSHUA RENEE          Virtual Visit Note: The provider triage portion of this emergency department evaluation and documentation was performed via [employer], a HIPAA-compliant telemedicine application, in concert with a tele-presenter in the room. A face to face patient evaluation with one of my colleagues will occur once the patient is placed in an emergency department room.      DISCLAIMER: This note was prepared with [employer] voice recognition transcription software. Garbled syntax, mangled pronouns, and other bizarre constructions may be attributed to that software system.

## 2023-05-15 NOTE — ED PROVIDER NOTES
Encounter Date: 5/15/2023    SCRIBE #1 NOTE: I, Tyshawn Johnson, am scribing for, and in the presence of,  Joselin Jackson MD. I have scribed the following portions of the note - Other sections scribed: HPI, ROS, PE, MDM.     History     Chief Complaint   Patient presents with    Abdominal Pain     LLQ abd pain that began last week increased  in intensity on fri, was dx with UTI on fri and vaginal bleeding reported      Jaycee Gamboa is a 42 y.o. female who has a past medical history of Endometriosis of uterus, Muscular dystrophy, Seizures, and Thyroid disease.    The patient presents to the ED for evaluation of acute abdominal pain, onset of 4 days ago. She describes the sharp pain to be localized to LLQ that radiates down into her left lateral leg. There is associated diarrhea and vomiting. Her last bowel movement was last night. She notes that her stool is black with blood streaking. The patient states she is currently taking norco. She has a surgical history of hysterectomy, cholecystectomy, and appendectomy.     She states she had a UTI about 2 weeks ago with similar symptoms of blood in stool and very loose diarrhea. She received IV antibiotics which resolved her symptoms.       The history is provided by the patient. No  was used.   Review of patient's allergies indicates:   Allergen Reactions    Benadryl decongestant Shortness Of Breath    Carrot Hives and Shortness Of Breath    Diphenhydramine Shortness Of Breath    Sumatriptan     Sumatriptan succinate Other (See Comments)     paralysis    Tramadol Other (See Comments)     Faces swells. Tolerates percocet  Pt states she  can take Dilaudid.     Venom-honey bee Anaphylaxis    Wasp sting [allergen ext-venom-honey bee] Anaphylaxis    Bupropion      Other reaction(s): Unknown    Bupropion hcl     Gabapentin Other (See Comments)     seizures     Past Medical History:   Diagnosis Date    Anxiety     Breast abscess     Charcot-Amanda-Tooth  disease     mild LE weakness    Chronic interstitial cystitis without hematuria     CMT (Charcot-Amanda-Tooth disease)     Depression     reports she is no longer depressed    Endometriosis     Endometriosis of uterus     Headache(784.0)     Herpes     History of psychiatric care     History of psychiatric hospitalization     Muscular dystrophy     PONV (postoperative nausea and vomiting)     PTSD (post-traumatic stress disorder)     S/P cholecystectomy     Seizures     last seizure 14-15 yrs ago    Self-injurious behavior     Thyroid disease      Past Surgical History:   Procedure Laterality Date    APPENDECTOMY      arthroscopy right shoulder      BACK SURGERY  07/01/2017    BACK SURGERY  02/21/2018    screw removal    BREAST SURGERY      reduction    CHOLECYSTECTOMY  03/2017    CYSTOSCOPY WITH HYDRODISTENSION OF BLADDER N/A 7/1/2019    Procedure: CYSTOSCOPY, WITH BLADDER HYDRODISTENSION;  Surgeon: Jay Shelby MD;  Location: [facility] OR;  Service: Urology;  Laterality: N/A;    CYSTOSCOPY WITH HYDRODISTENSION OF BLADDER N/A 12/7/2020    Procedure: CYSTOSCOPY, WITH BLADDER HYDRODISTENSION;  Surgeon: Jay Shelby MD;  Location: [facility] OR;  Service: Urology;  Laterality: N/A;    CYSTOSCOPY WITH HYDRODISTENSION OF BLADDER N/A 5/6/2021    Procedure: CYSTOSCOPY, WITH BLADDER HYDRODISTENSION;  Surgeon: Jay Shelby MD;  Location: [facility] OR;  Service: Urology;  Laterality: N/A;    CYSTOSCOPY WITH URETHRAL DILATION N/A 10/8/2021    Procedure: CYSTOSCOPY, WITH URETHRAL DILATION;  Surgeon: Tone Khanna MD;  Location[facility] OR;  Service: Urology;  Laterality: N/A;    DIAGNOSTIC LAPAROSCOPY Bilateral 9/21/2020    Procedure: LAPAROSCOPY, DIAGNOSTIC;  Surgeon: Santy Elaine MD;  Location: [facility];  Service: OB/GYN;  Laterality: Bilateral;  Plan to use robot in room 12 with Dr. Kemal LOGAN    DIAGNOSTIC LAPAROSCOPY N/A 2/21/2022    Procedure: LAPAROSCOPY, DIAGNOSTIC;  Surgeon: Santy Elaine MD;  Location: [facility];   Service: OB/GYN;  Laterality: N/A;  PER DR HINTON HE WOULD ONLY NEED 60 MINUTES    ENDOSCOPIC ULTRASOUND OF UPPER GASTROINTESTINAL TRACT N/A 8/14/2018    Procedure: ULTRASOUND, ENDOSCOPIC, UPPER GI TRACT;  Surgeon: Marko Pereyra MD;  Location: [facility];  Service: Endoscopy;  Laterality: N/A;    ESOPHAGOGASTRODUODENOSCOPY  08/14/2018    ESOPHAGOGASTRODUODENOSCOPY N/A 8/14/2018    Procedure: ESOPHAGOGASTRODUODENOSCOPY (EGD);  Surgeon: Marko Pereyra MD;  Location: [facility];  Service: Endoscopy;  Laterality: N/A;    ESOPHAGOGASTRODUODENOSCOPY N/A 7/23/2021    Procedure: EGD (ESOPHAGOGASTRODUODENOSCOPY);  Surgeon: Sulema Lopez MD;  Location: [facility];  Service: Endoscopy;  Laterality: N/A;    EXAMINATION UNDER ANESTHESIA N/A 11/2/2020    Procedure: EXAM UNDER ANESTHESIA;  Surgeon: Jenifer Aragon MD;  [facility];  Service: Endoscopy;  Laterality: N/A;    EXCISIONAL HEMORRHOIDECTOMY N/A 10/1/2019    Procedure: HEMORRHOIDECTOMY;  Surgeon: Jenifer Aragon MD;  [facility];  Service: Colon and Rectal;  Laterality: N/A;    HYSTERECTOMY      TLH vs LAVH with BSO    KNEE SURGERY Bilateral     OOPHORECTOMY      OPEN REDUCTION AND INTERNAL FIXATION (ORIF) OF FRACTURE OF DISTAL HUMERUS Left 4/11/2022    Procedure: ORIF, FRACTURE, HUMERUS, DISTAL - LEFT, Synthes;  Surgeon: Elliot King MD;  [facility];  Service: Orthopedics;  Laterality: Left;    SPINE SURGERY      SURGICAL REMOVAL OF ENDOMETRIOSIS N/A 9/21/2020    Procedure: DESTRUCTION, ENDOMETRIOSIS;  Surgeon: Santy Hinton MD;  Location: [facility] OR;  Service: OB/GYN;  Laterality: N/A;    SURGICAL REMOVAL OF ENDOMETRIOSIS N/A 2/21/2022    Procedure: DESTRUCTION, ENDOMETRIOSIS;  Surgeon: Santy Hinton MD;  Location: [facility] OR;  Service: OB/GYN;  Laterality: N/A;    Upper EUS  08/14/2018     Family History   Problem Relation Age of Onset    Cancer Maternal Grandfather         colon    Colon cancer  Maternal Grandfather     No Known Problems Mother     No Known Problems Father     Bladder Cancer Maternal Grandmother     Breast cancer Paternal Aunt     Heart disease Neg Hx      Social History     Tobacco Use    Smoking status: Former     Packs/day: 0.50     Years: 3.00     Pack years: 1.50     Types: Cigarettes     Quit date:      Years since quittin.3    Smokeless tobacco: Never    Tobacco comments:     rare   Substance Use Topics    Alcohol use: No    Drug use: No     Review of Systems   Gastrointestinal:  Positive for abdominal pain (LLQ), diarrhea and vomiting.     Physical Exam     Initial Vitals [05/15/23 1155]   BP Pulse Resp Temp SpO2   137/76 73 18 98.8 °F (37.1 °C) 100 %      MAP       --         Physical Exam    Nursing note and vitals reviewed.  Constitutional: She appears well-developed and well-nourished.   HENT:   Head: Normocephalic and atraumatic.   Eyes: Conjunctivae and EOM are normal. Pupils are equal, round, and reactive to light.   Neck: Neck supple.   Normal range of motion.  Cardiovascular:  Normal rate and regular rhythm.           Pulmonary/Chest: Breath sounds normal.   Abdominal: Abdomen is soft. Bowel sounds are normal. There is abdominal tenderness (LLQ) in the left upper quadrant. There is no rebound and no guarding.   Musculoskeletal:         General: Normal range of motion.      Cervical back: Normal range of motion and neck supple.     Neurological: She is alert and oriented to person, place, and time.   Skin: Skin is warm and dry.   Psychiatric: She has a normal mood and affect. Her behavior is normal. Judgment and thought content normal.       ED Course   Procedures  Labs Reviewed   URINALYSIS, REFLEX TO URINE CULTURE - Abnormal; Notable for the following components:       Result Value    Color, UA Colorless (*)     All other components within normal limits    Narrative:     Specimen Source->Urine   COMPREHENSIVE METABOLIC PANEL - Abnormal; Notable for the following  components:    CO2 22 (*)     All other components within normal limits   LIPASE - Abnormal; Notable for the following components:    Lipase 77 (*)     All other components within normal limits   CBC W/ AUTO DIFFERENTIAL   OCCULT BLOOD X 1, STOOL   ISTAT CREATININE          Imaging Results              CT Abdomen Pelvis With Contrast (In process)  Result time 05/15/23 15:52:34                     Medications   promethazine injection 25 mg (has no administration in time range)   ketorolac injection 15 mg (15 mg Intravenous Given 5/15/23 1550)   ondansetron injection 4 mg (4 mg Intravenous Given 5/15/23 1550)   iohexoL (OMNIPAQUE 350) injection 75 mL (75 mLs Intravenous Given 5/15/23 1611)     Medical Decision Making:   Initial Assessment:   The patient presents to the ED for evaluation of acute abdominal pain, onset of 4 days ago. The pain is localized to her LLQ. She describes it as sharp and radiates down into her left leg. There is associated diarrhea and vomiting.   Differential Diagnosis:   Differential Diagnosis includes, but is not limited to:  AAA, aortic dissection, mesenteric ischemia, perforated viscous, MI/ACS, SBO/volvulus, incarcerated/strangulated hernia, intussusception, ileus, appendicitis, cholecystitis, cholangitis, diverticulitis, esophagitis, hepatitis, nephrolithiasis, pancreatitis, gastroenteritis, colitis, IBD/IBS, biliary colic, GERD, PUD, constipation, UTI/pyelonephritis,  disorder.    Clinical Tests:   Lab Tests: Reviewed and Ordered  Radiological Study: Reviewed and Ordered  ED Management:  The patient is a 42-year-old female with abdominal pain.  She has multiple medical problems including a recent UTI, muscular dystrophy she has Charcot-Amanda-Tooth, endometriosis and interstitial cystitis.  She is on chronic pain medications for all of these issues.  She states she has medications prescribed for her to prevent constipation.    1644: he patient is Hemoccult negative.  Labs are within  "normal limits.  Lipase is slightly elevated at 77.  The patient had her CT scan however the results remain pending at this time.  The patient states she has to leave because "my  has a medical condition and he will die if I do not go home and given his medications".  She has pain medications at home.  She also appears to have constipation on her CT scan.  I did inform her that we would not give her any narcotics until the results of her CT scan were completed.  She states the Toradol did "nothing for her pain".  She is requesting a shot of Phenergan before she goes.  This will be ordered.  The patient is a pain management patient.        Scribe Attestation:   Scribe #1: I performed the above scribed service and the documentation accurately describes the services I performed. I attest to the accuracy of the note.            I, Joselin Jackson, personally performed the services described in this documentation. All medical record entries made by the scribe were at my direction and in my presence.  I have reviewed the chart and agree that the record reflects my personal performance and is accurate and complete. Joselin Jackson M.D. 3:44 PM05/15/2023        Clinical Impression:   Final diagnoses:  [R10.84] Generalized abdominal pain (Primary)        ED Disposition Condition    Discharge Stable          ED Prescriptions    None       Follow-up Information       Follow up With Specialties Details Why Contact Info    Emi Lechuga MD Internal Medicine   [address] [facility]  [address]  [place] [address]  [phone]               Joselin Jackson MD  05/15/23 1428    "

## 2023-05-15 NOTE — ED TRIAGE NOTES
Jaycee Gamboa, an 42 y.o. female presents to the ED with a complaint of LLQ pain since Friday with bloody diarrhea and vomit. Pt reports eating spicy food yesterday that made it worst. Pt also reports being placed on a new medication from her pain management doctor for constipation.       Review of patient's allergies indicates:   Allergen Reactions    Benadryl decongestant Shortness Of Breath    Carrot Hives and Shortness Of Breath    Diphenhydramine Shortness Of Breath    Sumatriptan     Sumatriptan succinate Other (See Comments)     paralysis    Tramadol Other (See Comments)     Faces swells. Tolerates percocet  Pt states she  can take Dilaudid.     Venom-honey bee Anaphylaxis    Wasp sting [allergen ext-venom-honey bee] Anaphylaxis    Bupropion      Other reaction(s): Unknown    Bupropion hcl     Gabapentin Other (See Comments)     seizures     Chief Complaint   Patient presents with    Abdominal Pain     LLQ abd pain that began last week increased  in intensity on fri, was dx with UTI on fri and vaginal bleeding reported      Past Medical History:   Diagnosis Date    Anxiety     Breast abscess     Charcot-Amanda-Tooth disease     mild LE weakness    Chronic interstitial cystitis without hematuria     CMT (Charcot-Amanda-Tooth disease)     Depression     reports she is no longer depressed    Endometriosis     Endometriosis of uterus     Headache(784.0)     Herpes     History of psychiatric care     History of psychiatric hospitalization     Muscular dystrophy     PONV (postoperative nausea and vomiting)     PTSD (post-traumatic stress disorder)     S/P cholecystectomy     Seizures     last seizure 14-15 yrs ago    Self-injurious behavior     Thyroid disease        Patient identifiers verified and correct.     LOC: The patient is awake, alert and aware of environment with an appropriate affect, the patient is oriented x 3 and speaking appropriately.     APPEARANCE: Patient appears comfortable and in no acute  distress, patient is clean and well groomed.    HEENT: Head symmetrical. Eyes bilateral.  Bilateral ears without drainage. Bilateral nares patent, throat clear.    SKIN: The skin is warm and dry, color consistent with ethnicity, patient has normal skin turgor and moist mucus membranes, skin intact, no breakdown or bruising noted.     MUSCULOSKELETAL: Patient moving all extremities spontaneously, no swelling noted. HX of muscular dystrophy.     RESPIRATORY: Airway is open and patent, respirations are spontaneous, patient has a normal effort and rate, no accessory muscle use noted.     CARDIAC: Patient has a normal rate and regular rhythm, no edema noted, capillary refill < 3 seconds.     GASTRO: Abdomen soft     NEURO: Pt opens eyes spontaneously pupils equal, round, and reactive. behavior appropriate to situation, follows commands, facial expression symmetrical,      NEUROVASCULAR: All extremities are warm and pink.   Will continue to monitor.

## 2023-05-17 ENCOUNTER — OFFICE VISIT (OUTPATIENT)
Dept: UROLOGY | Facility: CLINIC | Age: 43
End: 2023-05-17
Payer: MEDICARE

## 2023-05-17 VITALS
HEART RATE: 81 BPM | WEIGHT: 188.63 LBS | SYSTOLIC BLOOD PRESSURE: 133 MMHG | BODY MASS INDEX: 34.71 KG/M2 | DIASTOLIC BLOOD PRESSURE: 87 MMHG | HEIGHT: 62 IN

## 2023-05-17 DIAGNOSIS — N30.10 INTERSTITIAL CYSTITIS: Primary | ICD-10-CM

## 2023-05-17 DIAGNOSIS — R35.1 NOCTURIA: ICD-10-CM

## 2023-05-17 DIAGNOSIS — G89.29 CHRONIC PELVIC PAIN IN FEMALE: ICD-10-CM

## 2023-05-17 DIAGNOSIS — R35.0 URINARY FREQUENCY: ICD-10-CM

## 2023-05-17 DIAGNOSIS — R39.15 URINARY URGENCY: ICD-10-CM

## 2023-05-17 DIAGNOSIS — Z01.818 PRE-OP EXAM: ICD-10-CM

## 2023-05-17 DIAGNOSIS — R10.2 CHRONIC PELVIC PAIN IN FEMALE: ICD-10-CM

## 2023-05-17 PROCEDURE — 1159F PR MEDICATION LIST DOCUMENTED IN MEDICAL RECORD: ICD-10-PCS | Mod: CPTII,,, | Performed by: NURSE PRACTITIONER

## 2023-05-17 PROCEDURE — 99214 PR OFFICE/OUTPT VISIT, EST, LEVL IV, 30-39 MIN: ICD-10-PCS | Mod: ,,, | Performed by: NURSE PRACTITIONER

## 2023-05-17 PROCEDURE — 3008F BODY MASS INDEX DOCD: CPT | Mod: CPTII,,, | Performed by: NURSE PRACTITIONER

## 2023-05-17 PROCEDURE — 3079F DIAST BP 80-89 MM HG: CPT | Mod: CPTII,,, | Performed by: NURSE PRACTITIONER

## 2023-05-17 PROCEDURE — 99214 OFFICE O/P EST MOD 30 MIN: CPT | Mod: ,,, | Performed by: NURSE PRACTITIONER

## 2023-05-17 PROCEDURE — 3079F PR MOST RECENT DIASTOLIC BLOOD PRESSURE 80-89 MM HG: ICD-10-PCS | Mod: CPTII,,, | Performed by: NURSE PRACTITIONER

## 2023-05-17 PROCEDURE — 99999 PR PBB SHADOW E&M-EST. PATIENT-LVL V: ICD-10-PCS | Mod: PBBFAC,,, | Performed by: NURSE PRACTITIONER

## 2023-05-17 PROCEDURE — 3044F PR MOST RECENT HEMOGLOBIN A1C LEVEL <7.0%: ICD-10-PCS | Mod: CPTII,,, | Performed by: NURSE PRACTITIONER

## 2023-05-17 PROCEDURE — 1159F MED LIST DOCD IN RCRD: CPT | Mod: CPTII,,, | Performed by: NURSE PRACTITIONER

## 2023-05-17 PROCEDURE — 99999 PR PBB SHADOW E&M-EST. PATIENT-LVL V: CPT | Mod: PBBFAC,,, | Performed by: NURSE PRACTITIONER

## 2023-05-17 PROCEDURE — 3075F SYST BP GE 130 - 139MM HG: CPT | Mod: CPTII,,, | Performed by: NURSE PRACTITIONER

## 2023-05-17 PROCEDURE — 81003 URINALYSIS AUTO W/O SCOPE: CPT | Performed by: NURSE PRACTITIONER

## 2023-05-17 PROCEDURE — 3044F HG A1C LEVEL LT 7.0%: CPT | Mod: CPTII,,, | Performed by: NURSE PRACTITIONER

## 2023-05-17 PROCEDURE — 87086 URINE CULTURE/COLONY COUNT: CPT | Performed by: NURSE PRACTITIONER

## 2023-05-17 PROCEDURE — 3008F PR BODY MASS INDEX (BMI) DOCUMENTED: ICD-10-PCS | Mod: CPTII,,, | Performed by: NURSE PRACTITIONER

## 2023-05-17 PROCEDURE — 99215 OFFICE O/P EST HI 40 MIN: CPT | Mod: PO | Performed by: NURSE PRACTITIONER

## 2023-05-17 PROCEDURE — 3075F PR MOST RECENT SYSTOLIC BLOOD PRESS GE 130-139MM HG: ICD-10-PCS | Mod: CPTII,,, | Performed by: NURSE PRACTITIONER

## 2023-05-17 RX ORDER — SODIUM CHLORIDE 9 MG/ML
INJECTION, SOLUTION INTRAVENOUS CONTINUOUS
Status: CANCELLED | OUTPATIENT
Start: 2023-05-17

## 2023-05-17 RX ORDER — PHENAZOPYRIDINE HYDROCHLORIDE 200 MG/1
200 TABLET, FILM COATED ORAL 3 TIMES DAILY PRN
Qty: 9 TABLET | Refills: 0 | Status: SHIPPED | OUTPATIENT
Start: 2023-05-17 | End: 2023-05-20

## 2023-05-17 NOTE — PROGRESS NOTES
Subjective:       Patient ID: Jaycee Gamboa is a 42 y.o. female.    Chief Complaint: Pre-op Exam (For bladder stretching/)    Patient is here today with c/o bladder pain. Patient has a hx of IC and is currently experiencing a flare-up.     Abdominal Pain  This is a recurrent problem. The current episode started more than 1 month ago. The problem occurs daily. The problem has been gradually worsening. The pain is located in the suprapubic region. The pain is at a severity of 10/10. The pain is severe. The quality of the pain is burning and a sensation of fullness. The abdominal pain does not radiate. Associated symptoms include constipation, diarrhea, frequency, melena and vomiting. Pertinent negatives include no anorexia, arthralgias, belching, dysuria, fever, flatus, headaches, hematuria, myalgias, nausea or weight loss. Nothing aggravates the pain. The pain is relieved by Nothing. She has tried nothing for the symptoms. There is no history of gallstones or pancreatitis. Patient's medical history does not include kidney stones and UTI.   Review of Systems   Constitutional:  Negative for chills, fatigue, fever and weight loss.   Gastrointestinal:  Positive for abdominal pain, blood in stool, change in bowel habit, constipation, diarrhea, melena, vomiting and change in bowel habit. Negative for anorexia, flatus and nausea.   Genitourinary:  Positive for frequency, nocturia (x5-6), pelvic pain and urgency. Negative for decreased urine volume, dysuria, flank pain, hematuria, vaginal bleeding, vaginal discharge and vaginal pain.   Musculoskeletal:  Positive for back pain (bilateral lower back pain). Negative for arthralgias and myalgias.   Neurological:  Negative for weakness and headaches.   Psychiatric/Behavioral: Negative.         Objective:      Physical Exam  Vitals and nursing note reviewed.   Constitutional:       General: She is not in acute distress.     Appearance: She is well-developed. She is not  ill-appearing.   HENT:      Head: Normocephalic and atraumatic.   Eyes:      Pupils: Pupils are equal, round, and reactive to light.   Cardiovascular:      Rate and Rhythm: Normal rate.   Pulmonary:      Effort: Pulmonary effort is normal. No respiratory distress.   Abdominal:      Palpations: Abdomen is soft.      Tenderness: There is no abdominal tenderness.   Musculoskeletal:         General: Normal range of motion.      Cervical back: Normal range of motion.   Skin:     General: Skin is warm and dry.   Neurological:      Mental Status: She is alert and oriented to person, place, and time.      Coordination: Coordination normal.   Psychiatric:         Mood and Affect: Mood normal.         Behavior: Behavior normal.         Thought Content: Thought content normal.         Judgment: Judgment normal.       Assessment:       Problem List Items Addressed This Visit          Renal/    Interstitial cystitis - Primary    Relevant Orders    Urine culture    Urinalysis    CBC Auto Differential    Basic Metabolic Panel    Case Request Operating Room: CYSTOSCOPY, WITH BLADDER HYDRODISTENSION (Completed)    Diet NPO     Other Visit Diagnoses       Chronic pelvic pain in female        Relevant Medications    phenazopyridine (PYRIDIUM) 200 MG tablet    Other Relevant Orders    Urine culture    Urinalysis    CBC Auto Differential    Basic Metabolic Panel    Case Request Operating Room: CYSTOSCOPY, WITH BLADDER HYDRODISTENSION (Completed)    Diet NPO    Urinary frequency        Relevant Orders    Urine culture    Urinalysis    CBC Auto Differential    Basic Metabolic Panel    Urinary urgency        Relevant Orders    Urine culture    Urinalysis    CBC Auto Differential    Basic Metabolic Panel    Nocturia        Relevant Orders    Urine culture    Urinalysis    CBC Auto Differential    Basic Metabolic Panel    Pre-op exam        Relevant Orders    Urine culture    Urinalysis    CBC Auto Differential    Basic Metabolic Panel             Plan:           Jaycee was seen today for pre-op exam.    Diagnoses and all orders for this visit:    Interstitial cystitis  -     Urine culture  -     Urinalysis  -     CBC Auto Differential; Future  -     Basic Metabolic Panel; Future  -     Case Request Operating Room: CYSTOSCOPY, WITH BLADDER HYDRODISTENSION  -     Vital Signs ; Standing  -     Notify physician ; Standing  -     Diet NPO; Standing  -     Place in Outpatient; Standing  -     Place QUIANA hose; Standing  -     Diet NPO    Chronic pelvic pain in female  -     Urine culture  -     Urinalysis  -     CBC Auto Differential; Future  -     Basic Metabolic Panel; Future  -     phenazopyridine (PYRIDIUM) 200 MG tablet; Take 1 tablet (200 mg total) by mouth 3 (three) times daily as needed for Pain.  -     Case Request Operating Room: CYSTOSCOPY, WITH BLADDER HYDRODISTENSION  -     Vital Signs ; Standing  -     Notify physician ; Standing  -     Diet NPO; Standing  -     Place in Outpatient; Standing  -     Place QUIANA hose; Standing  -     Diet NPO    Urinary frequency  -     Urine culture  -     Urinalysis  -     CBC Auto Differential; Future  -     Basic Metabolic Panel; Future    Urinary urgency  -     Urine culture  -     Urinalysis  -     CBC Auto Differential; Future  -     Basic Metabolic Panel; Future    Nocturia  -     Urine culture  -     Urinalysis  -     CBC Auto Differential; Future  -     Basic Metabolic Panel; Future    Pre-op exam  -     Urine culture  -     Urinalysis  -     CBC Auto Differential; Future  -     Basic Metabolic Panel; Future    Other orders  -     0.9%  NaCl infusion  -     IP VTE LOW RISK PATIENT; Standing  -     cefTRIAXone (ROCEPHIN) 1 g/50 mL D5W IVPB    Schedule patient for cysto with bladder hydrodistension by Dr. Shelby.   Pre-op labs needed (CBC, BMP, U/A, and urine cx).   Continue to avoid the use of blood thinners for 7-10 days prior to surgery to reduce risk of bleeding.   Surgery packet provided to  patient.     Follow-up post-op.     Katie Rodriguez NP

## 2023-05-17 NOTE — PATIENT INSTRUCTIONS
Schedule patient for cysto with bladder hydrodistension by Dr. Shelby.   Pre-op labs needed (CBC, BMP, U/A, and urine cx).   Continue to avoid the use of blood thinners for 7-10 days prior to surgery to reduce risk of bleeding.   Surgery packet provided to patient.   Follow-up post-op.

## 2023-05-18 LAB
BILIRUB UR QL STRIP: NEGATIVE
CLARITY UR REFRACT.AUTO: CLEAR
COLOR UR AUTO: COLORLESS
GLUCOSE UR QL STRIP: NEGATIVE
HGB UR QL STRIP: NEGATIVE
KETONES UR QL STRIP: NEGATIVE
LEUKOCYTE ESTERASE UR QL STRIP: NEGATIVE
NITRITE UR QL STRIP: NEGATIVE
PH UR STRIP: 6 [PH] (ref 5–8)
PROT UR QL STRIP: NEGATIVE
SP GR UR STRIP: 1 (ref 1–1.03)
URN SPEC COLLECT METH UR: ABNORMAL

## 2023-05-19 ENCOUNTER — TELEPHONE (OUTPATIENT)
Dept: UROLOGY | Facility: CLINIC | Age: 43
End: 2023-05-19
Payer: MEDICARE

## 2023-05-19 LAB
BACTERIA UR CULT: NORMAL
BACTERIA UR CULT: NORMAL

## 2023-05-19 NOTE — TELEPHONE ENCOUNTER
----- Message from Ktaie Rodriguez NP sent at 5/19/2023  2:15 PM CDT -----  Please inform patient via telephone that her urine cx showed some bacteria but none in predominance to diagnose a UTI.

## 2023-07-20 ENCOUNTER — OFFICE VISIT (OUTPATIENT)
Dept: URGENT CARE | Facility: CLINIC | Age: 43
End: 2023-07-20
Payer: MEDICARE

## 2023-07-20 VITALS
HEART RATE: 86 BPM | BODY MASS INDEX: 31.83 KG/M2 | SYSTOLIC BLOOD PRESSURE: 134 MMHG | OXYGEN SATURATION: 98 % | DIASTOLIC BLOOD PRESSURE: 90 MMHG | WEIGHT: 173 LBS | HEIGHT: 62 IN | TEMPERATURE: 98 F | RESPIRATION RATE: 18 BRPM

## 2023-07-20 DIAGNOSIS — J34.89 SINUS PRESSURE: ICD-10-CM

## 2023-07-20 DIAGNOSIS — K52.9 ACUTE GASTROENTERITIS: ICD-10-CM

## 2023-07-20 DIAGNOSIS — H00.011 HORDEOLUM EXTERNUM OF RIGHT UPPER EYELID: Primary | ICD-10-CM

## 2023-07-20 DIAGNOSIS — R11.2 NAUSEA AND VOMITING, UNSPECIFIED VOMITING TYPE: ICD-10-CM

## 2023-07-20 LAB
BILIRUB UR QL STRIP: POSITIVE
GLUCOSE UR QL STRIP: NEGATIVE
KETONES UR QL STRIP: NEGATIVE
LEUKOCYTE ESTERASE UR QL STRIP: NEGATIVE
PH, POC UA: 5.5
POC BLOOD, URINE: NEGATIVE
POC NITRATES, URINE: NEGATIVE
PROT UR QL STRIP: NEGATIVE
SP GR UR STRIP: 1.02 (ref 1–1.03)
UROBILINOGEN UR STRIP-ACNC: NORMAL (ref 0.1–1.1)

## 2023-07-20 PROCEDURE — 81003 URINALYSIS AUTO W/O SCOPE: CPT | Mod: QW,S$GLB,, | Performed by: NURSE PRACTITIONER

## 2023-07-20 PROCEDURE — 99214 OFFICE O/P EST MOD 30 MIN: CPT | Mod: 25,S$GLB,, | Performed by: NURSE PRACTITIONER

## 2023-07-20 PROCEDURE — 99214 PR OFFICE/OUTPT VISIT, EST, LEVL IV, 30-39 MIN: ICD-10-PCS | Mod: 25,S$GLB,, | Performed by: NURSE PRACTITIONER

## 2023-07-20 PROCEDURE — 96372 THER/PROPH/DIAG INJ SC/IM: CPT | Mod: S$GLB,,, | Performed by: FAMILY MEDICINE

## 2023-07-20 PROCEDURE — 96372 PR INJECTION,THERAP/PROPH/DIAG2ST, IM OR SUBCUT: ICD-10-PCS | Mod: S$GLB,,, | Performed by: FAMILY MEDICINE

## 2023-07-20 PROCEDURE — 81003 POCT URINALYSIS, DIPSTICK, AUTOMATED, W/O SCOPE: ICD-10-PCS | Mod: QW,S$GLB,, | Performed by: NURSE PRACTITIONER

## 2023-07-20 RX ORDER — ERYTHROMYCIN 5 MG/G
OINTMENT OPHTHALMIC EVERY 4 HOURS
Qty: 1 G | Refills: 0 | Status: SHIPPED | OUTPATIENT
Start: 2023-07-20 | End: 2023-07-27

## 2023-07-20 RX ORDER — KETOROLAC TROMETHAMINE 30 MG/ML
30 INJECTION, SOLUTION INTRAMUSCULAR; INTRAVENOUS
Status: COMPLETED | OUTPATIENT
Start: 2023-07-20 | End: 2023-07-20

## 2023-07-20 RX ORDER — PROMETHAZINE HYDROCHLORIDE 25 MG/ML
25 INJECTION, SOLUTION INTRAMUSCULAR; INTRAVENOUS
Status: COMPLETED | OUTPATIENT
Start: 2023-07-20 | End: 2023-07-20

## 2023-07-20 RX ADMIN — KETOROLAC TROMETHAMINE 30 MG: 30 INJECTION, SOLUTION INTRAMUSCULAR; INTRAVENOUS at 08:07

## 2023-07-20 RX ADMIN — PROMETHAZINE HYDROCHLORIDE 25 MG: 25 INJECTION, SOLUTION INTRAMUSCULAR; INTRAVENOUS at 08:07

## 2023-07-20 NOTE — PATIENT INSTRUCTIONS
If your condition worsens or fails to improve we recommend that you receive another evaluation at the ER immediately or contact your PCP to discuss your concerns or return here. You must understand that you've received an urgent care treatment only and that you may be released before all your medical problems are known or treated. You the patient will arrange for followup care as instructed.   Zofran as needed for nausea or vomiting.  Maintain hydration by drinking small amounts of clear fluids frequently, then soft diet, and then advance diet as tolerated. May use OTC Imodium OR Pepto Bismol if desired for any diarrhea.    Watch for any increase pain, fever, localized pain to right lower abdomen or continued vomiting or diarrhea.     If your condition worsens or fails to improve we recommend that you receive another evaluation at the ER immediately or contact your PCP to discuss your concerns or return here. You must understand that you've received an urgent care treatment only and that you may be released before all your medical problems are known or treated. You the patient will arrange for followup care as instructed.   Do not wear your contact lens ( if you use them) for at least 5 days after you stop having symptoms and are rechecked by your doctor. Throw away the contacts, contact solution and carrying case you were using and start with new material.   If you develop increase eye symptoms or change in your vision seek medical care immediately either with your ophthalomologist or the ER or return here.

## 2023-07-20 NOTE — PROGRESS NOTES
"Subjective:      Patient ID: Jaycee Gamboa is a 42 y.o. female.    Vitals:  height is 5' 2" (1.575 m) and weight is 78.5 kg (173 lb). Her temperature is 98.2 °F (36.8 °C). Her blood pressure is 134/90 (abnormal) and her pulse is 86. Her respiration is 18 and oxygen saturation is 98%.     Chief Complaint: GI Problem    42 year old female presents today with NVD, fatigue, Abd pain, eye redness from the vomiting. Patient also has a stye on right eye and wants to know if that's from the constant vomiting and sneezing. Symptoms started 07/14/2023. No known exposure to anything. Home COVID test since 07/14/2023 was negative. States she ate [employer] 07/13/2023 and after that until today she has been sick. Treatments at home includes Tylenol and Zofran. States she has not eaten in over a week and a half. States she lost 10 pounds in a week. States she was pale last week but got her color back this week. States in 01/2023 she had a bowel obstruction. States Crohn's and Colon cancer run heavy in family hx.     GI Problem  The primary symptoms include weight loss, fatigue, abdominal pain, nausea, vomiting, diarrhea and myalgias. Primary symptoms do not include fever, melena, hematemesis, jaundice, hematochezia, dysuria, arthralgias or rash. The onset was sudden. The problem has not changed since onset.  Vomiting occurs 6 to 10 times per day. The emesis contains stomach contents (yellow).   The diarrhea is watery, mucous and malodorous ("smelled like "). The diarrhea occurs continuously.   The illness is also significant for anorexia and bloating. The illness does not include chills or constipation. Associated medical issues comments: had bladder stretched that causes the bowel obstruction.     Constitution: Positive for appetite change and fatigue. Negative for chills and fever.   HENT:  Positive for postnasal drip and sinus pressure. Negative for ear pain, congestion, sore throat and voice change.    Eyes:  " Positive for eye pain (right eyelid), eye redness (right eyelid) and eyelid swelling. Negative for eye trauma, foreign body in eye, eye discharge, eye itching, photophobia and blurred vision.   Respiratory:  Negative for cough and shortness of breath.    Gastrointestinal:  Positive for abdominal pain, nausea, vomiting and diarrhea. Negative for abdominal bloating, constipation, bright red blood in stool and dark colored stools.   Genitourinary:  Negative for dysuria, frequency, urgency, flank pain and hematuria.   Musculoskeletal:  Positive for muscle ache. Negative for joint pain.   Skin:  Negative for rash.    Objective:     Physical Exam   Constitutional: She is oriented to person, place, and time. She appears well-developed.  Non-toxic appearance. She does not appear ill. No distress.   HENT:   Head: Normocephalic and atraumatic.   Ears:   Right Ear: External ear normal.   Left Ear: External ear normal.   Nose: Nose normal.   Mouth/Throat: Oropharynx is clear and moist and mucous membranes are normal.   Eyes: Conjunctivae, EOM and lids are normal. Pupils are equal, round, and reactive to light. No visual field deficit is present. Right eye exhibits hordeolum. Right eye exhibits no discharge. No foreign body present in the right eye. Extraocular movement intact   Neck: Trachea normal and phonation normal. Neck supple.   Cardiovascular: Normal rate, regular rhythm and normal heart sounds.   Pulmonary/Chest: Effort normal and breath sounds normal. No respiratory distress.   Abdominal: Normal appearance and bowel sounds are normal. She exhibits no distension and no mass. Soft. There is no abdominal tenderness. There is no rebound, no guarding, no left CVA tenderness and no right CVA tenderness.   Musculoskeletal: Normal range of motion.         General: Normal range of motion.   Neurological: She is alert and oriented to person, place, and time. She has normal strength.   Skin: Skin is warm, dry, intact, not  diaphoretic and not pale.   Psychiatric: Her speech is normal and behavior is normal. Judgment and thought content normal.   Nursing note and vitals reviewed.    Assessment:     1. Hordeolum externum of right upper eyelid    2. Nausea and vomiting, unspecified vomiting type    3. Acute gastroenteritis    4. Sinus pressure        Plan:     Patient with resolved vomiting and diarrhea.  Resolved abdominal pain.  Occurred after eating [employer] breakfast, immediately after starting to consume according to patient.  Notes that she still has some nausea and now a stye to right upper lid that she attributes to vomiting.  She had multiple episodes of watery diarrhea mixed with constipation which has stopped and vomiting.  She took Phenergan suppository, Zofran oral, and anti diarrhea medication which she states didn't help but has not had an episode of diarrhea, abdominal pain, or vomiting x 24 hrs and is now tolerating PO fluids.  She states that she's hungry and that her  has food waiting for her in the truck.  She originally said that she lost 10 lbs in the last 5-6 days with this but now says that it was 20 lbs.  She mainly c/o stye, sinus pressure, and requesting Toradol and Phenergan IM today in clinic.  She is non toxic non ill appearing.  She has good skin turgor and pink moist mucous membranes.  UA does not show ketones or protein.  Unsure of sick contacts.  Denies recent antibiotic use or travel.  Abdomen soft and non tender with bowel sounds x4 quadrants.  Continue with oral rehydration, she is doing well with this.  She has PO zofran and supp phenergan at home.  Toradol and Phenergan given in clinic for symptom control.   will drive home, she does not drive.    Hordeolum externum of right upper eyelid  -     erythromycin (ROMYCIN) ophthalmic ointment; Place into the right eye every 4 (four) hours. for 7 days  Dispense: 1 g; Refill: 0    Nausea and vomiting, unspecified vomiting type  -     POCT  Urinalysis, Dipstick, Automated, W/O Scope  -     promethazine injection 25 mg    Acute gastroenteritis    Sinus pressure  -     ketorolac injection 30 mg      Patient Instructions     If your condition worsens or fails to improve we recommend that you receive another evaluation at the ER immediately or contact your PCP to discuss your concerns or return here. You must understand that you've received an urgent care treatment only and that you may be released before all your medical problems are known or treated. You the patient will arrange for followup care as instructed.   Zofran as needed for nausea or vomiting.  Maintain hydration by drinking small amounts of clear fluids frequently, then soft diet, and then advance diet as tolerated. May use OTC Imodium OR Pepto Bismol if desired for any diarrhea.    Watch for any increase pain, fever, localized pain to right lower abdomen or continued vomiting or diarrhea.     If your condition worsens or fails to improve we recommend that you receive another evaluation at the ER immediately or contact your PCP to discuss your concerns or return here. You must understand that you've received an urgent care treatment only and that you may be released before all your medical problems are known or treated. You the patient will arrange for followup care as instructed.   Do not wear your contact lens ( if you use them) for at least 5 days after you stop having symptoms and are rechecked by your doctor. Throw away the contacts, contact solution and carrying case you were using and start with new material.   If you develop increase eye symptoms or change in your vision seek medical care immediately either with your ophthalomologist or the ER or return here.

## 2023-08-08 DIAGNOSIS — E03.9 ACQUIRED HYPOTHYROIDISM: ICD-10-CM

## 2023-08-08 RX ORDER — LEVOTHYROXINE SODIUM 75 UG/1
75 TABLET ORAL
Qty: 90 TABLET | Refills: 1 | Status: SHIPPED | OUTPATIENT
Start: 2023-08-08 | End: 2024-01-29

## 2023-09-09 ENCOUNTER — OFFICE VISIT (OUTPATIENT)
Dept: URGENT CARE | Facility: CLINIC | Age: 43
End: 2023-09-09
Payer: MEDICARE

## 2023-09-09 VITALS
HEART RATE: 102 BPM | TEMPERATURE: 98 F | DIASTOLIC BLOOD PRESSURE: 96 MMHG | WEIGHT: 173.06 LBS | SYSTOLIC BLOOD PRESSURE: 137 MMHG | RESPIRATION RATE: 20 BRPM | HEIGHT: 62 IN | BODY MASS INDEX: 31.85 KG/M2 | OXYGEN SATURATION: 98 %

## 2023-09-09 DIAGNOSIS — H00.024 HORDEOLUM INTERNUM OF LEFT UPPER EYELID: Primary | ICD-10-CM

## 2023-09-09 PROCEDURE — 99213 OFFICE O/P EST LOW 20 MIN: CPT | Mod: S$GLB,,, | Performed by: NURSE PRACTITIONER

## 2023-09-09 PROCEDURE — 99213 PR OFFICE/OUTPT VISIT, EST, LEVL III, 20-29 MIN: ICD-10-PCS | Mod: S$GLB,,, | Performed by: NURSE PRACTITIONER

## 2023-09-09 RX ORDER — ERYTHROMYCIN 5 MG/G
OINTMENT OPHTHALMIC EVERY 6 HOURS
Qty: 3.5 G | Refills: 0 | Status: SHIPPED | OUTPATIENT
Start: 2023-09-09 | End: 2023-09-16

## 2023-09-09 NOTE — PATIENT INSTRUCTIONS
PLEASE READ YOUR DISCHARGE INSTRUCTIONS ENTIRELY AS IT CONTAINS IMPORTANT INFORMATION.      Warm compresses for 15 minutes at a time to the area.     Use the ointment 4 times daily.     Do not attempt to open the area yourself.     If you find you have the stye without improvement for two weeks, please seen an eye doctor for further treatment.     A referral to ophthamology has been placed for you. Please call [phone] if you need an appt    Please go to the emergency room if you experience loss of vision, swelling around your eye (upper and lower), fever.     Please arrange follow up with your primary medical clinic as soon as possible. You must understand that you've received an Urgent Care treatment only and that you may be released before all of your medical problems are known or treated. You, the patient, will arrange for follow up as instructed. If your symptoms worsen or fail to improve you should go to the Emergency Room.

## 2023-09-09 NOTE — PROGRESS NOTES
"Subjective:      Patient ID: Jaycee Gamboa is a 42 y.o. female.    Vitals:  height is 5' 2" (1.575 m) and weight is 78.5 kg (173 lb 1 oz). Her oral temperature is 98.3 °F (36.8 °C). Her blood pressure is 137/96 (abnormal) and her pulse is 102. Her respiration is 20 and oxygen saturation is 98%.     Chief Complaint: Eye Problem    This is a 42 y.o. female who presents today with a chief complaint of swollen left upper eyelid lid x 1 day. Denies any trauma,or injury, denies change in vision, denies blurred vision, denies eye pain, denies eye pain with movement, denies fever, body aches or chills, denies cough, wheezing or shortness of breath, denies nausea, vomiting, diarrhea or abdominal pain, denies chest pain or dizziness positional lightheadedness, denies sore throat or trouble swallowing, denies loss of taste or smell, or any other symptoms        Eye Problem   The left (eyelid swelling) eye is affected. This is a new problem. The current episode started yesterday. The problem occurs rarely. The problem has been waxing and waning. There was no injury mechanism. The pain is at a severity of 4/10. The pain is mild. There is No known exposure to pink eye. She Does not wear contacts. Pertinent negatives include no blurred vision, eye discharge, double vision, eye redness, itching, photophobia or recent URI.       Eyes:  Positive for eyelid swelling (left). Negative for eye trauma, foreign body in eye, eye discharge, eye itching, eye pain, eye redness, photophobia, vision loss, double vision and blurred vision.      Objective:     Physical Exam   Constitutional: She is oriented to person, place, and time. She appears well-developed.   HENT:   Head: Normocephalic and atraumatic.   Ears:   Right Ear: External ear normal.   Left Ear: External ear normal.   Nose: Nose normal.   Mouth/Throat: Oropharynx is clear and moist.   Eyes: Conjunctivae, EOM and lids are normal. Pupils are equal, round, and reactive to light. " Left eye exhibits hordeolum (internum upper). Left eye exhibits no chemosis, no discharge and no exudate. No foreign body present in the left eye. Left conjunctiva is not injected. Left conjunctiva has no hemorrhage. Left eye exhibits normal extraocular motion and no nystagmus. Extraocular movement intact vision grossly intact gaze aligned appropriately      Comments: Minimal swelling to left upper eyelid, no erythema or tenderness noted   Neck: Trachea normal and phonation normal. Neck supple.   Musculoskeletal: Normal range of motion.         General: Normal range of motion.   Neurological: She is alert and oriented to person, place, and time.   Skin: Skin is warm, dry and intact.   Psychiatric: Her speech is normal and behavior is normal. Judgment and thought content normal.   Nursing note and vitals reviewed.        Patient in no acute distress.  Vitals reassuring.  Discussed results/diagnosis/plan in depth with patient in clinic. Strict precautions given to patient to monitor for worsening signs and symptoms. Advised to follow up with primary.All questions answered. Strict ER precautions given. If your symptoms worsens or fail to improve you should go to the Emergency Room. Discharge and follow-up instructions given verbally/printed. Discharge and follow-up instructions discussed with the patient who expressed understanding and willingness to comply with my recommendations.Patient voiced understanding and in agreement with current treatment plan.     Please be advised this text was dictated with [employer] software and may contain errors due to translation.    Assessment:     1. Hordeolum internum of left upper eyelid        Plan:       Hordeolum internum of left upper eyelid    Other orders  -     erythromycin (ROMYCIN) ophthalmic ointment; Place into the left eye every 6 (six) hours. for 7 days  Dispense: 3.5 g; Refill: 0          Medical Decision Making:   Urgent Care Management:  Patient in no acute distress.   Vitals reassuring.  On exam, patient is nontoxic appearing and afebrile.  Lungs CTA.  Physical examination of left upper eyelid consistent with hordeolum internum.  No erythema or tenderness noted.  Minimal swelling noted to left upper eyelid.  No drainage or discharge noted.  Initially patient had make up on with eye liner and eye shadow, I discussed with patient in detail that my exam will be limited secondary to have her the eye makeup on.  Patient attempted to remove the eye makeup but did not remove the complete I make a.  With my limited exam of left upper eyelid minimal swelling noted to left upper eyelid with no erythema or tenderness.  Red flags discussed with patient in detail.  Warm compresses recommended.  Medication prescribed and over-the-counter medication discussed with patient at length.  Proper hydration advised.  I reiterated the importance of further evaluation if no improvement symptoms and follow-up with primary.           Patient Instructions      PLEASE READ YOUR DISCHARGE INSTRUCTIONS ENTIRELY AS IT CONTAINS IMPORTANT INFORMATION.      Warm compresses for 15 minutes at a time to the area.     Use the ointment 4 times daily.     Do not attempt to open the area yourself.     If you find you have the stye without improvement for two weeks, please seen an eye doctor for further treatment.     A referral to ophthamology has been placed for you. Please call [phone] if you need an appt    Please go to the emergency room if you experience loss of vision, swelling around your eye (upper and lower), fever.     Please arrange follow up with your primary medical clinic as soon as possible. You must understand that you've received an Urgent Care treatment only and that you may be released before all of your medical problems are known or treated. You, the patient, will arrange for follow up as instructed. If your symptoms worsen or fail to improve you should go to the Emergency Room.

## 2023-09-21 ENCOUNTER — OFFICE VISIT (OUTPATIENT)
Dept: URGENT CARE | Facility: CLINIC | Age: 43
End: 2023-09-21
Payer: MEDICARE

## 2023-09-21 VITALS
DIASTOLIC BLOOD PRESSURE: 80 MMHG | SYSTOLIC BLOOD PRESSURE: 110 MMHG | OXYGEN SATURATION: 97 % | HEIGHT: 62 IN | RESPIRATION RATE: 20 BRPM | TEMPERATURE: 98 F | BODY MASS INDEX: 31.85 KG/M2 | WEIGHT: 173.06 LBS | HEART RATE: 73 BPM

## 2023-09-21 DIAGNOSIS — H92.03 OTALGIA OF BOTH EARS: Primary | ICD-10-CM

## 2023-09-21 DIAGNOSIS — J04.0 LARYNGITIS: ICD-10-CM

## 2023-09-21 PROCEDURE — 99213 PR OFFICE/OUTPT VISIT, EST, LEVL III, 20-29 MIN: ICD-10-PCS | Mod: S$GLB,,, | Performed by: NURSE PRACTITIONER

## 2023-09-21 PROCEDURE — 99213 OFFICE O/P EST LOW 20 MIN: CPT | Mod: S$GLB,,, | Performed by: NURSE PRACTITIONER

## 2023-09-21 NOTE — PROGRESS NOTES
"Subjective:      Patient ID: Jaycee Gamboa is a 43 y.o. female.    Vitals:  height is 5' 2" (1.575 m) and weight is 78.5 kg (173 lb 1 oz). Her oral temperature is 97.5 °F (36.4 °C). Her blood pressure is 110/80 and her pulse is 73. Her respiration is 20 and oxygen saturation is 97%.     Chief Complaint: Otalgia    This is a 43 y.o. female who presents today with a chief complaint of  ear pain x 2 days .  Patient denies sore throat but reports hoarse voice, denies any ear drainage or discharge, denies fever, body aches or chills, denies cough, wheezing or shortness of breath, denies nausea, vomiting, diarrhea or abdominal pain, denies chest pain or dizziness positional lightheadedness, denies sore throat or trouble swallowing, denies loss of taste or smell, or any other symptoms        Otalgia   There is pain in both ears. This is a new problem. The current episode started yesterday. The problem occurs constantly. The problem has been unchanged. There has been no fever. The pain is at a severity of 5/10. The pain is mild. Pertinent negatives include no sore throat.       HENT:  Positive for ear pain. Negative for sore throat and trouble swallowing.       Objective:     Physical Exam   Constitutional: She is oriented to person, place, and time. She appears well-developed. She is cooperative.  Non-toxic appearance. She does not appear ill. No distress.   HENT:   Head: Normocephalic and atraumatic.   Ears:   Right Ear: Hearing, tympanic membrane, external ear and ear canal normal.   Left Ear: Hearing, tympanic membrane, external ear and ear canal normal.   Nose: Nose normal. No mucosal edema, rhinorrhea or nasal deformity. No epistaxis. Right sinus exhibits no maxillary sinus tenderness and no frontal sinus tenderness. Left sinus exhibits no maxillary sinus tenderness and no frontal sinus tenderness.   Mouth/Throat: Uvula is midline, oropharynx is clear and moist and mucous membranes are normal. No trismus in the " jaw. Normal dentition. No uvula swelling. No oropharyngeal exudate, posterior oropharyngeal edema or posterior oropharyngeal erythema.   Eyes: Conjunctivae and lids are normal. No scleral icterus.   Neck: Trachea normal and phonation normal. Neck supple. No edema present. No erythema present. No neck rigidity present.   Cardiovascular: Normal rate, regular rhythm, normal heart sounds and normal pulses.   Pulmonary/Chest: Effort normal and breath sounds normal. No respiratory distress. She has no decreased breath sounds. She has no rhonchi.   Abdominal: Normal appearance.   Musculoskeletal: Normal range of motion.         General: No deformity. Normal range of motion.   Neurological: She is alert and oriented to person, place, and time. She exhibits normal muscle tone. Coordination normal.   Skin: Skin is warm, dry, intact, not diaphoretic and not pale.   Psychiatric: Her speech is normal and behavior is normal. Judgment and thought content normal.   Nursing note and vitals reviewed.    Results for orders placed or performed in visit on 07/20/23   POCT Urinalysis, Dipstick, Automated, W/O Scope   Result Value Ref Range    POC Blood, Urine Negative Negative    POC Bilirubin, Urine Positive (A) Negative    POC Urobilinogen, Urine normal 0.1 - 1.1    POC Ketones, Urine Negative Negative    POC Protein, Urine Negative Negative    POC Nitrates, Urine Negative Negative    POC Glucose, Urine Negative Negative    pH, UA 5.5     POC Specific Gravity, Urine 1.020 1.003 - 1.029    POC Leukocytes, Urine Negative Negative     *Note: Due to a large number of results and/or encounters for the requested time period, some results have not been displayed. A complete set of results can be found in Results Review.         Patient in no acute distress.  Vitals reassuring.  Discussed results/diagnosis/plan in depth with patient in clinic. Strict precautions given to patient to monitor for worsening signs and symptoms. Advised to follow up  with primary.All questions answered. Strict ER precautions given. If your symptoms worsens or fail to improve you should go to the Emergency Room. Discharge and follow-up instructions given verbally/printed. Discharge and follow-up instructions discussed with the patient who expressed understanding and willingness to comply with my recommendations.Patient voiced understanding and in agreement with current treatment plan.     Please be advised this text was dictated with [employer] software and may contain errors due to translation.    Assessment:     1. Otalgia of both ears    2. Laryngitis        Plan:       Otalgia of both ears    Laryngitis          Medical Decision Making:   Urgent Care Management:  Patient in no acute distress.  Vitals reassuring.  On exam, patient is nontoxic appearing and afebrile.  Lungs CTA.  Reassurance provided about no ear infection noted.  Patient with complaint of hoarse voice but still able to talk. Discussed laryngitis and supportive treatment.   over-the-counter medication discussed with patient at length.  Proper hydration advised.  I reiterated the importance of further evaluation if no improvement symptoms and follow-up with primary.           Patient Instructions   PLEASE READ YOUR DISCHARGE INSTRUCTIONS ENTIRELY AS IT CONTAINS IMPORTANT INFORMATION.      Please drink plenty of fluids.    Please get plenty of rest.    Please return here or go to the Emergency Department for any concerns or worsening of condition.    Please take an over the counter antihistamine medication (allegra/Claritin/Zyrtec) of your choice as directed.    Try an over the counter decongestant like Mucinex D or Sudafed. You buy this behind the pharmacy counter    If you do have Hypertension or palpitations, it is safe to take Coricidin HBP for relief of sinus symptoms.    If not allergic, please take over the counter Tylenol (Acetaminophen) and/or Motrin (Ibuprofen) as directed for control of pain and/or  fever.  Please follow up with your primary care doctor or specialist as needed.    Sore throat recommendations: Warm fluids, warm salt water gargles, throat lozenges, tea, honey, soup, rest, hydration.    Use over the counter flonase: one spray each nostril twice daily OR two sprays each nostril once daily.     If you  smoke, please stop smoking.      Please return or see your primary care doctor if you develop new or worsening symptoms.     Please arrange follow up with your primary medical clinic as soon as possible. You must understand that you've received an Urgent Care treatment only and that you may be released before all of your medical problems are known or treated. You, the patient, will arrange for follow up as instructed. If your symptoms worsen or fail to improve you should go to the Emergency Room.

## 2023-10-01 NOTE — TELEPHONE ENCOUNTER
No care due was identified.  [facility] Embedded Care Due Messages. Reference number: [id number].   10/01/2023 11:46:37 AM CDT

## 2023-10-02 RX ORDER — ZOLPIDEM TARTRATE 10 MG/1
10 TABLET ORAL NIGHTLY PRN
Qty: 30 TABLET | Refills: 5 | Status: SHIPPED | OUTPATIENT
Start: 2023-10-02

## 2023-12-05 ENCOUNTER — TELEPHONE (OUTPATIENT)
Dept: OBSTETRICS AND GYNECOLOGY | Facility: CLINIC | Age: 43
End: 2023-12-05
Payer: MEDICARE

## 2023-12-05 ENCOUNTER — NURSE TRIAGE (OUTPATIENT)
Dept: ADMINISTRATIVE | Facility: CLINIC | Age: 43
End: 2023-12-05
Payer: MEDICARE

## 2023-12-05 NOTE — TELEPHONE ENCOUNTER
----- Message from Hafsa Vázquez sent at 12/5/2023  3:49 PM CST -----  Regarding: Appointment  Name of Who is Calling:  Patient          What is the request in detail:  Patient stated she is in great pain and  was told by a doctor from another hospital told her it was from scare tissue and for her to make an appointment with her OBGYN. Patient has an appointment for 01/11/2023            Can the clinic reply by [facility]: Yes            What Number to Call Back if not in [facility]:[phone]

## 2023-12-05 NOTE — TELEPHONE ENCOUNTER
"Spoke with pt in regards to abd pain. Pt states "she went to ED and they were unable to do an x-ray due to her being pregnant". Pt states she is not pregnant; she had a total Hyst done. Pt states ED gave her an injection for pain and she is feeling better; however, it will come back. Pt was offered to see provider's partners for earlier appt. Pt declined and would like to see provider. Appt scheduled, pt verbalized understanding.    "

## 2023-12-05 NOTE — TELEPHONE ENCOUNTER
"Spoke with patient who states she was told that she is pregnant by an ED provider.  Patient states she has no uterus but she is still being told that she is pregnant.  She was not told a EGA.  Patient states she has muscular dystropy and she is experiencing severe abdominal pain.  Patient states she has periods where she can't stand up straight due to the pain. Patient also reports having dizziness.  Advised EMS-911.  Patient states she needs another "clean out" by Dr. Elaine.  Dispo reiterated.  Message sent to Dr. Elaine's office.  Reason for Disposition   SEVERE dizziness (e.g., unable to stand, requires support to walk, feels like passing out now)   Sounds like a life-threatening emergency to the triager     Patient feeling dizzy and states she holds on to her partner when walking,  States she can walk on her own at times.    Additional Information   Negative: SEVERE difficulty breathing (e.g., struggling for each breath, speaks in single words)   Negative: Shock suspected (e.g., cold/pale/clammy skin, too weak to stand, low BP, rapid pulse)   Negative: Difficult to awaken or acting confused (e.g., disoriented, slurred speech)   Negative: Fainted, and still feels dizzy afterwards   Negative: Overdose (accidental or intentional) of medications   Negative: New neurologic deficit that is present now: * Weakness of the face, arm, or leg on one side of the body * Numbness of the face, arm, or leg on one side of the body * Loss of speech or garbled speech   Negative: Heart beating < 50 beats per minute OR > 140 beats per minute   Negative: Sounds like a life-threatening emergency to the triager   Negative: Passed out (i.e., fainted, collapsed and was not responding)   Negative: Shock suspected (e.g., cold/pale/clammy skin, too weak to stand, low BP, rapid pulse)   Negative: Difficult to awaken or acting confused (e.g., disoriented, slurred speech)    Protocols used: Pregnancy - Abdominal Pain Less Than 20 Weeks EGA-A-OH, " Dizziness-A-OH

## 2024-01-04 ENCOUNTER — OFFICE VISIT (OUTPATIENT)
Dept: OBSTETRICS AND GYNECOLOGY | Facility: CLINIC | Age: 44
End: 2024-01-04
Payer: MEDICARE

## 2024-01-04 VITALS
WEIGHT: 196.88 LBS | SYSTOLIC BLOOD PRESSURE: 138 MMHG | BODY MASS INDEX: 36.23 KG/M2 | HEIGHT: 62 IN | DIASTOLIC BLOOD PRESSURE: 92 MMHG

## 2024-01-04 DIAGNOSIS — R10.84 GENERALIZED ABDOMINAL PAIN: Primary | ICD-10-CM

## 2024-01-04 PROBLEM — B02.9 HERPES ZOSTER: Status: ACTIVE | Noted: 2024-01-04

## 2024-01-04 PROCEDURE — 99215 OFFICE O/P EST HI 40 MIN: CPT | Mod: S$GLB,,, | Performed by: OBSTETRICS & GYNECOLOGY

## 2024-01-04 PROCEDURE — 99999 PR PBB SHADOW E&M-EST. PATIENT-LVL III: CPT | Mod: PBBFAC,,, | Performed by: OBSTETRICS & GYNECOLOGY

## 2024-01-04 NOTE — PROGRESS NOTES
Subjective:      Patient ID: Jaycee Gamboa is a 43 y.o. female.    Chief Complaint:  Abdominal Pain (Wants to discuss getting cleaned out. )      History of Present Illness  Pt is 43 y.o. known to me with hx of Dx LSC and excision of scar tissue in 2022 who presents with complaints of abdominal pain.  Patient states that she was recently diagnosed with pancreatitis.  At [facility], she had an EGD and a colonoscopy that was attempted but not performed due to incomplete cleansing of the colon.  She has not seen Gastroenterology in follow-up.  But per the patient, she was told she may have ulcerative colitis and scar tissue.  Patient is presenting today with the hopes of potentially removing scar tissue thinking that may be the source of her pain.    We did review her last surgical procedure at which time minimal scar tissue was noted in the abdomen.  All biopsies were negative for any evidence of endometriosis.  Patient states that the abdominal pain is driving her blood pressure up.    Abdominal Pain  Associated symptoms include anorexia, constipation, frequency, headaches, nausea and vomiting. Pertinent negatives include no diarrhea, dysuria, fever or hematuria. Her past medical history is significant for abdominal surgery.   Pelvic Pain  The patient's primary symptoms include pelvic pain. The patient's pertinent negatives include no vaginal discharge. This is a recurrent problem. The current episode started more than 1 year ago. The problem occurs constantly. The problem has been rapidly worsening. The pain is severe. The problem affects the left side. She is not pregnant. Associated symptoms include abdominal pain, anorexia, back pain, constipation, flank pain, frequency, headaches, nausea, painful intercourse, urgency and vomiting. Pertinent negatives include no chills, diarrhea, discolored urine, dysuria, fever, hematuria or rash. Nothing aggravates the symptoms. She has tried acetaminophen,  antibiotics, heating pad, NSAIDs, oral narcotics and warm bath for the symptoms. The treatment provided no relief. She is sexually active. No, her partner does not have an STD. She is postmenopausal. Her past medical history is significant for an abdominal surgery, endometriosis and miscarriage. There is no history of a  section, an ectopic pregnancy, a gynecological surgery, herpes simplex, menorrhagia, metrorrhagia, perineal abscess, PID, an STD, a terminated pregnancy or vaginosis.         GYN & OB History  Patient's last menstrual period was 2009 (exact date).   Date of Last Pap: No result found    OB History    Para Term  AB Living   1 0 0 0 1     SAB IAB Ectopic Multiple Live Births   1 0 0          # Outcome Date GA Lbr Darren/2nd Weight Sex Delivery Anes PTL Lv   1 SAB               Birth Comments: at age 18       Review of Systems  Review of Systems   Constitutional:  Negative for activity change, appetite change, chills, fatigue and fever.   HENT: Negative.  Negative for tinnitus.    Eyes: Negative.    Respiratory:  Negative for cough and shortness of breath.    Cardiovascular:  Negative for chest pain and palpitations.   Gastrointestinal:  Positive for abdominal pain, anorexia, constipation, nausea and vomiting. Negative for blood in stool and diarrhea.   Endocrine: Negative.  Negative for hot flashes.   Genitourinary:  Positive for flank pain, frequency, pelvic pain and urgency. Negative for dysmenorrhea, dyspareunia, dysuria, hematuria, menorrhagia, menstrual problem, vaginal discharge, urinary incontinence and postcoital bleeding.   Musculoskeletal:  Positive for back pain. Negative for joint swelling.   Integumentary:  Negative for rash, breast mass, nipple discharge and breast skin changes.   Neurological:  Positive for headaches.   Hematological: Negative.  Does not bruise/bleed easily.   Psychiatric/Behavioral:  The patient is not nervous/anxious.    Breast: negative.   Negative for mass, nipple discharge and skin changes         Objective:     Physical Exam:   Constitutional: She is oriented to person, place, and time. She appears well-developed and well-nourished. No distress.    HENT:   Head: Normocephalic and atraumatic.    Eyes: Pupils are equal, round, and reactive to light. Conjunctivae and lids are normal.     Cardiovascular:  Normal rate.      Exam reveals no edema.        Pulmonary/Chest: Effort normal.        Abdominal: Soft. Bowel sounds are normal. She exhibits no distension. There is no abdominal tenderness. There is no rebound and no guarding.             Musculoskeletal: Normal range of motion and moves all extremeties.       Neurological: She is alert and oriented to person, place, and time. She has normal strength.    Skin: Skin is warm and dry.    Psychiatric: She has a normal mood and affect. Her speech is normal and behavior is normal. Thought content normal. Her mood appears not anxious. She does not exhibit a depressed mood. She expresses no suicidal plans and no homicidal plans.         Assessment:     1. Generalized abdominal pain               Plan:     Jaycee was seen today for abdominal pain.    Diagnoses and all orders for this visit:    Generalized abdominal pain      I spent a total of 40 minutes on the day of the visit.This includes face to face time and non-face to face time preparing to see the patient (eg, review of tests), obtaining and/or reviewing separately obtained history, documenting clinical information in the electronic or other health record, independently interpreting results and communicating results to the patient/family/caregiver, or care coordinator.     We had a long discussion today about the different etiologies of the abdominal pain.  I discussed how at the time of her last surgery, there was minimal scar tissue that was noted in the pelvic area and I do not suspect that her generalized abdominal pain is related to any pelvic  adhesive disease.  Given her recent history of pancreatitis and a presumed diagnosis of ulcerative colitis.  It would be best for patient to complete her colonoscopy and potentially be referred to Colorectal surgery as they would be the most appropriate team if she is in need of surgical intervention.  Patient is nervous about needing a colostomy.  I mentioned that Colorectal surgery would be able to advise her on the risks of that after her workup has been completed.  All questions were answered.  I would not recommend Dx LSC as I do not think that will help her generalized abdominal pain.

## 2024-01-19 ENCOUNTER — TELEPHONE (OUTPATIENT)
Dept: UROLOGY | Facility: CLINIC | Age: 44
End: 2024-01-19
Payer: MEDICARE

## 2024-01-19 NOTE — TELEPHONE ENCOUNTER
----- Message from Esmer León sent at 1/19/2024  2:00 PM CST -----  Type:  Patient Returning Call    Who Called:Pt   Would the patient rather a call back or a response via [facility]? Call back   Best Call Back Number:[phone]  Additional Information: Pt is requesting to be seen sooner than 01/30

## 2024-01-24 ENCOUNTER — OFFICE VISIT (OUTPATIENT)
Dept: UROLOGY | Facility: CLINIC | Age: 44
End: 2024-01-24
Payer: MEDICARE

## 2024-01-24 VITALS
BODY MASS INDEX: 36.44 KG/M2 | WEIGHT: 198 LBS | HEIGHT: 62 IN | SYSTOLIC BLOOD PRESSURE: 120 MMHG | DIASTOLIC BLOOD PRESSURE: 88 MMHG | HEART RATE: 97 BPM

## 2024-01-24 DIAGNOSIS — R39.198 DIFFICULTY VOIDING: ICD-10-CM

## 2024-01-24 DIAGNOSIS — G89.29 CHRONIC PELVIC PAIN IN FEMALE: ICD-10-CM

## 2024-01-24 DIAGNOSIS — R39.15 URINARY URGENCY: ICD-10-CM

## 2024-01-24 DIAGNOSIS — Z01.818 PRE-OP TESTING: ICD-10-CM

## 2024-01-24 DIAGNOSIS — R35.0 URINARY FREQUENCY: ICD-10-CM

## 2024-01-24 DIAGNOSIS — R35.1 NOCTURIA: ICD-10-CM

## 2024-01-24 DIAGNOSIS — R10.2 SUPRAPUBIC PRESSURE: ICD-10-CM

## 2024-01-24 DIAGNOSIS — N30.10 INTERSTITIAL CYSTITIS: Primary | ICD-10-CM

## 2024-01-24 DIAGNOSIS — R10.2 CHRONIC PELVIC PAIN IN FEMALE: ICD-10-CM

## 2024-01-24 DIAGNOSIS — N39.3 SUI (STRESS URINARY INCONTINENCE, FEMALE): ICD-10-CM

## 2024-01-24 LAB
B-HCG UR QL: NEGATIVE
BILIRUB UR QL STRIP: NEGATIVE
CLARITY UR REFRACT.AUTO: CLEAR
COLOR UR AUTO: YELLOW
CTP QC/QA: YES
GLUCOSE UR QL STRIP: NEGATIVE
HGB UR QL STRIP: NEGATIVE
KETONES UR QL STRIP: NEGATIVE
LEUKOCYTE ESTERASE UR QL STRIP: NEGATIVE
NITRITE UR QL STRIP: NEGATIVE
PH UR STRIP: 6 [PH] (ref 5–8)
POC RESIDUAL URINE VOLUME: 39 ML (ref 0–100)
PROT UR QL STRIP: NEGATIVE
SP GR UR STRIP: 1.01 (ref 1–1.03)
URN SPEC COLLECT METH UR: NORMAL

## 2024-01-24 PROCEDURE — 51798 US URINE CAPACITY MEASURE: CPT | Mod: S$GLB,,, | Performed by: NURSE PRACTITIONER

## 2024-01-24 PROCEDURE — 99214 OFFICE O/P EST MOD 30 MIN: CPT | Mod: S$GLB,,, | Performed by: NURSE PRACTITIONER

## 2024-01-24 PROCEDURE — 81003 URINALYSIS AUTO W/O SCOPE: CPT | Performed by: NURSE PRACTITIONER

## 2024-01-24 PROCEDURE — 87086 URINE CULTURE/COLONY COUNT: CPT | Performed by: NURSE PRACTITIONER

## 2024-01-24 PROCEDURE — 81025 URINE PREGNANCY TEST: CPT | Mod: S$GLB,,, | Performed by: NURSE PRACTITIONER

## 2024-01-24 PROCEDURE — 99999 PR PBB SHADOW E&M-EST. PATIENT-LVL V: CPT | Mod: PBBFAC,,, | Performed by: NURSE PRACTITIONER

## 2024-01-24 RX ORDER — TAMSULOSIN HYDROCHLORIDE 0.4 MG/1
0.4 CAPSULE ORAL DAILY
Qty: 30 CAPSULE | Refills: 1 | Status: SHIPPED | OUTPATIENT
Start: 2024-01-24 | End: 2024-01-24

## 2024-01-24 RX ORDER — TAMSULOSIN HYDROCHLORIDE 0.4 MG/1
0.4 CAPSULE ORAL
Qty: 90 CAPSULE | Refills: 0 | Status: SHIPPED | OUTPATIENT
Start: 2024-01-24 | End: 2024-05-30 | Stop reason: SDUPTHER

## 2024-01-24 RX ORDER — HYDROCODONE BITARTRATE AND ACETAMINOPHEN 10; 325 MG/1; MG/1
1 TABLET ORAL EVERY 8 HOURS PRN
COMMUNITY
Start: 2024-01-13

## 2024-01-24 RX ORDER — SODIUM CHLORIDE 9 MG/ML
INJECTION, SOLUTION INTRAVENOUS CONTINUOUS
Status: CANCELLED | OUTPATIENT
Start: 2024-01-24

## 2024-01-24 RX ORDER — KETOROLAC TROMETHAMINE 10 MG/1
10 TABLET, FILM COATED ORAL EVERY 6 HOURS PRN
Qty: 20 TABLET | Refills: 0 | Status: SHIPPED | OUTPATIENT
Start: 2024-01-24 | End: 2024-01-26

## 2024-01-24 NOTE — PATIENT INSTRUCTIONS
U/A and urine cx today  Bladder scan (PVR) today  Start trial of tamsulosin (Flomax) 0.4 mg daily to promote bladder emptying.   Schedule patient for cysto with bladder hydrodistension for IC flare-up.   Pre-up labs needed (CBC, BMP)  Continue to avoid the use of blood thinners for 7-10 days prior to surgery to reduce risk of bleeding.   Surgery packet provided to patient.   Follow-up post-op.

## 2024-01-24 NOTE — PROGRESS NOTES
Subjective:       Patient ID: Jaycee Gamboa is a 43 y.o. female.    Chief Complaint: Discuss Surgery (Bladder stretching surgery)    Patient is a 44 yo WF who is here today with c/o suprapubic pain. Patient has a hx of IC and thinks she may be having a flare-up. She also has c/o difficulty voiding, urinary incontinence with sneezing, urinary frequency, and urgency. Patient has been treated in the past with cysto with bladder hydrodistension and this has been effective.     Pelvic Pain  The patient's primary symptoms include pelvic pain. The patient's pertinent negatives include no genital itching, genital lesions, genital odor, genital rash, missed menses, vaginal bleeding or vaginal discharge. This is a recurrent problem. Episode onset: 2 months ago. The problem occurs daily. The problem has been gradually worsening. The pain is severe. She is not pregnant. Associated symptoms include constipation, diarrhea, frequency, nausea and urgency. Pertinent negatives include no chills, dysuria, fever, flank pain or hematuria. Nothing aggravates the symptoms. She has tried oral narcotics for the symptoms. The treatment provided mild relief.     Review of Systems   Constitutional:  Positive for appetite change (decreased) and fatigue. Negative for chills and fever.   Gastrointestinal:  Positive for change in bowel habit, constipation, diarrhea and nausea.   Genitourinary:  Positive for difficulty urinating (hesitancy and straining), frequency, nocturia (x5), pelvic pain and urgency. Negative for decreased urine volume, dysuria, flank pain, hematuria, missed menses and vaginal discharge.        Cloudy urine   Psychiatric/Behavioral:  Positive for sleep disturbance.          Objective:      Physical Exam  Vitals and nursing note reviewed.   Constitutional:       General: She is not in acute distress.     Appearance: She is well-developed. She is obese. She is not ill-appearing.   HENT:      Head: Normocephalic and  atraumatic.   Eyes:      Pupils: Pupils are equal, round, and reactive to light.   Cardiovascular:      Rate and Rhythm: Normal rate.   Pulmonary:      Effort: Pulmonary effort is normal. No respiratory distress.   Abdominal:      Palpations: Abdomen is soft.      Tenderness: There is no abdominal tenderness.   Musculoskeletal:         General: Normal range of motion.      Cervical back: Normal range of motion.   Skin:     General: Skin is warm and dry.   Neurological:      Mental Status: She is alert and oriented to person, place, and time.      Coordination: Coordination normal.   Psychiatric:         Mood and Affect: Mood normal.         Behavior: Behavior normal.         Thought Content: Thought content normal.         Judgment: Judgment normal.         Assessment:       Problem List Items Addressed This Visit          Renal/    Interstitial cystitis - Primary    Relevant Medications    methen-m.blue-s.phos-phsal-hyo (URIBEL) 118-10-40.8-36 mg Cap    ketorolac (TORADOL) 10 mg tablet    Other Relevant Orders    Urine culture    Urinalysis    POCT Bladder Scan (Completed)    POCT Urine Pregnancy (Completed)     Other Visit Diagnoses       Chronic pelvic pain in female        Relevant Medications    methen-m.blue-s.phos-phsal-hyo (URIBEL) 118-10-40.8-36 mg Cap    ketorolac (TORADOL) 10 mg tablet    Other Relevant Orders    Urine culture    Urinalysis    POCT Bladder Scan (Completed)    POCT Urine Pregnancy (Completed)    Urinary frequency        Relevant Orders    Urine culture    Urinalysis    POCT Bladder Scan (Completed)    POCT Urine Pregnancy (Completed)    Urinary urgency        Relevant Orders    Urine culture    Urinalysis    POCT Bladder Scan (Completed)    POCT Urine Pregnancy (Completed)    Nocturia        Relevant Orders    Urine culture    Urinalysis    POCT Bladder Scan (Completed)    POCT Urine Pregnancy (Completed)    Difficulty voiding        Relevant Orders    Urine culture    Urinalysis    POCT  Bladder Scan (Completed)    POCT Urine Pregnancy (Completed)    Suprapubic pressure        Relevant Orders    POCT Bladder Scan (Completed)    POCT Urine Pregnancy (Completed)    WALESKA (stress urinary incontinence, female)        Relevant Orders    POCT Bladder Scan (Completed)    POCT Urine Pregnancy (Completed)    Pre-op testing        Relevant Orders    CBC Auto Differential    Basic Metabolic Panel            Plan:            Jaycee was seen today for discuss surgery.    Diagnoses and all orders for this visit:    Interstitial cystitis  -     Urine culture  -     Urinalysis  -     POCT Bladder Scan  -     POCT Urine Pregnancy  -     methen-m.blue-s.phos-phsal-hyo (URIBEL) 118-10-40.8-36 mg Cap; Take 1 capsule by mouth every 6 (six) hours as needed (bladder pain).  -     ketorolac (TORADOL) 10 mg tablet; Take 1 tablet (10 mg total) by mouth every 6 (six) hours as needed for Pain.  -     Case Request Operating Room: CYSTOSCOPY, WITH BLADDER HYDRODISTENSION  -     Vital Signs ; Standing  -     Notify physician ; Standing  -     Diet NPO; Standing  -     Place in Outpatient; Standing  -     Place QUIANA hose; Standing  -     Diet NPO    Chronic pelvic pain in female  -     Urine culture  -     Urinalysis  -     POCT Bladder Scan  -     POCT Urine Pregnancy  -     methen-m.blue-s.phos-phsal-hyo (URIBEL) 118-10-40.8-36 mg Cap; Take 1 capsule by mouth every 6 (six) hours as needed (bladder pain).  -     ketorolac (TORADOL) 10 mg tablet; Take 1 tablet (10 mg total) by mouth every 6 (six) hours as needed for Pain.  -     Case Request Operating Room: CYSTOSCOPY, WITH BLADDER HYDRODISTENSION  -     Vital Signs ; Standing  -     Notify physician ; Standing  -     Diet NPO; Standing  -     Place in Outpatient; Standing  -     Place QUIANA hose; Standing  -     Diet NPO    Urinary frequency  -     Urine culture  -     Urinalysis  -     POCT Bladder Scan  -     POCT Urine Pregnancy  -     Case Request Operating Room: CYSTOSCOPY,  WITH BLADDER HYDRODISTENSION  -     Vital Signs ; Standing  -     Notify physician ; Standing  -     Diet NPO; Standing  -     Place in Outpatient; Standing  -     Place QUIANA hose; Standing  -     Diet NPO    Urinary urgency  -     Urine culture  -     Urinalysis  -     POCT Bladder Scan  -     POCT Urine Pregnancy  -     Case Request Operating Room: CYSTOSCOPY, WITH BLADDER HYDRODISTENSION  -     Vital Signs ; Standing  -     Notify physician ; Standing  -     Diet NPO; Standing  -     Place in Outpatient; Standing  -     Place QUIANA hose; Standing  -     Diet NPO    Nocturia  -     Urine culture  -     Urinalysis  -     POCT Bladder Scan  -     POCT Urine Pregnancy    Difficulty voiding  -     Urine culture  -     Urinalysis  -     POCT Bladder Scan  -     Discontinue: tamsulosin (FLOMAX) 0.4 mg Cap; Take 1 capsule (0.4 mg total) by mouth once daily.  -     POCT Urine Pregnancy    Suprapubic pressure  -     POCT Bladder Scan  -     POCT Urine Pregnancy  -     Case Request Operating Room: CYSTOSCOPY, WITH BLADDER HYDRODISTENSION  -     Vital Signs ; Standing  -     Notify physician ; Standing  -     Diet NPO; Standing  -     Place in Outpatient; Standing  -     Place QUIANA hose; Standing  -     Diet NPO    WALESKA (stress urinary incontinence, female)  -     POCT Bladder Scan  -     POCT Urine Pregnancy    Pre-op testing  -     CBC Auto Differential; Future  -     Basic Metabolic Panel; Future    Other orders  -     0.9%  NaCl infusion  -     IP VTE LOW RISK PATIENT; Standing  -     cefTRIAXone (Rocephin) 1 g in dextrose 5 % in water (D5W) 100 mL IVPB (MB+)    Start trial of tamsulosin (Flomax) 0.4 mg daily to promote bladder emptying.   Schedule patient for cysto with bladder hydrodistension for IC flare-up.   Pre-up labs needed (CBC, BMP)  Continue to avoid the use of blood thinners for 7-10 days prior to surgery to reduce risk of bleeding.   Surgery packet provided to patient.     Follow-up post-op.     Katie MITCHELL  Michael, NP

## 2024-01-25 LAB
BACTERIA UR CULT: NORMAL
BACTERIA UR CULT: NORMAL

## 2024-01-26 ENCOUNTER — TELEPHONE (OUTPATIENT)
Dept: UROLOGY | Facility: CLINIC | Age: 44
End: 2024-01-26
Payer: MEDICARE

## 2024-01-26 NOTE — TELEPHONE ENCOUNTER
----- Message from Katie Rodriguez NP sent at 1/26/2024  1:18 AM CST -----  Please inform patient via telephone that her urine cx showed some bacteria but none in predominance to diagnose a UTI.

## 2024-01-29 DIAGNOSIS — E03.9 ACQUIRED HYPOTHYROIDISM: ICD-10-CM

## 2024-01-29 RX ORDER — LEVOTHYROXINE SODIUM 75 UG/1
75 TABLET ORAL
Qty: 90 TABLET | Refills: 1 | Status: SHIPPED | OUTPATIENT
Start: 2024-01-29

## 2024-01-29 NOTE — TELEPHONE ENCOUNTER
Care Due:                  Date            Visit Type   Department     Provider  --------------------------------------------------------------------------------                                EP -                              PRIMARY      [facility]  Last Visit: 03-      CARE ([facility])   AMAN Lechuga  Next Visit: None Scheduled  None         None Found                                                            Last  Test          Frequency    Reason                     Performed    Due Date  --------------------------------------------------------------------------------    TSH.........  12 months..  levothyroxine............  02- 02-    [facility] Embedded Care Due Messages. Reference number: [id number].   1/29/2024 5:28:36 AM CST

## 2024-02-01 PROBLEM — R39.15 URINARY URGENCY: Status: ACTIVE | Noted: 2024-02-01

## 2024-02-01 PROBLEM — R10.2 SUPRAPUBIC PRESSURE: Status: ACTIVE | Noted: 2024-02-01

## 2024-02-01 PROBLEM — R35.0 URINARY FREQUENCY: Status: ACTIVE | Noted: 2024-02-01

## 2024-04-02 ENCOUNTER — TELEPHONE (OUTPATIENT)
Dept: OBSTETRICS AND GYNECOLOGY | Facility: CLINIC | Age: 44
End: 2024-04-02
Payer: MEDICARE

## 2024-04-02 NOTE — TELEPHONE ENCOUNTER
----- Message from Bebe Duenas sent at 4/2/2024 11:41 AM CDT -----  Contact: Pt [phone]  Pt called and stated that she needed to speak to you about doing a surgery on the scar tissue that is causing her pain.     Please call and advise

## 2024-04-02 NOTE — TELEPHONE ENCOUNTER
Called pt in regards to surgical consult appointment, no answer, phone is not ringing.Pt portal message sent

## 2024-04-22 ENCOUNTER — OFFICE VISIT (OUTPATIENT)
Dept: OBSTETRICS AND GYNECOLOGY | Facility: CLINIC | Age: 44
End: 2024-04-22
Payer: MEDICARE

## 2024-04-22 DIAGNOSIS — R10.84 GENERALIZED ABDOMINAL PAIN: Primary | ICD-10-CM

## 2024-04-22 PROCEDURE — 99213 OFFICE O/P EST LOW 20 MIN: CPT | Mod: 95,,, | Performed by: OBSTETRICS & GYNECOLOGY

## 2024-04-22 NOTE — PROGRESS NOTES
The patient location is: home  The chief complaint leading to consultation is: pain    Visit type: audiovisual    Face to Face time with patient: 20 minutes of total time spent on the encounter, which includes face to face time and non-face to face time preparing to see the patient (eg, review of tests), Obtaining and/or reviewing separately obtained history, Documenting clinical information in the electronic or other health record, Independently interpreting results (not separately reported) and communicating results to the patient/family/caregiver, or Care coordination (not separately reported).         Each patient to whom he or she provides medical services by telemedicine is:  (1) informed of the relationship between the physician and patient and the respective role of any other health care provider with respect to management of the patient; and (2) notified that he or she may decline to receive medical services by telemedicine and may withdraw from such care at any time.    Notes:   Pt is 43 y.o. known to our clinic who had a hysterectomy at age 23 for pain/endometriosis.  Pt has had 2 diagnostic laparoscopy procedures by our team for scar tissue and continued pain.  Pt states that she has had an extensive workup including urologic and GI.  No clear explanation for her continued pain.  Had recent CT of abd/pelvis that was normal as well.  She was interested in having another procedure to remove scar tissue    We had a long discussion today about how I do not feel that removal of small amounts of scar tissue are going to provide her long-term pain relief (nor appropriately explain the exact source of her pain).  There may be a neurogenic component to her pain and I would advise her to reach out to her pain specialist to see if there is a medication / block that could help address the pelvic pain she is experiencing.  But I did advise the patient that I would not do another laparoscopic procedure as I do not  that will address the cause of her pain.  All questions answered.

## 2024-04-23 ENCOUNTER — OFFICE VISIT (OUTPATIENT)
Dept: UROLOGY | Facility: CLINIC | Age: 44
End: 2024-04-23
Payer: MEDICARE

## 2024-04-23 VITALS
DIASTOLIC BLOOD PRESSURE: 85 MMHG | HEART RATE: 84 BPM | HEIGHT: 62 IN | WEIGHT: 196.63 LBS | SYSTOLIC BLOOD PRESSURE: 122 MMHG | BODY MASS INDEX: 36.18 KG/M2

## 2024-04-23 DIAGNOSIS — R10.2 CHRONIC PELVIC PAIN IN FEMALE: ICD-10-CM

## 2024-04-23 DIAGNOSIS — N30.10 INTERSTITIAL CYSTITIS: Primary | ICD-10-CM

## 2024-04-23 DIAGNOSIS — G89.29 CHRONIC PELVIC PAIN IN FEMALE: ICD-10-CM

## 2024-04-23 DIAGNOSIS — Z01.818 PRE-OP TESTING: ICD-10-CM

## 2024-04-23 DIAGNOSIS — R35.1 NOCTURIA: ICD-10-CM

## 2024-04-23 DIAGNOSIS — R39.15 URINARY URGENCY: ICD-10-CM

## 2024-04-23 DIAGNOSIS — R35.0 URINARY FREQUENCY: ICD-10-CM

## 2024-04-23 LAB
BILIRUB UR QL STRIP: NEGATIVE
CLARITY UR REFRACT.AUTO: CLEAR
COLOR UR AUTO: YELLOW
GLUCOSE UR QL STRIP: NEGATIVE
HGB UR QL STRIP: NEGATIVE
KETONES UR QL STRIP: NEGATIVE
LEUKOCYTE ESTERASE UR QL STRIP: NEGATIVE
NITRITE UR QL STRIP: NEGATIVE
PH UR STRIP: 6 [PH] (ref 5–8)
PROT UR QL STRIP: ABNORMAL
SP GR UR STRIP: 1.02 (ref 1–1.03)
URN SPEC COLLECT METH UR: ABNORMAL

## 2024-04-23 PROCEDURE — 99214 OFFICE O/P EST MOD 30 MIN: CPT | Mod: S$GLB,,, | Performed by: NURSE PRACTITIONER

## 2024-04-23 PROCEDURE — 87086 URINE CULTURE/COLONY COUNT: CPT | Performed by: NURSE PRACTITIONER

## 2024-04-23 PROCEDURE — 1160F RVW MEDS BY RX/DR IN RCRD: CPT | Mod: CPTII,S$GLB,, | Performed by: NURSE PRACTITIONER

## 2024-04-23 PROCEDURE — 99999 PR PBB SHADOW E&M-EST. PATIENT-LVL V: CPT | Mod: PBBFAC,,, | Performed by: NURSE PRACTITIONER

## 2024-04-23 PROCEDURE — 3079F DIAST BP 80-89 MM HG: CPT | Mod: CPTII,S$GLB,, | Performed by: NURSE PRACTITIONER

## 2024-04-23 PROCEDURE — 3074F SYST BP LT 130 MM HG: CPT | Mod: CPTII,S$GLB,, | Performed by: NURSE PRACTITIONER

## 2024-04-23 PROCEDURE — 1159F MED LIST DOCD IN RCRD: CPT | Mod: CPTII,S$GLB,, | Performed by: NURSE PRACTITIONER

## 2024-04-23 PROCEDURE — 81003 URINALYSIS AUTO W/O SCOPE: CPT | Performed by: NURSE PRACTITIONER

## 2024-04-23 PROCEDURE — 3008F BODY MASS INDEX DOCD: CPT | Mod: CPTII,S$GLB,, | Performed by: NURSE PRACTITIONER

## 2024-04-23 RX ORDER — SODIUM CHLORIDE 9 MG/ML
INJECTION, SOLUTION INTRAVENOUS CONTINUOUS
Status: CANCELLED | OUTPATIENT
Start: 2024-04-23

## 2024-04-23 NOTE — PROGRESS NOTES
Subjective:       Patient ID: Jaycee Gamboa is a 43 y.o. female.    Chief Complaint: Abdominal Pain, Cystitis, and Urinary Frequency    HPI  Review of Systems   Constitutional:  Positive for fatigue. Negative for chills and fever.   Gastrointestinal:  Positive for nausea and vomiting. Negative for abdominal pain, change in bowel habit, constipation and diarrhea.   Genitourinary:  Positive for frequency, nocturia (x4-5), pelvic pain (bladder pain) and urgency (with leakage). Negative for decreased urine volume, difficulty urinating, dysuria, flank pain and hematuria.   Psychiatric/Behavioral: Negative.           Objective:      Physical Exam  Vitals and nursing note reviewed.   Constitutional:       General: She is not in acute distress.     Appearance: She is well-developed. She is not ill-appearing.   HENT:      Head: Normocephalic and atraumatic.   Eyes:      Pupils: Pupils are equal, round, and reactive to light.   Cardiovascular:      Rate and Rhythm: Normal rate.   Pulmonary:      Effort: Pulmonary effort is normal.   Abdominal:      General: Bowel sounds are normal.      Palpations: Abdomen is soft.   Musculoskeletal:         General: Normal range of motion.      Cervical back: Normal range of motion.   Skin:     General: Skin is warm and dry.   Neurological:      Mental Status: She is alert and oriented to person, place, and time.      Coordination: Coordination normal.   Psychiatric:         Mood and Affect: Mood normal.         Behavior: Behavior normal.         Thought Content: Thought content normal.         Judgment: Judgment normal.         Assessment:       Problem List Items Addressed This Visit          Renal/    Interstitial cystitis - Primary    Relevant Orders    CBC Auto Differential    Basic Metabolic Panel    Urine culture    Urinalysis    Case Request Operating Room: CYSTOSCOPY, WITH BLADDER HYDRODISTENSION (Completed)    Diet NPO    Urinary frequency    Relevant Orders    CBC Auto  Differential    Basic Metabolic Panel    Urine culture    Urinalysis    Case Request Operating Room: CYSTOSCOPY, WITH BLADDER HYDRODISTENSION (Completed)    Diet NPO    Urinary urgency    Relevant Orders    CBC Auto Differential    Basic Metabolic Panel    Urine culture    Urinalysis    Case Request Operating Room: CYSTOSCOPY, WITH BLADDER HYDRODISTENSION (Completed)    Diet NPO       GI    Chronic pelvic pain in female    Relevant Orders    CBC Auto Differential    Basic Metabolic Panel    Urine culture    Urinalysis    Case Request Operating Room: CYSTOSCOPY, WITH BLADDER HYDRODISTENSION (Completed)    Diet NPO     Other Visit Diagnoses       Nocturia        Relevant Orders    CBC Auto Differential    Basic Metabolic Panel    Urine culture    Urinalysis    Pre-op testing        Relevant Orders    CBC Auto Differential    Basic Metabolic Panel    Urine culture    Urinalysis            Plan:           Jaycee was seen today for abdominal pain, cystitis and urinary frequency.    Diagnoses and all orders for this visit:    Interstitial cystitis  -     CBC Auto Differential; Future  -     Basic Metabolic Panel; Future  -     Urine culture  -     Urinalysis  -     Case Request Operating Room: CYSTOSCOPY, WITH BLADDER HYDRODISTENSION  -     Vital Signs ; Standing  -     Notify physician ; Standing  -     Diet NPO; Standing  -     Place in Outpatient; Standing  -     Place QUIANA hose; Standing  -     Diet NPO    Chronic pelvic pain in female  -     CBC Auto Differential; Future  -     Basic Metabolic Panel; Future  -     Urine culture  -     Urinalysis  -     Case Request Operating Room: CYSTOSCOPY, WITH BLADDER HYDRODISTENSION  -     Vital Signs ; Standing  -     Notify physician ; Standing  -     Diet NPO; Standing  -     Place in Outpatient; Standing  -     Place QUIANA hose; Standing  -     Diet NPO    Urinary frequency  -     CBC Auto Differential; Future  -     Basic Metabolic Panel; Future  -     Urine culture  -      Urinalysis  -     Case Request Operating Room: CYSTOSCOPY, WITH BLADDER HYDRODISTENSION  -     Vital Signs ; Standing  -     Notify physician ; Standing  -     Diet NPO; Standing  -     Place in Outpatient; Standing  -     Place QUIANA hose; Standing  -     Diet NPO    Urinary urgency  -     CBC Auto Differential; Future  -     Basic Metabolic Panel; Future  -     Urine culture  -     Urinalysis  -     Case Request Operating Room: CYSTOSCOPY, WITH BLADDER HYDRODISTENSION  -     Vital Signs ; Standing  -     Notify physician ; Standing  -     Diet NPO; Standing  -     Place in Outpatient; Standing  -     Place QUIANA hose; Standing  -     Diet NPO    Nocturia  -     CBC Auto Differential; Future  -     Basic Metabolic Panel; Future  -     Urine culture  -     Urinalysis    Pre-op testing  -     CBC Auto Differential; Future  -     Basic Metabolic Panel; Future  -     Urine culture  -     Urinalysis    Other orders  -     0.9%  NaCl infusion  -     IP VTE LOW RISK PATIENT; Standing  -     cefTRIAXone (Rocephin) 1 g in dextrose 5 % in water (D5W) 100 mL IVPB (MB+)    Schedule patient for cysto with bladder hydrodistension by Dr. Shelby.   Pre-op labs needed (CBC, BMP, U/A, and urine cx).   Continue to avoid the use of blood thinners for 7-10 days prior to surgery to reduce risk of bleeding.   Surgery packet provided to patient.     Follow-up post-op.     Katei Rodriguez, DNP     hydrodistension by Dr. Shelby.   Pre-op labs needed (CBC, BMP, U/A, and urine cx).   Continue to avoid the use of blood thinners for 7-10 days prior to surgery to reduce risk of bleeding.   Surgery packet provided to patient.     Follow-up post-op.     Katie Rodriguez, DNP

## 2024-04-25 LAB — BACTERIA UR CULT: NORMAL

## 2024-05-30 ENCOUNTER — OFFICE VISIT (OUTPATIENT)
Dept: UROLOGY | Facility: CLINIC | Age: 44
End: 2024-05-30
Payer: MEDICARE

## 2024-05-30 VITALS
HEART RATE: 59 BPM | BODY MASS INDEX: 36.47 KG/M2 | SYSTOLIC BLOOD PRESSURE: 135 MMHG | DIASTOLIC BLOOD PRESSURE: 94 MMHG | WEIGHT: 198.19 LBS | HEIGHT: 62 IN

## 2024-05-30 DIAGNOSIS — G89.29 CHRONIC PELVIC PAIN IN FEMALE: Primary | ICD-10-CM

## 2024-05-30 DIAGNOSIS — R10.2 SUPRAPUBIC PRESSURE: ICD-10-CM

## 2024-05-30 DIAGNOSIS — R32 URINARY INCONTINENCE, UNSPECIFIED TYPE: ICD-10-CM

## 2024-05-30 DIAGNOSIS — R10.2 CHRONIC PELVIC PAIN IN FEMALE: Primary | ICD-10-CM

## 2024-05-30 DIAGNOSIS — R39.198 DIFFICULTY VOIDING: ICD-10-CM

## 2024-05-30 DIAGNOSIS — R34 DECREASED URINE OUTPUT: ICD-10-CM

## 2024-05-30 PROCEDURE — 1159F MED LIST DOCD IN RCRD: CPT | Mod: CPTII,S$GLB,, | Performed by: NURSE PRACTITIONER

## 2024-05-30 PROCEDURE — 81003 URINALYSIS AUTO W/O SCOPE: CPT | Performed by: NURSE PRACTITIONER

## 2024-05-30 PROCEDURE — 3080F DIAST BP >= 90 MM HG: CPT | Mod: CPTII,S$GLB,, | Performed by: NURSE PRACTITIONER

## 2024-05-30 PROCEDURE — 3075F SYST BP GE 130 - 139MM HG: CPT | Mod: CPTII,S$GLB,, | Performed by: NURSE PRACTITIONER

## 2024-05-30 PROCEDURE — 99999 PR PBB SHADOW E&M-EST. PATIENT-LVL III: CPT | Mod: PBBFAC,,, | Performed by: NURSE PRACTITIONER

## 2024-05-30 PROCEDURE — 87086 URINE CULTURE/COLONY COUNT: CPT | Performed by: NURSE PRACTITIONER

## 2024-05-30 PROCEDURE — 1160F RVW MEDS BY RX/DR IN RCRD: CPT | Mod: CPTII,S$GLB,, | Performed by: NURSE PRACTITIONER

## 2024-05-30 PROCEDURE — 99024 POSTOP FOLLOW-UP VISIT: CPT | Mod: S$GLB,,, | Performed by: NURSE PRACTITIONER

## 2024-05-30 RX ORDER — SULFAMETHOXAZOLE AND TRIMETHOPRIM 800; 160 MG/1; MG/1
1 TABLET ORAL 2 TIMES DAILY
Qty: 20 TABLET | Refills: 0 | Status: SHIPPED | OUTPATIENT
Start: 2024-05-30 | End: 2024-06-09

## 2024-05-30 RX ORDER — TAMSULOSIN HYDROCHLORIDE 0.4 MG/1
0.4 CAPSULE ORAL DAILY
Qty: 90 CAPSULE | Refills: 0 | Status: SHIPPED | OUTPATIENT
Start: 2024-05-30 | End: 2024-08-28

## 2024-05-30 RX ORDER — KETOROLAC TROMETHAMINE 10 MG/1
10 TABLET, FILM COATED ORAL EVERY 6 HOURS PRN
Qty: 20 TABLET | Refills: 0 | Status: SHIPPED | OUTPATIENT
Start: 2024-05-30 | End: 2024-06-04

## 2024-05-30 NOTE — PROGRESS NOTES
Subjective:       Patient ID: Jaycee Gamboa is a 43 y.o. female.    Chief Complaint: Follow-up (Discuss surgery)    Follow-up  Associated symptoms include chills, fatigue, a fever, nausea and vomiting.     Review of Systems   Constitutional:  Positive for chills, fatigue and fever.   Gastrointestinal:  Positive for nausea and vomiting.   Genitourinary:  Positive for bladder incontinence, decreased urine volume, difficulty urinating and dysuria. Negative for flank pain, frequency, hematuria and urgency.        Bladder pain (sharp and pressure)   Psychiatric/Behavioral:  Positive for sleep disturbance.          Objective:      Physical Exam  Vitals and nursing note reviewed.   Constitutional:       General: She is not in acute distress.     Appearance: She is well-developed. She is obese. She is not ill-appearing.   HENT:      Head: Normocephalic and atraumatic.   Eyes:      Pupils: Pupils are equal, round, and reactive to light.   Cardiovascular:      Rate and Rhythm: Bradycardia present.   Pulmonary:      Effort: Pulmonary effort is normal. No respiratory distress.   Abdominal:      Palpations: Abdomen is soft.      Tenderness: There is no abdominal tenderness.   Musculoskeletal:         General: Normal range of motion.      Cervical back: Normal range of motion.   Skin:     General: Skin is warm and dry.   Neurological:      Mental Status: She is alert and oriented to person, place, and time.      Coordination: Coordination normal.   Psychiatric:         Mood and Affect: Mood normal.         Behavior: Behavior normal.         Thought Content: Thought content normal.         Judgment: Judgment normal.         Assessment:       Problem List Items Addressed This Visit    None      Plan:       ***         Neurological:      Mental Status: She is alert and oriented to person, place, and time.      Coordination: Coordination normal.   Psychiatric:         Mood and Affect: Mood normal.         Behavior: Behavior normal.         Thought Content: Thought content normal.         Judgment: Judgment normal.         Assessment:       Problem List Items Addressed This Visit          GI    Chronic pelvic pain in female - Primary    Relevant Orders    Urine culture (Completed)    Urinalysis (Completed)    POCT Bladder Scan    Suprapubic pressure    Relevant Orders    Urine culture (Completed)    Urinalysis (Completed)    POCT Bladder Scan     Other Visit Diagnoses       Difficulty voiding        Relevant Orders    Urine culture (Completed)    Urinalysis (Completed)    POCT Bladder Scan    Urinary incontinence, unspecified type        Relevant Orders    Urine culture (Completed)    Urinalysis (Completed)    POCT Bladder Scan    Decreased urine output        Relevant Orders    Urine culture (Completed)    Urinalysis (Completed)    POCT Bladder Scan            Plan:           Jaycee was seen today for follow-up.    Diagnoses and all orders for this visit:    Chronic pelvic pain in female  -     Urine culture  -     Urinalysis  -     ketorolac (TORADOL) 10 mg tablet; Take 1 tablet (10 mg total) by mouth every 6 (six) hours as needed for Pain.  -     sulfamethoxazole-trimethoprim 800-160mg (BACTRIM DS) 800-160 mg Tab; Take 1 tablet by mouth 2 (two) times daily. for 10 days  -     POCT Bladder Scan    Suprapubic pressure  -     Urine culture  -     Urinalysis  -     tamsulosin (FLOMAX) 0.4 mg Cap; Take 1 capsule (0.4 mg total) by mouth once daily. (Patient not taking: Reported on 8/12/2024)  -     POCT Bladder Scan    Difficulty voiding  -     Urine culture  -     Urinalysis  -     tamsulosin (FLOMAX) 0.4 mg Cap; Take 1 capsule (0.4 mg total) by mouth once daily. (Patient not taking: Reported on 8/12/2024)  -      sulfamethoxazole-trimethoprim 800-160mg (BACTRIM DS) 800-160 mg Tab; Take 1 tablet by mouth 2 (two) times daily. for 10 days  -     POCT Bladder Scan    Urinary incontinence, unspecified type  -     Urine culture  -     Urinalysis  -     POCT Bladder Scan    Decreased urine output  -     Urine culture  -     Urinalysis  -     POCT Bladder Scan    Other order  Start taking tamsulosin (Flomax) 0.4 mg daily to promote bladder emptying    Follow-up in 4 weeks.     YKg Rodriguez, DNP

## 2024-05-30 NOTE — PATIENT INSTRUCTIONS
U/A and urine cx today  Bladder scan (PVR)  Start taking tamsulosin (Flomax) 0.4 mg daily to promote bladder emptying  Follow-up in 4 weeks.

## 2024-05-31 LAB
BACTERIA UR CULT: NORMAL
BACTERIA UR CULT: NORMAL

## 2024-06-04 ENCOUNTER — TELEPHONE (OUTPATIENT)
Dept: UROLOGY | Facility: CLINIC | Age: 44
End: 2024-06-04
Payer: MEDICARE

## 2024-06-04 DIAGNOSIS — R35.0 URINARY FREQUENCY: Primary | ICD-10-CM

## 2024-06-04 NOTE — TELEPHONE ENCOUNTER
Pt informed of result note below. Ucx ordered and she will walk in since she has transportation issues     ----- Message from Katie Rodriguez NP sent at 6/4/2024  1:01 PM CDT -----  Please inform patient via telephone that her urine cx showed some bacteria but none in predominance to diagnose a UTI. Urine sample was contaminated. Repeat urine cx and re-educate patient on clean catch mid-stream urine specimen collection. Thanks.

## 2024-07-23 DIAGNOSIS — E03.9 ACQUIRED HYPOTHYROIDISM: ICD-10-CM

## 2024-07-23 RX ORDER — LEVOTHYROXINE SODIUM 75 UG/1
75 TABLET ORAL
Qty: 90 TABLET | Refills: 0 | Status: SHIPPED | OUTPATIENT
Start: 2024-07-23

## 2024-07-23 NOTE — TELEPHONE ENCOUNTER
Refill Routing Note   Medication(s) are not appropriate for processing by [facility] for the following reason(s):        ED/Hospital Visit since last OV with provider  Responsible provider unclear  Required labs outdated  Patient not seen by provider within 15 months    [facility] action(s):  Defer               Appointments  past 12m or future 3m with PCP    Date Provider   Last Visit   3/6/2023 Emi Lechuga MD   Next Visit   Visit date not found Emi Lechuga MD   ED visits in past 90 days: 0        Note composed:12:24 PM 07/23/2024

## 2024-08-12 ENCOUNTER — OFFICE VISIT (OUTPATIENT)
Dept: UROLOGY | Facility: CLINIC | Age: 44
End: 2024-08-12
Payer: MEDICARE

## 2024-08-12 VITALS
DIASTOLIC BLOOD PRESSURE: 59 MMHG | HEIGHT: 62 IN | HEART RATE: 65 BPM | WEIGHT: 193.31 LBS | BODY MASS INDEX: 35.57 KG/M2 | SYSTOLIC BLOOD PRESSURE: 90 MMHG

## 2024-08-12 DIAGNOSIS — R39.15 URINARY URGENCY: ICD-10-CM

## 2024-08-12 DIAGNOSIS — N39.3 SUI (STRESS URINARY INCONTINENCE, FEMALE): ICD-10-CM

## 2024-08-12 DIAGNOSIS — R35.1 NOCTURIA: ICD-10-CM

## 2024-08-12 DIAGNOSIS — R35.0 URINARY FREQUENCY: ICD-10-CM

## 2024-08-12 DIAGNOSIS — N30.10 INTERSTITIAL CYSTITIS: Primary | ICD-10-CM

## 2024-08-12 DIAGNOSIS — G89.29 CHRONIC PELVIC PAIN IN FEMALE: ICD-10-CM

## 2024-08-12 DIAGNOSIS — Z01.818 PRE-OP TESTING: ICD-10-CM

## 2024-08-12 DIAGNOSIS — R10.2 SUPRAPUBIC PRESSURE: ICD-10-CM

## 2024-08-12 DIAGNOSIS — R10.2 CHRONIC PELVIC PAIN IN FEMALE: ICD-10-CM

## 2024-08-12 PROCEDURE — 3078F DIAST BP <80 MM HG: CPT | Mod: CPTII,S$GLB,, | Performed by: NURSE PRACTITIONER

## 2024-08-12 PROCEDURE — 99215 OFFICE O/P EST HI 40 MIN: CPT | Mod: S$GLB,,, | Performed by: NURSE PRACTITIONER

## 2024-08-12 PROCEDURE — 3008F BODY MASS INDEX DOCD: CPT | Mod: CPTII,S$GLB,, | Performed by: NURSE PRACTITIONER

## 2024-08-12 PROCEDURE — 1159F MED LIST DOCD IN RCRD: CPT | Mod: CPTII,S$GLB,, | Performed by: NURSE PRACTITIONER

## 2024-08-12 PROCEDURE — 3074F SYST BP LT 130 MM HG: CPT | Mod: CPTII,S$GLB,, | Performed by: NURSE PRACTITIONER

## 2024-08-12 PROCEDURE — 87086 URINE CULTURE/COLONY COUNT: CPT | Performed by: NURSE PRACTITIONER

## 2024-08-12 PROCEDURE — 1160F RVW MEDS BY RX/DR IN RCRD: CPT | Mod: CPTII,S$GLB,, | Performed by: NURSE PRACTITIONER

## 2024-08-12 PROCEDURE — 81003 URINALYSIS AUTO W/O SCOPE: CPT | Performed by: NURSE PRACTITIONER

## 2024-08-12 PROCEDURE — 99999 PR PBB SHADOW E&M-EST. PATIENT-LVL V: CPT | Mod: PBBFAC,,, | Performed by: NURSE PRACTITIONER

## 2024-08-12 RX ORDER — HYDROCODONE BITARTRATE AND ACETAMINOPHEN 7.5; 325 MG/1; MG/1
1 TABLET ORAL 4 TIMES DAILY PRN
COMMUNITY
Start: 2024-08-01

## 2024-08-12 RX ORDER — SODIUM CHLORIDE 9 MG/ML
INJECTION, SOLUTION INTRAVENOUS CONTINUOUS
OUTPATIENT
Start: 2024-08-12

## 2024-08-12 NOTE — PROGRESS NOTES
Subjective:       Patient ID: Jaycee Gamboa is a 43 y.o. female.    Chief Complaint: Bladder Pain and Discuss Surgery    Patient is here today for a follow-up for IC. She thinks she is having a IC flare-up and would like to schedule bladder dilation to give her some relief. She has c/o bladder pain, suprapubic pressure, urinary frequency, urgency, and urinary incontinence. She reports changing her underwear 5 times daily. She reports being off her tamsulosin since her rotator cuff surgery (7/3/2024). Patient is tried bladder hydrodistension in the past with success.     Follow-up  This is a recurrent (IC) problem. The current episode started more than 1 year ago. The problem occurs daily. The problem has been gradually worsening. Associated symptoms include a change in bowel habit (Hx of IBS) and urinary symptoms. Pertinent negatives include no chills, fatigue, fever, nausea, swollen glands or vomiting. Nothing aggravates the symptoms. She has tried nothing for the symptoms.     Review of Systems   Constitutional:  Negative for chills, fatigue and fever.   Gastrointestinal:  Positive for change in bowel habit (Hx of IBS), constipation and diarrhea. Negative for nausea and vomiting.   Genitourinary:  Positive for bladder incontinence, frequency, nocturia (x2-5), pelvic pain (suprapubic pressure) and urgency. Negative for decreased urine volume, difficulty urinating, dysuria, flank pain and hematuria.   Musculoskeletal:  Positive for back pain (lower back pain).   Psychiatric/Behavioral: Negative.           Objective:      Physical Exam  Vitals and nursing note reviewed.   Constitutional:       General: She is not in acute distress.     Appearance: She is well-developed. She is obese. She is not ill-appearing.   HENT:      Head: Normocephalic and atraumatic.   Eyes:      Pupils: Pupils are equal, round, and reactive to light.   Cardiovascular:      Rate and Rhythm: Normal rate.   Pulmonary:      Effort:  Pulmonary effort is normal. No respiratory distress.   Abdominal:      Palpations: Abdomen is soft.      Tenderness: There is no abdominal tenderness.   Musculoskeletal:         General: Normal range of motion.      Cervical back: Normal range of motion.   Skin:     General: Skin is warm and dry.   Neurological:      Mental Status: She is alert and oriented to person, place, and time.      Coordination: Coordination normal.   Psychiatric:         Mood and Affect: Mood normal.         Behavior: Behavior normal.         Thought Content: Thought content normal.         Judgment: Judgment normal.         Assessment:       Problem List Items Addressed This Visit          Renal/    Interstitial cystitis - Primary    Relevant Orders    Urine culture    Urinalysis    CBC Auto Differential    Basic Metabolic Panel    Urinary frequency    Relevant Orders    Urine culture    Urinalysis    CBC Auto Differential    Basic Metabolic Panel    Urinary urgency    Relevant Orders    Urine culture    Urinalysis    CBC Auto Differential    Basic Metabolic Panel       GI    Chronic pelvic pain in female    Relevant Orders    Urine culture    Urinalysis    CBC Auto Differential    Basic Metabolic Panel    Suprapubic pressure    Relevant Orders    Urine culture    Urinalysis    CBC Auto Differential    Basic Metabolic Panel     Other Visit Diagnoses       Nocturia        Relevant Orders    Urine culture    Urinalysis    CBC Auto Differential    Basic Metabolic Panel    WALESKA (stress urinary incontinence, female)        Relevant Orders    Urine culture    Urinalysis    CBC Auto Differential    Basic Metabolic Panel    Pre-op testing        Relevant Orders    Urine culture    Urinalysis    CBC Auto Differential    Basic Metabolic Panel            Plan:           Jaycee was seen today for bladder pain and discuss surgery.    Diagnoses and all orders for this visit:    Interstitial cystitis  -     Urine culture  -     Urinalysis  -     CBC  Auto Differential; Future  -     Basic Metabolic Panel; Future  -     Case Request Operating Room: CYSTOSCOPY, WITH BLADDER HYDRODISTENSION  -     Vital Signs ; Standing  -     Notify physician ; Standing  -     Diet NPO; Standing  -     Place in Outpatient; Standing  -     Place QUIANA hose; Standing  -     Diet NPO    Chronic pelvic pain in female  -     Urine culture  -     Urinalysis  -     CBC Auto Differential; Future  -     Basic Metabolic Panel; Future  -     Case Request Operating Room: CYSTOSCOPY, WITH BLADDER HYDRODISTENSION  -     Vital Signs ; Standing  -     Notify physician ; Standing  -     Diet NPO; Standing  -     Place in Outpatient; Standing  -     Place QUIANA hose; Standing  -     Diet NPO    Suprapubic pressure  -     Urine culture  -     Urinalysis  -     CBC Auto Differential; Future  -     Basic Metabolic Panel; Future  -     Case Request Operating Room: CYSTOSCOPY, WITH BLADDER HYDRODISTENSION  -     Vital Signs ; Standing  -     Notify physician ; Standing  -     Diet NPO; Standing  -     Place in Outpatient; Standing  -     Place QUIANA hose; Standing  -     Diet NPO    Urinary frequency  -     Urine culture  -     Urinalysis  -     CBC Auto Differential; Future  -     Basic Metabolic Panel; Future  -     Case Request Operating Room: CYSTOSCOPY, WITH BLADDER HYDRODISTENSION  -     Vital Signs ; Standing  -     Notify physician ; Standing  -     Diet NPO; Standing  -     Place in Outpatient; Standing  -     Place QUIANA hose; Standing  -     Diet NPO    Urinary urgency  -     Urine culture  -     Urinalysis  -     CBC Auto Differential; Future  -     Basic Metabolic Panel; Future  -     Case Request Operating Room: CYSTOSCOPY, WITH BLADDER HYDRODISTENSION  -     Vital Signs ; Standing  -     Notify physician ; Standing  -     Diet NPO; Standing  -     Place in Outpatient; Standing  -     Place QUIANA hose; Standing  -     Diet NPO    Nocturia  -     Urine culture  -     Urinalysis  -     CBC Auto  Differential; Future  -     Basic Metabolic Panel; Future    WALESKA (stress urinary incontinence, female)  -     Urine culture  -     Urinalysis  -     CBC Auto Differential; Future  -     Basic Metabolic Panel; Future    Pre-op testing  -     Urine culture  -     Urinalysis  -     CBC Auto Differential; Future  -     Basic Metabolic Panel; Future    Other orders  -     0.9%  NaCl infusion  -     IP VTE LOW RISK PATIENT; Standing  -     cefTRIAXone (Rocephin) 1 g in D5W 100 mL IVPB (MB+)    Schedule patient for cystoscopy with bladder hydrodistension by Dr. Shelby.   Surgery team will call you to select surgery date and discuss pre-op instructions.   Pre-op labs needed (CBC, BMP, U/A, and urine cx).  Continue to avoid the use of blood thinners for 7 days prior to surgery to reduce risk of bleeding.   Surgery packet provided to patient.     Follow-up post-op.     Katie oRdriguez, DNP

## 2024-08-12 NOTE — PATIENT INSTRUCTIONS
Schedule patient for cystoscopy with bladder hydrodistension by Dr. Shelby.   Surgery team will call you to select surgery date and discuss pre-op instructions.   Pre-op labs needed (CBC, BMP, U/A, and urine cx).  Continue to avoid the use of blood thinners for 7 days prior to surgery to reduce risk of bleeding.   Surgery packet provided to patient.   Follow-up post-op.

## 2024-08-13 LAB — BACTERIA UR CULT: NORMAL

## 2024-08-23 NOTE — TELEPHONE ENCOUNTER
----- Message from Francy Campbell sent at 12/1/2020 10:04 AM CST -----  Regarding: call back  Contact: [phone]  Patient is calling to talk to nurse in regards to rescheduling her procedure. Please advice      ----- Message from Mayur Aguiar sent at 8/22/2024  4:14 PM EDT -----  Regarding: Needs 6mo FU appointment  She needs a 6-month follow-up appointment, please    Thank you    RT   Spontaneous, unlabored and symmetrical

## 2024-12-02 ENCOUNTER — OFFICE VISIT (OUTPATIENT)
Dept: UROLOGY | Facility: CLINIC | Age: 44
End: 2024-12-02
Payer: MEDICARE

## 2024-12-02 VITALS
BODY MASS INDEX: 37.32 KG/M2 | WEIGHT: 202.81 LBS | HEIGHT: 62 IN | SYSTOLIC BLOOD PRESSURE: 93 MMHG | HEART RATE: 68 BPM | DIASTOLIC BLOOD PRESSURE: 61 MMHG

## 2024-12-02 DIAGNOSIS — Z01.818 PRE-OP TESTING: ICD-10-CM

## 2024-12-02 DIAGNOSIS — R35.1 NOCTURIA: ICD-10-CM

## 2024-12-02 DIAGNOSIS — R30.0 STRANGURIA: ICD-10-CM

## 2024-12-02 DIAGNOSIS — N30.10 INTERSTITIAL CYSTITIS: Primary | ICD-10-CM

## 2024-12-02 DIAGNOSIS — R39.15 URINARY URGENCY: ICD-10-CM

## 2024-12-02 DIAGNOSIS — G89.29 CHRONIC PELVIC PAIN IN FEMALE: ICD-10-CM

## 2024-12-02 DIAGNOSIS — R35.0 URINARY FREQUENCY: ICD-10-CM

## 2024-12-02 DIAGNOSIS — R10.2 CHRONIC PELVIC PAIN IN FEMALE: ICD-10-CM

## 2024-12-02 PROCEDURE — 87086 URINE CULTURE/COLONY COUNT: CPT | Performed by: NURSE PRACTITIONER

## 2024-12-02 PROCEDURE — 3078F DIAST BP <80 MM HG: CPT | Mod: CPTII,S$GLB,, | Performed by: NURSE PRACTITIONER

## 2024-12-02 PROCEDURE — 99214 OFFICE O/P EST MOD 30 MIN: CPT | Mod: S$GLB,,, | Performed by: NURSE PRACTITIONER

## 2024-12-02 PROCEDURE — 1160F RVW MEDS BY RX/DR IN RCRD: CPT | Mod: CPTII,S$GLB,, | Performed by: NURSE PRACTITIONER

## 2024-12-02 PROCEDURE — 3074F SYST BP LT 130 MM HG: CPT | Mod: CPTII,S$GLB,, | Performed by: NURSE PRACTITIONER

## 2024-12-02 PROCEDURE — 99999 PR PBB SHADOW E&M-EST. PATIENT-LVL V: CPT | Mod: PBBFAC,,, | Performed by: NURSE PRACTITIONER

## 2024-12-02 PROCEDURE — 3008F BODY MASS INDEX DOCD: CPT | Mod: CPTII,S$GLB,, | Performed by: NURSE PRACTITIONER

## 2024-12-02 PROCEDURE — 1159F MED LIST DOCD IN RCRD: CPT | Mod: CPTII,S$GLB,, | Performed by: NURSE PRACTITIONER

## 2024-12-02 RX ORDER — TAMSULOSIN HYDROCHLORIDE 0.4 MG/1
0.4 CAPSULE ORAL
Qty: 90 CAPSULE | Refills: 0 | Status: SHIPPED | OUTPATIENT
Start: 2024-12-02

## 2024-12-02 RX ORDER — TAMSULOSIN HYDROCHLORIDE 0.4 MG/1
0.4 CAPSULE ORAL NIGHTLY
Qty: 30 CAPSULE | Refills: 0 | Status: SHIPPED | OUTPATIENT
Start: 2024-12-02 | End: 2024-12-02

## 2024-12-02 RX ORDER — SODIUM CHLORIDE 9 MG/ML
INJECTION, SOLUTION INTRAVENOUS CONTINUOUS
OUTPATIENT
Start: 2024-12-02

## 2024-12-02 NOTE — PROGRESS NOTES
Subjective:       Patient ID: Jaycee Gamboa is a 44 y.o. female.    Chief Complaint: Schedule Surgery    Patient is here today with c/o bladder pain and thinks she is having a IC flare-up. Patient would like to schedule bladder dilation to give her some relief. She has additional c/o suprapubic pressure, urinary frequency, urgency, and urinary incontinence. She reports changing her underwear 5 times daily. She is no longer taking tamsulosin. Patient is tried bladder hydrodistension in the past with success.     Pelvic Pain  The patient's primary symptoms include pelvic pain. This is a recurrent problem. The current episode started more than 1 year ago. The problem has been gradually worsening. The pain is severe. Associated symptoms include constipation, frequency, nausea and urgency. Pertinent negatives include no abdominal pain, chills, diarrhea, dysuria, flank pain, hematuria or vomiting. Nothing aggravates the symptoms. She has tried nothing for the symptoms. She is sexually active.     Review of Systems   Constitutional:  Positive for fatigue. Negative for chills.   Gastrointestinal:  Positive for change in bowel habit, constipation and nausea. Negative for abdominal pain, diarrhea and vomiting.   Genitourinary:  Positive for difficulty urinating (straining to void), frequency, nocturia (x3-4), pelvic pain and urgency. Negative for decreased urine volume, dysuria, flank pain and hematuria.   Psychiatric/Behavioral: Negative.           Objective:      Physical Exam  Vitals and nursing note reviewed.   Constitutional:       General: She is not in acute distress.     Appearance: She is well-developed. She is obese. She is not ill-appearing.   HENT:      Head: Normocephalic and atraumatic.   Eyes:      Pupils: Pupils are equal, round, and reactive to light.   Cardiovascular:      Rate and Rhythm: Normal rate.   Pulmonary:      Effort: Pulmonary effort is normal. No respiratory distress.   Abdominal:       Palpations: Abdomen is soft.      Tenderness: There is no abdominal tenderness.   Musculoskeletal:         General: Normal range of motion.      Cervical back: Normal range of motion.   Skin:     General: Skin is warm and dry.   Neurological:      Mental Status: She is alert and oriented to person, place, and time.      Coordination: Coordination normal.   Psychiatric:         Mood and Affect: Mood normal.         Behavior: Behavior normal.         Thought Content: Thought content normal.         Judgment: Judgment normal.         Assessment:       Problem List Items Addressed This Visit       Chronic pelvic pain in female    Relevant Orders    Urine culture    Interstitial cystitis - Primary    Relevant Orders    Urine culture    Urinary frequency    Relevant Orders    Urine culture    Urinary urgency    Relevant Orders    Urine culture     Other Visit Diagnoses       Nocturia        Relevant Orders    Urine culture    Pre-op testing        Relevant Orders    Urine culture    Stranguria        Relevant Medications    tamsulosin (FLOMAX) 0.4 mg Cap    Other Relevant Orders    Urine culture            Plan:           Jaycee was seen today for schedule surgery.    Diagnoses and all orders for this visit:    Interstitial cystitis  -     Urine culture  -     Case Request Operating Room: CYSTOSCOPY, WITH BLADDER HYDRODISTENSION  -     Vital Signs ; Standing  -     Notify physician ; Standing  -     Diet NPO; Standing  -     Place in Outpatient; Standing  -     Place QUIANA hose; Standing  -     Diet NPO    Chronic pelvic pain in female  -     Urine culture  -     Case Request Operating Room: CYSTOSCOPY, WITH BLADDER HYDRODISTENSION  -     Vital Signs ; Standing  -     Notify physician ; Standing  -     Diet NPO; Standing  -     Place in Outpatient; Standing  -     Place QUIANA hose; Standing  -     Diet NPO    Urinary frequency  -     Urine culture  -     Case Request Operating Room: CYSTOSCOPY, WITH BLADDER  HYDRODISTENSION  -     Vital Signs ; Standing  -     Notify physician ; Standing  -     Diet NPO; Standing  -     Place in Outpatient; Standing  -     Place QUIANA hose; Standing  -     Diet NPO    Urinary urgency  -     Urine culture  -     Case Request Operating Room: CYSTOSCOPY, WITH BLADDER HYDRODISTENSION  -     Vital Signs ; Standing  -     Notify physician ; Standing  -     Diet NPO; Standing  -     Place in Outpatient; Standing  -     Place QUIANA hose; Standing  -     Diet NPO    Nocturia  -     Urine culture  -     Case Request Operating Room: CYSTOSCOPY, WITH BLADDER HYDRODISTENSION  -     Vital Signs ; Standing  -     Notify physician ; Standing  -     Diet NPO; Standing  -     Place in Outpatient; Standing  -     Place QUIANA hose; Standing  -     Diet NPO    Pre-op testing  -     Urine culture    Stranguria  -     Urine culture  -     tamsulosin (FLOMAX) 0.4 mg Cap; Take 1 capsule (0.4 mg total) by mouth every evening.  -     Case Request Operating Room: CYSTOSCOPY, WITH BLADDER HYDRODISTENSION  -     Vital Signs ; Standing  -     Notify physician ; Standing  -     Diet NPO; Standing  -     Place in Outpatient; Standing  -     Place QUIANA hose; Standing  -     Diet NPO    Other orders  -     0.9% NaCl infusion  -     IP VTE LOW RISK PATIENT; Standing  -     cefTRIAXone (Rocephin) 1 g in D5W 100 mL IVPB (MB+)    Schedule patient for cystoscopy with bladder hydrodistension by Dr. Shelby.   Surgery team will call you to select surgery date and discuss pre-op instructions.   Pre-op lab needed (urine cx).  Continue to avoid the use of aspirin and aspirin containing products for 7 days prior to surgery to reduce risk of bleeding.   Surgery packet provided to patient.   Start back taking tamsulosin (Flomax) 0.4 mg nightly for stranguria.     Follow-up post-op.     Katie Rodriguez, DNP

## 2024-12-02 NOTE — PATIENT INSTRUCTIONS
Schedule patient for cystoscopy with bladder hydrodistension by Dr. Shelby.   Surgery team will call you to select surgery date and discuss pre-op instructions.   Pre-op lab needed (urine cx).  Continue to avoid the use of aspirin and aspirin containing products for 7 days prior to surgery to reduce risk of bleeding.   Surgery packet provided to patient.   Start back taking tamsulosin (Flomax) 0.4 mg nightly for stranguria.   Follow-up post-op.

## 2024-12-03 LAB — BACTERIA UR CULT: NO GROWTH

## 2024-12-12 PROBLEM — R35.1 NOCTURIA: Status: ACTIVE | Noted: 2024-12-12

## 2024-12-12 PROBLEM — R30.0 STRANGURIA: Status: ACTIVE | Noted: 2024-12-12

## 2025-03-12 ENCOUNTER — OFFICE VISIT (OUTPATIENT)
Dept: UROLOGY | Facility: CLINIC | Age: 45
End: 2025-03-12
Payer: MEDICAID

## 2025-03-12 VITALS
WEIGHT: 188.94 LBS | HEIGHT: 62 IN | TEMPERATURE: 98 F | DIASTOLIC BLOOD PRESSURE: 83 MMHG | HEART RATE: 80 BPM | SYSTOLIC BLOOD PRESSURE: 129 MMHG | BODY MASS INDEX: 34.77 KG/M2

## 2025-03-12 DIAGNOSIS — G89.29 CHRONIC PELVIC PAIN IN FEMALE: ICD-10-CM

## 2025-03-12 DIAGNOSIS — N30.10 INTERSTITIAL CYSTITIS: Primary | ICD-10-CM

## 2025-03-12 DIAGNOSIS — R10.2 CHRONIC PELVIC PAIN IN FEMALE: ICD-10-CM

## 2025-03-12 DIAGNOSIS — Z01.818 PRE-OP TESTING: ICD-10-CM

## 2025-03-12 DIAGNOSIS — R30.0 STRANGURIA: ICD-10-CM

## 2025-03-12 DIAGNOSIS — R10.2 SUPRAPUBIC PRESSURE: ICD-10-CM

## 2025-03-12 LAB
BILIRUB UR QL STRIP: NEGATIVE
CLARITY UR REFRACT.AUTO: CLEAR
COLOR UR AUTO: YELLOW
GLUCOSE UR QL STRIP: NEGATIVE
HGB UR QL STRIP: NEGATIVE
KETONES UR QL STRIP: ABNORMAL
LEUKOCYTE ESTERASE UR QL STRIP: NEGATIVE
NITRITE UR QL STRIP: NEGATIVE
PH UR STRIP: 6 [PH] (ref 5–8)
POC RESIDUAL URINE VOLUME: 0 ML (ref 0–100)
PROT UR QL STRIP: ABNORMAL
SP GR UR STRIP: 1.02 (ref 1–1.03)
URN SPEC COLLECT METH UR: ABNORMAL

## 2025-03-12 PROCEDURE — 1160F RVW MEDS BY RX/DR IN RCRD: CPT | Mod: CPTII,,, | Performed by: NURSE PRACTITIONER

## 2025-03-12 PROCEDURE — 99215 OFFICE O/P EST HI 40 MIN: CPT | Mod: PBBFAC,PO | Performed by: NURSE PRACTITIONER

## 2025-03-12 PROCEDURE — 99214 OFFICE O/P EST MOD 30 MIN: CPT | Mod: S$PBB,,, | Performed by: NURSE PRACTITIONER

## 2025-03-12 PROCEDURE — 99999 PR PBB SHADOW E&M-EST. PATIENT-LVL V: CPT | Mod: PBBFAC,,, | Performed by: NURSE PRACTITIONER

## 2025-03-12 PROCEDURE — 1159F MED LIST DOCD IN RCRD: CPT | Mod: CPTII,,, | Performed by: NURSE PRACTITIONER

## 2025-03-12 PROCEDURE — 99999PBSHW POCT BLADDER SCAN: Mod: PBBFAC,,,

## 2025-03-12 PROCEDURE — 3008F BODY MASS INDEX DOCD: CPT | Mod: CPTII,,, | Performed by: NURSE PRACTITIONER

## 2025-03-12 PROCEDURE — 81003 URINALYSIS AUTO W/O SCOPE: CPT | Performed by: NURSE PRACTITIONER

## 2025-03-12 PROCEDURE — 3074F SYST BP LT 130 MM HG: CPT | Mod: CPTII,,, | Performed by: NURSE PRACTITIONER

## 2025-03-12 PROCEDURE — 87086 URINE CULTURE/COLONY COUNT: CPT | Performed by: NURSE PRACTITIONER

## 2025-03-12 PROCEDURE — 3079F DIAST BP 80-89 MM HG: CPT | Mod: CPTII,,, | Performed by: NURSE PRACTITIONER

## 2025-03-12 PROCEDURE — 51798 US URINE CAPACITY MEASURE: CPT | Mod: PBBFAC,PO | Performed by: NURSE PRACTITIONER

## 2025-03-12 RX ORDER — SODIUM CHLORIDE 9 MG/ML
INJECTION, SOLUTION INTRAVENOUS CONTINUOUS
OUTPATIENT
Start: 2025-03-12

## 2025-03-12 RX ORDER — KETOROLAC TROMETHAMINE 10 MG/1
10 TABLET, FILM COATED ORAL EVERY 6 HOURS PRN
Qty: 20 TABLET | Refills: 0 | Status: SHIPPED | OUTPATIENT
Start: 2025-03-12 | End: 2025-03-17

## 2025-03-12 NOTE — PATIENT INSTRUCTIONS
Pre- and post-void bladder scan today  U/A and urine cx today  Start back taking tamsulosin (Flomax) 0.4 mg daily for stranguria.   Schedule patient for cystoscopy with bladder hydrodistension by Dr. Shelby.   Surgery team will call you to select surgery date and discuss pre-op instructions.   Pre-op labs needed (CBC, BMP, U/A, and urine cx).  Continue to avoid the use of aspirin and aspirin containing products for 7 days prior to surgery to reduce risk of bleeding.   Surgery packet provided to patient.   Follow-up post-op.

## 2025-03-12 NOTE — PROGRESS NOTES
Subjective:       Patient ID: Jaycee Gamboa is a 44 y.o. female.    Chief Complaint: Bladder Pain (Have not been able to use restroom in over 24 hours also stated that she has IBS flare up )    History of Present Illness    CHIEF COMPLAINT:  Patient presents today for urinary symptoms and possible IC flare-up.    URINARY SYMPTOMS:  She reports difficulty urinating for the past week with bladder pressure and heaviness. She describes a sensation of pelvic pressure and takes up to an hour to void sometimes. She reports a history of bladder obstruction 2 years ago. She denies bladder spasms. She previously took Tamsulosin but discontinued due to lack of efficacy a few months ago. Patient thinks her IC flare-up was triggered by her IBS-D flare-up that started a week prior.     GASTROINTESTINAL SYMPTOMS:  She reports a recent severe IBS flare with watery stools that resolved yesterday. She experiences prolonged time required for bowel movements. Previous endoscopic workup by Dr. Powell ruled out Crohn's disease and colitis, revealing only internal hemorrhoids. She reports nausea with frequent vomiting and difficulty maintaining oral intake during her IBS flare-up.      ROS:  General: -fever, -chills, -fatigue, -weight gain, -weight loss  Eyes: -vision changes, -redness, -discharge  ENT: -ear pain, -nasal congestion, -sore throat  Cardiovascular: -chest pain, -palpitations, -lower extremity edema  Respiratory: -cough, -shortness of breath  Gastrointestinal: -abdominal pain, +nausea, +vomiting, -diarrhea, -constipation, -blood in stool, +change in bowel habits  Genitourinary: -dysuria, -hematuria, +frequency, -urgency, +difficulty voiding  Musculoskeletal: -joint pain, -muscle pain  Skin: -rash, -lesion  Neurological: -headache, -dizziness, -numbness, -tingling  Psychiatric: -anxiety, -depression, -sleep difficulty           Objective:      Physical Exam  Vitals and nursing note reviewed.   Constitutional:        General: She is not in acute distress.     Appearance: She is well-developed. She is obese. She is not ill-appearing.   HENT:      Head: Normocephalic and atraumatic.   Eyes:      Pupils: Pupils are equal, round, and reactive to light.   Cardiovascular:      Rate and Rhythm: Normal rate.   Pulmonary:      Effort: Pulmonary effort is normal. No respiratory distress.   Abdominal:      Palpations: Abdomen is soft.      Tenderness: There is no abdominal tenderness.   Musculoskeletal:         General: Normal range of motion.      Cervical back: Normal range of motion.   Skin:     General: Skin is warm and dry.   Neurological:      Mental Status: She is alert and oriented to person, place, and time.      Coordination: Coordination normal.   Psychiatric:         Mood and Affect: Mood normal.         Behavior: Behavior normal.         Thought Content: Thought content normal.         Judgment: Judgment normal.         Assessment:       Problem List Items Addressed This Visit       Chronic pelvic pain in female    Relevant Medications    ketorolac (TORADOL) 10 mg tablet    Other Relevant Orders    POCT Bladder Scan (Completed)    Urinalysis    Urine Culture High Risk    Case Request Operating Room: CYSTOSCOPY, WITH BLADDER HYDRODISTENSION (Completed)    Diet NPO    Interstitial cystitis - Primary    Relevant Medications    ketorolac (TORADOL) 10 mg tablet    Other Relevant Orders    POCT Bladder Scan (Completed)    Urinalysis    Urine Culture High Risk    Case Request Operating Room: CYSTOSCOPY, WITH BLADDER HYDRODISTENSION (Completed)    Diet NPO    Suprapubic pressure    Relevant Orders    POCT Bladder Scan (Completed)    Urinalysis    Urine Culture High Risk    Case Request Operating Room: CYSTOSCOPY, WITH BLADDER HYDRODISTENSION (Completed)    Diet NPO    Stranguria    Relevant Orders    POCT Bladder Scan (Completed)    Urinalysis    Urine Culture High Risk    Case Request Operating Room: CYSTOSCOPY, WITH BLADDER  HYDRODISTENSION (Completed)    Diet NPO     Other Visit Diagnoses         Pre-op testing        Relevant Orders    CBC Auto Differential    Basic Metabolic Panel            Plan:           Jaycee was seen today for bladder pain.    Diagnoses and all orders for this visit:    Interstitial cystitis  -     POCT Bladder Scan  -     Urinalysis  -     Urine Culture High Risk  -     ketorolac (TORADOL) 10 mg tablet; Take 1 tablet (10 mg total) by mouth every 6 (six) hours as needed for Pain.  -     Case Request Operating Room: CYSTOSCOPY, WITH BLADDER HYDRODISTENSION  -     Vital Signs ; Standing  -     Notify physician ; Standing  -     Diet NPO; Standing  -     Place in Outpatient; Standing  -     Place QUIANA hose; Standing  -     Diet NPO    Chronic pelvic pain in female  -     POCT Bladder Scan  -     Urinalysis  -     Urine Culture High Risk  -     ketorolac (TORADOL) 10 mg tablet; Take 1 tablet (10 mg total) by mouth every 6 (six) hours as needed for Pain.  -     Case Request Operating Room: CYSTOSCOPY, WITH BLADDER HYDRODISTENSION  -     Vital Signs ; Standing  -     Notify physician ; Standing  -     Diet NPO; Standing  -     Place in Outpatient; Standing  -     Place QUIANA hose; Standing  -     Diet NPO    Suprapubic pressure  -     POCT Bladder Scan  -     Urinalysis  -     Urine Culture High Risk  -     Case Request Operating Room: CYSTOSCOPY, WITH BLADDER HYDRODISTENSION  -     Vital Signs ; Standing  -     Notify physician ; Standing  -     Diet NPO; Standing  -     Place in Outpatient; Standing  -     Place QUIANA hose; Standing  -     Diet NPO    Stranguria  -     POCT Bladder Scan  -     Urinalysis  -     Urine Culture High Risk  -     Case Request Operating Room: CYSTOSCOPY, WITH BLADDER HYDRODISTENSION  -     Vital Signs ; Standing  -     Notify physician ; Standing  -     Diet NPO; Standing  -     Place in Outpatient; Standing  -     Place QUIANA hose; Standing  -     Diet NPO    Pre-op testing  -     CBC Auto  Differential; Future  -     Basic Metabolic Panel; Future    Other orders  -     0.9% NaCl infusion  -     IP VTE LOW RISK PATIENT; Standing  -     cefTRIAXone (Rocephin) 1 g in D5W 100 mL IVPB (MB+)    Start back taking tamsulosin (Flomax) 0.4 mg daily for stranguria.   Schedule patient for cystoscopy with bladder hydrodistension by Dr. Shelby.   Surgery team will call you to select surgery date and discuss pre-op instructions.   Pre-op labs needed (CBC, BMP, U/A, and urine cx).  Continue to avoid the use of aspirin and aspirin containing products for 7 days prior to surgery to reduce risk of bleeding.   Surgery packet provided to patient.     Follow-up post-op.     This note was generated with the assistance of ambient listening technology. Verbal consent was obtained by the patient and accompanying visitor(s) for the recording of patient appointment to facilitate this note. I attest to having reviewed and edited the generated note for accuracy, though some syntax or spelling errors may persist. Please contact the author of this note for any clarification.      Katie Rodriguez, DNP

## 2025-03-13 LAB — BACTERIA UR CULT: NO GROWTH

## 2025-03-14 ENCOUNTER — RESULTS FOLLOW-UP (OUTPATIENT)
Dept: UROLOGY | Facility: CLINIC | Age: 45
End: 2025-03-14

## 2025-03-25 NOTE — OR NURSING
Pt resting quietly in bed, playing on phone and inserting various body piercings.  States her pain level is 9/10.  Dr. Cherry called.  Order for Percolone received.   The medication list included in this document is our record of what you are currently taking, including any changes that were made at today's visit.  If you find any differences when compared to your medications at home, or have any questions that were not answered at your visit, please contact the office.    Advise to have ( Fasting) lab test prior to next visit.

## 2025-08-19 ENCOUNTER — OFFICE VISIT (OUTPATIENT)
Dept: UROLOGY | Facility: CLINIC | Age: 45
End: 2025-08-19
Payer: MEDICARE

## 2025-08-19 VITALS
HEIGHT: 62 IN | DIASTOLIC BLOOD PRESSURE: 90 MMHG | OXYGEN SATURATION: 98 % | HEART RATE: 98 BPM | WEIGHT: 194.69 LBS | SYSTOLIC BLOOD PRESSURE: 140 MMHG | BODY MASS INDEX: 35.83 KG/M2

## 2025-08-19 DIAGNOSIS — R30.0 STRANGURIA: ICD-10-CM

## 2025-08-19 DIAGNOSIS — Z01.818 PRE-OP TESTING: ICD-10-CM

## 2025-08-19 DIAGNOSIS — G89.29 CHRONIC PELVIC PAIN IN FEMALE: ICD-10-CM

## 2025-08-19 DIAGNOSIS — R35.0 URINARY FREQUENCY: ICD-10-CM

## 2025-08-19 DIAGNOSIS — N30.10 INTERSTITIAL CYSTITIS: Primary | ICD-10-CM

## 2025-08-19 DIAGNOSIS — R10.2 CHRONIC PELVIC PAIN IN FEMALE: ICD-10-CM

## 2025-08-19 DIAGNOSIS — R10.2 SUPRAPUBIC PRESSURE: ICD-10-CM

## 2025-08-19 LAB
BACTERIA #/AREA URNS AUTO: NORMAL /HPF
BILIRUB UR QL STRIP.AUTO: NEGATIVE
CLARITY UR: ABNORMAL
COLOR UR AUTO: YELLOW
GLUCOSE UR QL STRIP: NEGATIVE
HGB UR QL STRIP: NEGATIVE
HYALINE CASTS UR QL AUTO: 0 /LPF (ref 0–1)
KETONES UR QL STRIP: NEGATIVE
LEUKOCYTE ESTERASE UR QL STRIP: NEGATIVE
MICROSCOPIC COMMENT: NORMAL
NITRITE UR QL STRIP: NEGATIVE
PH UR STRIP: 6 [PH]
PROT UR QL STRIP: ABNORMAL
RBC #/AREA URNS AUTO: 1 /HPF (ref 0–4)
SP GR UR STRIP: 1.02
SQUAMOUS #/AREA URNS AUTO: 3 /HPF
UROBILINOGEN UR STRIP-ACNC: NEGATIVE EU/DL
WBC #/AREA URNS AUTO: 1 /HPF (ref 0–5)
YEAST UR QL AUTO: NORMAL /HPF

## 2025-08-19 PROCEDURE — 3077F SYST BP >= 140 MM HG: CPT | Mod: CPTII,S$GLB,, | Performed by: NURSE PRACTITIONER

## 2025-08-19 PROCEDURE — 1160F RVW MEDS BY RX/DR IN RCRD: CPT | Mod: CPTII,S$GLB,, | Performed by: NURSE PRACTITIONER

## 2025-08-19 PROCEDURE — 3008F BODY MASS INDEX DOCD: CPT | Mod: CPTII,S$GLB,, | Performed by: NURSE PRACTITIONER

## 2025-08-19 PROCEDURE — 87086 URINE CULTURE/COLONY COUNT: CPT | Performed by: NURSE PRACTITIONER

## 2025-08-19 PROCEDURE — 1159F MED LIST DOCD IN RCRD: CPT | Mod: CPTII,S$GLB,, | Performed by: NURSE PRACTITIONER

## 2025-08-19 PROCEDURE — 3080F DIAST BP >= 90 MM HG: CPT | Mod: CPTII,S$GLB,, | Performed by: NURSE PRACTITIONER

## 2025-08-19 PROCEDURE — 99214 OFFICE O/P EST MOD 30 MIN: CPT | Mod: S$GLB,,, | Performed by: NURSE PRACTITIONER

## 2025-08-19 PROCEDURE — 81003 URINALYSIS AUTO W/O SCOPE: CPT | Performed by: NURSE PRACTITIONER

## 2025-08-19 PROCEDURE — 99999 PR PBB SHADOW E&M-EST. PATIENT-LVL V: CPT | Mod: PBBFAC,,, | Performed by: NURSE PRACTITIONER

## 2025-08-19 RX ORDER — PHENAZOPYRIDINE HYDROCHLORIDE 200 MG/1
200 TABLET, FILM COATED ORAL 3 TIMES DAILY PRN
Qty: 9 TABLET | Refills: 0 | Status: SHIPPED | OUTPATIENT
Start: 2025-08-19 | End: 2025-08-22

## 2025-08-19 RX ORDER — TOPIRAMATE 25 MG/1
25 TABLET, FILM COATED ORAL 2 TIMES DAILY
COMMUNITY
Start: 2025-04-26

## 2025-08-19 RX ORDER — DICLOFENAC SODIUM 10 MG/G
4 GEL TOPICAL
COMMUNITY
Start: 2025-03-25

## 2025-08-19 RX ORDER — SODIUM CHLORIDE 9 MG/ML
INJECTION, SOLUTION INTRAVENOUS CONTINUOUS
OUTPATIENT
Start: 2025-08-19

## 2025-08-19 RX ORDER — HYDROXYZINE HYDROCHLORIDE 50 MG/1
50 TABLET, FILM COATED ORAL 3 TIMES DAILY
COMMUNITY
Start: 2025-05-14

## 2025-08-21 LAB — BACTERIA UR CULT: NO GROWTH

## 2025-08-27 ENCOUNTER — TELEPHONE (OUTPATIENT)
Dept: UROLOGY | Facility: CLINIC | Age: 45
End: 2025-08-27
Payer: MEDICARE

## (undated) DEVICE — SCREW TITANIUM LOK 2.7X14MM
Type: IMPLANTABLE DEVICE | Site: ARM | Status: NON-FUNCTIONAL
Removed: 2022-04-11

## (undated) DEVICE — SCISSOR 5MMX35CM DIRECT DRIVE

## (undated) DEVICE — SEE MEDLINE ITEM 152487

## (undated) DEVICE — DRESSING XEROFORM 1X8IN

## (undated) DEVICE — SEE MEDLINE ITEM 157117

## (undated) DEVICE — GUIDEWIRE ORTHO 1.6X150MM
Type: IMPLANTABLE DEVICE | Site: ARM | Status: NON-FUNCTIONAL
Removed: 2022-04-11

## (undated) DEVICE — APPLICATOR CHLORAPREP ORN 26ML

## (undated) DEVICE — GUIDE DRILL AO 2.6X30MM

## (undated) DEVICE — GUIDE DRILL AO 2.6X70MM

## (undated) DEVICE — SEE MEDLINE ITEM 157148

## (undated) DEVICE — BIT DRILL OVERDRILL AO 2.7MM

## (undated) DEVICE — LUBRICANT SURGILUBE 2 OZ

## (undated) DEVICE — GAUZE SPONGE 4X4 12PLY

## (undated) DEVICE — TROCAR ENDO KII FIOS 12X100MM

## (undated) DEVICE — SUT CTD VICRYL 3-0 VIL BR

## (undated) DEVICE — SEE MEDLINE ITEM 146417

## (undated) DEVICE — DRAPE PLASTIC U 60X72

## (undated) DEVICE — BNDG COFLEX FOAM LF2 ST 4X5YD

## (undated) DEVICE — DRAPE C-ARMOR EQUIPMENT COVER

## (undated) DEVICE — TRAY MINOR GEN SURG

## (undated) DEVICE — SEE MEDLINE ITEM 157166

## (undated) DEVICE — BLADE SURG #15 CARBON STEEL

## (undated) DEVICE — PANTIES FEMININE NAPKIN LG/XLG

## (undated) DEVICE — PACK LAPAROSCOPY BAPTIST

## (undated) DEVICE — PREP KIT 14

## (undated) DEVICE — CONTAINER SPECIMEN STRL 4OZ

## (undated) DEVICE — SYS CLSR DERMABOND PRINEO 22CM

## (undated) DEVICE — PORT ACCESS 5MM W/120MM

## (undated) DEVICE — SOL NS 1000CC

## (undated) DEVICE — NDL 22GA X1 1/2 REG BEVEL

## (undated) DEVICE — SOL CLEARIFY VISUALIZATION LAP

## (undated) DEVICE — ELECTRODE REM PLYHSV RETURN 9

## (undated) DEVICE — GUIDEWIRE FIXOS UNTHRD 2.0X150
Type: IMPLANTABLE DEVICE | Site: ARM | Status: NON-FUNCTIONAL
Removed: 2022-04-11

## (undated) DEVICE — TOWEL OR DISP STRL BLUE 4/PK

## (undated) DEVICE — TROCAR KII FIOS 5MM X 100MM

## (undated) DEVICE — TIP YANKAUERS BULB NO VENT

## (undated) DEVICE — SCALPEL #11 BLADE STRL DISP

## (undated) DEVICE — HEMOSTAT SURGICEL PWD 3G

## (undated) DEVICE — CATH SUCTION 10FR

## (undated) DEVICE — SYR ONLY LUER LOCK 20CC

## (undated) DEVICE — BLADE NARROW LONG 29.5MMX7.0MM

## (undated) DEVICE — GAUZE SPONGE 4X4 12 PLY NS

## (undated) DEVICE — CONTAINER SPECIMEN OR STER 4OZ

## (undated) DEVICE — KIT WING PAD POSITIONING

## (undated) DEVICE — GLOVE BIOGEL SKINSENSE PI 6.5

## (undated) DEVICE — BIT DRILL AO 3.5X122MM

## (undated) DEVICE — DRESSING TRANS 4X4 3/4

## (undated) DEVICE — TAPE SILK 3IN

## (undated) DEVICE — SEE MEDLINE ITEM 152622

## (undated) DEVICE — NDL INSUF ULTRA VERESS 120MM

## (undated) DEVICE — BANDAGE ELAS SOFTWRAP ST 4X5YD

## (undated) DEVICE — DRAPE STERI U-SHAPED 47X51IN

## (undated) DEVICE — APPLICATOR SURGICEL ENDOSCOPIC

## (undated) DEVICE — SPONGE LAP 18X18 PREWASHED

## (undated) DEVICE — SEE MEDLINE ITEM 157150

## (undated) DEVICE — DRAPE C ARM 42 X 120 10/BX

## (undated) DEVICE — SEE MEDLINE ITEM 154981

## (undated) DEVICE — Device

## (undated) DEVICE — SOL 9P NACL IRR PIC IL

## (undated) DEVICE — PAD PREP 50/CA

## (undated) DEVICE — SOL PVP-I SCRUB 7.5% 4OZ

## (undated) DEVICE — SUT MONOCRYL 3-0 PS-2 UND

## (undated) DEVICE — SCALPEL #15 BLADE STRL DISP.

## (undated) DEVICE — DRESSING TELFA N ADH 3X8

## (undated) DEVICE — NDL INSUFFLATION VERRES 120MM

## (undated) DEVICE — SUT MCRYL PLUS 4-0 PS2 27IN

## (undated) DEVICE — PAD CAST SPECIALIST STRL 4

## (undated) DEVICE — 2.0 DRILL

## (undated) DEVICE — DRAPE INCISE IOBAN 2 23X17IN

## (undated) DEVICE — SOL BETADINE 5%

## (undated) DEVICE — SUT VICRYL PLUS 0 CT1 18IN

## (undated) DEVICE — DRESSING AQUACEL AG RBBN 2X45

## (undated) DEVICE — SYS SEE SHARP SCOPE ANTIFOG

## (undated) DEVICE — SUT VICRYL PLUS 2-0 CT1 18

## (undated) DEVICE — MASK FLYTE HOOD PEEL AWAY

## (undated) DEVICE — SEALER LIGASURE CRV 18.8CM

## (undated) DEVICE — BIT DRILL AO 2X135MM SCALED

## (undated) DEVICE — TRAY MINOR ORTHO

## (undated) DEVICE — SEE MEDLINE ITEM 152543

## (undated) DEVICE — SEE L#120831

## (undated) DEVICE — TAPE SURG DURAPORE 2 X10YD

## (undated) DEVICE — SUT VICRYL PLUS 3-0 SH 18IN

## (undated) DEVICE — TOURNIQUET HEMACLEAR MEDIUM